# Patient Record
Sex: MALE | Race: WHITE | NOT HISPANIC OR LATINO
[De-identification: names, ages, dates, MRNs, and addresses within clinical notes are randomized per-mention and may not be internally consistent; named-entity substitution may affect disease eponyms.]

---

## 2023-03-07 ENCOUNTER — LABORATORY RESULT (OUTPATIENT)
Age: 63
End: 2023-03-07

## 2023-03-07 ENCOUNTER — NON-APPOINTMENT (OUTPATIENT)
Age: 63
End: 2023-03-07

## 2023-03-07 ENCOUNTER — APPOINTMENT (OUTPATIENT)
Dept: HEART AND VASCULAR | Facility: CLINIC | Age: 63
End: 2023-03-07
Payer: COMMERCIAL

## 2023-03-07 VITALS
BODY MASS INDEX: 30.92 KG/M2 | WEIGHT: 204 LBS | HEIGHT: 68 IN | RESPIRATION RATE: 14 BRPM | HEART RATE: 75 BPM | DIASTOLIC BLOOD PRESSURE: 62 MMHG | SYSTOLIC BLOOD PRESSURE: 100 MMHG

## 2023-03-07 DIAGNOSIS — Z82.49 FAMILY HISTORY OF ISCHEMIC HEART DISEASE AND OTHER DISEASES OF THE CIRCULATORY SYSTEM: ICD-10-CM

## 2023-03-07 DIAGNOSIS — Z87.891 PERSONAL HISTORY OF NICOTINE DEPENDENCE: ICD-10-CM

## 2023-03-07 DIAGNOSIS — Z87.19 PERSONAL HISTORY OF OTHER DISEASES OF THE DIGESTIVE SYSTEM: ICD-10-CM

## 2023-03-07 DIAGNOSIS — Z87.898 PERSONAL HISTORY OF OTHER SPECIFIED CONDITIONS: ICD-10-CM

## 2023-03-07 DIAGNOSIS — Z00.00 ENCOUNTER FOR GENERAL ADULT MEDICAL EXAMINATION W/OUT ABNORMAL FINDINGS: ICD-10-CM

## 2023-03-07 PROCEDURE — 36415 COLL VENOUS BLD VENIPUNCTURE: CPT

## 2023-03-07 PROCEDURE — 93306 TTE W/DOPPLER COMPLETE: CPT

## 2023-03-07 PROCEDURE — 93000 ELECTROCARDIOGRAM COMPLETE: CPT

## 2023-03-07 PROCEDURE — 99204 OFFICE O/P NEW MOD 45 MIN: CPT | Mod: 25

## 2023-03-07 RX ORDER — CARVEDILOL 6.25 MG/1
6.25 TABLET, FILM COATED ORAL TWICE DAILY
Refills: 0 | Status: DISCONTINUED | COMMUNITY
End: 2023-03-07

## 2023-03-07 NOTE — HISTORY OF PRESENT ILLNESS
[FreeTextEntry1] : Pt reports going to [facility] w major UGIB requiring PRBC's. He was experiencing chest pain due to the low H/H and was found to have esophageal varices (which were partially banded) (?status of liver?), and TVD by cardiac cath. Before leaving the hospital. pt. was told he needed bypass surgery. He comes in today for second opinion and management of cardiac disease. Pt also reports not drinking for past 2 weeks since discharge.\par Cardiac Murmur / CAD - Echo ordered to assess LV function/possible valvular heart disease.

## 2023-03-07 NOTE — END OF VISIT
[FreeTextEntry3] : All medical record entries made by the Scribe were at my, Dr. Drew Box, direction and personally dictated by me on 03/07/2023. I have reviewed the chart and agree that the record accurately reflects my personal performance of the history, physical exam, assessment and plan. I have also personally directed, reviewed, and agreed with the chart.  [Time Spent: ___ minutes] : I have spent [unfilled] minutes of time on the encounter.

## 2023-03-07 NOTE — DISCUSSION/SUMMARY
[Coronary Artery Disease] : coronary artery disease [Stable] : stable [None] : There are no changes in medication management [Dietary Modification] : dietary modification [Patient] : discussed with the patient [FreeTextEntry1] : H/o esophageal varices- GI referral given for repeat EGD post banding procedure at hospital.\par CAD- stable at present. Medication adjusted. Will attempt to obtain cardiac cath report. Normal LV fxn by echo done today.\par Cardiac Murmur- Stable; no further intervention at this time (see echo). \par Blood work drawn. Maintain a low caloric, low sodium, low cholesterol diet. Dietary counseling given, diet and exercise discussed in detail.

## 2023-03-07 NOTE — PHYSICAL EXAM

## 2023-03-08 LAB
ALBUMIN SERPL ELPH-MCNC: 3.6 G/DL
ALP BLD-CCNC: 161 U/L
ALT SERPL-CCNC: 37 U/L
ANION GAP SERPL CALC-SCNC: 12 MMOL/L
AST SERPL-CCNC: 81 U/L
BASOPHILS # BLD AUTO: 0.07 K/UL
BASOPHILS NFR BLD AUTO: 0.9 %
BILIRUB SERPL-MCNC: 2.2 MG/DL
BUN SERPL-MCNC: 10 MG/DL
CALCIUM SERPL-MCNC: 9.2 MG/DL
CHLORIDE SERPL-SCNC: 105 MMOL/L
CHOLEST SERPL-MCNC: 128 MG/DL
CO2 SERPL-SCNC: 22 MMOL/L
CREAT SERPL-MCNC: 1.23 MG/DL
EGFR: 66 ML/MIN/1.73M2
EOSINOPHIL # BLD AUTO: 0.21 K/UL
EOSINOPHIL NFR BLD AUTO: 2.6 %
ESTIMATED AVERAGE GLUCOSE: 134 MG/DL
GLUCOSE SERPL-MCNC: 130 MG/DL
HBA1C MFR BLD HPLC: 6.3 %
HCT VFR BLD CALC: 28.5 %
HDLC SERPL-MCNC: 26 MG/DL
HGB BLD-MCNC: 9.8 G/DL
IMM GRANULOCYTES NFR BLD AUTO: 0.2 %
INR PPP: 1.78 RATIO
LDLC SERPL CALC-MCNC: 86 MG/DL
LYMPHOCYTES # BLD AUTO: 1.19 K/UL
LYMPHOCYTES NFR BLD AUTO: 14.7 %
MAN DIFF?: NORMAL
MCHC RBC-ENTMCNC: 30.2 PG
MCHC RBC-ENTMCNC: 34.4 GM/DL
MCV RBC AUTO: 87.7 FL
MONOCYTES # BLD AUTO: 1.01 K/UL
MONOCYTES NFR BLD AUTO: 12.5 %
NEUTROPHILS # BLD AUTO: 5.58 K/UL
NEUTROPHILS NFR BLD AUTO: 69.1 %
NONHDLC SERPL-MCNC: 102 MG/DL
PLATELET # BLD AUTO: 199 K/UL
POTASSIUM SERPL-SCNC: 4.4 MMOL/L
PROT SERPL-MCNC: 8.1 G/DL
PT BLD: 21.1 SEC
RBC # BLD: 3.25 M/UL
RBC # FLD: 17.2 %
SODIUM SERPL-SCNC: 139 MMOL/L
T3 SERPL-MCNC: 99 NG/DL
T3FREE SERPL-MCNC: 2.21 PG/ML
T3RU NFR SERPL: 0.9 TBI
T4 FREE SERPL-MCNC: 1.3 NG/DL
T4 SERPL-MCNC: 9.7 UG/DL
TRIGL SERPL-MCNC: 80 MG/DL
TSH SERPL-ACNC: 4.91 UIU/ML
WBC # FLD AUTO: 8.08 K/UL

## 2023-03-15 ENCOUNTER — APPOINTMENT (OUTPATIENT)
Dept: HEPATOLOGY | Facility: CLINIC | Age: 63
End: 2023-03-15
Payer: COMMERCIAL

## 2023-03-15 VITALS
SYSTOLIC BLOOD PRESSURE: 93 MMHG | BODY MASS INDEX: 31.22 KG/M2 | TEMPERATURE: 98.3 F | HEIGHT: 68 IN | OXYGEN SATURATION: 100 % | HEART RATE: 70 BPM | WEIGHT: 206 LBS | DIASTOLIC BLOOD PRESSURE: 65 MMHG

## 2023-03-15 PROCEDURE — 99205 OFFICE O/P NEW HI 60 MIN: CPT

## 2023-03-15 NOTE — END OF VISIT
[FreeTextEntry3] : Attending note \par I have seen and examined patient at bedside. I agree with hx, ROS, physical examination, imp/suggestions as written by Ms. Kayce Hong NP. Please see my note.\par \par Patient presented to [facility] with chest pain.  He underwent cardiac catheterization which was reportedly showed three-vessel disease.  He was found to be very anemic and he had black his stool.  He underwent upper endoscopy and banding of esophageal varices.  He left the CD which we are going to upload into our system.\par This is a case of alcohol associated liver disease with cirrhosis, portal hypertension and upper GI bleeding related to varices.\par Chest pain could have been due to demand ischemia in the context of significant coronary artery disease.  He was recommended to undergo CABG because of inability to tolerate antiplatelet agents.  He carries a history of aspirin allergy.\par Importance of alcohol cessation was discussed with him in details.  I informed him that at this point, Which carries significant risk of mortality and morbidity for him.  It would be reasonable to stop drinking alcohol which would help improving the liver function.  Subsequently, he might be a better candidate for treatment of coronary artery disease.\par An EGD will be arranged to assess the esophageal varices\par Low-salt diet was discussed with him\par He is on Lopressor or as a beta-blocker.  This is a cardioselective medication and I would like to have him on a noncardioselective beta-blocker such as Coreg which would help both his cardiac disease and portal hypertension.\par Apparently, he carries a history of hepatitis C.  Recent set of labs to assess this.\par Importance of HCC screening was discussed with him in details

## 2023-03-15 NOTE — ASSESSMENT
[FreeTextEntry1] : \par 63 M with hx CAD,  ARLD with portal htn (EV and GIB s/p banding), here for hep f/u.\par \par labs today\par HCC screening, will request imaging done at [facility]\par etoh - encouraged alc cessation\par needs repeat EGD, to eradicate varices, to scd (asap, next week?)\par reassess heart after liver stablized\par CAD, cont f/u with cardiologist dr. Box, clarify beta blockade. \par \par \par RTC 3 weeks\par \par

## 2023-03-15 NOTE — HISTORY OF PRESENT ILLNESS
[de-identified] : 62 yo [place] ([facility]) M with hx CAD, rectal bleeding, LIZ, gastric ulcer, gout, alcoholic liver disease with elev LFTs (TB 2.2, ) and recent admission 2023 [facility] for chest pain (found to have TVD and referred to Card) and UGIB requiring PRBC's, EGD and banding,  Referred by cardiologist  for GI/hep f/u given EVB abd GIB and need for repeat EGD\par \par no drink since hosp discharge late 2023\par Background\par Pt was experiencing chest pain due to the low H/H and was found to have esophageal varices (which were partially banded) (?status of liver?), and tricuspid valve disease (TVD) by cardiac cath. Before leaving the hospital pt was told he needed bypass surgery. Pt reports not drinking for past 2 weeks since discharge.\par \par PMH\par HCV (per report)\par HTN\par per pt - mini stroke ()\par rectal bleeding, started in January (dark stools)\par \par FAM HX\par no liver disease\par mother with ' white blood' cancer ()\par Father, heart disease, open heart surgery, aliver\par \par STUDIES\par \par TTE  3/7/23\par EF 55-60%\par 1. The left ventricular cavity is normal in size. The left ventricular wall thickness is normal. The left ventricular systolic function is normal with an ejection fraction visually estimated at 55 to 60 %. There are no regional wall motion abnormalities seen.\par 2. Mild to moderate mitral regurgitation.\par 3. 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 \par former smoker, smoked 2 -3 pk/day for 40 yrs, quit  after stroke\par Etoh, 'a lot', drank vodka 1-2 day for 4 yrs ago after lost job, prior to that drinking couple pints weekends, stopped 3 weeks ago after hospitalization\par no herbals/no illicit drugs\par Covid vaccination, never rec'd - declined\par Covid infection - 2019, mild case\par \par Surgical Hx\par none\par \par EGD 23\par z line regular\par medium HH\par G3EV, incompletely eradicated, banded\par gstritis\par gastric ulcer with no stigmate of bleeding\par normal duodenal bulb\par \par LABS  3/7/23\par INR 1.78\par Hb 9.8\par PLTs 199\par TB 2.3\par AST 81\par ALT 37\par \par SCr 1.23\par \par \par INTERVAL HX\par feels 'dizzy sometimes'  lost a lot of weight in hospital\par denies abdl pain, NVD, CP, SOB, HA, fevers\par occasional chills in evening\par BM daily, last rectal bleeding 2023\par per wife, sometimes with forgetful\par L shoulder pain (seen by cardiologist)\par \par MEDICATIONS\par  · Allopurinol 100 MG daily\par  · Atorvastatin Calcium 80 MG nightly\par  · Ezetimibe 10 MG bedtime\par  · Folic Acid 1 MG daily\par  · Metoprolol Tartrate 50 MG bid\par  · Olmesartan-amLODIPine-HCTZ 40-10-12.5 MG daily\par  · Pantoprazole Sodium 40 MG Oral Tablet Delayed Release Bid\par  · Vitamin D 36571 UNIT CAPS; TAKE 1 CAPSULE WEEKLY

## 2023-03-15 NOTE — PHYSICAL EXAM
[General Appearance - Alert] : alert [General Appearance - In No Acute Distress] : in no acute distress [PERRL With Normal Accommodation] : pupils were equal in size, round, and reactive to light [Auscultation Breath Sounds / Voice Sounds] : lungs were clear to auscultation bilaterally [Heart Rate And Rhythm] : heart rate was normal and rhythm regular [Edema] : there was no peripheral edema [Abdomen Soft] : soft [Abdomen Tenderness] : non-tender [Abnormal Walk] : normal gait [Nail Clubbing] : no clubbing  or cyanosis of the fingernails [] : no rash [Oriented To Time, Place, And Person] : oriented to person, place, and time [Scleral Icterus] : No Scleral Icterus [Spider Angioma] : No spider angioma(s) were observed [Asterixis] : no asterixis observed [Jaundice] : No jaundice [FreeTextEntry1] : +murmur

## 2023-03-16 ENCOUNTER — LABORATORY RESULT (OUTPATIENT)
Age: 63
End: 2023-03-16

## 2023-03-21 LAB
AFP-TM SERPL-MCNC: 2.2 NG/ML
ALBUMIN SERPL ELPH-MCNC: 3.3 G/DL
ALP BLD-CCNC: 216 U/L
ALT SERPL-CCNC: 29 U/L
ANION GAP SERPL CALC-SCNC: 15 MMOL/L
AST SERPL-CCNC: 69 U/L
BASOPHILS # BLD AUTO: 0.21 K/UL
BASOPHILS NFR BLD AUTO: 2.5 %
BILIRUB SERPL-MCNC: 1.7 MG/DL
BUN SERPL-MCNC: 13 MG/DL
CALCIUM SERPL-MCNC: 9.1 MG/DL
CHLORIDE SERPL-SCNC: 105 MMOL/L
CK BB SERPL ELPH-CCNC: 0 %
CK MB CFR SERPL ELPH: 0 %
CK MM SERPL ELPH-CCNC: 100 %
CO2 SERPL-SCNC: 22 MMOL/L
CREAT SERPL-MCNC: 1.36 MG/DL
CREATINE KINASE,TOTAL,SERUM: 92 U/L
EGFR: 58 ML/MIN/1.73M2
EOSINOPHIL # BLD AUTO: 0.07 K/UL
EOSINOPHIL NFR BLD AUTO: 0.8 %
FERRITIN SERPL-MCNC: 39 NG/ML
GLUCOSE SERPL-MCNC: 170 MG/DL
HBV CORE IGG+IGM SER QL: REACTIVE
HBV SURFACE AB SERPL IA-ACNC: 11.4 MIU/ML
HBV SURFACE AG SER QL: NONREACTIVE
HCT VFR BLD CALC: 29.5 %
HCV AB SER QL: NONREACTIVE
HCV RNA SERPL NAA+PROBE-LOG IU: NOT DETECTED LOGIU/ML
HCV S/CO RATIO: 0.18 S/CO
HEPATITIS A IGG ANTIBODY: REACTIVE
HEPATITIS A IGG ANTIBODY: REACTIVE
HEPC RNA INTERP: NOT DETECTED
HGB BLD-MCNC: 9.5 G/DL
IRON SATN MFR SERPL: 8 %
IRON SERPL-MCNC: 27 UG/DL
LYMPHOCYTES # BLD AUTO: 1.23 K/UL
LYMPHOCYTES NFR BLD AUTO: 14.3 %
MACRO TYPE 1: 0 %
MACRO TYPE 2: 0 %
MAN DIFF?: NORMAL
MCHC RBC-ENTMCNC: 27.8 PG
MCHC RBC-ENTMCNC: 32.2 GM/DL
MCV RBC AUTO: 86.3 FL
MONOCYTES # BLD AUTO: 0.8 K/UL
MONOCYTES NFR BLD AUTO: 9.3 %
NEUTROPHILS # BLD AUTO: 6.27 K/UL
NEUTROPHILS NFR BLD AUTO: 73.1 %
PLATELET # BLD AUTO: 159 K/UL
POTASSIUM SERPL-SCNC: 3.6 MMOL/L
PROT SERPL-MCNC: 7.8 G/DL
RBC # BLD: 3.42 M/UL
RBC # FLD: 17.8 %
SODIUM SERPL-SCNC: 142 MMOL/L
TIBC SERPL-MCNC: 329 UG/DL
UIBC SERPL-MCNC: 303 UG/DL
WBC # FLD AUTO: 8.58 K/UL

## 2023-03-23 LAB — HCV GENTYP BLD NAA+PROBE: NOT DETECTED

## 2023-03-29 ENCOUNTER — APPOINTMENT (OUTPATIENT)
Dept: MRI IMAGING | Facility: CLINIC | Age: 63
End: 2023-03-29
Payer: COMMERCIAL

## 2023-03-29 ENCOUNTER — APPOINTMENT (OUTPATIENT)
Dept: HEPATOLOGY | Facility: CLINIC | Age: 63
End: 2023-03-29
Payer: COMMERCIAL

## 2023-03-29 VITALS
WEIGHT: 208 LBS | OXYGEN SATURATION: 100 % | TEMPERATURE: 98.4 F | DIASTOLIC BLOOD PRESSURE: 80 MMHG | BODY MASS INDEX: 31.52 KG/M2 | HEART RATE: 62 BPM | SYSTOLIC BLOOD PRESSURE: 116 MMHG | HEIGHT: 68 IN

## 2023-03-29 PROCEDURE — 74183 MRI ABD W/O CNTR FLWD CNTR: CPT

## 2023-03-29 PROCEDURE — 99214 OFFICE O/P EST MOD 30 MIN: CPT

## 2023-03-29 PROCEDURE — A9585: CPT | Mod: JW

## 2023-03-29 NOTE — ASSESSMENT
[FreeTextEntry1] : \par 63 M with hx CAD, ARLD with cirrhosis cb portal htn (EV and GIB s/p banding), here for hep f/u.\par \par labs reviewed, trending down LFTs, TB down 1.7 (2.2) with rising AP.216 (161), repeat labs next week\par HCC screening, will request imaging done at [facility], [place] obtained, team to scd\par Portal HTN\par - Ascites - none, rec low Na diet \par - EV - on beta blockade - currently on Lopressor?  taking Coreg (need to confirm with cards), need card clearance prior to EGD\par - HE, mild confusion, start Lactulose, start slow, tsp to tbs, monitor, slowly ttitrate to 2-3bm daily, avoid driving\par low Fe 27 and Iron Sat 8%, fatigue, stable Hg 9.5, start Fe after endoscopy, started MVI\par ETOH - encouraged alc cessation\par repeat EGD, to eradicate varices, to scd (asap, scheduled 4/26\par reassess heart after liver stabilized\par CAD, cont f/u with cardiologist dr. Box, clarify beta blockade. \par \par RTC  1 month\par

## 2023-03-29 NOTE — PHYSICAL EXAM
[General Appearance - Alert] : alert [General Appearance - In No Acute Distress] : in no acute distress [PERRL With Normal Accommodation] : pupils were equal in size, round, and reactive to light [Heart Rate And Rhythm] : heart rate was normal and rhythm regular [Murmurs] : no murmurs [Abdomen Soft] : soft [Abdomen Mass (___ Cm)] : no abdominal mass palpated [Abnormal Walk] : normal gait [] : no rash [Skin Lesions] : no skin lesions [Oriented To Time, Place, And Person] : oriented to person, place, and time [Asterixis] : Asterixis observed [Scleral Icterus] : No Scleral Icterus [Spider Angioma] : No spider angioma(s) were observed [Jaundice] : No jaundice

## 2023-03-29 NOTE — HISTORY OF PRESENT ILLNESS
[de-identified] : \par 64 yo [place] ([facility]) M with hx CAD, rectal bleeding, LIZ, gastric ulcer, gout, alcoholic liver disease with elev LFTs (TB 2.2, ) and recent admission 2023 [facility] for chest pain (found to have TVD and referred to Card) and UGIB requiring PRBC's, EGD and banding, Referred by cardiologist  for GI/hep f/u given EVB abd GIB and need for repeat EGD\par \par Accompanied by wife and daughter\par \par no drink since hosp discharge late 2023\par Background\par Pt was experiencing chest pain due to the low H/H and was found to have esophageal varices (which were partially banded) (?status of liver?), and tricuspid valve disease (TVD) by cardiac cath. Before leaving the hospital pt was told he needed bypass surgery. Pt reports not drinking for past 2 weeks since discharge.\par \par PMH\par HCV (per report)\par HTN\par per pt - mini stroke ()\par rectal bleeding, started in January (dark stools)\par \par FAM HX\par no liver disease\par mother with ' white blood' cancer ()\par Father, heart disease, open heart surgery, aliver\par \par STUDIES\par \par TTE 3/7/23\par EF 55-60%\par 1. The left ventricular cavity is normal in size. The left ventricular wall thickness is normal. The left ventricular systolic function is normal with an ejection fraction visually estimated at 55 to 60 %. There are no regional wall motion abnormalities seen.\par 2. Mild to moderate mitral regurgitation.\par 3. 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 \par former smoker, smoked 2 -3 pk/day for 40 yrs, quit  after stroke\par Etoh, 'a lot', drank vodka 1-2 day for 4 yrs ago after lost job, prior to that drinking couple pints weekends, stopped 3 weeks ago after hospitalization\par no herbals/no illicit drugs\par Covid vaccination, never rec'd - declined\par Covid infection - 2019, mild case\par \par Surgical Hx\par none\par \par EGD 23\par z line regular\par medium HH\par G3EV, incompletely eradicated, banded\par gstritis\par gastric ulcer with no stigmate of bleeding\par normal duodenal bulb\par \par LABS 3/16 as compared to (3/7) \par \par AFP 2.2\par Ferritin 39\par Iron 27\par Iron Sat 8%\par TIBC 329\par \par AST 69 (81)\par ALT 29 (37)\par  (161)\par TB 1.7 (2.2)\par SCr 1.36 (1.23)\par eGFR 58 (66)\par AFP 2.2\par Hbsag NR\par Hbcab REACTIVE\par HBsab 11.4 (nonimmune)\par HAV1Gg ab Reactive\par HAV IgM ab Nonreactive\par HCV NR\par \par INTERVAL HX\par feels tired\par hands are always cold since d/c from hosp\par denies abdl pain, some mild discomfort\par denies CP?SOB/fever/chills\par BM daily, 1-2 formed, no rectal bleeding\par nose bleed a couple of days ago, denies hematemesis\par mild confusion per daugher\par

## 2023-03-29 NOTE — END OF VISIT
[FreeTextEntry3] : Attending note\par I have seen and examined patient at bedside. I agree with hx, ROS, physical examination, imp/suggestions as written by Ms. Kayce Hong NP. Please see my note.\par Patient was seen at bedside.  He is accompanied by wife and daughter.\par Physical exam is unchanged\par He has minimal asterixis\par Had a long discussion with him and family and addressed the following\par Natural history of alcohol associated liver disease and cirrhosis\par Importance of  EGD to assess for esophageal varices\par Risks associated with cirrhosis, fall precautions and the risks associated with driving.\par He was a started on lactulose because of minimal asterixis.  He has no confusion.  He is driving with no issue but I have emphasized on the importance of monitoring his overall mental status and stop driving with any sign of encephalopathy.  He and his family were fully educated.\par He is iron deficient but with a hemoglobin of 9.  I would like to wait and see his labs before starting any iron supplements\par Low MELD score precludes him from being a transplant candidate at this time.  Heart issues also barrier for OLT.

## 2023-03-30 ENCOUNTER — NON-APPOINTMENT (OUTPATIENT)
Age: 63
End: 2023-03-30

## 2023-03-30 LAB
AFP-TM SERPL-MCNC: 2.4 NG/ML
ALBUMIN SERPL ELPH-MCNC: 3.3 G/DL
ALP BLD-CCNC: 190 U/L
ALT SERPL-CCNC: 25 U/L
AMMONIA PLAS-MCNC: 72.9 UMOL/L
ANION GAP SERPL CALC-SCNC: 14 MMOL/L
AST SERPL-CCNC: 87 U/L
BASOPHILS # BLD AUTO: 0.06 K/UL
BASOPHILS NFR BLD AUTO: 0.8 %
BILIRUB SERPL-MCNC: 2.2 MG/DL
BUN SERPL-MCNC: 12 MG/DL
CALCIUM SERPL-MCNC: 9.3 MG/DL
CHLORIDE SERPL-SCNC: 104 MMOL/L
CO2 SERPL-SCNC: 24 MMOL/L
CREAT SERPL-MCNC: 1.26 MG/DL
EGFR: 64 ML/MIN/1.73M2
EOSINOPHIL # BLD AUTO: 0.1 K/UL
EOSINOPHIL NFR BLD AUTO: 1.4 %
GLUCOSE SERPL-MCNC: 172 MG/DL
HCT VFR BLD CALC: 30.6 %
HGB BLD-MCNC: 9.8 G/DL
IMM GRANULOCYTES NFR BLD AUTO: 0.3 %
INR PPP: 1.73 RATIO
LYMPHOCYTES # BLD AUTO: 1.4 K/UL
LYMPHOCYTES NFR BLD AUTO: 19.2 %
MAN DIFF?: NORMAL
MCHC RBC-ENTMCNC: 27.3 PG
MCHC RBC-ENTMCNC: 32 GM/DL
MCV RBC AUTO: 85.2 FL
MONOCYTES # BLD AUTO: 0.89 K/UL
MONOCYTES NFR BLD AUTO: 12.2 %
NEUTROPHILS # BLD AUTO: 4.82 K/UL
NEUTROPHILS NFR BLD AUTO: 66.1 %
PLATELET # BLD AUTO: 116 K/UL
POTASSIUM SERPL-SCNC: 4 MMOL/L
PROT SERPL-MCNC: 7.9 G/DL
PT BLD: 20.4 SEC
RBC # BLD: 3.59 M/UL
RBC # FLD: 19 %
SODIUM SERPL-SCNC: 142 MMOL/L
WBC # FLD AUTO: 7.29 K/UL

## 2023-04-03 ENCOUNTER — NON-APPOINTMENT (OUTPATIENT)
Age: 63
End: 2023-04-03

## 2023-04-03 LAB — PHOSPHATIDYETHANOL (PETH), WHOLE BLOOD: 38 NG/ML

## 2023-04-05 ENCOUNTER — APPOINTMENT (OUTPATIENT)
Dept: HEART AND VASCULAR | Facility: CLINIC | Age: 63
End: 2023-04-05
Payer: COMMERCIAL

## 2023-04-05 ENCOUNTER — NON-APPOINTMENT (OUTPATIENT)
Age: 63
End: 2023-04-05

## 2023-04-05 VITALS
BODY MASS INDEX: 31.98 KG/M2 | DIASTOLIC BLOOD PRESSURE: 72 MMHG | WEIGHT: 211 LBS | SYSTOLIC BLOOD PRESSURE: 98 MMHG | HEIGHT: 68 IN | RESPIRATION RATE: 14 BRPM | HEART RATE: 66 BPM

## 2023-04-05 PROCEDURE — 93000 ELECTROCARDIOGRAM COMPLETE: CPT

## 2023-04-05 PROCEDURE — 99214 OFFICE O/P EST MOD 30 MIN: CPT | Mod: 25

## 2023-04-05 RX ORDER — EZETIMIBE 10 MG/1
10 TABLET ORAL
Refills: 0 | Status: DISCONTINUED | COMMUNITY
End: 2023-04-05

## 2023-04-05 NOTE — DISCUSSION/SUMMARY
[Coronary Artery Disease] : coronary artery disease [Stable] : stable [None] : There are no changes in medication management [Dietary Modification] : dietary modification [Patient] : discussed with the patient [FreeTextEntry1] : CAD- Cath report obtained; includes TVD and LM disease as well. All options discussed with pt. including risk of various revascularization procedures in the setting of Liver disease/coagulopathy and h/o GIB.\par Also, discussed at length with Dr. aLwson, (hepatology). Plan is for consultation with CT surgery for possible minimally invasive/Mid-cap LIMA ->LAD. Other coronary lesions may require coronary stents as situation moves forward. Pt. currently symptom free without cardiac complaints.\par Blood work reviewed with patient. Maintain a low caloric, low sodium, low cholesterol diet. Dietary counseling given, diet and exercise discussed in detail.

## 2023-04-05 NOTE — PHYSICAL EXAM

## 2023-04-05 NOTE — HISTORY OF PRESENT ILLNESS
[FreeTextEntry1] : Denies Chest Pain, SOB, Dizziness, Leg edema, Orthopnea, PND, Palpitations, Syncope, WARD, Diaphoresis.

## 2023-04-05 NOTE — END OF VISIT
[Time Spent: ___ minutes] : I have spent [unfilled] minutes of time on the encounter. [FreeTextEntry3] : All medical record entries made by the Scribe were at my, Dr. Drew Box, direction and personally dictated by me on 04/05/2023. I have reviewed the chart and agree that the record accurately reflects my personal performance of the history, physical exam, assessment and plan. I have also personally directed, reviewed, and agreed with the chart.

## 2023-04-14 RX ORDER — FERROUS SULFATE 325(65) MG
325 TABLET ORAL TWICE DAILY
Refills: 0 | Status: COMPLETED | COMMUNITY
End: 2023-04-14

## 2023-04-18 ENCOUNTER — APPOINTMENT (OUTPATIENT)
Dept: CARDIOTHORACIC SURGERY | Facility: CLINIC | Age: 63
End: 2023-04-18
Payer: COMMERCIAL

## 2023-04-18 VITALS
HEART RATE: 71 BPM | WEIGHT: 197 LBS | SYSTOLIC BLOOD PRESSURE: 102 MMHG | DIASTOLIC BLOOD PRESSURE: 61 MMHG | RESPIRATION RATE: 15 BRPM | HEIGHT: 68 IN | BODY MASS INDEX: 29.86 KG/M2 | TEMPERATURE: 97.1 F | OXYGEN SATURATION: 99 %

## 2023-04-18 PROCEDURE — 99203 OFFICE O/P NEW LOW 30 MIN: CPT

## 2023-04-19 ENCOUNTER — NON-APPOINTMENT (OUTPATIENT)
Age: 63
End: 2023-04-19

## 2023-04-19 NOTE — DATA REVIEWED
[FreeTextEntry1] : 3/7/23 Echo: \par 1. LVEF 55-60%\par 2. mild to moderate MR\par 3. mild TR\par \par 2/27/23 Cardiac Cath @ [facility]: \par -50% distal LM\par -60% mid LAD, 80% distal LAD\par -100%  CFX after OM1\par -95% mid RCA\par -100%  RPDA\par -grade 3 collaterals supplying distal CFX\par -severe inferolateral hypokinesis\par -basal inferolateral hypokinesis\par \par 3/29/23 MRI/MRCP Abdomen: IMPRESSION:\par Motion degraded exam.\par \par Heterogeneous somewhat nodular enhancement of the right hepatic lobe near the dome of uncertain etiology, and potentially perfusional, though neoplasm is not excluded. Given motion on this exam, further evaluation with contrast enhanced multiphase CT is recommended, including arterial, portal venous, and delayed phases.\par \par Cirrhosis.\par \par Findings of portal hypertension including trace ascites, splenomegaly, and small perigastric and paraesophageal varices.

## 2023-04-19 NOTE — CONSULT LETTER
[Dear  ___] : Dear  [unfilled], [Please see my note below.] : Please see my note below. [Sincerely,] : Sincerely, [FreeTextEntry2] : Drew Box MD [FreeTextEntry1] : Please find attached our consultation on your patient, PAVEL DORSEY \par We take a multidisciplinary team approach to patient care and consider you, the referring physician, an extension of our team. We will maintain an open line of communication with you throughout your patient's treatment course.  \par It is our commitment to provide your patient with the highest quality of advanced therapeutic options. We thank you for allowing us to participate in the care of your patient.\par Please do not hesitate to contact our team with any questions or concerns at [phone].\par  [FreeTextEntry3] : Kev Hinton MD, FRCS\par \par [facility] \par Department of Cardiothoracic  Surgery \par Director of Robotic Cardiac Surgery\par Professor of Cardiovascular & Thoracic Surgery\par [employer] \par \par [facility]\par

## 2023-04-19 NOTE — HISTORY OF PRESENT ILLNESS
[FreeTextEntry1] : 62 yo male presents with PMH of HTN, HLD, pre-diabetes, gout,  former tobacco/ETOH use, cirrhosis, esophageal varices, GI bleed and TIA (4 years ago) with severe 3 vessel CAD. Patient sent to [facility] ED from PCP office on 2/18/23 with c/o intermittent chest pain x 2 weeks that was worse on exertion and relieved with rest. CCS 3. In ED his EKG noted to had ST depressions and troponin 0.27. Additionally his history is significant for  black stools and anemia ( hgb 7.7). Work up revealed Grade III esophageal varices.  s/p incomplete banding. Cardiac cath on 2/27/23 revealed distal LM stenosis and 3 vessel CAD. He was discharged home on 3/3. 3/29/23 s/p MRI/MRCP Abdomen that revealed  heterogeneous nodule of the hepatic lobe, cirrhosis, portal hypertension including trace ascites, splenomegaly and small perigastric and paraesophageal varices. \par \par Patient presents today for surgical consult with Dr. Hinton accompanied by his wife. He appears generally well, NAD. He reports prn dizziness.  CCS III.

## 2023-04-19 NOTE — END OF VISIT
[Time Spent: ___ minutes] : I have spent [unfilled] minutes of time on the encounter. [FreeTextEntry3] : I, ROC MUNROE , am scribing for and in the presence of MARK PURI the following sections: History of present illness, past Medical/family/surgical/family/social history, review of systems, vital signs, physical exam and disposition.\par I personally performed the services described in the documentation, reviewed the documentation recorded by the scribe in my presence and it accurately and completely records my words and actions.\par

## 2023-04-19 NOTE — PHYSICAL EXAM
[General Appearance - Alert] : alert [General Appearance - In No Acute Distress] : in no acute distress [Sclera] : the sclera and conjunctiva were normal [PERRL With Normal Accommodation] : pupils were equal in size, round, and reactive to light [Extraocular Movements] : extraocular movements were intact [Outer Ear] : the ears and nose were normal in appearance [Oropharynx] : the oropharynx was normal [Neck Appearance] : the appearance of the neck was normal [Neck Cervical Mass (___cm)] : no neck mass was observed [Jugular Venous Distention Increased] : there was no jugular-venous distention [Thyroid Diffuse Enlargement] : the thyroid was not enlarged [Thyroid Nodule] : there were no palpable thyroid nodules [Auscultation Breath Sounds / Voice Sounds] : lungs were clear to auscultation bilaterally [Heart Rate And Rhythm] : heart rate was normal and rhythm regular [Heart Sounds] : normal S1 and S2 [Heart Sounds Gallop] : no gallops [Murmurs] : no murmurs [Heart Sounds Pericardial Friction Rub] : no pericardial rub [Examination Of The Chest] : the chest was normal in appearance [Chest Visual Inspection Thoracic Asymmetry] : no chest asymmetry [Diminished Respiratory Excursion] : normal chest expansion [2+] : left 2+ [No Abnormalities] : the abdominal aorta was not enlarged and no bruit was heard [Bowel Sounds] : normal bowel sounds [Abdomen Soft] : soft [Abdomen Tenderness] : non-tender [Abdomen Mass (___ Cm)] : no abdominal mass palpated [No CVA Tenderness] : no ~M costovertebral angle tenderness [No Spinal Tenderness] : no spinal tenderness [Abnormal Walk] : normal gait [Nail Clubbing] : no clubbing  or cyanosis of the fingernails [Musculoskeletal - Swelling] : no joint swelling seen [Motor Tone] : muscle strength and tone were normal [Skin Color & Pigmentation] : normal skin color and pigmentation [Skin Turgor] : normal skin turgor [] : no rash [Deep Tendon Reflexes (DTR)] : deep tendon reflexes were 2+ and symmetric [Sensation] : the sensory exam was normal to light touch and pinprick [No Focal Deficits] : no focal deficits [Oriented To Time, Place, And Person] : oriented to person, place, and time [Impaired Insight] : insight and judgment were intact [Affect] : the affect was normal [Right Carotid Bruit] : no bruit heard over the right carotid [Left Carotid Bruit] : no bruit heard over the left carotid [Right Femoral Bruit] : no bruit heard over the right femoral artery [Left Femoral Bruit] : no bruit heard over the left femoral artery [FreeTextEntry1] : no pedal edema

## 2023-04-19 NOTE — ASSESSMENT
[FreeTextEntry1] : 64 yo male presents with PMH of HTN, HLD, pre-diabetes, gout,  former tobacco/ETOH use, cirrhosis(meld score 18), esophageal varices, GI bleed and TIA (4 years ago) with severe 3 vessel CAD. Dr. Hinton reviewed the cardiac cath imaging and reports with the patient and his wife and discussed the case with Dr. Box.  Dr. Hinton deems him high risk for surgery s/t liver dysfunction w/meld score of 18. He discussed that he is not a candidate for PCI/stents s/t inability to tolerate DAPT. Dr. Hinton recommends that he follow up with Dr. Lawson for management of liver dysfunction. Once optimized and cleared we will schedule him for off pump CABG x 3. All questions were addressed.\par

## 2023-04-24 ENCOUNTER — APPOINTMENT (OUTPATIENT)
Age: 63
End: 2023-04-24
Payer: COMMERCIAL

## 2023-04-24 PROCEDURE — 74170 CT ABD WO CNTRST FLWD CNTRST: CPT

## 2023-04-26 ENCOUNTER — OUTPATIENT (OUTPATIENT)
Dept: OUTPATIENT SERVICES | Facility: HOSPITAL | Age: 63
LOS: 1 days | Discharge: HOME | End: 2023-04-26
Payer: COMMERCIAL

## 2023-04-26 ENCOUNTER — APPOINTMENT (OUTPATIENT)
Dept: HEPATOLOGY | Facility: HOSPITAL | Age: 63
End: 2023-04-26

## 2023-04-26 VITALS
WEIGHT: 207.9 LBS | OXYGEN SATURATION: 100 % | TEMPERATURE: 97 F | DIASTOLIC BLOOD PRESSURE: 81 MMHG | HEIGHT: 68 IN | SYSTOLIC BLOOD PRESSURE: 128 MMHG | RESPIRATION RATE: 16 BRPM | HEART RATE: 71 BPM

## 2023-04-26 PROCEDURE — 43244 EGD VARICES LIGATION: CPT

## 2023-04-26 PROCEDURE — C1889: CPT

## 2023-04-26 DEVICE — LIGATOR MULTI BAND/6 KITS: Type: IMPLANTABLE DEVICE | Status: FUNCTIONAL

## 2023-04-26 NOTE — PRE-ANESTHESIA EVALUATION ADULT - NSRADCARDRESULTSFT_GEN_ALL_CORE
TTE 3/7/23  EF 55-60%  1. The left ventricular cavity is normal in size. The left ventricular wall thickness is normal. The left ventricular systolic function is normal with an ejection fraction visually estimated at 55 to 60 %. There are no regional wall motion abnormalities seen.  2. Mild to moderate mitral regurgitation.  3. Mild tricuspid regurgitation.

## 2023-04-26 NOTE — PRE-ANESTHESIA EVALUATION ADULT - NSANTHGENDERRD_ENT_A_CORE
Physical Therapy  Attempted to see pt. Pt requests to be seen later d/t his breakfast just arriving. Will cont as able. Davonte Ferro. Maria Isabel Mackey PTA  Returned at 1000, pt agreeable to therapy    Physical Therapy Treatment Note  Name: Lida Borden MRN: [id number] :   1969   Date:  3/28/2020   Admission Date: 3/16/2020 Room:  [address] [place] [address]   Restrictions/Precautions:        fall risk  Communication with other providers:  Elder Fisher RN states pt is ok to see for therapy, hemoglobin is 7.0  Subjective:  Patient states:  He is ready for ex or getting up in the chair  Pain:   Location, Type, Intensity (0/10 to 10/10): Headache 4/10  Objective:    Observation:  Pt was resting in the bed  Treatment, including education/measures:  Transfers  Supine to sit : Ind  Scooting :SBA  Sit to stand :CGA to min a d/t balance  Stand to sit :min A d/t pt not reaching back for the arms of the chair to control sitting  SPT:min A for balance  Sitting Exercises: Ankle pumps x 20  Glute sets x 20  LAQ's x 20  Marching x 20   Pillow squeezes x 20  Hip Abd x 20  Therapeutic Exercise:  Therapeutic exercises were instructed today. Cues were given for technique, safety, recruitment, and rationale. Cues were verbal and/or tactile. Safety  Patient left safely in the chair, with call light/phone in reach with alarm applied. Gait belt was used for transfers.   Assessment / Impression:     Patient's tolerance of treatment:  Good, very cooperative, sat O2 remained between 93%-96%   Adverse Reaction: none  Significant change in status and impact:  none  Barriers to improvement:  Weakness, neuropathy in both LE's  Plan for Next Session:    Will cont to work towards pt's goals per his tolerance  Time in:  1000  Time out:  1053  Timed treatment minutes:  53  Total treatment time:  48  Previously filed items:  Social/Functional History  Type of Home: Homeless  ADL Assistance: Independent  Ambulation Assistance: Independent  Transfer Assistance: Independent  Active : No  Mode of Transportation: Walk  Type of occupation: Pt is homeless  Additional Comments: Pt reports no recent falls in last 6 months  Short term goals  Time Frame for Short term goals: 1 week   Short term goal 1: Pt will perform sit><supine Christian   Short term goal 2: Pt will transfer sit><stand Christian   Short term goal 3: Pt will transfer between surfaces Christian   Short term goal 4: Pt will ambulate 100ft with [id number]      Electronically signed by:     Beryle Ramsay PTA  3/28/2020, 10:43 AM Yes

## 2023-04-26 NOTE — PRE-ANESTHESIA EVALUATION ADULT - NSATTENDATTESTRD_GEN_ALL_CORE
[General Appearance - Well Developed] : well developed [Normal Appearance] : normal appearance [Well Groomed] : well groomed [General Appearance - Well Nourished] : well nourished [General Appearance - In No Acute Distress] : no acute distress [Normal Conjunctiva] : the conjunctiva exhibited no abnormalities [Eyelids - No Xanthelasma] : the eyelids demonstrated no xanthelasmas [Normal Oral Mucosa] : normal oral mucosa [No Oral Pallor] : no oral pallor The patient has been re-examined and I agree with the above assessment or I updated with my findings. [No Oral Cyanosis] : no oral cyanosis [Respiration, Rhythm And Depth] : normal respiratory rhythm and effort [Exaggerated Use Of Accessory Muscles For Inspiration] : no accessory muscle use [Auscultation Breath Sounds / Voice Sounds] : lungs were clear to auscultation bilaterally [Heart Rate And Rhythm] : heart rate and rhythm were normal [Heart Sounds] : normal S1 and S2 [Murmurs] : no murmurs present [Edema] : no peripheral edema present [Abnormal Walk] : normal gait [Gait - Sufficient For Exercise Testing] : the gait was sufficient for exercise testing [Nail Clubbing] : no clubbing of the fingernails [Cyanosis, Localized] : no localized cyanosis [Skin Color & Pigmentation] : normal skin color and pigmentation [Skin Turgor] : normal skin turgor [] : no rash [Impaired Insight] : insight and judgment were intact [Affect] : the affect was normal [Memory Recent] : recent memory was not impaired [FreeTextEntry1] : No JVD, no carotid artery bruits auscultated bilaterally

## 2023-04-26 NOTE — PRE-ANESTHESIA EVALUATION ADULT - NSANTHINPATMEDSFT_GEN_ALL_CORE
Allopurinol 100 MG Oral Tablet; TAKE 1 TABLET DAILY  Atorvastatin Calcium 80 MG Oral Tablet; TAKE 1 TABLET AT BEDTIME  Ezetimibe 10 MG Oral Tablet; TAKE 1 TABLET AT BEDTIME  Folic Acid 1 MG Oral Tablet; TAKE 1 TABLET DAILY  Metoprolol Tartrate 50 MG Oral Tablet; TAKE 1 TABLET BY MOUTH TWICE A DAY  Olmesartan-amLODIPine-HCTZ 40-10-12.5 MG Oral Tablet; TAKE 1 TABLET BY MOUTH  ONCE DAILY  Pantoprazole Sodium 40 MG Oral Tablet Delayed Release; TAKE 1 TABLET TWICE DAILY  30 MINUTES BEFORE BREAKFAST AND DINNER  Vitamin D 06860 UNIT CAPS; TAKE 1 CAPSULE WEEKLY

## 2023-04-26 NOTE — PRE-ANESTHESIA EVALUATION ADULT - NSANTHPMHFT_GEN_ALL_CORE
HTN, HCV, TIA 2006, Esophageal Varices, Cirrhosis, CAD, Anemia, PUD, Rectal Bleeding, s/p emergency esophageal banding.

## 2023-05-02 ENCOUNTER — NON-APPOINTMENT (OUTPATIENT)
Age: 63
End: 2023-05-02

## 2023-05-11 ENCOUNTER — APPOINTMENT (OUTPATIENT)
Dept: HEPATOLOGY | Facility: CLINIC | Age: 63
End: 2023-05-11
Payer: COMMERCIAL

## 2023-05-11 VITALS
BODY MASS INDEX: 30.41 KG/M2 | DIASTOLIC BLOOD PRESSURE: 73 MMHG | WEIGHT: 200 LBS | OXYGEN SATURATION: 98 % | HEART RATE: 84 BPM | SYSTOLIC BLOOD PRESSURE: 123 MMHG

## 2023-05-11 PROCEDURE — 99214 OFFICE O/P EST MOD 30 MIN: CPT

## 2023-05-12 LAB — PHOSPHATIDYETHANOL (PETH), WHOLE BLOOD: NEGATIVE

## 2023-05-12 NOTE — REVIEW OF SYSTEMS
[Fever] : no fever [Chills] : no chills [Feeling Poorly] : not feeling poorly [Feeling Tired] : not feeling tired [Nosebleeds] : no nosebleeds [Nasal Discharge] : no nasal discharge [Sore Throat] : no sore throat [Hoarseness] : no hoarseness [Chest Pain] : no chest pain [Palpitations] : no palpitations [Leg Claudication] : no intermittent leg claudication [Lower Ext Edema] : no extremity edema [Shortness Of Breath] : no shortness of breath [Wheezing] : no wheezing [Cough] : no cough [SOB on Exertion] : no shortness of breath during exertion [Abdominal Pain] : no abdominal pain [Vomiting] : no vomiting [Constipation] : no constipation [Diarrhea] : no diarrhea [Melena] : no melena [Itching] : no itching

## 2023-05-12 NOTE — PHYSICAL EXAM
[Non-Tender] : non-tender [General Appearance - Alert] : alert [General Appearance - In No Acute Distress] : in no acute distress [General Appearance - Well Nourished] : well nourished [General Appearance - Well Developed] : well developed [Neck Appearance] : the appearance of the neck was normal [] : no respiratory distress [Exaggerated Use Of Accessory Muscles For Inspiration] : no accessory muscle use [Veins - Varicosity Changes] : there were no varicosital changes [Abdomen Soft] : soft [Abdomen Tenderness] : non-tender [Abdomen Mass (___ Cm)] : no abdominal mass palpated [Oriented To Time, Place, And Person] : oriented to person, place, and time [Abdominal  Ascites] : no ascites [Jaundice] : No jaundice [FreeTextEntry1] : +minimal b/l LE edema

## 2023-05-12 NOTE — ASSESSMENT
[FreeTextEntry1] : \par \par 63 M with hx CAD, ARLD with cirrhosis cb portal htn (EV and GIB s/p banding), here for hep f/u. EGD with EV s/p banded. Pt on Coreg. \par \par \par HCC screening: Abd CT 4/24/23: no hcc \par Portal HTN\par - Ascites - none, rec low Na diet \par - EV - on Coreg\par - HE, pt with at least 1 BM per day.\par ETOH - encouraged alc cessation\par reassess heart after liver stabilized\par CAD, cont f/u with cardiologist dr. Box.\par Refer to liver transplant eval. \par \par RTC in 1 week.\par

## 2023-05-12 NOTE — HISTORY OF PRESENT ILLNESS
[FreeTextEntry1] : 62 yo [place] ([facility]) M with hx CAD, rectal bleeding, LIZ, gastric ulcer, gout, alcoholic liver disease with elev LFTs (TB 2.2, ) and admission 2023 [facility] for chest pain (found to have TVD and referred to Card) and UGIB requiring PRBC's, EGD and banding, Referred by cardiologist  for GI/hep f/u given EVB abd GIB and need for repeat EGD.\par \par Repeat EGD 23:  Esophageal Varices. One column of large esophageal varix with one red spot. Three bands were placed. Moderate portal hypertensive gastropathy. Moderate portal hypertensive gastropathy. -No gastric varix. Mild edema of duodenal mucosa. \par \par Accompanied by wife. Pt on Coreg. \par \par No drink since hosp discharge late 2023. Reports daily BM. Reports good appetite. Pt seen cardio and they recommend, once optimized and cleared we will schedule him for off pump CABG x 3\par \par \par Background\par Pt was experiencing chest pain due to the low H/H and was found to have esophageal varices (which were partially banded) (?status of liver?), and tricuspid valve disease (TVD) by cardiac cath. Before leaving the hospital pt was told he needed bypass surgery. Pt reports not drinking for past 2 weeks since discharge.\par \par PMH\par HCV (per report)\par HTN\par per pt - mini stroke ()\par rectal bleeding, started in January (dark stools)\par \par FAM HX\par no liver disease\par mother with ' white blood' cancer ()\par Father, heart disease, open heart surgery, aliver\par \par STUDIES\par \par Abd CT 23: No suspicious liver lesion and no findings to correlate with recent MRI.\par \par EGD 23: Esophageal Varices. One column of large esophageal varix with one red spot. \par Three bands were placed. Moderate portal hypertensive gastropathy. Moderate portal hypertensive gastropathy. No gastric varix. Mild edema of duodenal mucosa. \par \par Abd MRI 3/29/23: Motion degraded exam. Heterogeneous somewhat nodular enhancement of the right hepatic lobe near the dome of uncertain etiology, and potentially perfusional, though neoplasm is not excluded. Given motion on this exam, further evaluation with contrast enhanced multiphase CT is recommended, including arterial, portal venous, and delayed phases. Cirrhosis.\par Findings of portal hypertension including trace ascites, splenomegaly, and small perigastric and paraesophageal varices.\par \par \par TTE 3/7/23\par EF 55-60%\par 1. The left ventricular cavity is normal in size. The left ventricular wall thickness is normal. The left ventricular systolic function is normal with an ejection fraction visually estimated at 55 to 60 %. There are no regional wall motion abnormalities seen.\par 2. Mild to moderate mitral regurgitation.\par 3. 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 \par former smoker, smoked 2 -3 pk/day for 40 yrs, quit  after stroke\par Etoh, 'a lot', drank vodka 1-2 day for 4 yrs ago after lost job, prior to that drinking couple pints weekends, stopped 3 weeks ago after hospitalization\par no herbals/no illicit drugs\par Covid vaccination, never rec'd - declined\par Covid infection - 2019, mild case\par \par Surgical Hx\par none\par \par EGD 23\par z line regular\par medium HH\par G3EV, incompletely eradicated, banded\par gstritis\par gastric ulcer with no stigmate of bleeding\par normal duodenal bulb\par \par LABS 3/16 as compared to (3/7) \par \par AFP 2.2\par Ferritin 39\par Iron 27\par Iron Sat 8%\par TIBC 329\par \par AST 69 (81)\par ALT 29 (37)\par  (161)\par TB 1.7 (2.2)\par SCr 1.36 (1.23)\par eGFR 58 (66)\par AFP 2.2\par Hbsag NR\par Hbcab REACTIVE\par HBsab 11.4 (nonimmune)\par HAV1Gg ab Reactive\par HAV IgM ab Nonreactive\par HCV NR\par \par \par  \par \par  \par

## 2023-05-12 NOTE — END OF VISIT
[FreeTextEntry3] : Attending note\par Patient is seen for follow-up.  No new complaint.  No chest pain, shortness of breath, palpitations, dizziness.  No nausea, vomiting, diarrhea, rectal bleeding or melena.\par Vital signs are stable\par Clear lungs\par Regular heartbeats with no murmur\par Soft abdomen with no tenderness, ascites or organomegaly\par Awake alert and oriented\par He is accompanied by his wife. \par \par I used [facility] and [facility] model for assessment of his post-op mortality if he undergoes CABG. \par If we consider him an ASA 3 class, his 30 to 90 days mortality after CABG is estimated at 6 to 12%.  Obviously this is only an estimation based on the models and it can vary based on the patient's condition.  I have reviewed this in details with him and his wife.\par Our plan is to evaluate him for liver transplantation.  Obviously, he will not be a candidate for liver transplant considering his heart condition, once the work-up is concluded, we will carefully clear him with the above-mentioned risk to undergo CABG.  If he develops decompensation after CABG, and if he goes to the immediate postsurgical.,  And if there is no evidence of ongoing infection, major wound dehiscence, or any other problem which would be a major barrier for transplant, then we can consider him as a transplant candidate.  Obviously, the CABG should be successful from the cardiac point of view and we might consider a post CABG stress test before he will be a candidate for liver transplant.  I have reviewed all of this with him and his wife.  The alternative for him is not to undergo CABG which carries its own risks.  I encouraged him to discuss the risk of not having revascularization of heart vessels with his cardiologist and cardiothoracic surgeon.\par Case was discussed with transplant surgery. \par \par

## 2023-05-15 LAB
AFP-TM SERPL-MCNC: 2.4 NG/ML
ALBUMIN SERPL ELPH-MCNC: 3.4 G/DL
ALP BLD-CCNC: 217 U/L
ALT SERPL-CCNC: 21 U/L
ANION GAP SERPL CALC-SCNC: 15 MMOL/L
AST SERPL-CCNC: 36 U/L
BILIRUB SERPL-MCNC: 2.6 MG/DL
BUN SERPL-MCNC: 8 MG/DL
CALCIUM SERPL-MCNC: 9.3 MG/DL
CHLORIDE SERPL-SCNC: 102 MMOL/L
CO2 SERPL-SCNC: 22 MMOL/L
CREAT SERPL-MCNC: 0.77 MG/DL
EGFR: 100 ML/MIN/1.73M2
GLUCOSE SERPL-MCNC: 346 MG/DL
POTASSIUM SERPL-SCNC: 3.8 MMOL/L
PROT SERPL-MCNC: 7.6 G/DL
SODIUM SERPL-SCNC: 139 MMOL/L

## 2023-05-16 LAB
ABO + RH PNL BLD: NORMAL
BASOPHILS # BLD AUTO: 0.03 K/UL
BASOPHILS NFR BLD AUTO: 0.7 %
EOSINOPHIL # BLD AUTO: 0.1 K/UL
EOSINOPHIL NFR BLD AUTO: 2.4 %
HCT VFR BLD CALC: 32.3 %
HGB BLD-MCNC: 10.1 G/DL
IMM GRANULOCYTES NFR BLD AUTO: 0 %
INR PPP: 1.53 RATIO
LYMPHOCYTES # BLD AUTO: 0.92 K/UL
LYMPHOCYTES NFR BLD AUTO: 21.9 %
MAN DIFF?: NORMAL
MCHC RBC-ENTMCNC: 27.3 PG
MCHC RBC-ENTMCNC: 31.3 GM/DL
MCV RBC AUTO: 87.3 FL
MONOCYTES # BLD AUTO: 0.54 K/UL
MONOCYTES NFR BLD AUTO: 12.8 %
NEUTROPHILS # BLD AUTO: 2.62 K/UL
NEUTROPHILS NFR BLD AUTO: 62.2 %
PLATELET # BLD AUTO: 104 K/UL
PT BLD: 18.1 SEC
RBC # BLD: 3.7 M/UL
RBC # FLD: 21.9 %
WBC # FLD AUTO: 4.21 K/UL

## 2023-05-17 ENCOUNTER — APPOINTMENT (OUTPATIENT)
Dept: TRANSPLANT | Facility: CLINIC | Age: 63
End: 2023-05-17

## 2023-05-17 ENCOUNTER — APPOINTMENT (OUTPATIENT)
Dept: HEPATOLOGY | Facility: CLINIC | Age: 63
End: 2023-05-17

## 2023-05-17 ENCOUNTER — APPOINTMENT (OUTPATIENT)
Dept: HEPATOLOGY | Facility: CLINIC | Age: 63
End: 2023-05-17
Payer: COMMERCIAL

## 2023-05-17 PROCEDURE — 99214 OFFICE O/P EST MOD 30 MIN: CPT

## 2023-05-19 ENCOUNTER — NON-APPOINTMENT (OUTPATIENT)
Age: 63
End: 2023-05-19

## 2023-06-08 ENCOUNTER — APPOINTMENT (OUTPATIENT)
Dept: HEPATOLOGY | Facility: CLINIC | Age: 63
End: 2023-06-08

## 2023-06-30 NOTE — END OF VISIT
[FreeTextEntry3] : Attending note \par I have seen and examined patient at bedside. I agree with hx, ROS, physical examination, imp/suggestions as written by Ms. Kayce Hong NP. Please see my note.\par \par \par Had a long conversation with patient, wife, daughter and brother (as per patient's request)\par The process of liver transplant evaluation, case selection, listing, organ allocation and risks associated with transplant were discussed in details.\par I have explained to them that cardiac surgery carries the risk of hepatic decompensation and liver transplant evaluation would provide a potential option of salvage OLT, pending resolve of cardiac issues after CABG. \par Patient and family understood the conversation but decided not to pursue OLT evaluation.

## 2023-06-30 NOTE — ASSESSMENT
[FreeTextEntry1] : Assessment and Plan: Pt is a 64 yo M hx severe CAD and decompensated ETOH cirr with EV s/p UGIB. Given his severe CAD and liver dysfunction, he is deemed too high risk for card surgery until he is medically optimized per hepatology.  The plan is to proceed with liver transplant evaluation and workup.  Once work up complete, pt can be cleared for liver transplant candidacy with the risk (mortality risks reviewed) of undergoing CABG and developing liver decompensation. Given no medical barriers such as infection, he can undergo liver transplantation. This was discussed with pt at length.  \par \par Pt and family attended Liver Transplant Evaluation class.  However, pt did not consent to liver txp evaluation.   Pt concerns heard. At this time, family wants to think more about and possibly reschedule evaluation.

## 2023-06-30 NOTE — HISTORY OF PRESENT ILLNESS
[History of HCC] : No history of hepatocellular carcinoma [Previous Transplant] : No history of previous transplant [FreeTextEntry1] : 15 [FreeTextEntry2] : O [TextBox_42] : \par 62 yo [place] ([facility]) M former smoker with hx HTN, HLD, severe 3 vessel CAD, pre-diabetes, gout, TIA (14 yrs ago), gastric ulcer, rectal bleed, LIZ and alcoholic liver disease c/b cirrhosis and portal hypertension m/b esophageal varices (and UGIB) and splenomegaly.  Patient sent to  ED from PCP office on 23 with c/o intermittent chest pain x 2 weeks that was worse on exertion and relieved with rest. In ED his EKG noted to have ST depressions and troponin 0.27. Additionally his history is significant for black stools and anemia (Hg7.7). Work up revealed Gr 3 EV s/p incomplete banding and elevated LFTs (TB 2.2, ).  Cardiac cath on 23 revealed distal LM stenosis and 3 vessel CAD. He was discharged home on 3/3. \par Pt was referred by cardiologist Dr. Box for GI/hep f/u given EVB and GIB and need for repeat EGD (performed  cw EV s/p 3 bands placed) and subsequently referred for liver transplant evaluation by Dr. 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 One column of large esophageal varix with one red spot. Three bands were placed. \par Moderate portal hypertensive gastropathy. No gastric varix. \par Mild edema of duodenal mucosa.\par \par MRCP 3/29/23\par Motion degraded exam.\par Heterogeneous somewhat nodular enhancement of the right hepatic lobe near the dome of uncertain etiology, and potentially perfusional, though neoplasm is not excluded. Given motion on this exam, further evaluation with contrast enhanced multiphase CT is recommended, including arterial, portal venous, and delayed phases.\par Cirrhosis.\par Findings of portal htn including trace ascites, splenomegaly, and small perigastric and paraesophageal varices. \par \par TTE 3/7/23\par EF 55-60%\par 1. The left ventricular cavity is normal in size. The left ventricular wall thickness is normal. The left ventricular systolic function is normal with an ejection fraction visually estimated at 55 to 60 %. There are no regional wall motion abnormalities seen.\par 2. Mild to moderate mitral regurgitation.\par 3. Mild tricuspid regurgitation.\par \par COLON 23 poor prep\par Sessile Polyps x2 (transverse, x 2 and desceding) \par  (polypectomy not done due to poor prep\par \par COLON 23 (good prep)\par polyps removed - 1 10mm in transverse, 1 5 mm in descending, 2 rectal\par IH \par multiple nonbleeding colonic angioectasias\par \par EGD 23\par z line regular\par medium HH\par G3EV, incompletely eradicated, banded\par gstritis\par gastric ulcer with no stigmate of bleeding\par normal duodenal bulb\par \par Cardiac Cath 23    : \par -50% distal LM\par -60% mid LAD, 80% distal LAD\par -100%  CFX after OM1\par -95% mid RCA\par -100%  RPDA\par -grade 3 collaterals supplying distal CFX\par -severe inferolateral hypokinesis\par -basal inferolateral hypokinesis\par \par LABS 23  (3/29/23)\par AFP 2.1\par TB 2.6 (2.2)\par AST 36 (87)\par ALT 21 (87)\par  (190)\par SCr 0.77\par Na 139\par K 3.8\par ALB 3.4\par INR 1.53\par \par LABS 3/16 \par Hbsag NR\par Hbcab REACTIVE\par HBsab 11.4 (nonimmune)\par HAV1Gg ab Reactive\par HAV IgM ab Nonreactive\par HCV NR\par Ferritin 39\par Iron 27\par Iron Sat 8%\par TIBC 329\par \par MEDICATIONS\par Allopurinol 100mg daily\par Atorvastin 80mg bedtime\par Carvedilol 6.25 mg bid\par Ezetimibe 10mg bedtime\par Folic Acid 1mg daily\par Pantoprazole 4omg daily\par Vit D 50K weekly\par \par ALLERGIES\par Aspirin \par

## 2023-07-03 ENCOUNTER — NON-APPOINTMENT (OUTPATIENT)
Age: 63
End: 2023-07-03

## 2023-07-03 ENCOUNTER — APPOINTMENT (OUTPATIENT)
Dept: HEPATOLOGY | Facility: CLINIC | Age: 63
End: 2023-07-03
Payer: COMMERCIAL

## 2023-07-03 VITALS
OXYGEN SATURATION: 98 % | HEART RATE: 72 BPM | SYSTOLIC BLOOD PRESSURE: 135 MMHG | HEIGHT: 68 IN | DIASTOLIC BLOOD PRESSURE: 89 MMHG

## 2023-07-03 VITALS — WEIGHT: 190 LBS | BODY MASS INDEX: 28.89 KG/M2

## 2023-07-03 DIAGNOSIS — Z09 ENCOUNTER FOR FOLLOW-UP EXAMINATION AFTER COMPLETED TREATMENT FOR CONDITIONS OTHER THAN MALIGNANT NEOPLASM: ICD-10-CM

## 2023-07-03 LAB — AFP-TM SERPL-MCNC: 2.8 NG/ML

## 2023-07-03 PROCEDURE — 99214 OFFICE O/P EST MOD 30 MIN: CPT

## 2023-07-03 NOTE — ASSESSMENT
[FreeTextEntry1] : Cirrhosis due to alcohol use disorder\par \par No history of recent drinking\par Patient was treated with prednisolone at [facility].  Since then, he has been on metformin and insulin for hyperglycemia.  The prednisone is being decreased by 10 mg every week.  I have educated his wife and him about the blood glucose management in the context of decreasing the dose of prednisolone.  Risk of hypoglycemia was discussed with him in details.\par \par Esophageal varices with history of severe anemia\par Patient had an upper endoscopy while in the hospital.  A small columns of esophageal varix was seen.  He has significant portal hypertensive gastropathy.  He is currently off any antiplatelet agent for his coronary artery disease.  He has allergy to aspirin.  There is no contraindication for clopidogrel, however, he would be at risk of anemia and his CBC should be monitored.\par \par  History of hepatic encephalopathy while in the hospital\par I have advised him and family to gradually lower the dose of gabapentin and methocarbamol.\par  continue Xifaxan and lactulose\par \par  I advised him to follow-up with his primary care physician as well as cardiologist and cardiothoracic surgeon.\par  follow-up in 2 to 3 weeks\par

## 2023-07-03 NOTE — HISTORY OF PRESENT ILLNESS
[de-identified] : \par 64 yo [place] ([facility]) M former smoker with hx HTN, HLD, severe 3 vessel CAD, pre-diabetes, gout, TIA (14 yrs ago), gastric ulcer, rectal bleed, LIZ and alcoholic liver disease c/b cirrhosis and portal hypertension m/b esophageal varices (and UGIB) and splenomegaly. Patient sent to [facility] ED from PCP office on 23 with c/o intermittent chest pain x 2 weeks that was worse on exertion and relieved with rest. In ED his EKG noted to have ST depressions and troponin 0.27. Additionally his history is significant for black stools and anemia (Hg7.7). Work up revealed Gr 3 EV s/p incomplete banding and elevated LFTs (TB 2.2, ). Cardiac cath on 23 revealed distal LM stenosis and 3 vessel CAD. He was discharged home on 3/3. \par Pt was referred by cardiologist Dr. Box for GI/hep f/u given EVB and GIB and need for repeat EGD (performed  cw EV s/p 3 bands placed) and subsequently referred for liver transplant evaluation by Dr. 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 One column of large esophageal varix with one red spot. Three bands were placed. \par Moderate portal hypertensive gastropathy. No gastric varix. \par Mild edema of duodenal mucosa.\par \par MRCP 3/29/23\par Motion degraded exam.\par Heterogeneous somewhat nodular enhancement of the right hepatic lobe near the dome of uncertain etiology, and potentially perfusional, though neoplasm is not excluded. Given motion on this exam, further evaluation with contrast enhanced multiphase CT is recommended, including arterial, portal venous, and delayed phases.\par Cirrhosis.\par Findings of portal htn including trace ascites, splenomegaly, and small perigastric and paraesophageal varices. \par \par TTE 3/7/23\par EF 55-60%\par 1. The left ventricular cavity is normal in size. The left ventricular wall thickness is normal. The left ventricular systolic function is normal with an ejection fraction visually estimated at 55 to 60 %. There are no regional wall motion abnormalities seen.\par 2. Mild to moderate mitral regurgitation.\par 3. Mild tricuspid regurgitation.\par \par COLON 23 poor prep\par Sessile Polyps x2 (transverse, x 2 and desceding) \par  (polypectomy not done due to poor prep\par \par COLON 23 (good prep)\par polyps removed - 1 10mm in transverse, 1 5 mm in descending, 2 rectal\par IH \par multiple nonbleeding colonic angioectasias\par \par EGD 23\par z line regular\par medium HH\par G3EV, incompletely eradicated, banded\par gstritis\par gastric ulcer with no stigmate of bleeding\par normal duodenal bulb\par \par Cardiac Cath 23 [facility]: \par -50% distal LM\par -60% mid LAD, 80% distal LAD\par -100%  CFX after OM1\par -95% mid RCA\par -100%  RPDA\par -grade 3 collaterals supplying distal CFX\par -severe inferolateral hypokinesis\par -basal inferolateral hypokinesis\par \par \par Hbsag NR\par Hbcab REACTIVE\par HBsab 11.4 (nonimmune)\par HAV1Gg ab Reactive\par HAV IgM ab Nonreactive\par HCV NR\par Ferritin 39\par Iron 27\par Iron Sat 8%\par TIBC 329\par \par MEDICATIONS\par Allopurinol 100mg daily\par \par ALLERGIES\par Aspirin \par \par Allopurinol 100 mg\par Norvasc 5 mg\par Atorvastatin 80 mg\par Carvedilolol 3.125 BID\par Ezetimibe 10 mg\par Ferrous sulfate OD \par Folic acid \par Gabapentin 100 one tab BID \par Lactulose 30 ml TID\par Metformin 500 BID\par Methocarbamol 750 mg TID\par Spironolactone 100 mg\par Prednisolone 20 mg with tapering weekly\par \par 7/3/2023\par Patient is here after an admission for fever to [facility].  He was treated with 4 weeks of antibiotics and also prednisone.  I was not sure the exact indication for prednisolone.\par He is in my office today with no significant new complain.  He feels tired.\par No nausea vomiting or diarrhea\par No rectal bleeding\par No fever\par No chest pain or shortness of breath\par No altered mental status\par \par Vital signs are stable\par No scleral icterus\par Clear lungs\par Regular heart rate with no murmur\par Soft abdomen with no ascites\par No edema of the extremities\par Alert awake and oriented\par

## 2023-07-06 LAB
ALBUMIN SERPL ELPH-MCNC: 3.9 G/DL
ALP BLD-CCNC: 274 U/L
ALT SERPL-CCNC: 59 U/L
AMMONIA PLAS-MCNC: 56.4 UMOL/L
ANION GAP SERPL CALC-SCNC: 15 MMOL/L
AST SERPL-CCNC: 60 U/L
BILIRUB SERPL-MCNC: 1.9 MG/DL
BUN SERPL-MCNC: 25 MG/DL
CALCIUM SERPL-MCNC: 9.9 MG/DL
CHLORIDE SERPL-SCNC: 99 MMOL/L
CHOLEST SERPL-MCNC: 225 MG/DL
CO2 SERPL-SCNC: 23 MMOL/L
CREAT SERPL-MCNC: 1.02 MG/DL
EGFR: 82 ML/MIN/1.73M2
ESTIMATED AVERAGE GLUCOSE: 217 MG/DL
FERRITIN SERPL-MCNC: 44 NG/ML
GLUCOSE SERPL-MCNC: 162 MG/DL
HBA1C MFR BLD HPLC: 9.2 %
HDLC SERPL-MCNC: 116 MG/DL
INR PPP: 1.24 RATIO
IRON SATN MFR SERPL: 17 %
IRON SERPL-MCNC: 72 UG/DL
LDLC SERPL CALC-MCNC: 96 MG/DL
MAGNESIUM SERPL-MCNC: 1.8 MG/DL
NONHDLC SERPL-MCNC: 109 MG/DL
POTASSIUM SERPL-SCNC: 5.1 MMOL/L
PROT SERPL-MCNC: 7.5 G/DL
PT BLD: 14.4 SEC
SODIUM SERPL-SCNC: 137 MMOL/L
TIBC SERPL-MCNC: 426 UG/DL
TRIGL SERPL-MCNC: 66 MG/DL
UIBC SERPL-MCNC: 354 UG/DL

## 2023-07-17 ENCOUNTER — NON-APPOINTMENT (OUTPATIENT)
Age: 63
End: 2023-07-17

## 2023-07-17 ENCOUNTER — APPOINTMENT (OUTPATIENT)
Dept: HEART AND VASCULAR | Facility: CLINIC | Age: 63
End: 2023-07-17
Payer: COMMERCIAL

## 2023-07-17 VITALS
HEART RATE: 71 BPM | HEIGHT: 68 IN | WEIGHT: 192 LBS | DIASTOLIC BLOOD PRESSURE: 70 MMHG | RESPIRATION RATE: 14 BRPM | BODY MASS INDEX: 29.1 KG/M2 | SYSTOLIC BLOOD PRESSURE: 120 MMHG

## 2023-07-17 PROCEDURE — 93000 ELECTROCARDIOGRAM COMPLETE: CPT

## 2023-07-17 PROCEDURE — 99214 OFFICE O/P EST MOD 30 MIN: CPT | Mod: 25

## 2023-07-17 NOTE — DISCUSSION/SUMMARY
[Coronary Artery Disease] : coronary artery disease [Stable] : stable [None] : There are no changes in medication management [Dietary Modification] : dietary modification [Patient] : discussed with the patient [FreeTextEntry1] : CAD- pt. appears stable in NAD. Pt. requires CABG when cleared by Hepatology as being "optimized" prior to surgery. Improvement in PT/INR noted. Pt. to discuss this week with Dr. Lawson.\par Cardiac Murmur- Stable; no further intervention at this time (see last echo). \par Blood work reviewed with patient. Elevated HgbA1c due to extensive use of steroids (no longer on regimen). WIll consider increasing diabetes medication next visit if elevated HgbA1c persists. Maintain a low caloric, low sodium, low cholesterol diet. Dietary counseling given, diet and exercise discussed in detail.

## 2023-07-17 NOTE — PHYSICAL EXAM

## 2023-07-17 NOTE — END OF VISIT
[FreeTextEntry3] : All medical record entries made by the Scribe were at my, Dr. Drew Box, direction and personally dictated by me on 07/17/2023. I have reviewed the chart and agree that the record accurately reflects my personal performance of the history, physical exam, assessment and plan. I have also personally directed, reviewed, and agreed with the chart. [Time Spent: ___ minutes] : I have spent [unfilled] minutes of time on the encounter.

## 2023-07-19 ENCOUNTER — APPOINTMENT (OUTPATIENT)
Dept: HEPATOLOGY | Facility: CLINIC | Age: 63
End: 2023-07-19
Payer: COMMERCIAL

## 2023-07-19 VITALS
HEIGHT: 65 IN | OXYGEN SATURATION: 100 % | SYSTOLIC BLOOD PRESSURE: 122 MMHG | WEIGHT: 191 LBS | TEMPERATURE: 97.6 F | HEART RATE: 74 BPM | DIASTOLIC BLOOD PRESSURE: 86 MMHG | BODY MASS INDEX: 31.82 KG/M2

## 2023-07-19 PROCEDURE — 99213 OFFICE O/P EST LOW 20 MIN: CPT

## 2023-07-19 NOTE — HISTORY OF PRESENT ILLNESS
[de-identified] : \par 64 yo [place] ([place]) M former smoker with hx HTN, HLD, severe 3 vessel CAD, pre-diabetes, gout, TIA (14 yrs ago), gastric ulcer, rectal bleed, LIZ and alcoholic liver disease c/b cirrhosis and portal hypertension m/b esophageal varices (and UGIB) and splenomegaly. Patient sent to [facility] ED from PCP office on 23 with c/o intermittent chest pain x 2 weeks that was worse on exertion and relieved with rest. In ED his EKG noted to have ST depressions and troponin 0.27. Additionally his history is significant for black stools and anemia (Hg7.7). Work up revealed Gr 3 EV s/p incomplete banding and elevated LFTs (TB 2.2, ). Cardiac cath on 23 revealed distal LM stenosis and 3 vessel CAD. He was discharged home on 3/3. \par Pt was referred by cardiologist Dr. Box for GI/hep f/u given EVB and GIB and need for repeat EGD (performed  cw EV s/p 3 bands placed) and subsequently referred for liver transplant evaluation by Dr. Lawson, though pt declined undergoing liver transplant evaluation.\par \par Pt had recent [facility] hospitalization in May, 2023 for  staph sepsis\par Patient was treated with prednisolone wean, stopped 2 days ago.. Since then, he has been on metformin and insulin for hyperglycemia. .\par Patient had an upper endoscopy while in the hospital (EV with history of severe anemia. A small columns of esophageal varix was seen. He has significant portal hypertensive gastropathy. He is currently off any antiplatelet agent for his coronary artery disease. He has allergy to aspirin. There is no contraindication for clopidogrel, however, he would be at risk of anemia and his CBC should be monitored\par \par ABO type O\par EV, coreg\par \par No drink since hospital discharge late 2023\par \par Pt is accompanied by wife and granddaughter\par \par PMH\par HCV (per report)\par HTN\par per pt - mini stroke ()\par rectal bleeding, started in January (dark stools)\par Hiatal Hernia\par Gastric Ulcer \par Iron def anemia \par \par FAM HX\par no liver disease\par mother with 'white blood' cancer ()\par Father, CAD s/p CABG\par \par SOCIAL HX\par , 2 daughters, older daughter with breast ca, alive\par drove school bus, 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, 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 \par former smoker, smoked 2 -3 pk/day for 40 yrs, quit 2006 after stroke\par Etoh, 'a lot', drank vodka 1-2 day for 4 yrs ago after lost job, prior to that drinking couple pints weekends, stopped 3 weeks ago after hospitalization\par no herbals/no illicit drugs\par Covid vaccination, never rec'd - declined\par Covid infection - 2019, mild case\par \par SURGICAL HX\par None\par \par STUDIES\par \par CT Abd w/wo IVC 23\par No suspicious liver lesion and no findings to correlate with recent MRI.\par \par EGD 23\par G1 EV\par Gastritis\par NB gastric uslces with no stigmate of bleeding\par normal duodenal bulb and 2nd portion of the duodenum\par No specimens collected.\par \par MRCP 3/29/23\par Motion degraded exam.\par Heterogeneous somewhat nodular enhancement of the right hepatic lobe near the dome of uncertain etiology, and potentially perfusional, though neoplasm is not excluded. Given motion on this exam, further evaluation with contrast enhanced multiphase CT is recommended, including arterial, portal venous, and delayed phases.\par Cirrhosis.\par Findings of portal htn including trace ascites, splenomegaly, and small perigastric and paraesophageal varices. \par \par TTE 3/7/23\par EF 55-60%\par 1. The left ventricular cavity is normal in size. The left ventricular wall thickness is normal. The left ventricular systolic function is normal with an ejection fraction visually estimated at 55 to 60 %. There are no regional wall motion abnormalities seen.\par 2. Mild to moderate mitral regurgitation.\par 3. Mild tricuspid regurgitation.\par \par COLON 23 poor prep\par Sessile Polyps x2 (transverse, x 2 and desceding) \par  (polypectomy not done due to poor prep\par \par COLON 23 (good prep)\par polyps removed - 1 10mm in transverse, 1 5 mm in descending, 2 rectal\par IH \par multiple nonbleeding colonic angioectasias\par \par EGD 23\par z line regular\par medium HH\par G3EV, incompletely eradicated, banded\par gstritis\par gastric ulcer with no stigmate of bleeding\par normal duodenal bulb\par \par Cardiac Cath 23 [facility]: \par -50% distal LM\par -60% mid LAD, 80% distal LAD\par -100%  CFX after OM1\par -95% mid RCA\par -100%  RPDA\par -grade 3 collaterals supplying distal CFX\par -severe inferolateral hypokinesis\par -basal inferolateral hypokinesis\par \par Hbsag NR\par Hbcab REACTIVE\par HBsab 11.4 (nonimmune)\par HAV1Gg ab Reactive\par HAV IgM ab Nonreactive\par HCV NR\par Ferritin 39\par Iron 27\par Iron Sat 8%\par TIBC 329\par \par MEDICATIONS\par Allopurinol 100mg daily\par \par ALLERGIES\par Aspirin \par \par MEDICATIONS\par Allopurinol 100 mg - not taking\par Norvasc 5 mg\par Atorvastatin 80 mg\par Carvedilolol 3.125 BID\par Ezetimibe 10 mg\par Ferrous sulfate OD \par Folic acid 1mg\par Gabapentin 100 one tab BID \par Metformin 500 BID\par Spironolactone 100 mg\par furosemide 40mg\par rifaximin (was for 2 weeks)\par Lactulose 30 ml TID\par \par INTERIM HX\par feels 'ok'\par denies abdl pain, mild pain on R flank\par denies urinary symptoms, f/c/NV\par appetitte 'good'\par BM 1-2x/day, no blood\par exericising no

## 2023-07-19 NOTE — ASSESSMENT
[FreeTextEntry1] : \par 62 yo w hx CAC, preDM, gout, ETOH cirrhosis, recent one month hosp for sepsis, off prednisone x 2 days\par \par HCC screening: Abd CT 4/24/23: no hcc , q 6mos DUE OCT\par Portal HTN\par - Ascites - none, continue Lasix 40/aldactone 100\par - EV 6/22/23, G1 EV- on Coreg\par - HE, 2 BM daily, was on Rifax and lactulose in hosp, now stopped,recommend cont Rifax 550 mg bid\par ETOH - denies drinking, encouraged continue not to drink\par CAD, cont f/u with cardiologist Dr. Box, will wait for pred to wash out (stopped 2 days ago) in the context of card surgery\par \par RTC 3 weeks\par labs 3 weeks before visit

## 2023-08-16 ENCOUNTER — APPOINTMENT (OUTPATIENT)
Dept: HEPATOLOGY | Facility: CLINIC | Age: 63
End: 2023-08-16
Payer: COMMERCIAL

## 2023-08-16 VITALS
TEMPERATURE: 98.2 F | SYSTOLIC BLOOD PRESSURE: 114 MMHG | DIASTOLIC BLOOD PRESSURE: 76 MMHG | HEIGHT: 68 IN | HEART RATE: 83 BPM | BODY MASS INDEX: 28.79 KG/M2 | OXYGEN SATURATION: 100 % | RESPIRATION RATE: 14 BRPM | WEIGHT: 190 LBS

## 2023-08-16 LAB
ALBUMIN SERPL ELPH-MCNC: 4 G/DL
ALP BLD-CCNC: 141 U/L
ALT SERPL-CCNC: 30 U/L
ANION GAP SERPL CALC-SCNC: 17 MMOL/L
AST SERPL-CCNC: 47 U/L
BILIRUB SERPL-MCNC: 2.3 MG/DL
BUN SERPL-MCNC: 17 MG/DL
CALCIUM SERPL-MCNC: 9.8 MG/DL
CHLORIDE SERPL-SCNC: 101 MMOL/L
CO2 SERPL-SCNC: 20 MMOL/L
CREAT SERPL-MCNC: 1.19 MG/DL
EGFR: 68 ML/MIN/1.73M2
GGT SERPL-CCNC: 174 U/L
GLUCOSE SERPL-MCNC: 204 MG/DL
HCT VFR BLD CALC: 35.5 %
HGB BLD-MCNC: 11.9 G/DL
MCHC RBC-ENTMCNC: 30.9 PG
MCHC RBC-ENTMCNC: 33.5 GM/DL
MCV RBC AUTO: 92.2 FL
PLATELET # BLD AUTO: 121 K/UL
POTASSIUM SERPL-SCNC: 4.3 MMOL/L
PROT SERPL-MCNC: 7.6 G/DL
RBC # BLD: 3.85 M/UL
RBC # FLD: 16.8 %
SODIUM SERPL-SCNC: 139 MMOL/L
WBC # FLD AUTO: 6.48 K/UL

## 2023-08-16 PROCEDURE — 99214 OFFICE O/P EST MOD 30 MIN: CPT

## 2023-08-16 NOTE — HISTORY OF PRESENT ILLNESS
[FreeTextEntry1] : 64 yo [place] ([facility]) M former smoker with hx HTN, HLD, severe 3 vessel CAD, pre-diabetes, gout, TIA (14 yrs ago), gastric ulcer, rectal bleed, LIZ and alcoholic liver disease c/b cirrhosis and portal hypertension m/b esophageal varices (and UGIB) and splenomegaly. Patient sent to [facility] ED from PCP office on 23 with c/o intermittent chest pain x 2 weeks that was worse on exertion and relieved with rest. In ED his EKG noted to have ST depressions and troponin 0.27. Additionally his history is significant for black stools and anemia (Hg7.7). Work up revealed Gr 3 EV s/p incomplete banding and elevated LFTs (TB 2.2, ). Cardiac cath on 23 revealed distal LM stenosis and 3 vessel CAD. He was discharged home on 3/3.  Pt was referred by cardiologist Dr. Box for GI/hep f/u given EVB and GIB and need for repeat EGD (performed  cw EV s/p 3 bands placed) and subsequently referred for liver transplant evaluation by Dr. Lawson, though pt declined undergoing liver transplant evaluation.  Pt had recent [facility] hospitalization in May, 2023 for staph sepsis Patient was treated with prednisolone wean, completed. Since then, he has been on metformin and insulin for hyperglycemia.. Patient had an upper endoscopy while in the hospital (EV with history of severe anemia. A small columns of esophageal varix was seen. He has significant portal hypertensive gastropathy. He is currently off any antiplatelet agent for his coronary artery disease. He has allergy to aspirin. There is no contraindication for clopidogrel, however, he would be at risk of anemia and his CBC should be monitored  ABO type O EV, on coreg No drink since hospital discharge late 2023 Pt is accompanied by wife and granddaughter  Labs:  23 AST/ALT 47/30      TB 2.3   GGTP 174 Cr 1.19 Na 139   K  4.3 H/H 11.9/35.5        PMH HCV (per report) HTN per pt - mini stroke () rectal bleeding, started in January (dark stools) Hiatal Hernia Gastric Ulcer Iron def anemia    FAM HX no liver disease mother with 'white blood' cancer () Father, CAD s/p CABG    SOCIAL HX  , 2 daughters, older daughter with breast ca, alive  drove school bus, 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  EGD 23 G1 EV Gastritis NB gastric uslces with no stigmate of bleeding normal duodenal bulb and 2nd portion of the duodenum No specimens collected.   MRCP 3/29/23 Motion degraded exam. Heterogeneous somewhat nodular enhancement of the right hepatic lobe near the dome of uncertain etiology, and potentially perfusional, though neoplasm is not excluded. Given motion on this exam, further evaluation with contrast enhanced multiphase CT is recommended, including arterial, portal venous, and delayed phases. Cirrhosis. Findings of portal htn including trace ascites, splenomegaly, and small perigastric and paraesophageal varices.    TTE 3/7/23 EF 55-60% 1. The left ventricular cavity is normal in size. The left ventricular wall thickness is normal. The left ventricular systolic function is normal with an ejection fraction visually estimated at 55 to 60 %. There are no regional wall motion abnormalities seen. 2. Mild to moderate mitral regurgitation. 3. Mild tricuspid regurgitation.   COLON 23 poor prep Sessile Polyps x2 (transverse, x 2 and desceding)  (polypectomy not done due to poor prep  COLON 23 (good prep) polyps removed - 1 10mm in transverse, 1 5 mm in descending, 2 rectal IH multiple nonbleeding colonic angioectasias   EGD 23 z line regular medium HH G3EV, incompletely eradicated, banded gstritis gastric ulcer with no stigmate of bleeding normal duodenal bulb    Cardiac Cath 23 [facility]:  -50% distal LM  -60% mid LAD, 80% distal LAD  -100%  CFX after OM1  -95% mid RCA  -100%  RPDA  -grade 3 collaterals supplying distal CFX  -severe inferolateral hypokinesis  -basal inferolateral hypokinesis    Hbsag NR  Hbcab REACTIVE  HBsab 11.4 (nonimmune)  HAV1Gg ab Reactive  HAV IgM ab Nonreactive  HCV NR  Ferritin 39  Iron 27  Iron Sat 8%  TIBC 329    INTERIM HX  Reports abd pain last week but resolved after restarting lactulose.  denies abdl pain today. denies urinary symptoms, f/c/NV appetitte 'good' BM 1-2x/day, no blood exericising no

## 2023-08-16 NOTE — ASSESSMENT
[FreeTextEntry1] : 64 yo w hx CAC, preDM, gout, ETOH cirrhosis.    HCC screening: Abd CT 4/24/23: no hcc , q 6mos DUE OCT  Portal HTN - Ascites - none, continue Lasix 40/aldactone 100 - EV 6/22/23, G1 EV- on Coreg - HE, 1 BM daily, cont Rifax and lactulose   ETOH - denies drinking, encouraged continue not to drink  CAD, cont f/u with cardiologist Dr. Box for car surgery.  Labs next week  RTC 6 weeks

## 2023-08-22 ENCOUNTER — APPOINTMENT (OUTPATIENT)
Dept: CARDIOTHORACIC SURGERY | Facility: CLINIC | Age: 63
End: 2023-08-22
Payer: COMMERCIAL

## 2023-08-22 VITALS
TEMPERATURE: 98.3 F | WEIGHT: 190 LBS | RESPIRATION RATE: 15 BRPM | BODY MASS INDEX: 28.79 KG/M2 | HEART RATE: 69 BPM | SYSTOLIC BLOOD PRESSURE: 104 MMHG | OXYGEN SATURATION: 95 % | HEIGHT: 68 IN | DIASTOLIC BLOOD PRESSURE: 59 MMHG

## 2023-08-22 LAB
PETH 16:0/18:1: 30 NG/ML
PETH 16:0/18:2: 34 NG/ML
PETH COMMENTS: NORMAL

## 2023-08-22 PROCEDURE — 99215 OFFICE O/P EST HI 40 MIN: CPT

## 2023-08-22 RX ORDER — PANTOPRAZOLE 40 MG/1
40 TABLET, DELAYED RELEASE ORAL
Qty: 30 | Refills: 5 | Status: DISCONTINUED | COMMUNITY
Start: 1900-01-01 | End: 2023-08-22

## 2023-08-22 RX ORDER — INSULIN LISPRO 100 [IU]/ML
(50-50) 100 INJECTION, SUSPENSION SUBCUTANEOUS
Refills: 0 | Status: DISCONTINUED | COMMUNITY
End: 2023-08-22

## 2023-08-23 NOTE — PHYSICAL EXAM
[General Appearance - Alert] : alert [General Appearance - In No Acute Distress] : in no acute distress [Sclera] : the sclera and conjunctiva were normal [PERRL With Normal Accommodation] : pupils were equal in size, round, and reactive to light [Extraocular Movements] : extraocular movements were intact [Neck Appearance] : the appearance of the neck was normal [Neck Cervical Mass (___cm)] : no neck mass was observed [Jugular Venous Distention Increased] : there was no jugular-venous distention [Thyroid Diffuse Enlargement] : the thyroid was not enlarged [Thyroid Nodule] : there were no palpable thyroid nodules [Auscultation Breath Sounds / Voice Sounds] : lungs were clear to auscultation bilaterally [Heart Rate And Rhythm] : heart rate was normal and rhythm regular [Heart Sounds] : normal S1 and S2 [Heart Sounds Gallop] : no gallops [Murmurs] : no murmurs [Heart Sounds Pericardial Friction Rub] : no pericardial rub [Examination Of The Chest] : the chest was normal in appearance [Chest Visual Inspection Thoracic Asymmetry] : no chest asymmetry [Diminished Respiratory Excursion] : normal chest expansion [2+] : left 2+ [No Abnormalities] : the abdominal aorta was not enlarged and no bruit was heard [Bowel Sounds] : normal bowel sounds [Abdomen Soft] : soft [Abdomen Tenderness] : non-tender [Abdomen Mass (___ Cm)] : no abdominal mass palpated [No CVA Tenderness] : no ~M costovertebral angle tenderness [No Spinal Tenderness] : no spinal tenderness [Abnormal Walk] : normal gait [Nail Clubbing] : no clubbing  or cyanosis of the fingernails [Musculoskeletal - Swelling] : no joint swelling seen [Motor Tone] : muscle strength and tone were normal [Skin Color & Pigmentation] : normal skin color and pigmentation [Skin Turgor] : normal skin turgor [] : no rash [Deep Tendon Reflexes (DTR)] : deep tendon reflexes were 2+ and symmetric [Sensation] : the sensory exam was normal to light touch and pinprick [No Focal Deficits] : no focal deficits [Oriented To Time, Place, And Person] : oriented to person, place, and time [Impaired Insight] : insight and judgment were intact [Affect] : the affect was normal [Outer Ear] : the ears and nose were normal in appearance [Oropharynx] : the oropharynx was normal [Right Carotid Bruit] : no bruit heard over the right carotid [Left Carotid Bruit] : no bruit heard over the left carotid [Right Femoral Bruit] : no bruit heard over the right femoral artery [Left Femoral Bruit] : no bruit heard over the left femoral artery [FreeTextEntry1] : no pedal edema

## 2023-08-23 NOTE — DATA REVIEWED
[FreeTextEntry1] : 3/7/23 Echo: 1. LVEF 55-60% 2. mild to moderate MR 3. mild TR  2/27/23 Cardiac Cath @ [facility]: -50% distal LM -60% mid LAD, 80% distal LAD -100%  CFX after OM1 -95% mid RCA -100%  RPDA -grade 3 collaterals supplying distal CFX -severe inferolateral hypokinesis -basal inferolateral hypokinesis  3/29/23 MRI/MRCP Abdomen: IMPRESSION: Motion degraded exam. Heterogeneous somewhat nodular enhancement of the right hepatic lobe near the dome of uncertain etiology, and potentially perfusional, though neoplasm is not excluded. Given motion on this exam, further evaluation with contrast enhanced multiphase CT is recommended, including arterial, portal venous, and delayed phases. Cirrhosis. Findings of portal hypertension including trace ascites, splenomegaly, and small sotero gastric and paraoesophageal varices.

## 2023-08-23 NOTE — END OF VISIT
[Time Spent: ___ minutes] : I have spent [unfilled] minutes of time on the encounter. [FreeTextEntry3] : I, ROC MUNROE , am scribing for and in the presence of MARK PURI the following sections: History of present illness, past Medical/family/surgical/family/social history, review of systems, vital signs, physical exam and disposition. I personally performed the services described in the documentation, reviewed the documentation recorded by the scribe in my presence and it accurately and completely records my words and actions.

## 2023-08-23 NOTE — HISTORY OF PRESENT ILLNESS
[FreeTextEntry1] : 62 yo male presents with PMH of HTN, HLD, pre-diabetes, gout, former tobacco/ETOH use, cirrhosis, esophageal varices, GI bleed and TIA (4 years ago) with severe 3 vessel CAD. Patient sent to [facility] ED from PCP office on 2/18/23 with c/o intermittent chest pain x 2 weeks that was worse on exertion and relieved with rest. CCS 3. In ED his EKG noted to had ST depressions and troponin 0.27. Additionally, his history is significant for black stools and anemia ( hgb 7.7). Work up revealed Grade III esophageal varices. s/p incomplete banding. Cardiac cath on 2/27/23 revealed distal LM stenosis and 3 vessel CAD. He was discharged home on 3/3. 3/29/23 s/p MRI/MRCP Abdomen that revealed heterogeneous nodule of the hepatic lobe, cirrhosis, portal hypertension including trace ascites, splenomegaly and small sotero gastric and paraoesophageal varices.  Patient seen by Dr. Hinton for surgical consult on 4/18/23. At that time, he was deemed not a candidate for PCI/stents s/t inability to tolerate DAPT and high risk for surgery s/t liver dysfunction w/MELD score of 18. Patient has been by followed by hepatology, Dr. Lawson. Patient hospitalization in May, 2023 for staph sepsis Patient was treated with prednisolone wean, completed. Since then, he has been on metformin and insulin for hyperglycemia.  Patient had an upper endoscopy while in the hospital (EV with history of severe anemia. A small column of esophageal varix was seen. He has significant portal hypertensive gastropathy. He is currently on plavix for his coronary artery disease s/t allergy to aspirin. Meld Score now 14.   Patient presents today for follow up visit with Dr. Hinton to discuss off pump CABG. He is accompanied by his wife. He appears generally well, NAD. He reports compliance with medications and medical follow up.

## 2023-08-23 NOTE — ASSESSMENT
[FreeTextEntry1] : 62 yo male presents with PMH of HTN, HLD, pre-diabetes, gout, former tobacco/ETOH use, cirrhosis(meld score 14), esophageal varices, GI bleed and TIA (4 years ago) with severe 3 vessel CAD. Dr. Hinton reviewed the cardiac cath imaging and reports with the patient and his wife and discussed the case with Dr. Box and Dr. Lawson.  Dr. Hinton performed the STS risk calculator and quoted a 1-2% operative mortality and  3-7% risk for major complication including but not limited to death, heart attack, bleeding, stroke, kidney problems and infection. He also discussed the various approaches, minimally invasive versus sternotomy. Dr. Hinton feels the patient will benefit and is a candidate for off pump CABG. All questions were addressed and patient agrees to proceed with surgery.  Plan:  admit 2 days prior for hepatic work up hold plavix 1 week prior to admission PST->repeat TTE, pfts, carotids, labs, pa/lat xray optimize nutrition

## 2023-08-25 ENCOUNTER — NON-APPOINTMENT (OUTPATIENT)
Age: 63
End: 2023-08-25

## 2023-09-12 ENCOUNTER — INPATIENT (INPATIENT)
Facility: HOSPITAL | Age: 63
LOS: 8 days | Discharge: HOME HEALTH | DRG: 235 | End: 2023-09-21
Attending: THORACIC SURGERY (CARDIOTHORACIC VASCULAR SURGERY) | Admitting: THORACIC SURGERY (CARDIOTHORACIC VASCULAR SURGERY)
Payer: COMMERCIAL

## 2023-09-12 VITALS
TEMPERATURE: 98 F | OXYGEN SATURATION: 100 % | SYSTOLIC BLOOD PRESSURE: 127 MMHG | DIASTOLIC BLOOD PRESSURE: 85 MMHG | HEIGHT: 68 IN | HEART RATE: 85 BPM | RESPIRATION RATE: 16 BRPM | WEIGHT: 192.68 LBS

## 2023-09-12 LAB
ALBUMIN SERPL ELPH-MCNC: 3.8 G/DL — SIGNIFICANT CHANGE UP (ref 3.3–5)
ALP SERPL-CCNC: 175 U/L — HIGH (ref 40–120)
ALT FLD-CCNC: 41 U/L — SIGNIFICANT CHANGE UP (ref 10–45)
ANION GAP SERPL CALC-SCNC: 11 MMOL/L — SIGNIFICANT CHANGE UP (ref 5–17)
APPEARANCE UR: CLEAR — SIGNIFICANT CHANGE UP
APTT BLD: 36.4 SEC — HIGH (ref 24.5–35.6)
AST SERPL-CCNC: 73 U/L — HIGH (ref 10–40)
BILIRUB SERPL-MCNC: 2 MG/DL — HIGH (ref 0.2–1.2)
BILIRUB UR-MCNC: NEGATIVE — SIGNIFICANT CHANGE UP
BLD GP AB SCN SERPL QL: NEGATIVE — SIGNIFICANT CHANGE UP
BLD GP AB SCN SERPL QL: NEGATIVE — SIGNIFICANT CHANGE UP
BUN SERPL-MCNC: 14 MG/DL — SIGNIFICANT CHANGE UP (ref 7–23)
CALCIUM SERPL-MCNC: 9.8 MG/DL — SIGNIFICANT CHANGE UP (ref 8.4–10.5)
CHLORIDE SERPL-SCNC: 104 MMOL/L — SIGNIFICANT CHANGE UP (ref 96–108)
CHOLEST SERPL-MCNC: 125 MG/DL — SIGNIFICANT CHANGE UP
CK MB CFR SERPL CALC: 1.3 NG/ML — SIGNIFICANT CHANGE UP (ref 0–6.7)
CK SERPL-CCNC: 78 U/L — SIGNIFICANT CHANGE UP (ref 30–200)
CO2 SERPL-SCNC: 25 MMOL/L — SIGNIFICANT CHANGE UP (ref 22–31)
COLOR SPEC: YELLOW — SIGNIFICANT CHANGE UP
CREAT SERPL-MCNC: 0.98 MG/DL — SIGNIFICANT CHANGE UP (ref 0.5–1.3)
DIFF PNL FLD: NEGATIVE — SIGNIFICANT CHANGE UP
EGFR: 86 ML/MIN/1.73M2 — SIGNIFICANT CHANGE UP
GLUCOSE BLDC GLUCOMTR-MCNC: 120 MG/DL — HIGH (ref 70–99)
GLUCOSE SERPL-MCNC: 105 MG/DL — HIGH (ref 70–99)
GLUCOSE UR QL: NEGATIVE — SIGNIFICANT CHANGE UP
HCT VFR BLD CALC: 35.1 % — LOW (ref 39–50)
HDLC SERPL-MCNC: 61 MG/DL — SIGNIFICANT CHANGE UP
HGB BLD-MCNC: 11.6 G/DL — LOW (ref 13–17)
INR BLD: 1.3 — HIGH (ref 0.85–1.18)
KETONES UR-MCNC: NEGATIVE — SIGNIFICANT CHANGE UP
LEUKOCYTE ESTERASE UR-ACNC: NEGATIVE — SIGNIFICANT CHANGE UP
LIPID PNL WITH DIRECT LDL SERPL: 49 MG/DL — SIGNIFICANT CHANGE UP
MCHC RBC-ENTMCNC: 29.8 PG — SIGNIFICANT CHANGE UP (ref 27–34)
MCHC RBC-ENTMCNC: 33 GM/DL — SIGNIFICANT CHANGE UP (ref 32–36)
MCV RBC AUTO: 90.2 FL — SIGNIFICANT CHANGE UP (ref 80–100)
NITRITE UR-MCNC: NEGATIVE — SIGNIFICANT CHANGE UP
NON HDL CHOLESTEROL: 64 MG/DL — SIGNIFICANT CHANGE UP
NRBC # BLD: 0 /100 WBCS — SIGNIFICANT CHANGE UP (ref 0–0)
NT-PROBNP SERPL-SCNC: 44 PG/ML — SIGNIFICANT CHANGE UP (ref 0–300)
PH UR: 7 — SIGNIFICANT CHANGE UP (ref 5–8)
PLATELET # BLD AUTO: 107 K/UL — LOW (ref 150–400)
POTASSIUM SERPL-MCNC: 3.9 MMOL/L — SIGNIFICANT CHANGE UP (ref 3.5–5.3)
POTASSIUM SERPL-SCNC: 3.9 MMOL/L — SIGNIFICANT CHANGE UP (ref 3.5–5.3)
PROT SERPL-MCNC: 7.9 G/DL — SIGNIFICANT CHANGE UP (ref 6–8.3)
PROT UR-MCNC: NEGATIVE MG/DL — SIGNIFICANT CHANGE UP
PROTHROM AB SERPL-ACNC: 14.7 SEC — HIGH (ref 9.5–13)
RBC # BLD: 3.89 M/UL — LOW (ref 4.2–5.8)
RBC # FLD: 14.6 % — HIGH (ref 10.3–14.5)
RH IG SCN BLD-IMP: POSITIVE — SIGNIFICANT CHANGE UP
RH IG SCN BLD-IMP: POSITIVE — SIGNIFICANT CHANGE UP
SODIUM SERPL-SCNC: 140 MMOL/L — SIGNIFICANT CHANGE UP (ref 135–145)
SP GR SPEC: 1.01 — SIGNIFICANT CHANGE UP (ref 1–1.03)
TRIGL SERPL-MCNC: 76 MG/DL — SIGNIFICANT CHANGE UP
TROPONIN T, HIGH SENSITIVITY RESULT: 10 NG/L — SIGNIFICANT CHANGE UP (ref 0–51)
TSH SERPL-MCNC: 1.31 UIU/ML — SIGNIFICANT CHANGE UP (ref 0.27–4.2)
UROBILINOGEN FLD QL: 2 E.U./DL
WBC # BLD: 5.79 K/UL — SIGNIFICANT CHANGE UP (ref 3.8–10.5)
WBC # FLD AUTO: 5.79 K/UL — SIGNIFICANT CHANGE UP (ref 3.8–10.5)

## 2023-09-12 PROCEDURE — 93880 EXTRACRANIAL BILAT STUDY: CPT | Mod: 26

## 2023-09-12 PROCEDURE — 71045 X-RAY EXAM CHEST 1 VIEW: CPT | Mod: 26

## 2023-09-12 PROCEDURE — 93306 TTE W/DOPPLER COMPLETE: CPT | Mod: 26

## 2023-09-12 PROCEDURE — 93010 ELECTROCARDIOGRAM REPORT: CPT

## 2023-09-12 RX ORDER — DEXTROSE 50 % IN WATER 50 %
25 SYRINGE (ML) INTRAVENOUS ONCE
Refills: 0 | Status: DISCONTINUED | OUTPATIENT
Start: 2023-09-12 | End: 2023-09-14

## 2023-09-12 RX ORDER — INSULIN LISPRO 100/ML
VIAL (ML) SUBCUTANEOUS
Refills: 0 | Status: DISCONTINUED | OUTPATIENT
Start: 2023-09-12 | End: 2023-09-14

## 2023-09-12 RX ORDER — POTASSIUM CHLORIDE 20 MEQ
20 PACKET (EA) ORAL ONCE
Refills: 0 | Status: COMPLETED | OUTPATIENT
Start: 2023-09-12 | End: 2023-09-12

## 2023-09-12 RX ORDER — ATORVASTATIN CALCIUM 80 MG/1
80 TABLET, FILM COATED ORAL AT BEDTIME
Refills: 0 | Status: DISCONTINUED | OUTPATIENT
Start: 2023-09-12 | End: 2023-09-14

## 2023-09-12 RX ORDER — ALLOPURINOL 300 MG
100 TABLET ORAL DAILY
Refills: 0 | Status: DISCONTINUED | OUTPATIENT
Start: 2023-09-12 | End: 2023-09-12

## 2023-09-12 RX ORDER — INSULIN LISPRO 100/ML
VIAL (ML) SUBCUTANEOUS AT BEDTIME
Refills: 0 | Status: DISCONTINUED | OUTPATIENT
Start: 2023-09-12 | End: 2023-09-14

## 2023-09-12 RX ORDER — CLOPIDOGREL BISULFATE 75 MG/1
1 TABLET, FILM COATED ORAL
Refills: 0 | DISCHARGE

## 2023-09-12 RX ORDER — GLUCAGON INJECTION, SOLUTION 0.5 MG/.1ML
1 INJECTION, SOLUTION SUBCUTANEOUS ONCE
Refills: 0 | Status: DISCONTINUED | OUTPATIENT
Start: 2023-09-12 | End: 2023-09-14

## 2023-09-12 RX ORDER — DEXTROSE 50 % IN WATER 50 %
15 SYRINGE (ML) INTRAVENOUS ONCE
Refills: 0 | Status: DISCONTINUED | OUTPATIENT
Start: 2023-09-12 | End: 2023-09-14

## 2023-09-12 RX ORDER — CARVEDILOL PHOSPHATE 80 MG/1
6.25 CAPSULE, EXTENDED RELEASE ORAL EVERY 12 HOURS
Refills: 0 | Status: DISCONTINUED | OUTPATIENT
Start: 2023-09-12 | End: 2023-09-14

## 2023-09-12 RX ORDER — SODIUM CHLORIDE 9 MG/ML
1000 INJECTION, SOLUTION INTRAVENOUS
Refills: 0 | Status: DISCONTINUED | OUTPATIENT
Start: 2023-09-12 | End: 2023-09-14

## 2023-09-12 RX ORDER — SPIRONOLACTONE 25 MG/1
100 TABLET, FILM COATED ORAL DAILY
Refills: 0 | Status: DISCONTINUED | OUTPATIENT
Start: 2023-09-12 | End: 2023-09-12

## 2023-09-12 RX ORDER — DEXTROSE 50 % IN WATER 50 %
12.5 SYRINGE (ML) INTRAVENOUS ONCE
Refills: 0 | Status: DISCONTINUED | OUTPATIENT
Start: 2023-09-12 | End: 2023-09-14

## 2023-09-12 RX ORDER — PANTOPRAZOLE SODIUM 20 MG/1
40 TABLET, DELAYED RELEASE ORAL
Refills: 0 | Status: DISCONTINUED | OUTPATIENT
Start: 2023-09-12 | End: 2023-09-14

## 2023-09-12 RX ORDER — AMLODIPINE BESYLATE 2.5 MG/1
5 TABLET ORAL DAILY
Refills: 0 | Status: DISCONTINUED | OUTPATIENT
Start: 2023-09-13 | End: 2023-09-14

## 2023-09-12 RX ADMIN — Medication 20 MILLIEQUIVALENT(S): at 18:34

## 2023-09-12 RX ADMIN — ATORVASTATIN CALCIUM 80 MILLIGRAM(S): 80 TABLET, FILM COATED ORAL at 21:30

## 2023-09-12 RX ADMIN — CARVEDILOL PHOSPHATE 6.25 MILLIGRAM(S): 80 CAPSULE, EXTENDED RELEASE ORAL at 18:34

## 2023-09-12 NOTE — H&P ADULT - ASSESSMENT
Neuro: AOx3  - follow up carotid US     CVS: 3CAD pre-op for OPCAB on 9/14   - NO ASA allergy.   - echo pending.     Respiratory: Saturating well on  pack year smoking history   - Wean off O2 sat > 92%  - IS and ambulation  - Chest PT.   - Follow up PFTs     GI: Liver cirrhosis secondary to ETOH abuse with esophageal varices s/p 3 clips in 4/2023. AST/ALT 73/41; INR normal   - consult GI/Hepatologyy. Dr. Lawson aware. Will see   - GI PPX   - Bowel regimen with Senna and Miralax     : BUN/ Creatinine.14/0.98  - Davis   - monitor I/Os.     Endo:A1c pending.  FS well contolled.   - ISS     ID:   - completed periop ABX   - continue to monitor fever curve     Heme: Daniel DVT PPX   - SQH     Dispo plan:     Deepa Woods PA-C       Neuro: AOx3  - follow up carotid US     CVS: 3CAD pre-op for OPCAB on 9/14   - NO ASA allergy.  - Restart Amlodipine 5mg Daily, Coreg 6.25 mg BID and statin.    - echo pending.     Respiratory: Saturating well on  pack year smoking history   - Wean off O2 sat > 92%  - IS and ambulation  - Chest PT.   - Follow up PFTs     GI: Liver cirrhosis secondary to ETOH abuse with esophageal varices s/p 3 clips in 4/2023. AST/ALT 73/41; INR normal   - consult GI/Hepatologyy. Dr. Lawson aware. Will see today.   - GI PPX   - Bowel regimen with Senna and Miralax   - continue rifaximin     : BUN/ Creatinine.14/0.98  - Davis   - monitor I/Os.   - euvolmeic holding Lasix     Endo:A1c pending.  FS well contolled.   - ISS   - on sliding scale for now.     ID: WBC wnl afebrile.   - continue to monitor fever curve     Heme: Platelet 132    - SQH held for low platelets.     Dispo plan: OR on 9/14     Deepa Woods PA-C

## 2023-09-12 NOTE — CONSULT NOTE ADULT - ASSESSMENT
Assessment:   63 [place] ([facility]) M former smoker with PMH of HTN, severe 3 vessel CAD, HLD, pre-diabetes, gout, TIA (14 yrs ago), gastric ulcer, rectal bleed, LIZ and alcoholic liver disease c/b cirrhosis and portal hypertension m/b esophageal varices (and UGIB) and splenomegaly, presenting for OPCAB with CT surgery. Hepatology team consulted for pre-operative optimization.     Recommendations:   Vital signs stable and labs this admission: plt 107, H/H 11.6/35.1, INR 1.30, Na 140, Cr 0.98, bili 2.0 AST/ALT 73/41   - Meld-Na score upon admission: 12   - patient denies any prior adverse reactions to anesthesia   - states that he can walk 5 blocks and climb 1-2 flights of stairs before becoming SOB, so METS >4  - Valverde risk assessment score: 0.7% risk of MI or cardiac arrest, intraoperatively or up to 30 days post-up   - RCRI risk assessment score: Class IV risk   - echo this admission showed: normal LV and RV systolic function, no valvular disease     Case discussed with Dr. Hernandez. Hepatology Team will continue to follow for optimization prior to OPCAB this week.     Arina Murray D.O.   Gastroenterology Fellow  Weekday 7am-5pm Pager: [phone]

## 2023-09-12 NOTE — H&P ADULT - HISTORY OF PRESENT ILLNESS
63 year old malewith history of HTN , HLD, pre-diabetes, gout, former tobacco/ETOH use, cirrhosis, esophageal varices, GI bleed and TIA found to have 3vCAD. Patient originally found to have CAD on cardiac cath in 2/2023. During his hospital stay he also reported history of black stools.  63 year old malewith history of HTN , HLD, diabetes, gout, former tobacco/ETOH use, cirrhosis, esophageal varices, GI bleed and TIA found to have 3vCAD. Patient originally found to have CAD on cardiac cath in 2/2023. During his hospital stay in february he also reported history of black stools.  63 year old malewith history of HTN , HLD, diabetes, gout, former tobacco/ETOH use, cirrhosis, esophageal varices, GI bleed and TIA found to have 3vCAD. Patient originally found to have CAD on cardiac cath in 2/2023. During his hospital stay in february he also reported history of black stools. Work up revealed esophageal varices s/p banding. Patient was seen by Dr. Hinton for surgical consult. At the time, he was demed not a PCI/Stents s/t inability to tolerate DAPT and high risk for surgery secondary to Lover dysfunction with MELD score of 18. Patient has been seeing a hepatologist Dr. Lawson. Today he is admitted for pre-op optimization by Liver medicine prior to his OPCAB later this week. Patient states that he has not taken Plavix for months. Denies chest pain, SOB, n/v/d, abdominal pain, fever and chills.

## 2023-09-12 NOTE — H&P ADULT - NSICDXPASTMEDICALHX_GEN_ALL_CORE_FT
PAST MEDICAL HISTORY:  CAD (coronary artery disease)     Cirrhosis     Diabetes mellitus     Esophageal varices in cirrhosis     Gout     H/O: GI bleed     History of portal hypertension     History of TIAs     HLD (hyperlipidemia)     HTN (hypertension)

## 2023-09-12 NOTE — CONSULT NOTE ADULT - SUBJECTIVE AND OBJECTIVE BOX
Initial Hepatology Consult Note:       OVERNIGHT EVENTS:    SUBJECTIVE / INTERVAL HPI: Patient seen and examined at bedside.     VITAL SIGNS:  Vital Signs Last 24 Hrs  T(C): 36.6 (12 Sep 2023 17:20), Max: 36.6 (12 Sep 2023 14:21)  T(F): 97.9 (12 Sep 2023 17:20), Max: 97.9 (12 Sep 2023 17:20)  HR: 82 (12 Sep 2023 17:48) (82 - 85)  BP: 132/78 (12 Sep 2023 17:48) (127/85 - 132/78)  BP(mean): 97 (12 Sep 2023 17:48) (95 - 97)  RR: 18 (12 Sep 2023 17:48) (16 - 18)  SpO2: 99% (12 Sep 2023 17:48) (99% - 100%)    Parameters below as of 12 Sep 2023 17:48  Patient On (Oxygen Delivery Method): room air        23 @ 07:01  -  23 @ 20:20  --------------------------------------------------------  IN: 720 mL / OUT: 400 mL / NET: 320 mL        PHYSICAL EXAM:    General: WDWN  HEENT: NC/AT; PERRL, anicteric sclera; MMM  Neck: supple  Cardiovascular: +S1/S2; RRR  Respiratory: CTA B/L; no W/R/R  Gastrointestinal: soft, NT/ND; +BSx4  Extremities: WWP; no edema, clubbing or cyanosis  Vascular: 2+ radial, DP/PT pulses B/L  Neurological: AAOx3; no focal deficits    MEDICATIONS:  MEDICATIONS  (STANDING):  atorvastatin 80 milliGRAM(s) Oral at bedtime  carvedilol 6.25 milliGRAM(s) Oral every 12 hours  dextrose 5%. 1000 milliLiter(s) (100 mL/Hr) IV Continuous <Continuous>  dextrose 5%. 1000 milliLiter(s) (50 mL/Hr) IV Continuous <Continuous>  dextrose 50% Injectable 25 Gram(s) IV Push once  dextrose 50% Injectable 12.5 Gram(s) IV Push once  dextrose 50% Injectable 25 Gram(s) IV Push once  glucagon  Injectable 1 milliGRAM(s) IntraMuscular once  insulin lispro (ADMELOG) corrective regimen sliding scale   SubCutaneous at bedtime  insulin lispro (ADMELOG) corrective regimen sliding scale   SubCutaneous three times a day before meals  pantoprazole    Tablet 40 milliGRAM(s) Oral before breakfast  rifAXIMin 550 milliGRAM(s) Oral two times a day    MEDICATIONS  (PRN):  dextrose Oral Gel 15 Gram(s) Oral once PRN Blood Glucose LESS THAN 70 milliGRAM(s)/deciliter      ALLERGIES:  Allergies    aspirin (Pruritus)    Intolerances        LABS:                        11.6   5.79  )-----------( 107      ( 12 Sep 2023 15:42 )             35.1         140  |  104  |  14  ----------------------------<  105<H>  3.9   |  25  |  0.98    Ca    9.8      12 Sep 2023 15:42    TPro  7.9  /  Alb  3.8  /  TBili  2.0<H>  /  DBili  x   /  AST  73<H>  /  ALT  41  /  AlkPhos  175<H>      PT/INR - ( 12 Sep 2023 15:42 )   PT: 14.7 sec;   INR: 1.30          PTT - ( 12 Sep 2023 15:42 )  PTT:36.4 sec  Urinalysis Basic - ( 12 Sep 2023 18:42 )    Color: Yellow / Appearance: Clear / S.010 / pH: x  Gluc: x / Ketone: NEGATIVE  / Bili: Negative / Urobili: 2.0 E.U./dL   Blood: x / Protein: NEGATIVE mg/dL / Nitrite: NEGATIVE   Leuk Esterase: NEGATIVE / RBC: x / WBC x   Sq Epi: x / Non Sq Epi: x / Bacteria: x      CAPILLARY BLOOD GLUCOSE            RADIOLOGY & ADDITIONAL TESTS: Reviewed.    ASSESSMENT:    PLAN:      Initial Hepatology Consult Note:     HPI:   63 [place] ([facility]) M former smoker with PMH of HTN, severe 3 vessel CAD, HLD, pre-diabetes, gout, TIA (14 yrs ago), gastric ulcer, rectal bleed, LIZ and alcoholic liver disease c/b cirrhosis and portal hypertension m/b esophageal varices (and UGIB) and splenomegaly. Additional collateral obtained from out-patient records: Patient initially presented to [facility] ED (23) c/o intermittent chest pain x 2weeks that was worse on exertion and relieved with rest. In ED his EKG noted to have ST depressions and troponin 0.27. This admission was also found to have melena and anemia (Hg7.7). EGD showed: Gr 3 EV s/p incomplete banding and elevated LFTs (TB 2.2, ). Patient underwent a cardiac cath (23), which revealed distal LM stenosis and 3 vessel CAD and was discharged home 3/3/23. Patient was referred by his cardiologist to see Dr. Lawson, and underwent repeat EGD (performed 23 cw EV s/p 3 bands placed) and was referred for liver transplant but declined. Patient recently hospitalized at [place] for sepsis from [facility] (May 2023). EGD there showed small esophageal varix and significant protal hypertensive gastropathy. He was treated with prednisolone wean, which he says he recently completed and has been on Metformin and insulin for hyperglycemia. Of note, patient has an aspirin allergy.     Patient presents this admission to CT surgery for planned OPCAB surgery. Previously not a candidate for PCI/stents due to inability to tolerate DAPT. Patient seen and examined at bedside. Reports that he feels well and is no longer taking steroids. Has not consumed alcohol in any significant quantity since 2023 when he was diagnosed with a liver problem. Baseline labs: Hb 11.9 and AST/ALT 47/30 with TB of 2.3. States that he has no acute complaints at this time aside from mild abdominal tenderness. Denies nausea, vomiting, diarrhea, constipation, fever, or chills.     Prior imaging studies per out-patient records:   ?CT Abd w/wo IVC 23:   No suspicious liver lesion and no findings to correlate with recent MRI.  ?  EGD 23:   G1 EV  Gastritis  NB gastric uslces with no stigmate of bleeding  normal duodenal bulb and 2nd portion of the duodenum  No specimens collected.  ?  MRCP 3/29/23:   Motion degraded exam.  Heterogeneous somewhat nodular enhancement of the right hepatic lobe near the dome of uncertain etiology, and potentially perfusional, though neoplasm is not excluded. Given motion on this exam, further evaluation with contrast enhanced multiphase CT is recommended, including arterial, portal venous, and delayed phases.  Cirrhosis.  Findings of portal htn including trace ascites, splenomegaly, and small perigastric and paraesophageal varices.  ?  TTE 3/7/23:   EF 55-60%  1. The left ventricular cavity is normal in size. The left ventricular wall thickness is normal. The left ventricular systolic function is normal with an ejection fraction visually estimated at 55 to 60 %. There are no regional wall motion abnormalities seen.  2. Mild to moderate mitral regurgitation.  3. Mild tricuspid regurgitation.    COLON 23 poor prep:  Sessile Polyps x2 (transverse, x 2 and desceding)  (polypectomy not done due to poor prep  ?  COLON 23 (good prep):   polyps removed - 1 10mm in transverse, 1 5 mm in descending, 2 rectal  IH  multiple nonbleeding colonic angioectasias  ?  EGD 23:   z line regular  medium HH  G3EV, incompletely eradicated, banded  gstritis  gastric ulcer with no stigmate of bleeding  normal duodenal bulb  ?  Cardiac Cath 23 [facility]:  - 50% distal LM  - 60% mid LAD, 80% distal LAD  - 100%  CFX after OM1  - 95% mid RCA  - 100%  RPDA  - grade 3 collaterals supplying distal CFX  - severe inferolateral hypokinesis  - basal inferolateral hypokinesis  ?    VITAL SIGNS:  Vital Signs Last 24 Hrs  T(C): 36.6 (12 Sep 2023 17:20), Max: 36.6 (12 Sep 2023 14:21)  T(F): 97.9 (12 Sep 2023 17:20), Max: 97.9 (12 Sep 2023 17:20)  HR: 82 (12 Sep 2023 17:48) (82 - 85)  BP: 132/78 (12 Sep 2023 17:48) (127/85 - 132/78)  BP(mean): 97 (12 Sep 2023 17:48) (95 - 97)  RR: 18 (12 Sep 2023 17:48) (16 - 18)  SpO2: 99% (12 Sep 2023 17:48) (99% - 100%)    Parameters below as of 12 Sep 2023 17:48  Patient On (Oxygen Delivery Method): room air    23 @ 07:01  -  23 @ 20:20  --------------------------------------------------------  IN: 720 mL / OUT: 400 mL / NET: 320 mL      PHYSICAL EXAM:  General: No acute distress, conversant   Lungs: Normal respiratory effort and no intercostal retractions  Cardiovascular: RRR  Abdomen: Soft, non-tender, non-distended   Neurological: Alert and oriented x3  Skin: Warm and dry. No obvious rash      MEDICATIONS:  MEDICATIONS  (STANDING):  atorvastatin 80 milliGRAM(s) Oral at bedtime  carvedilol 6.25 milliGRAM(s) Oral every 12 hours  dextrose 5%. 1000 milliLiter(s) (100 mL/Hr) IV Continuous <Continuous>  dextrose 5%. 1000 milliLiter(s) (50 mL/Hr) IV Continuous <Continuous>  dextrose 50% Injectable 25 Gram(s) IV Push once  dextrose 50% Injectable 12.5 Gram(s) IV Push once  dextrose 50% Injectable 25 Gram(s) IV Push once  glucagon  Injectable 1 milliGRAM(s) IntraMuscular once  insulin lispro (ADMELOG) corrective regimen sliding scale   SubCutaneous at bedtime  insulin lispro (ADMELOG) corrective regimen sliding scale   SubCutaneous three times a day before meals  pantoprazole    Tablet 40 milliGRAM(s) Oral before breakfast  rifAXIMin 550 milliGRAM(s) Oral two times a day    MEDICATIONS  (PRN):  dextrose Oral Gel 15 Gram(s) Oral once PRN Blood Glucose LESS THAN 70 milliGRAM(s)/deciliter      ALLERGIES:  Allergies    aspirin (Pruritus)    Intolerances        LABS:                        11.6   5.79  )-----------( 107      ( 12 Sep 2023 15:42 )             35.1     09-12    140  |  104  |  14  ----------------------------<  105<H>  3.9   |  25  |  0.98    Ca    9.8      12 Sep 2023 15:42    TPro  7.9  /  Alb  3.8  /  TBili  2.0<H>  /  DBili  x   /  AST  73<H>  /  ALT  41  /  AlkPhos  175<H>      PT/INR - ( 12 Sep 2023 15:42 )   PT: 14.7 sec;   INR: 1.30          PTT - ( 12 Sep 2023 15:42 )  PTT:36.4 sec  Urinalysis Basic - ( 12 Sep 2023 18:42 )    Color: Yellow / Appearance: Clear / S.010 / pH: x  Gluc: x / Ketone: NEGATIVE  / Bili: Negative / Urobili: 2.0 E.U./dL   Blood: x / Protein: NEGATIVE mg/dL / Nitrite: NEGATIVE   Leuk Esterase: NEGATIVE / RBC: x / WBC x   Sq Epi: x / Non Sq Epi: x / Bacteria: x      CAPILLARY BLOOD GLUCOSE  RADIOLOGY & ADDITIONAL TESTS: Reviewed.

## 2023-09-12 NOTE — H&P ADULT - NSHPPHYSICALEXAM_GEN_ALL_CORE
GEN: NAD, looks comfortable  Psych: Mood appropriate  Neuro: A&Ox3.  No focal deficits.  Moving all extremities.   CV: S1S2, regular, no murmurs appreciated.  No carotid bruits.  No JVD  Lungs: Clear B/L.  No wheezing, rales or rhonchi  ABD: Soft, non-tender, distended.  +Bowel sounds  EXT: Warm and well perfused.  No peripheral edema noted Distal pulses palpable bilaterally.   Musculoskeletal: Moving all extremities with normal ROM, no joint swelling

## 2023-09-12 NOTE — H&P ADULT - NSHPLABSRESULTS_GEN_ALL_CORE
Cardiac Cath 2/27/23:   50% distal LM  60% mid LAD 80% Distal LAD  100%  of CFXafter OM1  95% mid RCA  100%  RPDA

## 2023-09-13 ENCOUNTER — TRANSCRIPTION ENCOUNTER (OUTPATIENT)
Age: 63
End: 2023-09-13

## 2023-09-13 LAB
A1C WITH ESTIMATED AVERAGE GLUCOSE RESULT: 7 % — HIGH (ref 4–5.6)
ALBUMIN SERPL ELPH-MCNC: 3.6 G/DL — SIGNIFICANT CHANGE UP (ref 3.3–5)
ALP SERPL-CCNC: 134 U/L — HIGH (ref 40–120)
ALT FLD-CCNC: 35 U/L — SIGNIFICANT CHANGE UP (ref 10–45)
ANION GAP SERPL CALC-SCNC: 10 MMOL/L — SIGNIFICANT CHANGE UP (ref 5–17)
AST SERPL-CCNC: 58 U/L — HIGH (ref 10–40)
BILIRUB SERPL-MCNC: 2.1 MG/DL — HIGH (ref 0.2–1.2)
BUN SERPL-MCNC: 15 MG/DL — SIGNIFICANT CHANGE UP (ref 7–23)
CALCIUM SERPL-MCNC: 9.8 MG/DL — SIGNIFICANT CHANGE UP (ref 8.4–10.5)
CHLORIDE SERPL-SCNC: 103 MMOL/L — SIGNIFICANT CHANGE UP (ref 96–108)
CO2 SERPL-SCNC: 23 MMOL/L — SIGNIFICANT CHANGE UP (ref 22–31)
CREAT SERPL-MCNC: 0.95 MG/DL — SIGNIFICANT CHANGE UP (ref 0.5–1.3)
EGFR: 89 ML/MIN/1.73M2 — SIGNIFICANT CHANGE UP
ESTIMATED AVERAGE GLUCOSE: 154 MG/DL — HIGH (ref 68–114)
GLUCOSE BLDC GLUCOMTR-MCNC: 128 MG/DL — HIGH (ref 70–99)
GLUCOSE BLDC GLUCOMTR-MCNC: 133 MG/DL — HIGH (ref 70–99)
GLUCOSE BLDC GLUCOMTR-MCNC: 136 MG/DL — HIGH (ref 70–99)
GLUCOSE BLDC GLUCOMTR-MCNC: 141 MG/DL — HIGH (ref 70–99)
GLUCOSE SERPL-MCNC: 195 MG/DL — HIGH (ref 70–99)
HCT VFR BLD CALC: 33.1 % — LOW (ref 39–50)
HCV AB S/CO SERPL IA: 0.04 S/CO — SIGNIFICANT CHANGE UP
HCV AB SERPL-IMP: SIGNIFICANT CHANGE UP
HGB BLD-MCNC: 11.2 G/DL — LOW (ref 13–17)
MAGNESIUM SERPL-MCNC: 1.8 MG/DL — SIGNIFICANT CHANGE UP (ref 1.6–2.6)
MCHC RBC-ENTMCNC: 30.5 PG — SIGNIFICANT CHANGE UP (ref 27–34)
MCHC RBC-ENTMCNC: 33.8 GM/DL — SIGNIFICANT CHANGE UP (ref 32–36)
MCV RBC AUTO: 90.2 FL — SIGNIFICANT CHANGE UP (ref 80–100)
NRBC # BLD: 0 /100 WBCS — SIGNIFICANT CHANGE UP (ref 0–0)
PLATELET # BLD AUTO: 91 K/UL — LOW (ref 150–400)
POTASSIUM SERPL-MCNC: 4 MMOL/L — SIGNIFICANT CHANGE UP (ref 3.5–5.3)
POTASSIUM SERPL-SCNC: 4 MMOL/L — SIGNIFICANT CHANGE UP (ref 3.5–5.3)
PROT SERPL-MCNC: 7.6 G/DL — SIGNIFICANT CHANGE UP (ref 6–8.3)
RBC # BLD: 3.67 M/UL — LOW (ref 4.2–5.8)
RBC # FLD: 14.5 % — SIGNIFICANT CHANGE UP (ref 10.3–14.5)
SODIUM SERPL-SCNC: 136 MMOL/L — SIGNIFICANT CHANGE UP (ref 135–145)
WBC # BLD: 4.64 K/UL — SIGNIFICANT CHANGE UP (ref 3.8–10.5)
WBC # FLD AUTO: 4.64 K/UL — SIGNIFICANT CHANGE UP (ref 3.8–10.5)

## 2023-09-13 PROCEDURE — 99232 SBSQ HOSP IP/OBS MODERATE 35: CPT

## 2023-09-13 PROCEDURE — 99233 SBSQ HOSP IP/OBS HIGH 50: CPT

## 2023-09-13 RX ORDER — CHLORHEXIDINE GLUCONATE 213 G/1000ML
1 SOLUTION TOPICAL ONCE
Refills: 0 | Status: COMPLETED | OUTPATIENT
Start: 2023-09-13 | End: 2023-09-13

## 2023-09-13 RX ORDER — ACETAMINOPHEN 500 MG
1000 TABLET ORAL ONCE
Refills: 0 | Status: COMPLETED | OUTPATIENT
Start: 2023-09-13 | End: 2023-09-14

## 2023-09-13 RX ORDER — MAGNESIUM OXIDE 400 MG ORAL TABLET 241.3 MG
400 TABLET ORAL ONCE
Refills: 0 | Status: COMPLETED | OUTPATIENT
Start: 2023-09-13 | End: 2023-09-13

## 2023-09-13 RX ORDER — CHLORHEXIDINE GLUCONATE 213 G/1000ML
1 SOLUTION TOPICAL ONCE
Refills: 0 | Status: COMPLETED | OUTPATIENT
Start: 2023-09-14 | End: 2023-09-14

## 2023-09-13 RX ORDER — MUPIROCIN 20 MG/G
1 OINTMENT TOPICAL
Refills: 0 | Status: DISCONTINUED | OUTPATIENT
Start: 2023-09-13 | End: 2023-09-14

## 2023-09-13 RX ORDER — ASCORBIC ACID 60 MG
2000 TABLET,CHEWABLE ORAL ONCE
Refills: 0 | Status: COMPLETED | OUTPATIENT
Start: 2023-09-13 | End: 2023-09-13

## 2023-09-13 RX ORDER — CHLORHEXIDINE GLUCONATE 213 G/1000ML
15 SOLUTION TOPICAL ONCE
Refills: 0 | Status: COMPLETED | OUTPATIENT
Start: 2023-09-14 | End: 2023-09-14

## 2023-09-13 RX ORDER — CHLORHEXIDINE GLUCONATE 213 G/1000ML
1 SOLUTION TOPICAL ONCE
Refills: 0 | Status: DISCONTINUED | OUTPATIENT
Start: 2023-09-13 | End: 2023-09-16

## 2023-09-13 RX ADMIN — MAGNESIUM OXIDE 400 MG ORAL TABLET 400 MILLIGRAM(S): 241.3 TABLET ORAL at 13:30

## 2023-09-13 RX ADMIN — CARVEDILOL PHOSPHATE 6.25 MILLIGRAM(S): 80 CAPSULE, EXTENDED RELEASE ORAL at 05:46

## 2023-09-13 RX ADMIN — AMLODIPINE BESYLATE 5 MILLIGRAM(S): 2.5 TABLET ORAL at 05:46

## 2023-09-13 RX ADMIN — ATORVASTATIN CALCIUM 80 MILLIGRAM(S): 80 TABLET, FILM COATED ORAL at 21:29

## 2023-09-13 RX ADMIN — Medication 2000 MILLIGRAM(S): at 23:39

## 2023-09-13 RX ADMIN — CARVEDILOL PHOSPHATE 6.25 MILLIGRAM(S): 80 CAPSULE, EXTENDED RELEASE ORAL at 19:43

## 2023-09-13 RX ADMIN — PANTOPRAZOLE SODIUM 40 MILLIGRAM(S): 20 TABLET, DELAYED RELEASE ORAL at 06:12

## 2023-09-13 RX ADMIN — MUPIROCIN 1 APPLICATION(S): 20 OINTMENT TOPICAL at 21:29

## 2023-09-13 NOTE — PROGRESS NOTE ADULT - ASSESSMENT
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 Patient originally found to have CAD on cardiac cath in 2/2023. During his hospital stay in february he also reported history of black stools. Work up revealed esophageal varices s/p banding. Patient was seen by Dr. Hinton for surgical consult. At the time, he was deemed not a PCI/Stents 2/2 inability to tolerate DAPT and high risk for surgery secondary to liver dysfunction with MELD score of 18. Patient has been seen by hepatologist Dr. Lawosn. Patient presented to [facility] on 9/12/23 for pre-op optimization prior to his OPCAB later this week. Upon admission, hepatology consulted and patient cleared for OPCAB tomorrow.    Plan:    Neurovascular:   -Hx of ETOH use  -Hx of TIA  -Pain well controlled.    Cardiovascular:   -3v CAD  -Hx of HTN  -Hx of HLD  -Hemodynamically stable.   -Monitor: BP, HR, tele    Respiratory:   -Oxygenating well on room air  -Encourage continued use of IS 10x/hr and frequent ambulation  -CXR:    GI:  -GI PPX:  -PO Diet  -Bowel Regimen:     Renal / :  -Continue to monitor renal function: BUN/Cr  -Monitor I/O's daily     Endocrine:    -No hx of DM or thyroid dx  -A1c:  -TSH:    Hematologic:  -CBC: H/H-  -Coagulation Panel.    ID:  -Temperature:   -CBC: WBC-  -Continue to observe for SIRS/Sepsis Syndrome.    Prophylaxis:  -DVT prophylaxis with 5000 SubQ Heparin q8h.  -Continue with SCD's b/l while patient is at rest     Disposition:  -Discharge home once patient is medically ready   64 YO Male w/ PMHx of HTN, HLD, DMII, gout, former tobacco/ETOH use, cirrhosis, esophageal varices, GI bleed and TIA who was found to have 3vCAD. Patient originally found to have CAD on cardiac cath in 2/2023. During his hospital stay in february he also reported history of black stools. Work up revealed esophageal varices s/p banding. Patient was seen by Dr. Hinton for surgical consult. At the time, he was deemed not a PCI/Stents 2/2 inability to tolerate DAPT and high risk for surgery secondary to liver dysfunction with MELD score of 18. Patient has been seen by hepatologist Dr. Lawson. Patient presented to [facility] on 9/12/23 for pre-op optimization prior to his OPCAB later this week. Upon admission, hepatology consulted and patient cleared for OPCAB tomorrow.    Plan:    Neurovascular:   -Hx of ETOH use  -Hx of TIA  -Pain well controlled.    Cardiovascular:   -3v CAD  -No ASA 2/2 allergy  -Cont BB and statin  -OPCAB tomorrow  -Hx of HTN  -Cont Coreg and Norvasc  -Hx of HLD  -Cont statin  -Hemodynamically stable.   -Monitor: BP, HR, tele    Respiratory:   -Oxygenating well on room air  -Encourage continued use of IS 10x/hr and frequent ambulation  -CXR: stable    GI:  -Hx of Cirrhosis  -Cont Rifaximin  -Hepatology following, recs appreciated  -Hx of Esophageal varices  -GI PPX: Protonix  -PO Diet  -Bowel Regimen: None     Renal / :  -Continue to monitor renal function: BUN/Cr: 15/0.95  -Monitor I/O's daily     Endocrine:    -Hx of DMII  -A1c: 7.0  -No hx of thyroid dx  -TSH: 1.31    Hematologic:  -CBC: H/H- 11/33  -Coagulation Panel.    ID:  -Temperature: Afebrile  -CBC: WBC-  -Continue to observe for SIRS/Sepsis Syndrome.    Prophylaxis:  -DVT prophylaxis with 5000 SubQ Heparin q8h.  -Continue with SCD's b/l while patient is at rest     Disposition:  -Discharge home once patient is medically ready   62 YO Male w/ PMHx of HTN, HLD, DMII, gout, former tobacco/ETOH use, cirrhosis, esophageal varices, GI bleed and TIA who was found to have 3vCAD. Patient originally found to have CAD on cardiac cath in 2/2023. During his hospital stay in february he also reported history of black stools. Work up revealed esophageal varices s/p banding. Patient was seen by Dr. Hinton for surgical consult. At the time, he was deemed not a PCI/Stents 2/2 inability to tolerate DAPT and high risk for surgery secondary to liver dysfunction with MELD score of 18. Patient has been seen by hepatologist Dr. Lawson. Patient presented to [facility] on 9/12/23 for pre-op optimization prior to his OPCAB later this week. Upon admission, hepatology consulted and patient cleared for OPCAB tomorrow.    Plan:    Neurovascular:   -Hx of ETOH use  -Hx of TIA  -Pain well controlled.    Cardiovascular:   -3v CAD  -No ASA 2/2 allergy  -Cont BB and statin  -OPCAB tomorrow  -Hx of HTN  -Cont Coreg and Norvasc  -Hx of HLD  -Cont statin  -Hemodynamically stable.   -Monitor: BP, HR, tele    Respiratory:   -Oxygenating well on room air  -Encourage continued use of IS 10x/hr and frequent ambulation  -CXR: stable    GI:  -Hx of Cirrhosis  -Cont Rifaximin  -Hepatology following, recs appreciated  -Hx of Esophageal varices  -GI PPX: Protonix  -PO Diet  -Bowel Regimen: None     Renal / :  -Continue to monitor renal function: BUN/Cr: 15/0.95  -Monitor I/O's daily     Endocrine:    -Hx of DMII  -A1c: 7.0  -Cont ISS  -No hx of thyroid dx  -TSH: 1.31    Hematologic:  -CBC: H/H- 11/33  -Coagulation Panel.    ID:  -Temperature: Afebrile  -CBC: WBC- 4.64  -Continue to observe for SIRS/Sepsis Syndrome.    Prophylaxis:  -DVT prophylaxis with 5000 SubQ Heparin q8h.  -Continue with SCD's b/l while patient is at rest     Disposition:  -OPCAB tomorrow

## 2023-09-13 NOTE — PROGRESS NOTE ADULT - ASSESSMENT
Dr denise ate the bedside- pt removed all his monitors 63 [place] ([facility]) M former smoker with PMH of HTN, severe 3 vessel CAD, HLD, pre-diabetes, gout, TIA (14 yrs ago), gastric ulcer, rectal bleed, LIZ and alcoholic liver disease c/b cirrhosis and portal hypertension m/b esophageal varices (and UGIB) and splenomegaly, presenting for OPCAB with CT surgery. Hepatology team consulted for pre-operative optimization.     *INCOMPLETE*  Recommendations:   Vital signs stable and labs this admission: plt 107, H/H 11.6/35.1, INR 1.30, Na 140, Cr 0.98, bili 2.0 AST/ALT 73/41   - Meld-Na score upon admission: 12   - patient denies any prior adverse reactions to anesthesia   - states that he can walk 5 blocks and climb 1-2 flights of stairs before becoming SOB, so METS >4  - echo this admission showed: normal LV and RV systolic function, no valvular disease      63 [place] ([facility]) M former smoker with PMH of HTN, severe 3 vessel CAD, HLD, pre-diabetes, gout, TIA (14 yrs ago), gastric ulcer, rectal bleed, LIZ and alcoholic liver disease c/b cirrhosis and portal hypertension m/b esophageal varices (and UGIB) and splenomegaly, presenting for OPCAB with CT surgery. Hepatology team consulted for pre-operative optimization.     Recommendations:   - Meld-Na score today: 15  - echo this admission showed: normal LV and RV systolic function, no valvular disease   - based on the Vocal Roberto Score for Cirrhosis Surgical Risk Factors, patient has a 3-7% 30-day mortality risk and a 5-10% 90-day mortality risk, so overall no contraindications to moving forward with the OPCAB surgery planned this week   - agree with continuing home rifaximin for HE   - would recommend colloid vs. crystalloid solutions for IVF post-surgery   - avoid over transfusing patient   - give ceftriaxone 1g for 72 hours starting on day of surgery   - platelets of 91 not a contraindication to DVT ppx  - start pantoprazole 40 mg PO daily   - cautious use of pain medications post-procedurally     Case discussed with Dr. Lawson. Hepatology Team will continue to follow.     Arina Murrya D.O.   Gastroenterology Fellow  Weekday 7am-5pm Pager: [phone]

## 2023-09-13 NOTE — PROGRESS NOTE ADULT - SUBJECTIVE AND OBJECTIVE BOX
Hepatology Consult Progress Note:     OVERNIGHT EVENTS:    SUBJECTIVE / INTERVAL HPI: Patient seen and examined at bedside.     VITAL SIGNS:  Vital Signs Last 24 Hrs  T(C): 36.2 (13 Sep 2023 09:12), Max: 36.6 (12 Sep 2023 14:21)  T(F): 97.1 (13 Sep 2023 09:12), Max: 97.9 (12 Sep 2023 17:20)  HR: 66 (13 Sep 2023 09:36) (64 - 85)  BP: 112/77 (13 Sep 2023 09:36) (110/72 - 132/78)  BP(mean): 89 (13 Sep 2023 09:36) (81 - 97)  RR: 18 (13 Sep 2023 09:36) (15 - 18)  SpO2: 98% (13 Sep 2023 09:36) (96% - 100%)    Parameters below as of 13 Sep 2023 09:36  Patient On (Oxygen Delivery Method): room air        09-12-23 @ 07:01  -  09-13-23 @ 07:00  --------------------------------------------------------  IN: 720 mL / OUT: 625 mL / NET: 95 mL        PHYSICAL EXAM:    General: WDWN  HEENT: NC/AT; PERRL, anicteric sclera; MMM  Neck: supple  Cardiovascular: +S1/S2; RRR  Respiratory: CTA B/L; no W/R/R  Gastrointestinal: soft, NT/ND; +BSx4  Extremities: WWP; no edema, clubbing or cyanosis  Vascular: 2+ radial, DP/PT pulses B/L  Neurological: AAOx3; no focal deficits    MEDICATIONS:  MEDICATIONS  (STANDING):  amLODIPine   Tablet 5 milliGRAM(s) Oral daily  atorvastatin 80 milliGRAM(s) Oral at bedtime  carvedilol 6.25 milliGRAM(s) Oral every 12 hours  dextrose 5%. 1000 milliLiter(s) (50 mL/Hr) IV Continuous <Continuous>  dextrose 5%. 1000 milliLiter(s) (100 mL/Hr) IV Continuous <Continuous>  dextrose 50% Injectable 25 Gram(s) IV Push once  dextrose 50% Injectable 25 Gram(s) IV Push once  dextrose 50% Injectable 12.5 Gram(s) IV Push once  glucagon  Injectable 1 milliGRAM(s) IntraMuscular once  insulin lispro (ADMELOG) corrective regimen sliding scale   SubCutaneous at bedtime  insulin lispro (ADMELOG) corrective regimen sliding scale   SubCutaneous three times a day before meals  magnesium oxide 400 milliGRAM(s) Oral once  pantoprazole    Tablet 40 milliGRAM(s) Oral before breakfast  rifAXIMin 550 milliGRAM(s) Oral two times a day    MEDICATIONS  (PRN):  dextrose Oral Gel 15 Gram(s) Oral once PRN Blood Glucose LESS THAN 70 milliGRAM(s)/deciliter      ALLERGIES:  Allergies    aspirin (Pruritus)    Intolerances        LABS:                        11.2   4.64  )-----------( 91       ( 13 Sep 2023 08:16 )             33.1     09-13    136  |  103  |  15  ----------------------------<  195<H>  4.0   |  23  |  0.95    Ca    9.8      13 Sep 2023 08:16  Mg     1.8     09-13    TPro  7.6  /  Alb  3.6  /  TBili  2.1<H>  /  DBili  x   /  AST  58<H>  /  ALT  35  /  AlkPhos  134<H>  09-13    PT/INR - ( 12 Sep 2023 15:42 )   PT: 14.7 sec;   INR: 1.30          PTT - ( 12 Sep 2023 15:42 )  PTT:36.4 sec  Urinalysis Basic - ( 13 Sep 2023 08:16 )    Color: x / Appearance: x / SG: x / pH: x  Gluc: 195 mg/dL / Ketone: x  / Bili: x / Urobili: x   Blood: x / Protein: x / Nitrite: x   Leuk Esterase: x / RBC: x / WBC x   Sq Epi: x / Non Sq Epi: x / Bacteria: x      CAPILLARY BLOOD GLUCOSE      POCT Blood Glucose.: 141 mg/dL (13 Sep 2023 11:30)        RADIOLOGY & ADDITIONAL TESTS: Reviewed.    ASSESSMENT:    PLAN:      Hepatology Consult Progress Note:     OVERNIGHT EVENTS: RAZIA.     SUBJECTIVE / INTERVAL HPI: Patient seen and examined at bedside.     VITAL SIGNS:  Vital Signs Last 24 Hrs  T(C): 36.2 (13 Sep 2023 09:12), Max: 36.6 (12 Sep 2023 14:21)  T(F): 97.1 (13 Sep 2023 09:12), Max: 97.9 (12 Sep 2023 17:20)  HR: 66 (13 Sep 2023 09:36) (64 - 85)  BP: 112/77 (13 Sep 2023 09:36) (110/72 - 132/78)  BP(mean): 89 (13 Sep 2023 09:36) (81 - 97)  RR: 18 (13 Sep 2023 09:36) (15 - 18)  SpO2: 98% (13 Sep 2023 09:36) (96% - 100%)    Parameters below as of 13 Sep 2023 09:36  Patient On (Oxygen Delivery Method): room air        09-12-23 @ 07:01  -  09-13-23 @ 07:00  --------------------------------------------------------  IN: 720 mL / OUT: 625 mL / NET: 95 mL      PHYSICAL EXAM:  General: No acute distress  Lungs: Normal respiratory effort and no intercostal retractions  Cardiovascular: RRR  Abdomen: Soft, non-tender, non-distended    Neurological: Alert and oriented x3  Skin: Warm and dry. No obvious rash      MEDICATIONS:  MEDICATIONS  (STANDING):  amLODIPine   Tablet 5 milliGRAM(s) Oral daily  atorvastatin 80 milliGRAM(s) Oral at bedtime  carvedilol 6.25 milliGRAM(s) Oral every 12 hours  dextrose 5%. 1000 milliLiter(s) (50 mL/Hr) IV Continuous <Continuous>  dextrose 5%. 1000 milliLiter(s) (100 mL/Hr) IV Continuous <Continuous>  dextrose 50% Injectable 25 Gram(s) IV Push once  dextrose 50% Injectable 25 Gram(s) IV Push once  dextrose 50% Injectable 12.5 Gram(s) IV Push once  glucagon  Injectable 1 milliGRAM(s) IntraMuscular once  insulin lispro (ADMELOG) corrective regimen sliding scale   SubCutaneous at bedtime  insulin lispro (ADMELOG) corrective regimen sliding scale   SubCutaneous three times a day before meals  magnesium oxide 400 milliGRAM(s) Oral once  pantoprazole    Tablet 40 milliGRAM(s) Oral before breakfast  rifAXIMin 550 milliGRAM(s) Oral two times a day    MEDICATIONS  (PRN):  dextrose Oral Gel 15 Gram(s) Oral once PRN Blood Glucose LESS THAN 70 milliGRAM(s)/deciliter      ALLERGIES:  Allergies    aspirin (Pruritus)    Intolerances        LABS:                        11.2   4.64  )-----------( 91       ( 13 Sep 2023 08:16 )             33.1     09-13    136  |  103  |  15  ----------------------------<  195<H>  4.0   |  23  |  0.95    Ca    9.8      13 Sep 2023 08:16  Mg     1.8     09-13    TPro  7.6  /  Alb  3.6  /  TBili  2.1<H>  /  DBili  x   /  AST  58<H>  /  ALT  35  /  AlkPhos  134<H>  09-13    PT/INR - ( 12 Sep 2023 15:42 )   PT: 14.7 sec;   INR: 1.30          PTT - ( 12 Sep 2023 15:42 )  PTT:36.4 sec  Urinalysis Basic - ( 13 Sep 2023 08:16 )    Color: x / Appearance: x / SG: x / pH: x  Gluc: 195 mg/dL / Ketone: x  / Bili: x / Urobili: x   Blood: x / Protein: x / Nitrite: x   Leuk Esterase: x / RBC: x / WBC x   Sq Epi: x / Non Sq Epi: x / Bacteria: x      CAPILLARY BLOOD GLUCOSE      POCT Blood Glucose.: 141 mg/dL (13 Sep 2023 11:30)  RADIOLOGY & ADDITIONAL TESTS: Reviewed.         Hepatology Consult Progress Note:     OVERNIGHT EVENTS: RAZIA.     SUBJECTIVE / INTERVAL HPI: Patient seen and examined at bedside. Currently being optimized for OPCAB this week.       VITAL SIGNS:  Vital Signs Last 24 Hrs  T(C): 36.2 (13 Sep 2023 09:12), Max: 36.6 (12 Sep 2023 14:21)  T(F): 97.1 (13 Sep 2023 09:12), Max: 97.9 (12 Sep 2023 17:20)  HR: 66 (13 Sep 2023 09:36) (64 - 85)  BP: 112/77 (13 Sep 2023 09:36) (110/72 - 132/78)  BP(mean): 89 (13 Sep 2023 09:36) (81 - 97)  RR: 18 (13 Sep 2023 09:36) (15 - 18)  SpO2: 98% (13 Sep 2023 09:36) (96% - 100%)    Parameters below as of 13 Sep 2023 09:36  Patient On (Oxygen Delivery Method): room air        09-12-23 @ 07:01  -  09-13-23 @ 07:00  --------------------------------------------------------  IN: 720 mL / OUT: 625 mL / NET: 95 mL    PHYSICAL EXAM:  General: No acute distress  Lungs: Normal respiratory effort and no intercostal retractions  Cardiovascular: RRR  Abdomen: Soft, non-tender, non-distended    Neurological: Alert and oriented x3  Skin: Warm and dry. No obvious rash      MEDICATIONS:  MEDICATIONS  (STANDING):  amLODIPine   Tablet 5 milliGRAM(s) Oral daily  atorvastatin 80 milliGRAM(s) Oral at bedtime  carvedilol 6.25 milliGRAM(s) Oral every 12 hours  dextrose 5%. 1000 milliLiter(s) (50 mL/Hr) IV Continuous <Continuous>  dextrose 5%. 1000 milliLiter(s) (100 mL/Hr) IV Continuous <Continuous>  dextrose 50% Injectable 25 Gram(s) IV Push once  dextrose 50% Injectable 25 Gram(s) IV Push once  dextrose 50% Injectable 12.5 Gram(s) IV Push once  glucagon  Injectable 1 milliGRAM(s) IntraMuscular once  insulin lispro (ADMELOG) corrective regimen sliding scale   SubCutaneous at bedtime  insulin lispro (ADMELOG) corrective regimen sliding scale   SubCutaneous three times a day before meals  magnesium oxide 400 milliGRAM(s) Oral once  pantoprazole    Tablet 40 milliGRAM(s) Oral before breakfast  rifAXIMin 550 milliGRAM(s) Oral two times a day    MEDICATIONS  (PRN):  dextrose Oral Gel 15 Gram(s) Oral once PRN Blood Glucose LESS THAN 70 milliGRAM(s)/deciliter      ALLERGIES:  Allergies    aspirin (Pruritus)    Intolerances        LABS:                        11.2   4.64  )-----------( 91       ( 13 Sep 2023 08:16 )             33.1     09-13    136  |  103  |  15  ----------------------------<  195<H>  4.0   |  23  |  0.95    Ca    9.8      13 Sep 2023 08:16  Mg     1.8     09-13    TPro  7.6  /  Alb  3.6  /  TBili  2.1<H>  /  DBili  x   /  AST  58<H>  /  ALT  35  /  AlkPhos  134<H>  09-13    PT/INR - ( 12 Sep 2023 15:42 )   PT: 14.7 sec;   INR: 1.30          PTT - ( 12 Sep 2023 15:42 )  PTT:36.4 sec  Urinalysis Basic - ( 13 Sep 2023 08:16 )    Color: x / Appearance: x / SG: x / pH: x  Gluc: 195 mg/dL / Ketone: x  / Bili: x / Urobili: x   Blood: x / Protein: x / Nitrite: x   Leuk Esterase: x / RBC: x / WBC x   Sq Epi: x / Non Sq Epi: x / Bacteria: x      CAPILLARY BLOOD GLUCOSE      POCT Blood Glucose.: 141 mg/dL (13 Sep 2023 11:30)  RADIOLOGY & ADDITIONAL TESTS: Reviewed.

## 2023-09-13 NOTE — PROGRESS NOTE ADULT - SUBJECTIVE AND OBJECTIVE BOX
Planned Date of Surgery: 9/14/23                                                                                                           Surgeon/Attending MD: Dr. Hinton    Procedure: OPCAB    Subjective: Patient states that he feels well today, offers no complaints. Denies chills, chest pain, SOB, palpitations, N/V.     HPI:  64 YO Male w/ PMHx of HTN, HLD, DMII, gout, former tobacco/ETOH use, cirrhosis, esophageal varices, GI bleed and TIA who was found to have 3vCAD. Patient originally found to have CAD on cardiac cath in 2/2023. During his hospital stay in february he also reported history of black stools. Work up revealed esophageal varices s/p banding. Patient was seen by Dr. Hinton for surgical consult. At the time, he was deemed not a PCI/Stents 2/2 inability to tolerate DAPT and high risk for surgery secondary to liver dysfunction with MELD score of 18. Patient has been seen by hepatologist Dr. Lawson. Patient presented to [facility] on 9/12/23 for pre-op optimization prior to his OPCAB later this week. Per patient, he has not taken Plavix for months.     PAST MEDICAL & SURGICAL HISTORY:  CAD (coronary artery disease)    HTN (hypertension)    Cirrhosis    Esophageal varices in cirrhosis    History of TIAs    H/O: GI bleed    Gout    HLD (hyperlipidemia)    History of portal hypertension    Diabetes mellitus    aspirin (Pruritus)    Vitals:  T(C): 36.6 (09-13-23 @ 13:21), Max: 36.6 (09-12-23 @ 17:20)  HR: 66 (09-13-23 @ 09:36) (64 - 82)  BP: 112/77 (09-13-23 @ 09:36) (110/72 - 132/78)  RR: 18 (09-13-23 @ 09:36) (15 - 18)  SpO2: 98% (09-13-23 @ 09:36) (96% - 99%)    Physical Exam  GENERAL: NAD, lying in bed comfortably  HEAD:  Atraumatic, Normocephalic  EYES: EOMI, PERRLA, conjunctiva and sclera clear  ENT: Moist mucous membranes  NECK: Supple, No JVD  CHEST/LUNG: CTAB  HEART: RRR  ABDOMEN: Bowel sounds present; Soft, Nontender, Nondistended. No hepatomegally  EXTREMITIES:  2+ Peripheral Pulses, brisk capillary refill. No clubbing, cyanosis, or edema  NERVOUS SYSTEM:  Alert & Oriented X3, speech clear. No deficits     MEDICATIONS  (STANDING):  amLODIPine   Tablet 5 milliGRAM(s) Oral daily  atorvastatin 80 milliGRAM(s) Oral at bedtime  carvedilol 6.25 milliGRAM(s) Oral every 12 hours  chlorhexidine 4% Liquid 1 Application(s) Topical once  chlorhexidine 4% Liquid 1 Application(s) Topical once  dextrose 5%. 1000 milliLiter(s) (100 mL/Hr) IV Continuous <Continuous>  dextrose 5%. 1000 milliLiter(s) (50 mL/Hr) IV Continuous <Continuous>  dextrose 50% Injectable 25 Gram(s) IV Push once  dextrose 50% Injectable 12.5 Gram(s) IV Push once  dextrose 50% Injectable 25 Gram(s) IV Push once  glucagon  Injectable 1 milliGRAM(s) IntraMuscular once  insulin lispro (ADMELOG) corrective regimen sliding scale   SubCutaneous three times a day before meals  insulin lispro (ADMELOG) corrective regimen sliding scale   SubCutaneous at bedtime  pantoprazole    Tablet 40 milliGRAM(s) Oral before breakfast  rifAXIMin 550 milliGRAM(s) Oral two times a day    MEDICATIONS  (PRN):  dextrose Oral Gel 15 Gram(s) Oral once PRN Blood Glucose LESS THAN 70 milliGRAM(s)/deciliter    On Beta Blocker? YES   On Aspirin 81mg? No CONTRAINDICATED Reason___ASA allergy____________    Labs:                        11.2   4.64  )-----------( 91       ( 13 Sep 2023 08:16 )             33.1     09-13    136  |  103  |  15  ----------------------------<  195<H>  4.0   |  23  |  0.95    Ca    9.8      13 Sep 2023 08:16  Mg     1.8     09-13    TPro  7.6  /  Alb  3.6  /  TBili  2.1<H>  /  DBili  x   /  AST  58<H>  /  ALT  35  /  AlkPhos  134<H>  09-13    PT/INR - ( 12 Sep 2023 15:42 )   PT: 14.7 sec;   INR: 1.30       PTT - ( 12 Sep 2023 15:42 )  PTT:36.4 sec  Urinalysis Basic - ( 13 Sep 2023 08:16 )    Color: x / Appearance: x / SG: x / pH: x  Gluc: 195 mg/dL / Ketone: x  / Bili: x / Urobili: x   Blood: x / Protein: x / Nitrite: x   Leuk Esterase: x / RBC: x / WBC x   Sq Epi: x / Non Sq Epi: x / Bacteria: x    ABO Interpretation: O (09-12-23 @ 16:13)    CARDIAC MARKERS ( 12 Sep 2023 15:42 )  x     / x     / 78 U/L / x     / 1.3 ng/mL    Preoperative Checklist: (x = completed, n/a = not applicable, p = pending)  __X____ECHO  __X____PA/Lateral CXR  __X____Carotid Duplex  __N/A____CT imaging  __P____PFTs  __X____UA  __X____Baseline Labs (CMP, CBC w/ diff, PTT, PT/INR)  __X____A1c  __X____TSH  __X____Lipid panel  __X____Cardiac enzymes  __X____Pro BNP  __X____EKG   __X____T&S 1  __X____T&S 2 (within 72 hours)  __N/A____COVID PCR (within 72 hours)  __N/A____Room air ABG  __N/A____P2Y12  __N/A___bHCG  __P____Consents  __X____Pre-op Orders Placed  __X____Blood Products Ordered  __X____NPO at midnight  ______Conduit tested    Abnormal/Noteworthy Preoperative Testing Results:   TTE:  < from: TTE Echo Complete w/ Contrast w/ Doppler (09.12.23 @ 16:08) >  CONCLUSIONS:     1. Normal left and right ventricular size and systolic function.   2. Aortic sclerosis without significant stenosis.   3. No other significant valvular disease.   4. No pericardial effusion.   5. Normal left ventricular diastolic function.    U/S:  < from: US Duplex Carotid Arteries Complete, Bilateral (09.12.23 @ 17:28) >  FINDINGS:    Moderate amount of calcified plaque in the right carotid bulb. Small   amount of calcified plaque proximal left internal carotid artery.    Peak systolic velocities are as follows:    RIGHT:    PROX CCA = 88 cm/s  DIST CCA = 59 cm/s  PROX ICA = 51 cm/s  DIST ICA = 59 cm/s  ECA = 86 cm/s    LEFT:    PROX CCA = 76 cm/s  DIST CCA = 57 cm/s  PROX ICA = 41 cm/s  DIST ICA = 80 cm/s  ECA = 72 cm/s    Antegrade flow is noted within both vertebral arteries.    IMPRESSION: Plaque bilaterally, right greater than left. No significant   hemodynamic stenosis of either carotid artery.    < end of copied text >

## 2023-09-14 ENCOUNTER — TRANSCRIPTION ENCOUNTER (OUTPATIENT)
Age: 63
End: 2023-09-14

## 2023-09-14 ENCOUNTER — APPOINTMENT (OUTPATIENT)
Dept: CARDIOTHORACIC SURGERY | Facility: HOSPITAL | Age: 63
End: 2023-09-14

## 2023-09-14 LAB
ALBUMIN SERPL ELPH-MCNC: 3.3 G/DL — SIGNIFICANT CHANGE UP (ref 3.3–5)
ALP SERPL-CCNC: 93 U/L — SIGNIFICANT CHANGE UP (ref 40–120)
ALT FLD-CCNC: 32 U/L — SIGNIFICANT CHANGE UP (ref 10–45)
ANION GAP SERPL CALC-SCNC: 11 MMOL/L — SIGNIFICANT CHANGE UP (ref 5–17)
ANION GAP SERPL CALC-SCNC: 8 MMOL/L — SIGNIFICANT CHANGE UP (ref 5–17)
ANION GAP SERPL CALC-SCNC: 9 MMOL/L — SIGNIFICANT CHANGE UP (ref 5–17)
APTT BLD: 36.5 SEC — HIGH (ref 24.5–35.6)
APTT BLD: 37 SEC — HIGH (ref 24.5–35.6)
AST SERPL-CCNC: 61 U/L — HIGH (ref 10–40)
BASE EXCESS BLDA CALC-SCNC: -3.2 MMOL/L — LOW (ref -2–3)
BASE EXCESS BLDA CALC-SCNC: -3.9 MMOL/L — LOW (ref -2–3)
BASE EXCESS BLDA CALC-SCNC: -4.3 MMOL/L — LOW (ref -2–3)
BASE EXCESS BLDA CALC-SCNC: -4.9 MMOL/L — LOW (ref -2–3)
BASE EXCESS BLDA CALC-SCNC: -5.5 MMOL/L — LOW (ref -2–3)
BASE EXCESS BLDA CALC-SCNC: -5.8 MMOL/L — LOW (ref -2–3)
BASOPHILS # BLD AUTO: 0.02 K/UL — SIGNIFICANT CHANGE UP (ref 0–0.2)
BASOPHILS NFR BLD AUTO: 0.2 % — SIGNIFICANT CHANGE UP (ref 0–2)
BILIRUB SERPL-MCNC: 1.6 MG/DL — HIGH (ref 0.2–1.2)
BILIRUB SERPL-MCNC: 3 MG/DL — HIGH (ref 0.2–1.2)
BUN SERPL-MCNC: 13 MG/DL — SIGNIFICANT CHANGE UP (ref 7–23)
BUN SERPL-MCNC: 14 MG/DL — SIGNIFICANT CHANGE UP (ref 7–23)
BUN SERPL-MCNC: 15 MG/DL — SIGNIFICANT CHANGE UP (ref 7–23)
CA-I BLDA-SCNC: 1.17 MMOL/L — SIGNIFICANT CHANGE UP (ref 1.15–1.33)
CA-I BLDA-SCNC: 1.2 MMOL/L — SIGNIFICANT CHANGE UP (ref 1.15–1.33)
CA-I BLDA-SCNC: 1.21 MMOL/L — SIGNIFICANT CHANGE UP (ref 1.15–1.33)
CA-I BLDA-SCNC: 1.24 MMOL/L — SIGNIFICANT CHANGE UP (ref 1.15–1.33)
CA-I BLDA-SCNC: 1.26 MMOL/L — SIGNIFICANT CHANGE UP (ref 1.15–1.33)
CA-I BLDA-SCNC: 1.35 MMOL/L — HIGH (ref 1.15–1.33)
CALCIUM SERPL-MCNC: 10 MG/DL — SIGNIFICANT CHANGE UP (ref 8.4–10.5)
CALCIUM SERPL-MCNC: 10.3 MG/DL — SIGNIFICANT CHANGE UP (ref 8.4–10.5)
CALCIUM SERPL-MCNC: 9.2 MG/DL — SIGNIFICANT CHANGE UP (ref 8.4–10.5)
CHLORIDE SERPL-SCNC: 106 MMOL/L — SIGNIFICANT CHANGE UP (ref 96–108)
CHLORIDE SERPL-SCNC: 106 MMOL/L — SIGNIFICANT CHANGE UP (ref 96–108)
CHLORIDE SERPL-SCNC: 107 MMOL/L — SIGNIFICANT CHANGE UP (ref 96–108)
CO2 BLDA-SCNC: 20 MMOL/L — SIGNIFICANT CHANGE UP (ref 19–24)
CO2 BLDA-SCNC: 21 MMOL/L — SIGNIFICANT CHANGE UP (ref 19–24)
CO2 BLDA-SCNC: 22 MMOL/L — SIGNIFICANT CHANGE UP (ref 19–24)
CO2 BLDA-SCNC: 22 MMOL/L — SIGNIFICANT CHANGE UP (ref 19–24)
CO2 SERPL-SCNC: 21 MMOL/L — LOW (ref 22–31)
CO2 SERPL-SCNC: 21 MMOL/L — LOW (ref 22–31)
CO2 SERPL-SCNC: 22 MMOL/L — SIGNIFICANT CHANGE UP (ref 22–31)
COHGB MFR BLDA: 0.7 % — SIGNIFICANT CHANGE UP
COHGB MFR BLDA: 0.9 % — SIGNIFICANT CHANGE UP
COHGB MFR BLDA: 1.2 % — SIGNIFICANT CHANGE UP
COHGB MFR BLDA: 1.4 % — SIGNIFICANT CHANGE UP
CREAT SERPL-MCNC: 0.71 MG/DL — SIGNIFICANT CHANGE UP (ref 0.5–1.3)
CREAT SERPL-MCNC: 0.71 MG/DL — SIGNIFICANT CHANGE UP (ref 0.5–1.3)
CREAT SERPL-MCNC: 0.99 MG/DL — SIGNIFICANT CHANGE UP (ref 0.5–1.3)
EGFR: 102 ML/MIN/1.73M2 — SIGNIFICANT CHANGE UP
EGFR: 102 ML/MIN/1.73M2 — SIGNIFICANT CHANGE UP
EGFR: 85 ML/MIN/1.73M2 — SIGNIFICANT CHANGE UP
EOSINOPHIL # BLD AUTO: 0.02 K/UL — SIGNIFICANT CHANGE UP (ref 0–0.5)
EOSINOPHIL NFR BLD AUTO: 0.2 % — SIGNIFICANT CHANGE UP (ref 0–6)
GAS PNL BLDA: SIGNIFICANT CHANGE UP
GLUCOSE BLDA-MCNC: 117 MG/DL — HIGH (ref 70–99)
GLUCOSE BLDA-MCNC: 122 MG/DL — HIGH (ref 70–99)
GLUCOSE BLDA-MCNC: 131 MG/DL — HIGH (ref 70–99)
GLUCOSE BLDA-MCNC: 140 MG/DL — HIGH (ref 70–99)
GLUCOSE BLDA-MCNC: 155 MG/DL — HIGH (ref 70–99)
GLUCOSE BLDA-MCNC: 163 MG/DL — HIGH (ref 70–99)
GLUCOSE BLDC GLUCOMTR-MCNC: 129 MG/DL — HIGH (ref 70–99)
GLUCOSE BLDC GLUCOMTR-MCNC: 133 MG/DL — HIGH (ref 70–99)
GLUCOSE BLDC GLUCOMTR-MCNC: 142 MG/DL — HIGH (ref 70–99)
GLUCOSE BLDC GLUCOMTR-MCNC: 143 MG/DL — HIGH (ref 70–99)
GLUCOSE SERPL-MCNC: 128 MG/DL — HIGH (ref 70–99)
GLUCOSE SERPL-MCNC: 133 MG/DL — HIGH (ref 70–99)
GLUCOSE SERPL-MCNC: 163 MG/DL — HIGH (ref 70–99)
HCO3 BLDA-SCNC: 19 MMOL/L — LOW (ref 21–28)
HCO3 BLDA-SCNC: 19 MMOL/L — LOW (ref 21–28)
HCO3 BLDA-SCNC: 20 MMOL/L — LOW (ref 21–28)
HCO3 BLDA-SCNC: 20 MMOL/L — LOW (ref 21–28)
HCO3 BLDA-SCNC: 21 MMOL/L — SIGNIFICANT CHANGE UP (ref 21–28)
HCO3 BLDA-SCNC: 21 MMOL/L — SIGNIFICANT CHANGE UP (ref 21–28)
HCT VFR BLD CALC: 30.1 % — LOW (ref 39–50)
HCT VFR BLD CALC: 31 % — LOW (ref 39–50)
HCT VFR BLD CALC: 32.2 % — LOW (ref 39–50)
HGB BLD-MCNC: 10.2 G/DL — LOW (ref 13–17)
HGB BLD-MCNC: 10.3 G/DL — LOW (ref 13–17)
HGB BLD-MCNC: 10.7 G/DL — LOW (ref 13–17)
HGB BLDA-MCNC: 10 G/DL — LOW (ref 12.6–17.4)
HGB BLDA-MCNC: 10.4 G/DL — LOW (ref 12.6–17.4)
HGB BLDA-MCNC: 10.4 G/DL — LOW (ref 12.6–17.4)
HGB BLDA-MCNC: 10.6 G/DL — LOW (ref 12.6–17.4)
HGB BLDA-MCNC: 9.4 G/DL — LOW (ref 12.6–17.4)
HGB BLDA-MCNC: 9.9 G/DL — LOW (ref 12.6–17.4)
IMM GRANULOCYTES NFR BLD AUTO: 0.6 % — SIGNIFICANT CHANGE UP (ref 0–0.9)
INR BLD: 1.38 — HIGH (ref 0.85–1.18)
INR BLD: 1.41 — HIGH (ref 0.85–1.18)
INR BLD: 1.7 — HIGH (ref 0.85–1.18)
ISTAT ARTERIAL BE: -2 MMOL/L — SIGNIFICANT CHANGE UP (ref -2–3)
ISTAT ARTERIAL GLUCOSE: 120 MG/DL — HIGH (ref 70–99)
ISTAT ARTERIAL HCO3: 22 MMOL/L — SIGNIFICANT CHANGE UP (ref 22–26)
ISTAT ARTERIAL HEMATOCRIT: 28 % — LOW (ref 39–50)
ISTAT ARTERIAL HEMOGLOBIN: 9.5 G/DL — LOW (ref 13–17)
ISTAT ARTERIAL IONIZED CALCIUM: 1.4 MMOL/L — HIGH (ref 1.12–1.3)
ISTAT ARTERIAL PCO2: 37 MMHG — SIGNIFICANT CHANGE UP (ref 35–45)
ISTAT ARTERIAL PH: 7.39 — SIGNIFICANT CHANGE UP (ref 7.35–7.45)
ISTAT ARTERIAL PO2: 160 MMHG — HIGH (ref 80–105)
ISTAT ARTERIAL POTASSIUM: 4.2 MMOL/L — SIGNIFICANT CHANGE UP (ref 3.5–5.3)
ISTAT ARTERIAL SO2: 99 % — HIGH (ref 95–98)
ISTAT ARTERIAL SODIUM: 138 MMOL/L — SIGNIFICANT CHANGE UP (ref 135–145)
ISTAT ARTERIAL TCO2: 24 MMOL/L — SIGNIFICANT CHANGE UP (ref 22–31)
LYMPHOCYTES # BLD AUTO: 0.75 K/UL — LOW (ref 1–3.3)
LYMPHOCYTES # BLD AUTO: 6.5 % — LOW (ref 13–44)
MAGNESIUM SERPL-MCNC: 1.6 MG/DL — SIGNIFICANT CHANGE UP (ref 1.6–2.6)
MAGNESIUM SERPL-MCNC: 1.8 MG/DL — SIGNIFICANT CHANGE UP (ref 1.6–2.6)
MAGNESIUM SERPL-MCNC: 2.6 MG/DL — SIGNIFICANT CHANGE UP (ref 1.6–2.6)
MCHC RBC-ENTMCNC: 29.8 PG — SIGNIFICANT CHANGE UP (ref 27–34)
MCHC RBC-ENTMCNC: 30.4 PG — SIGNIFICANT CHANGE UP (ref 27–34)
MCHC RBC-ENTMCNC: 30.5 PG — SIGNIFICANT CHANGE UP (ref 27–34)
MCHC RBC-ENTMCNC: 33.2 GM/DL — SIGNIFICANT CHANGE UP (ref 32–36)
MCHC RBC-ENTMCNC: 33.2 GM/DL — SIGNIFICANT CHANGE UP (ref 32–36)
MCHC RBC-ENTMCNC: 33.9 GM/DL — SIGNIFICANT CHANGE UP (ref 32–36)
MCV RBC AUTO: 89.7 FL — SIGNIFICANT CHANGE UP (ref 80–100)
MCV RBC AUTO: 90.1 FL — SIGNIFICANT CHANGE UP (ref 80–100)
MCV RBC AUTO: 91.4 FL — SIGNIFICANT CHANGE UP (ref 80–100)
MELD SCORE WITH DIALYSIS: 25 POINTS — SIGNIFICANT CHANGE UP
MELD SCORE WITHOUT DIALYSIS: 12 POINTS — SIGNIFICANT CHANGE UP
METHGB MFR BLDA: 1.1 % — SIGNIFICANT CHANGE UP
METHGB MFR BLDA: 1.1 % — SIGNIFICANT CHANGE UP
METHGB MFR BLDA: 1.2 % — SIGNIFICANT CHANGE UP
METHGB MFR BLDA: 1.3 % — SIGNIFICANT CHANGE UP
METHGB MFR BLDA: 1.4 % — SIGNIFICANT CHANGE UP
METHGB MFR BLDA: 1.6 % — HIGH
MONOCYTES # BLD AUTO: 1.33 K/UL — HIGH (ref 0–0.9)
MONOCYTES NFR BLD AUTO: 11.6 % — SIGNIFICANT CHANGE UP (ref 2–14)
NEUTROPHILS # BLD AUTO: 9.32 K/UL — HIGH (ref 1.8–7.4)
NEUTROPHILS NFR BLD AUTO: 80.9 % — HIGH (ref 43–77)
NRBC # BLD: 0 /100 WBCS — SIGNIFICANT CHANGE UP (ref 0–0)
OB PNL STL: NEGATIVE — SIGNIFICANT CHANGE UP
OXYHGB MFR BLDA: 96.5 % — HIGH (ref 90–95)
OXYHGB MFR BLDA: 97.1 % — HIGH (ref 90–95)
OXYHGB MFR BLDA: 97.1 % — HIGH (ref 90–95)
OXYHGB MFR BLDA: 97.3 % — HIGH (ref 90–95)
OXYHGB MFR BLDA: 97.4 % — HIGH (ref 90–95)
OXYHGB MFR BLDA: 97.5 % — HIGH (ref 90–95)
PCO2 BLDA: 32 MMHG — LOW (ref 35–48)
PCO2 BLDA: 33 MMHG — LOW (ref 35–48)
PCO2 BLDA: 33 MMHG — LOW (ref 35–48)
PCO2 BLDA: 34 MMHG — LOW (ref 35–48)
PCO2 BLDA: 35 MMHG — SIGNIFICANT CHANGE UP (ref 35–48)
PCO2 BLDA: 36 MMHG — SIGNIFICANT CHANGE UP (ref 35–48)
PH BLDA: 7.34 — LOW (ref 7.35–7.45)
PH BLDA: 7.37 — SIGNIFICANT CHANGE UP (ref 7.35–7.45)
PH BLDA: 7.38 — SIGNIFICANT CHANGE UP (ref 7.35–7.45)
PH BLDA: 7.38 — SIGNIFICANT CHANGE UP (ref 7.35–7.45)
PH BLDA: 7.4 — SIGNIFICANT CHANGE UP (ref 7.35–7.45)
PH BLDA: 7.4 — SIGNIFICANT CHANGE UP (ref 7.35–7.45)
PHOSPHATE SERPL-MCNC: 3.8 MG/DL — SIGNIFICANT CHANGE UP (ref 2.5–4.5)
PHOSPHATE SERPL-MCNC: 4.4 MG/DL — SIGNIFICANT CHANGE UP (ref 2.5–4.5)
PLATELET # BLD AUTO: 77 K/UL — LOW (ref 150–400)
PLATELET # BLD AUTO: 81 K/UL — LOW (ref 150–400)
PLATELET # BLD AUTO: 96 K/UL — LOW (ref 150–400)
PO2 BLDA: 225 MMHG — HIGH (ref 83–108)
PO2 BLDA: 234 MMHG — HIGH (ref 83–108)
PO2 BLDA: 263 MMHG — HIGH (ref 83–108)
PO2 BLDA: 285 MMHG — HIGH (ref 83–108)
PO2 BLDA: 293 MMHG — HIGH (ref 83–108)
PO2 BLDA: 446 MMHG — HIGH (ref 83–108)
POTASSIUM BLDA-SCNC: 4.3 MMOL/L — SIGNIFICANT CHANGE UP (ref 3.5–5.1)
POTASSIUM BLDA-SCNC: 4.4 MMOL/L — SIGNIFICANT CHANGE UP (ref 3.5–5.1)
POTASSIUM BLDA-SCNC: 4.5 MMOL/L — SIGNIFICANT CHANGE UP (ref 3.5–5.1)
POTASSIUM BLDA-SCNC: 4.6 MMOL/L — SIGNIFICANT CHANGE UP (ref 3.5–5.1)
POTASSIUM BLDA-SCNC: 4.6 MMOL/L — SIGNIFICANT CHANGE UP (ref 3.5–5.1)
POTASSIUM BLDA-SCNC: 4.9 MMOL/L — SIGNIFICANT CHANGE UP (ref 3.5–5.1)
POTASSIUM SERPL-MCNC: 4.4 MMOL/L — SIGNIFICANT CHANGE UP (ref 3.5–5.3)
POTASSIUM SERPL-MCNC: 4.5 MMOL/L — SIGNIFICANT CHANGE UP (ref 3.5–5.3)
POTASSIUM SERPL-MCNC: 4.5 MMOL/L — SIGNIFICANT CHANGE UP (ref 3.5–5.3)
POTASSIUM SERPL-SCNC: 4.4 MMOL/L — SIGNIFICANT CHANGE UP (ref 3.5–5.3)
POTASSIUM SERPL-SCNC: 4.5 MMOL/L — SIGNIFICANT CHANGE UP (ref 3.5–5.3)
POTASSIUM SERPL-SCNC: 4.5 MMOL/L — SIGNIFICANT CHANGE UP (ref 3.5–5.3)
PROT SERPL-MCNC: 6.4 G/DL — SIGNIFICANT CHANGE UP (ref 6–8.3)
PROTHROM AB SERPL-ACNC: 15.6 SEC — HIGH (ref 9.5–13)
PROTHROM AB SERPL-ACNC: 15.9 SEC — HIGH (ref 9.5–13)
PROTHROM AB SERPL-ACNC: 19.1 SEC — HIGH (ref 9.5–13)
RBC # BLD: 3.34 M/UL — LOW (ref 4.2–5.8)
RBC # BLD: 3.39 M/UL — LOW (ref 4.2–5.8)
RBC # BLD: 3.59 M/UL — LOW (ref 4.2–5.8)
RBC # FLD: 14.3 % — SIGNIFICANT CHANGE UP (ref 10.3–14.5)
RBC # FLD: 14.4 % — SIGNIFICANT CHANGE UP (ref 10.3–14.5)
RBC # FLD: 14.6 % — HIGH (ref 10.3–14.5)
SAO2 % BLDA: 99.1 % — HIGH (ref 94–98)
SAO2 % BLDA: 99.3 % — HIGH (ref 94–98)
SAO2 % BLDA: 99.3 % — HIGH (ref 94–98)
SAO2 % BLDA: 99.6 % — HIGH (ref 94–98)
SAO2 % BLDA: 99.9 % — HIGH (ref 94–98)
SAO2 % BLDA: 99.9 % — HIGH (ref 94–98)
SODIUM BLDA-SCNC: 134 MMOL/L — LOW (ref 136–145)
SODIUM BLDA-SCNC: 135 MMOL/L — LOW (ref 136–145)
SODIUM BLDA-SCNC: 136 MMOL/L — SIGNIFICANT CHANGE UP (ref 136–145)
SODIUM BLDA-SCNC: 137 MMOL/L — SIGNIFICANT CHANGE UP (ref 136–145)
SODIUM SERPL-SCNC: 136 MMOL/L — SIGNIFICANT CHANGE UP (ref 135–145)
SODIUM SERPL-SCNC: 136 MMOL/L — SIGNIFICANT CHANGE UP (ref 135–145)
SODIUM SERPL-SCNC: 139 MMOL/L — SIGNIFICANT CHANGE UP (ref 135–145)
WBC # BLD: 11.51 K/UL — HIGH (ref 3.8–10.5)
WBC # BLD: 4.23 K/UL — SIGNIFICANT CHANGE UP (ref 3.8–10.5)
WBC # BLD: 8.09 K/UL — SIGNIFICANT CHANGE UP (ref 3.8–10.5)
WBC # FLD AUTO: 11.51 K/UL — HIGH (ref 3.8–10.5)
WBC # FLD AUTO: 4.23 K/UL — SIGNIFICANT CHANGE UP (ref 3.8–10.5)
WBC # FLD AUTO: 8.09 K/UL — SIGNIFICANT CHANGE UP (ref 3.8–10.5)

## 2023-09-14 PROCEDURE — 99291 CRITICAL CARE FIRST HOUR: CPT

## 2023-09-14 PROCEDURE — 71045 X-RAY EXAM CHEST 1 VIEW: CPT | Mod: 26

## 2023-09-14 PROCEDURE — 33533 CABG ARTERIAL SINGLE: CPT

## 2023-09-14 PROCEDURE — 93010 ELECTROCARDIOGRAM REPORT: CPT

## 2023-09-14 PROCEDURE — 99292 CRITICAL CARE ADDL 30 MIN: CPT

## 2023-09-14 PROCEDURE — 33519 CABG ARTERY-VEIN THREE: CPT

## 2023-09-14 DEVICE — KIT CVC 3LUM SPECTRUM 9FR: Type: IMPLANTABLE DEVICE | Status: FUNCTIONAL

## 2023-09-14 DEVICE — HEARTSTRING III PROXIMAL SEAL SYSTEM: Type: IMPLANTABLE DEVICE | Status: FUNCTIONAL

## 2023-09-14 DEVICE — PACING WIRE STREAMLINE BIPOLAR MYOCARDIAL: Type: IMPLANTABLE DEVICE | Status: FUNCTIONAL

## 2023-09-14 DEVICE — SURGICEL FIBRILLAR 4 X 4": Type: IMPLANTABLE DEVICE | Status: FUNCTIONAL

## 2023-09-14 DEVICE — SURGIFLO HEMOSTATIC MATRIX KIT: Type: IMPLANTABLE DEVICE | Status: FUNCTIONAL

## 2023-09-14 DEVICE — SHUNT FLO-THRU INTRALUMINAL 2.25MM X 18MM: Type: IMPLANTABLE DEVICE | Status: FUNCTIONAL

## 2023-09-14 DEVICE — CHEST DRAIN THORACIC PVC 28FR RIGHT ANGLE: Type: IMPLANTABLE DEVICE | Status: FUNCTIONAL

## 2023-09-14 DEVICE — LIGATING CLIPS WECK HORIZON SMALL-WIDE (RED) 24: Type: IMPLANTABLE DEVICE | Status: FUNCTIONAL

## 2023-09-14 DEVICE — SHUNT FLO-THRU INTRALUMINAL 2MM X 18MM: Type: IMPLANTABLE DEVICE | Status: FUNCTIONAL

## 2023-09-14 DEVICE — SHUNT FLO-THRU INTRALUMINAL1.5MM X 18MM: Type: IMPLANTABLE DEVICE | Status: FUNCTIONAL

## 2023-09-14 DEVICE — CANNULA VESSEL 3MM BEVELED TIP RADIOPAQUE: Type: IMPLANTABLE DEVICE | Status: FUNCTIONAL

## 2023-09-14 RX ORDER — DEXTROSE 50 % IN WATER 50 %
50 SYRINGE (ML) INTRAVENOUS
Refills: 0 | Status: DISCONTINUED | OUTPATIENT
Start: 2023-09-14 | End: 2023-09-21

## 2023-09-14 RX ORDER — MUPIROCIN 20 MG/G
1 OINTMENT TOPICAL
Refills: 0 | Status: DISCONTINUED | OUTPATIENT
Start: 2023-09-14 | End: 2023-09-18

## 2023-09-14 RX ORDER — FENTANYL CITRATE 50 UG/ML
25 INJECTION INTRAVENOUS
Refills: 0 | Status: DISCONTINUED | OUTPATIENT
Start: 2023-09-14 | End: 2023-09-15

## 2023-09-14 RX ORDER — CHLORHEXIDINE GLUCONATE 213 G/1000ML
1 SOLUTION TOPICAL DAILY
Refills: 0 | Status: DISCONTINUED | OUTPATIENT
Start: 2023-09-14 | End: 2023-09-21

## 2023-09-14 RX ORDER — HEPARIN SODIUM 5000 [USP'U]/ML
5000 INJECTION INTRAVENOUS; SUBCUTANEOUS EVERY 8 HOURS
Refills: 0 | Status: DISCONTINUED | OUTPATIENT
Start: 2023-09-14 | End: 2023-09-18

## 2023-09-14 RX ORDER — PANTOPRAZOLE SODIUM 20 MG/1
40 TABLET, DELAYED RELEASE ORAL DAILY
Refills: 0 | Status: DISCONTINUED | OUTPATIENT
Start: 2023-09-14 | End: 2023-09-21

## 2023-09-14 RX ORDER — DEXTROSE 50 % IN WATER 50 %
25 SYRINGE (ML) INTRAVENOUS
Refills: 0 | Status: DISCONTINUED | OUTPATIENT
Start: 2023-09-14 | End: 2023-09-21

## 2023-09-14 RX ORDER — SODIUM CHLORIDE 9 MG/ML
1000 INJECTION, SOLUTION INTRAVENOUS ONCE
Refills: 0 | Status: COMPLETED | OUTPATIENT
Start: 2023-09-14 | End: 2023-09-14

## 2023-09-14 RX ORDER — ASCORBIC ACID 60 MG
500 TABLET,CHEWABLE ORAL DAILY
Refills: 0 | Status: COMPLETED | OUTPATIENT
Start: 2023-09-14 | End: 2023-09-19

## 2023-09-14 RX ORDER — PROPOFOL 10 MG/ML
25 INJECTION, EMULSION INTRAVENOUS
Qty: 1000 | Refills: 0 | Status: DISCONTINUED | OUTPATIENT
Start: 2023-09-14 | End: 2023-09-15

## 2023-09-14 RX ORDER — POLYETHYLENE GLYCOL 3350 17 G/17G
17 POWDER, FOR SOLUTION ORAL DAILY
Refills: 0 | Status: DISCONTINUED | OUTPATIENT
Start: 2023-09-15 | End: 2023-09-21

## 2023-09-14 RX ORDER — MAGNESIUM SULFATE 500 MG/ML
2 VIAL (ML) INJECTION ONCE
Refills: 0 | Status: COMPLETED | OUTPATIENT
Start: 2023-09-14 | End: 2023-09-14

## 2023-09-14 RX ORDER — ATORVASTATIN CALCIUM 80 MG/1
80 TABLET, FILM COATED ORAL AT BEDTIME
Refills: 0 | Status: DISCONTINUED | OUTPATIENT
Start: 2023-09-14 | End: 2023-09-14

## 2023-09-14 RX ORDER — CLOPIDOGREL BISULFATE 75 MG/1
75 TABLET, FILM COATED ORAL DAILY
Refills: 0 | Status: DISCONTINUED | OUTPATIENT
Start: 2023-09-15 | End: 2023-09-21

## 2023-09-14 RX ORDER — DEXMEDETOMIDINE HYDROCHLORIDE IN 0.9% SODIUM CHLORIDE 4 UG/ML
0.5 INJECTION INTRAVENOUS
Qty: 400 | Refills: 0 | Status: DISCONTINUED | OUTPATIENT
Start: 2023-09-14 | End: 2023-09-15

## 2023-09-14 RX ORDER — INSULIN HUMAN 100 [IU]/ML
1 INJECTION, SOLUTION SUBCUTANEOUS
Qty: 50 | Refills: 0 | Status: DISCONTINUED | OUTPATIENT
Start: 2023-09-14 | End: 2023-09-15

## 2023-09-14 RX ORDER — NICARDIPINE HYDROCHLORIDE 30 MG/1
5 CAPSULE, EXTENDED RELEASE ORAL
Qty: 40 | Refills: 0 | Status: DISCONTINUED | OUTPATIENT
Start: 2023-09-14 | End: 2023-09-15

## 2023-09-14 RX ORDER — SODIUM CHLORIDE 9 MG/ML
1000 INJECTION INTRAMUSCULAR; INTRAVENOUS; SUBCUTANEOUS
Refills: 0 | Status: DISCONTINUED | OUTPATIENT
Start: 2023-09-14 | End: 2023-09-21

## 2023-09-14 RX ORDER — CHLORHEXIDINE GLUCONATE 213 G/1000ML
15 SOLUTION TOPICAL EVERY 12 HOURS
Refills: 0 | Status: DISCONTINUED | OUTPATIENT
Start: 2023-09-14 | End: 2023-09-16

## 2023-09-14 RX ORDER — SENNA PLUS 8.6 MG/1
2 TABLET ORAL AT BEDTIME
Refills: 0 | Status: DISCONTINUED | OUTPATIENT
Start: 2023-09-15 | End: 2023-09-21

## 2023-09-14 RX ORDER — CEFAZOLIN SODIUM 1 G
2000 VIAL (EA) INJECTION EVERY 8 HOURS
Refills: 0 | Status: COMPLETED | OUTPATIENT
Start: 2023-09-14 | End: 2023-09-16

## 2023-09-14 RX ADMIN — DEXMEDETOMIDINE HYDROCHLORIDE IN 0.9% SODIUM CHLORIDE 10.6 MICROGRAM(S)/KG/HR: 4 INJECTION INTRAVENOUS at 21:35

## 2023-09-14 RX ADMIN — FENTANYL CITRATE 25 MICROGRAM(S): 50 INJECTION INTRAVENOUS at 22:15

## 2023-09-14 RX ADMIN — FENTANYL CITRATE 25 MICROGRAM(S): 50 INJECTION INTRAVENOUS at 19:15

## 2023-09-14 RX ADMIN — Medication 1000 MILLIGRAM(S): at 10:39

## 2023-09-14 RX ADMIN — MUPIROCIN 1 APPLICATION(S): 20 OINTMENT TOPICAL at 05:25

## 2023-09-14 RX ADMIN — CARVEDILOL PHOSPHATE 6.25 MILLIGRAM(S): 80 CAPSULE, EXTENDED RELEASE ORAL at 05:25

## 2023-09-14 RX ADMIN — CHLORHEXIDINE GLUCONATE 15 MILLILITER(S): 213 SOLUTION TOPICAL at 05:26

## 2023-09-14 RX ADMIN — SODIUM CHLORIDE 1000 MILLILITER(S): 9 INJECTION, SOLUTION INTRAVENOUS at 21:35

## 2023-09-14 RX ADMIN — FENTANYL CITRATE 25 MICROGRAM(S): 50 INJECTION INTRAVENOUS at 19:00

## 2023-09-14 RX ADMIN — Medication 25 GRAM(S): at 20:09

## 2023-09-14 RX ADMIN — Medication 25 GRAM(S): at 19:00

## 2023-09-14 RX ADMIN — PROPOFOL 12.7 MICROGRAM(S)/KG/MIN: 10 INJECTION, EMULSION INTRAVENOUS at 19:42

## 2023-09-14 RX ADMIN — NICARDIPINE HYDROCHLORIDE 25 MG/HR: 30 CAPSULE, EXTENDED RELEASE ORAL at 19:42

## 2023-09-14 RX ADMIN — CHLORHEXIDINE GLUCONATE 1 APPLICATION(S): 213 SOLUTION TOPICAL at 05:26

## 2023-09-14 RX ADMIN — FENTANYL CITRATE 25 MICROGRAM(S): 50 INJECTION INTRAVENOUS at 22:00

## 2023-09-14 NOTE — PRE-ANESTHESIA EVALUATION ADULT - NSANTHPEFT_GEN_ALL_CORE
General: Appearance is consistent with chronological age. No abnormal facies.  Constitutional: Alert and in no acute distress.  Eyes: The sclera and conjunctiva were normal, pupils were equal in size, round, and reactive to light and extraocular movements were intact.   ENT: The ears and nose were normal in appearance; oropharynx clear, moist mucus membranes.  Neck: The appearance of the neck was normal, no neck mass was observed. There was no jugular-venous distention.   Airway:  See Mallampati score.  Cardiovascular:  Regular rate and rhythm.  Respiratory: Unlabored breathing.  Neurological: No focal deficit, moves all extremities.  Psychiatric: Oriented to person, place, and time, insight and judgment were intact and the affect was normal.  Exercise Tolerance: poor

## 2023-09-14 NOTE — PRE-OP CHECKLIST - SELECT TESTS ORDERED
BMP/CBC/CMP/PT/PTT/INR/Hepatic Function/Spirometry/Type and Cross/Type and Screen/Urinalysis/EKG/CXR/Results in MD note/POCT Blood Glucose/COVID-19

## 2023-09-14 NOTE — BRIEF OPERATIVE NOTE - COMMENTS
Pt was admitted last week for vomiting blood. Dr. Wilcox told to pt to come in today for fever of 102 for the past two days.
I first assisted for the entirety of the case, including but not limited to conduit harvest, distal/proximal anastamoses, and chest closure.

## 2023-09-14 NOTE — PROGRESS NOTE ADULT - SUBJECTIVE AND OBJECTIVE BOX
/CTICU  CRITICAL  CARE  attending  /   Hand off received 					   Pertinent clinical, laboratory, radiographic, hemodynamic, echocardiographic, respiratory data, microbiologic data and chart were reviewed and analyzed frequently throughout the course of the day  Patient seen and examined with CTS/ SH attending at bedside  Pt is a 63yr old male with PMH HTN, HLD, DM, gout, etoh use cb cirrhosis and esophageal varices cb GI bleed, admitted for surgical mgmt of CAD. Pt underwent OpCABG x 4 (LIMA-LAD, OYR-Mitul-WB-PDA, EF 65%) with Dr. Hinton 23. Given 1L IVF with 1200cc urine output. Arrived intubated, cardene started for bp.       FAMILY HISTORY:  PAST MEDICAL & SURGICAL HISTORY:  CAD (coronary artery disease)  HTN (hypertension)  Cirrhosis  Esophageal varices in cirrhosis  History of TIAs  H/O: GI bleed  Gout  HLD (hyperlipidemia)  History of portal hypertension  Diabetes mellitus        Patient is a 63y old  Male who presents with a chief complaint of CAD.      14 system review was unremarkable    Vital signs, hemodynamic and respiratory parameters were reviewed from the bedside nursing flowsheet.  ICU Vital Signs Last 24 Hrs  T(C): 36.1 (14 Sep 2023 18:30), Max: 36.6 (14 Sep 2023 09:31)  T(F): 97 (14 Sep 2023 18:30), Max: 97.9 (14 Sep 2023 09:31)  HR: 63 (14 Sep 2023 20:00) (60 - 73)  BP: 116/76 (14 Sep 2023 11:53) (101/68 - 116/76)  BP(mean): 81 (14 Sep 2023 11:53) (80 - 85)  ABP: 122/64 (14 Sep 2023 20:00) (114/59 - 164/83)  ABP(mean): 83 (14 Sep 2023 20:00) (76 - 110)  RR: 19 (14 Sep 2023 20:00) (14 - 19)  SpO2: 100% (14 Sep 2023 20:00) (97% - 100%)    O2 Parameters below as of 14 Sep 2023 20:00  Patient On (Oxygen Delivery Method): ventilator    O2 Concentration (%): 50      Adult Advanced Hemodynamics Last 24 Hrs  CVP(mm Hg): 14 (14 Sep 2023 20:00) (13 - 22)  CVP(cm H2O): --  CO: --  CI: --  PA: --  PA(mean): --  PCWP: --  SVR: --  SVRI: --  PVR: --  PVRI: --, ABG - ( 14 Sep 2023 18:30 )  pH, Arterial: 7.38  pH, Blood: x     /  pCO2: 36    /  pO2: 177   / HCO3: 21    / Base Excess: -3.3  /  SaO2: 100.0               Intake and output was reviewed and the fluid balance was calculated  Daily Height in cm: 172.72 (14 Sep 2023 11:53)    Daily Weight in k.7 (14 Sep 2023 10:00)  I&O's Summary    14 Sep 2023 07:01  -  14 Sep 2023 20:32  --------------------------------------------------------  IN: 183.1 mL / OUT: 307 mL / NET: -123.9 mL        All lines and drain sites were assessed  Glycemic trend was reviewed      POCT Blood Glucose.: 142 mg/dL (14 Sep 2023 19:56)      Neuro: drowsy but arousable  HEENT: ett  Heart: s1 s2  Lungs: clear bl  Abdomen: soft nt nd   Extremities: wwp    Lines:  RIJ TLC   L radial arterial line     Tubes:  Bl pleurals  Med x 2    labs  CBC Full  -  ( 14 Sep 2023 18:30 )  WBC Count : 8.09 K/uL  RBC Count : 3.34 M/uL  Hemoglobin : 10.2 g/dL  Hematocrit : 30.1 %  Platelet Count - Automated : 77 K/uL  Mean Cell Volume : 90.1 fl  Mean Cell Hemoglobin : 30.5 pg  Mean Cell Hemoglobin Concentration : 33.9 gm/dL  Auto Neutrophil # : x  Auto Lymphocyte # : x  Auto Monocyte # : x  Auto Eosinophil # : x  Auto Basophil # : x  Auto Neutrophil % : x  Auto Lymphocyte % : x  Auto Monocyte % : x  Auto Eosinophil % : x  Auto Basophil % : x        136  |  106  |  13  ----------------------------<  128<H>  4.4   |  22  |  0.71    Ca    10.3      14 Sep 2023 18:30  Phos  3.8     -  Mg     1.6     -    TPro  x   /  Alb  x   /  TBili  1.6<H>  /  DBili  x   /  AST  x   /  ALT  x   /  AlkPhos  x   -    PT/INR - ( 14 Sep 2023 18:30 )   PT: 19.1 sec;   INR: 1.70          PTT - ( 14 Sep 2023 18:30 )  PTT:37.0 sec  The current medications were reviewed   MEDICATIONS  (STANDING):  ascorbic acid 500 milliGRAM(s) Oral daily  ceFAZolin   IVPB 2000 milliGRAM(s) IV Intermittent every 8 hours  chlorhexidine 0.12% Liquid 15 milliLiter(s) Oral Mucosa every 12 hours  chlorhexidine 2% Cloths 1 Application(s) Topical daily  chlorhexidine 4% Liquid 1 Application(s) Topical once  dextrose 50% Injectable 25 milliLiter(s) IV Push every 15 minutes  dextrose 50% Injectable 50 milliLiter(s) IV Push every 15 minutes  heparin   Injectable 5000 Unit(s) SubCutaneous every 8 hours  insulin regular Infusion 1 Unit(s)/Hr (1 mL/Hr) IV Continuous <Continuous>  mupirocin 2% Nasal 1 Application(s) Both Nostrils two times a day  niCARdipine Infusion 5 mG/Hr (25 mL/Hr) IV Continuous <Continuous>  pantoprazole    Tablet 40 milliGRAM(s) Oral daily  propofol Infusion 25 MICROgram(s)/kG/Min (12.7 mL/Hr) IV Continuous <Continuous>  sodium chloride 0.9%. 1000 milliLiter(s) (10 mL/Hr) IV Continuous <Continuous>    MEDICATIONS  (PRN):  fentaNYL    Injectable 25 MICROGram(s) IV Push every 3 hours PRN Severe Pain (7 - 10)      Assessment/Plan:  63yr old male with PMH HTN, HLD, DM, gout, etoh use cb cirrhosis and esophageal varices cb GI bleed, admitted for surgical mgmt of CAD.     Sp OpCABG x 4 (LIMA-LAD, VMK-Xfsrd-UV-PDA, EF 65%, Hinton, 23)  Acute post operative mechanical ventilation requirement-wean to extubate  Post operative hypertensive on cardene-titrate to systolic 120-140  BBlocker when able  Anemia-monitor H/H  Thrombocytopenia-monitor  Monitor CT output  Periop abx  Replete lytes prn  GI/DVT PPX  Bowel Regimen  Pain control  Close hemodynamic, ventilatory and drain monitoring and management per post op routine  Monitor for arrhythmias and monitor parameters for organ perfusion  Monitor neurologic status  Head of the bed should remain elevated to 45 deg   Chest PT and IS will be encouraged  Monitor adequacy of oxygenation and ventilation and attempt to wean oxygen  Antibiotic regimen will be tailored to the clinical, laboratory and microbiologic data  Nutritional goals will be met using po eventually, ensure adequate caloric intake and monitor the same  Stress ulcer and VTE prophylaxis will be achieved    Glycemic control is satisfactory  Electrolytes have been repleted as necessary and wound care has been carried out   Pain control has been achieved.   Aggressive physical therapy and early mobility and ambulation goals will be met   The family was updated about the course and plan.    CRITICAL CARE TIME personally provided by me  in evaluation and management, reassessments, review and interpretation of labs and x-rays, ventilator and hemodynamic management, formulating a plan and coordinating care: ___140____ MIN.  Time does not include procedural time.    CTICU ATTENDING     					  Marii Dill MD

## 2023-09-14 NOTE — CHART NOTE - NSCHARTNOTEFT_GEN_A_CORE
Patient seen and examined at bedside today. Reports that he is feeling well and is planned for OPCAB today at 11:30 AM with CT surgery. Has no acute complaints at this time. Will re-asses patient after OPCAB planned for today.     Arina Murray D.O.   Gastroenterology Fellow  Weekday 7am-5pm Pager: [phone] Patient seen and examined at bedside today. Reports that he is feeling well and is planned for OPCAB today at 11:30 AM with CT surgery. Has no acute complaints at this time. Will re-asses patient after OPCAB planned for today. Meld-Na score today 13.     Arina Murray D.O.   Gastroenterology Fellow  Weekday 7am-5pm Pager: [phone]

## 2023-09-14 NOTE — BRIEF OPERATIVE NOTE - NSICDXBRIEFPROCEDURE_GEN_ALL_CORE_FT
PROCEDURES:  CABG, with CHAVA 14-Sep-2023 18:10:08 OPCABx4 LIMA-LAD, CJY-Bxtqg-RF-PDA EF 65%   Cadence Land

## 2023-09-15 LAB
ALBUMIN SERPL ELPH-MCNC: 3 G/DL — LOW (ref 3.3–5)
ALBUMIN SERPL ELPH-MCNC: 3.8 G/DL — SIGNIFICANT CHANGE UP (ref 3.3–5)
ALBUMIN SERPL ELPH-MCNC: 3.8 G/DL — SIGNIFICANT CHANGE UP (ref 3.3–5)
ALP SERPL-CCNC: 65 U/L — SIGNIFICANT CHANGE UP (ref 40–120)
ALP SERPL-CCNC: 72 U/L — SIGNIFICANT CHANGE UP (ref 40–120)
ALP SERPL-CCNC: 80 U/L — SIGNIFICANT CHANGE UP (ref 40–120)
ALT FLD-CCNC: 23 U/L — SIGNIFICANT CHANGE UP (ref 10–45)
ALT FLD-CCNC: 23 U/L — SIGNIFICANT CHANGE UP (ref 10–45)
ALT FLD-CCNC: 28 U/L — SIGNIFICANT CHANGE UP (ref 10–45)
ANION GAP SERPL CALC-SCNC: 11 MMOL/L — SIGNIFICANT CHANGE UP (ref 5–17)
ANION GAP SERPL CALC-SCNC: 13 MMOL/L — SIGNIFICANT CHANGE UP (ref 5–17)
ANION GAP SERPL CALC-SCNC: 8 MMOL/L — SIGNIFICANT CHANGE UP (ref 5–17)
APTT BLD: 35.2 SEC — SIGNIFICANT CHANGE UP (ref 24.5–35.6)
APTT BLD: 37.2 SEC — HIGH (ref 24.5–35.6)
APTT BLD: 38 SEC — HIGH (ref 24.5–35.6)
AST SERPL-CCNC: 45 U/L — HIGH (ref 10–40)
AST SERPL-CCNC: 46 U/L — HIGH (ref 10–40)
AST SERPL-CCNC: 54 U/L — HIGH (ref 10–40)
BASOPHILS # BLD AUTO: 0.02 K/UL — SIGNIFICANT CHANGE UP (ref 0–0.2)
BASOPHILS # BLD AUTO: 0.03 K/UL — SIGNIFICANT CHANGE UP (ref 0–0.2)
BASOPHILS # BLD AUTO: 0.04 K/UL — SIGNIFICANT CHANGE UP (ref 0–0.2)
BASOPHILS NFR BLD AUTO: 0.3 % — SIGNIFICANT CHANGE UP (ref 0–2)
BASOPHILS NFR BLD AUTO: 0.3 % — SIGNIFICANT CHANGE UP (ref 0–2)
BASOPHILS NFR BLD AUTO: 0.4 % — SIGNIFICANT CHANGE UP (ref 0–2)
BILIRUB SERPL-MCNC: 3.1 MG/DL — HIGH (ref 0.2–1.2)
BILIRUB SERPL-MCNC: 3.5 MG/DL — HIGH (ref 0.2–1.2)
BILIRUB SERPL-MCNC: 4.1 MG/DL — HIGH (ref 0.2–1.2)
BUN SERPL-MCNC: 13 MG/DL — SIGNIFICANT CHANGE UP (ref 7–23)
BUN SERPL-MCNC: 16 MG/DL — SIGNIFICANT CHANGE UP (ref 7–23)
BUN SERPL-MCNC: 16 MG/DL — SIGNIFICANT CHANGE UP (ref 7–23)
CALCIUM SERPL-MCNC: 8.9 MG/DL — SIGNIFICANT CHANGE UP (ref 8.4–10.5)
CALCIUM SERPL-MCNC: 9.2 MG/DL — SIGNIFICANT CHANGE UP (ref 8.4–10.5)
CALCIUM SERPL-MCNC: 9.4 MG/DL — SIGNIFICANT CHANGE UP (ref 8.4–10.5)
CHLORIDE SERPL-SCNC: 103 MMOL/L — SIGNIFICANT CHANGE UP (ref 96–108)
CHLORIDE SERPL-SCNC: 105 MMOL/L — SIGNIFICANT CHANGE UP (ref 96–108)
CHLORIDE SERPL-SCNC: 107 MMOL/L — SIGNIFICANT CHANGE UP (ref 96–108)
CO2 SERPL-SCNC: 20 MMOL/L — LOW (ref 22–31)
CO2 SERPL-SCNC: 20 MMOL/L — LOW (ref 22–31)
CO2 SERPL-SCNC: 22 MMOL/L — SIGNIFICANT CHANGE UP (ref 22–31)
CREAT SERPL-MCNC: 0.66 MG/DL — SIGNIFICANT CHANGE UP (ref 0.5–1.3)
CREAT SERPL-MCNC: 0.82 MG/DL — SIGNIFICANT CHANGE UP (ref 0.5–1.3)
CREAT SERPL-MCNC: 0.88 MG/DL — SIGNIFICANT CHANGE UP (ref 0.5–1.3)
EGFR: 105 ML/MIN/1.73M2 — SIGNIFICANT CHANGE UP
EGFR: 96 ML/MIN/1.73M2 — SIGNIFICANT CHANGE UP
EGFR: 98 ML/MIN/1.73M2 — SIGNIFICANT CHANGE UP
EOSINOPHIL # BLD AUTO: 0 K/UL — SIGNIFICANT CHANGE UP (ref 0–0.5)
EOSINOPHIL # BLD AUTO: 0.03 K/UL — SIGNIFICANT CHANGE UP (ref 0–0.5)
EOSINOPHIL # BLD AUTO: 0.1 K/UL — SIGNIFICANT CHANGE UP (ref 0–0.5)
EOSINOPHIL NFR BLD AUTO: 0 % — SIGNIFICANT CHANGE UP (ref 0–6)
EOSINOPHIL NFR BLD AUTO: 0.3 % — SIGNIFICANT CHANGE UP (ref 0–6)
EOSINOPHIL NFR BLD AUTO: 1 % — SIGNIFICANT CHANGE UP (ref 0–6)
GAS PNL BLDA: SIGNIFICANT CHANGE UP
GLUCOSE BLDC GLUCOMTR-MCNC: 130 MG/DL — HIGH (ref 70–99)
GLUCOSE BLDC GLUCOMTR-MCNC: 133 MG/DL — HIGH (ref 70–99)
GLUCOSE BLDC GLUCOMTR-MCNC: 133 MG/DL — HIGH (ref 70–99)
GLUCOSE BLDC GLUCOMTR-MCNC: 136 MG/DL — HIGH (ref 70–99)
GLUCOSE BLDC GLUCOMTR-MCNC: 146 MG/DL — HIGH (ref 70–99)
GLUCOSE BLDC GLUCOMTR-MCNC: 180 MG/DL — HIGH (ref 70–99)
GLUCOSE SERPL-MCNC: 139 MG/DL — HIGH (ref 70–99)
GLUCOSE SERPL-MCNC: 165 MG/DL — HIGH (ref 70–99)
GLUCOSE SERPL-MCNC: 183 MG/DL — HIGH (ref 70–99)
HCT VFR BLD CALC: 29.1 % — LOW (ref 39–50)
HCT VFR BLD CALC: 29.5 % — LOW (ref 39–50)
HCT VFR BLD CALC: 30.2 % — LOW (ref 39–50)
HGB BLD-MCNC: 10 G/DL — LOW (ref 13–17)
HGB BLD-MCNC: 10.2 G/DL — LOW (ref 13–17)
HGB BLD-MCNC: 9.8 G/DL — LOW (ref 13–17)
IMM GRANULOCYTES NFR BLD AUTO: 0.2 % — SIGNIFICANT CHANGE UP (ref 0–0.9)
IMM GRANULOCYTES NFR BLD AUTO: 0.3 % — SIGNIFICANT CHANGE UP (ref 0–0.9)
IMM GRANULOCYTES NFR BLD AUTO: 0.3 % — SIGNIFICANT CHANGE UP (ref 0–0.9)
INR BLD: 1.49 — HIGH (ref 0.85–1.18)
INR BLD: 1.56 — HIGH (ref 0.85–1.18)
INR BLD: 1.63 — HIGH (ref 0.85–1.18)
LYMPHOCYTES # BLD AUTO: 0.56 K/UL — LOW (ref 1–3.3)
LYMPHOCYTES # BLD AUTO: 0.98 K/UL — LOW (ref 1–3.3)
LYMPHOCYTES # BLD AUTO: 1.06 K/UL — SIGNIFICANT CHANGE UP (ref 1–3.3)
LYMPHOCYTES # BLD AUTO: 11.5 % — LOW (ref 13–44)
LYMPHOCYTES # BLD AUTO: 7.7 % — LOW (ref 13–44)
LYMPHOCYTES # BLD AUTO: 9.8 % — LOW (ref 13–44)
MAGNESIUM SERPL-MCNC: 2 MG/DL — SIGNIFICANT CHANGE UP (ref 1.6–2.6)
MCHC RBC-ENTMCNC: 30.2 PG — SIGNIFICANT CHANGE UP (ref 27–34)
MCHC RBC-ENTMCNC: 30.4 PG — SIGNIFICANT CHANGE UP (ref 27–34)
MCHC RBC-ENTMCNC: 30.4 PG — SIGNIFICANT CHANGE UP (ref 27–34)
MCHC RBC-ENTMCNC: 33.7 GM/DL — SIGNIFICANT CHANGE UP (ref 32–36)
MCHC RBC-ENTMCNC: 33.8 GM/DL — SIGNIFICANT CHANGE UP (ref 32–36)
MCHC RBC-ENTMCNC: 33.9 GM/DL — SIGNIFICANT CHANGE UP (ref 32–36)
MCV RBC AUTO: 89.5 FL — SIGNIFICANT CHANGE UP (ref 80–100)
MCV RBC AUTO: 89.7 FL — SIGNIFICANT CHANGE UP (ref 80–100)
MCV RBC AUTO: 89.9 FL — SIGNIFICANT CHANGE UP (ref 80–100)
MONOCYTES # BLD AUTO: 0.77 K/UL — SIGNIFICANT CHANGE UP (ref 0–0.9)
MONOCYTES # BLD AUTO: 1.15 K/UL — HIGH (ref 0–0.9)
MONOCYTES # BLD AUTO: 1.31 K/UL — HIGH (ref 0–0.9)
MONOCYTES NFR BLD AUTO: 10.6 % — SIGNIFICANT CHANGE UP (ref 2–14)
MONOCYTES NFR BLD AUTO: 11.5 % — SIGNIFICANT CHANGE UP (ref 2–14)
MONOCYTES NFR BLD AUTO: 14.2 % — HIGH (ref 2–14)
NEUTROPHILS # BLD AUTO: 5.91 K/UL — SIGNIFICANT CHANGE UP (ref 1.8–7.4)
NEUTROPHILS # BLD AUTO: 6.75 K/UL — SIGNIFICANT CHANGE UP (ref 1.8–7.4)
NEUTROPHILS # BLD AUTO: 7.72 K/UL — HIGH (ref 1.8–7.4)
NEUTROPHILS NFR BLD AUTO: 73.4 % — SIGNIFICANT CHANGE UP (ref 43–77)
NEUTROPHILS NFR BLD AUTO: 77.1 % — HIGH (ref 43–77)
NEUTROPHILS NFR BLD AUTO: 81.1 % — HIGH (ref 43–77)
NRBC # BLD: 0 /100 WBCS — SIGNIFICANT CHANGE UP (ref 0–0)
PHOSPHATE SERPL-MCNC: 4.6 MG/DL — HIGH (ref 2.5–4.5)
PLATELET # BLD AUTO: 106 K/UL — LOW (ref 150–400)
PLATELET # BLD AUTO: 69 K/UL — LOW (ref 150–400)
PLATELET # BLD AUTO: 96 K/UL — LOW (ref 150–400)
POTASSIUM SERPL-MCNC: 4 MMOL/L — SIGNIFICANT CHANGE UP (ref 3.5–5.3)
POTASSIUM SERPL-MCNC: 4.4 MMOL/L — SIGNIFICANT CHANGE UP (ref 3.5–5.3)
POTASSIUM SERPL-MCNC: 4.6 MMOL/L — SIGNIFICANT CHANGE UP (ref 3.5–5.3)
POTASSIUM SERPL-SCNC: 4 MMOL/L — SIGNIFICANT CHANGE UP (ref 3.5–5.3)
POTASSIUM SERPL-SCNC: 4.4 MMOL/L — SIGNIFICANT CHANGE UP (ref 3.5–5.3)
POTASSIUM SERPL-SCNC: 4.6 MMOL/L — SIGNIFICANT CHANGE UP (ref 3.5–5.3)
PROT SERPL-MCNC: 5.8 G/DL — LOW (ref 6–8.3)
PROT SERPL-MCNC: 6.4 G/DL — SIGNIFICANT CHANGE UP (ref 6–8.3)
PROT SERPL-MCNC: 6.5 G/DL — SIGNIFICANT CHANGE UP (ref 6–8.3)
PROTHROM AB SERPL-ACNC: 16.8 SEC — HIGH (ref 9.5–13)
PROTHROM AB SERPL-ACNC: 17.6 SEC — HIGH (ref 9.5–13)
PROTHROM AB SERPL-ACNC: 18.3 SEC — HIGH (ref 9.5–13)
RBC # BLD: 3.25 M/UL — LOW (ref 4.2–5.8)
RBC # BLD: 3.29 M/UL — LOW (ref 4.2–5.8)
RBC # BLD: 3.36 M/UL — LOW (ref 4.2–5.8)
RBC # FLD: 14.5 % — SIGNIFICANT CHANGE UP (ref 10.3–14.5)
RBC # FLD: 14.5 % — SIGNIFICANT CHANGE UP (ref 10.3–14.5)
RBC # FLD: 14.7 % — HIGH (ref 10.3–14.5)
SODIUM SERPL-SCNC: 136 MMOL/L — SIGNIFICANT CHANGE UP (ref 135–145)
SODIUM SERPL-SCNC: 136 MMOL/L — SIGNIFICANT CHANGE UP (ref 135–145)
SODIUM SERPL-SCNC: 137 MMOL/L — SIGNIFICANT CHANGE UP (ref 135–145)
WBC # BLD: 10.01 K/UL — SIGNIFICANT CHANGE UP (ref 3.8–10.5)
WBC # BLD: 7.28 K/UL — SIGNIFICANT CHANGE UP (ref 3.8–10.5)
WBC # BLD: 9.21 K/UL — SIGNIFICANT CHANGE UP (ref 3.8–10.5)
WBC # FLD AUTO: 10.01 K/UL — SIGNIFICANT CHANGE UP (ref 3.8–10.5)
WBC # FLD AUTO: 7.28 K/UL — SIGNIFICANT CHANGE UP (ref 3.8–10.5)
WBC # FLD AUTO: 9.21 K/UL — SIGNIFICANT CHANGE UP (ref 3.8–10.5)

## 2023-09-15 PROCEDURE — 99292 CRITICAL CARE ADDL 30 MIN: CPT

## 2023-09-15 PROCEDURE — 71045 X-RAY EXAM CHEST 1 VIEW: CPT | Mod: 26

## 2023-09-15 PROCEDURE — 71045 X-RAY EXAM CHEST 1 VIEW: CPT | Mod: 26,77

## 2023-09-15 PROCEDURE — 99291 CRITICAL CARE FIRST HOUR: CPT

## 2023-09-15 RX ORDER — FUROSEMIDE 40 MG
10 TABLET ORAL ONCE
Refills: 0 | Status: COMPLETED | OUTPATIENT
Start: 2023-09-15 | End: 2023-09-15

## 2023-09-15 RX ORDER — SODIUM CHLORIDE 9 MG/ML
1000 INJECTION, SOLUTION INTRAVENOUS
Refills: 0 | Status: DISCONTINUED | OUTPATIENT
Start: 2023-09-15 | End: 2023-09-21

## 2023-09-15 RX ORDER — ALBUMIN HUMAN 25 %
250 VIAL (ML) INTRAVENOUS ONCE
Refills: 0 | Status: COMPLETED | OUTPATIENT
Start: 2023-09-15 | End: 2023-09-15

## 2023-09-15 RX ORDER — INSULIN LISPRO 100/ML
VIAL (ML) SUBCUTANEOUS
Refills: 0 | Status: DISCONTINUED | OUTPATIENT
Start: 2023-09-15 | End: 2023-09-21

## 2023-09-15 RX ORDER — FUROSEMIDE 40 MG
20 TABLET ORAL ONCE
Refills: 0 | Status: COMPLETED | OUTPATIENT
Start: 2023-09-15 | End: 2023-09-15

## 2023-09-15 RX ORDER — FENTANYL CITRATE 50 UG/ML
50 INJECTION INTRAVENOUS ONCE
Refills: 0 | Status: DISCONTINUED | OUTPATIENT
Start: 2023-09-15 | End: 2023-09-15

## 2023-09-15 RX ORDER — NOREPINEPHRINE BITARTRATE/D5W 8 MG/250ML
0.05 PLASTIC BAG, INJECTION (ML) INTRAVENOUS
Qty: 8 | Refills: 0 | Status: DISCONTINUED | OUTPATIENT
Start: 2023-09-15 | End: 2023-09-15

## 2023-09-15 RX ORDER — GLUCAGON INJECTION, SOLUTION 0.5 MG/.1ML
1 INJECTION, SOLUTION SUBCUTANEOUS ONCE
Refills: 0 | Status: DISCONTINUED | OUTPATIENT
Start: 2023-09-15 | End: 2023-09-21

## 2023-09-15 RX ORDER — OXYCODONE HYDROCHLORIDE 5 MG/1
5 TABLET ORAL EVERY 6 HOURS
Refills: 0 | Status: DISCONTINUED | OUTPATIENT
Start: 2023-09-15 | End: 2023-09-16

## 2023-09-15 RX ORDER — ALBUMIN HUMAN 25 %
250 VIAL (ML) INTRAVENOUS
Refills: 0 | Status: COMPLETED | OUTPATIENT
Start: 2023-09-15 | End: 2023-09-15

## 2023-09-15 RX ORDER — ACETAMINOPHEN 500 MG
1000 TABLET ORAL ONCE
Refills: 0 | Status: COMPLETED | OUTPATIENT
Start: 2023-09-15 | End: 2023-09-15

## 2023-09-15 RX ORDER — HYDROMORPHONE HYDROCHLORIDE 2 MG/ML
0.5 INJECTION INTRAMUSCULAR; INTRAVENOUS; SUBCUTANEOUS
Refills: 0 | Status: DISCONTINUED | OUTPATIENT
Start: 2023-09-15 | End: 2023-09-16

## 2023-09-15 RX ORDER — POTASSIUM CHLORIDE 20 MEQ
20 PACKET (EA) ORAL ONCE
Refills: 0 | Status: COMPLETED | OUTPATIENT
Start: 2023-09-15 | End: 2023-09-15

## 2023-09-15 RX ORDER — DEXTROSE 50 % IN WATER 50 %
15 SYRINGE (ML) INTRAVENOUS ONCE
Refills: 0 | Status: DISCONTINUED | OUTPATIENT
Start: 2023-09-15 | End: 2023-09-21

## 2023-09-15 RX ORDER — OXYCODONE HYDROCHLORIDE 5 MG/1
10 TABLET ORAL EVERY 6 HOURS
Refills: 0 | Status: DISCONTINUED | OUTPATIENT
Start: 2023-09-15 | End: 2023-09-16

## 2023-09-15 RX ORDER — AMIODARONE HYDROCHLORIDE 400 MG/1
150 TABLET ORAL ONCE
Refills: 0 | Status: COMPLETED | OUTPATIENT
Start: 2023-09-15 | End: 2023-09-15

## 2023-09-15 RX ORDER — VASOPRESSIN 20 [USP'U]/ML
0.03 INJECTION INTRAVENOUS
Qty: 40 | Refills: 0 | Status: DISCONTINUED | OUTPATIENT
Start: 2023-09-15 | End: 2023-09-16

## 2023-09-15 RX ADMIN — Medication 10 MILLIGRAM(S): at 11:51

## 2023-09-15 RX ADMIN — Medication 7.94 MICROGRAM(S)/KG/MIN: at 10:20

## 2023-09-15 RX ADMIN — Medication 20 MILLIEQUIVALENT(S): at 11:52

## 2023-09-15 RX ADMIN — Medication 100 MILLIGRAM(S): at 17:39

## 2023-09-15 RX ADMIN — FENTANYL CITRATE 25 MICROGRAM(S): 50 INJECTION INTRAVENOUS at 01:41

## 2023-09-15 RX ADMIN — FENTANYL CITRATE 25 MICROGRAM(S): 50 INJECTION INTRAVENOUS at 05:22

## 2023-09-15 RX ADMIN — Medication 125 MILLILITER(S): at 05:07

## 2023-09-15 RX ADMIN — HYDROMORPHONE HYDROCHLORIDE 0.5 MILLIGRAM(S): 2 INJECTION INTRAMUSCULAR; INTRAVENOUS; SUBCUTANEOUS at 17:59

## 2023-09-15 RX ADMIN — Medication 100 MILLIGRAM(S): at 01:56

## 2023-09-15 RX ADMIN — Medication 400 MILLIGRAM(S): at 03:49

## 2023-09-15 RX ADMIN — Medication 500 MILLILITER(S): at 10:21

## 2023-09-15 RX ADMIN — Medication 125 MILLILITER(S): at 23:08

## 2023-09-15 RX ADMIN — Medication 20 MILLIGRAM(S): at 15:20

## 2023-09-15 RX ADMIN — SENNA PLUS 2 TABLET(S): 8.6 TABLET ORAL at 22:01

## 2023-09-15 RX ADMIN — HEPARIN SODIUM 5000 UNIT(S): 5000 INJECTION INTRAVENOUS; SUBCUTANEOUS at 22:00

## 2023-09-15 RX ADMIN — OXYCODONE HYDROCHLORIDE 10 MILLIGRAM(S): 5 TABLET ORAL at 23:00

## 2023-09-15 RX ADMIN — OXYCODONE HYDROCHLORIDE 10 MILLIGRAM(S): 5 TABLET ORAL at 22:00

## 2023-09-15 RX ADMIN — Medication 1000 MILLIGRAM(S): at 04:00

## 2023-09-15 RX ADMIN — OXYCODONE HYDROCHLORIDE 5 MILLIGRAM(S): 5 TABLET ORAL at 14:00

## 2023-09-15 RX ADMIN — MUPIROCIN 1 APPLICATION(S): 20 OINTMENT TOPICAL at 17:39

## 2023-09-15 RX ADMIN — HYDROMORPHONE HYDROCHLORIDE 0.5 MILLIGRAM(S): 2 INJECTION INTRAMUSCULAR; INTRAVENOUS; SUBCUTANEOUS at 09:07

## 2023-09-15 RX ADMIN — VASOPRESSIN 4.5 UNIT(S)/MIN: 20 INJECTION INTRAVENOUS at 23:39

## 2023-09-15 RX ADMIN — OXYCODONE HYDROCHLORIDE 10 MILLIGRAM(S): 5 TABLET ORAL at 07:25

## 2023-09-15 RX ADMIN — FENTANYL CITRATE 25 MICROGRAM(S): 50 INJECTION INTRAVENOUS at 05:35

## 2023-09-15 RX ADMIN — HEPARIN SODIUM 5000 UNIT(S): 5000 INJECTION INTRAVENOUS; SUBCUTANEOUS at 13:02

## 2023-09-15 RX ADMIN — FENTANYL CITRATE 25 MICROGRAM(S): 50 INJECTION INTRAVENOUS at 02:00

## 2023-09-15 RX ADMIN — Medication 100 MILLIGRAM(S): at 11:16

## 2023-09-15 RX ADMIN — Medication 2: at 16:18

## 2023-09-15 RX ADMIN — POLYETHYLENE GLYCOL 3350 17 GRAM(S): 17 POWDER, FOR SOLUTION ORAL at 11:15

## 2023-09-15 RX ADMIN — Medication 500 MILLILITER(S): at 10:45

## 2023-09-15 RX ADMIN — CLOPIDOGREL BISULFATE 75 MILLIGRAM(S): 75 TABLET, FILM COATED ORAL at 11:17

## 2023-09-15 RX ADMIN — Medication 125 MILLILITER(S): at 04:16

## 2023-09-15 RX ADMIN — Medication 500 MILLIGRAM(S): at 11:17

## 2023-09-15 RX ADMIN — CHLORHEXIDINE GLUCONATE 15 MILLILITER(S): 213 SOLUTION TOPICAL at 17:39

## 2023-09-15 RX ADMIN — PANTOPRAZOLE SODIUM 40 MILLIGRAM(S): 20 TABLET, DELAYED RELEASE ORAL at 11:17

## 2023-09-15 RX ADMIN — HYDROMORPHONE HYDROCHLORIDE 0.5 MILLIGRAM(S): 2 INJECTION INTRAMUSCULAR; INTRAVENOUS; SUBCUTANEOUS at 17:39

## 2023-09-15 RX ADMIN — AMIODARONE HYDROCHLORIDE 600 MILLIGRAM(S): 400 TABLET ORAL at 14:29

## 2023-09-15 RX ADMIN — Medication 20 MILLIEQUIVALENT(S): at 15:20

## 2023-09-15 RX ADMIN — SODIUM CHLORIDE 1000 MILLILITER(S): 9 INJECTION, SOLUTION INTRAVENOUS at 00:18

## 2023-09-15 RX ADMIN — HYDROMORPHONE HYDROCHLORIDE 0.5 MILLIGRAM(S): 2 INJECTION INTRAMUSCULAR; INTRAVENOUS; SUBCUTANEOUS at 09:20

## 2023-09-15 RX ADMIN — Medication 500 MILLILITER(S): at 07:52

## 2023-09-15 RX ADMIN — OXYCODONE HYDROCHLORIDE 5 MILLIGRAM(S): 5 TABLET ORAL at 14:31

## 2023-09-15 RX ADMIN — Medication 125 MILLILITER(S): at 22:01

## 2023-09-15 RX ADMIN — MUPIROCIN 1 APPLICATION(S): 20 OINTMENT TOPICAL at 06:44

## 2023-09-15 NOTE — PROGRESS NOTE ADULT - SUBJECTIVE AND OBJECTIVE BOX
Hepatology Consult Progress Note:     OVERNIGHT EVENTS:    SUBJECTIVE / INTERVAL HPI: Patient seen and examined at bedside.     VITAL SIGNS:  Vital Signs Last 24 Hrs  T(C): 36.4 (15 Sep 2023 09:28), Max: 36.6 (14 Sep 2023 11:08)  T(F): 97.6 (15 Sep 2023 09:28), Max: 97.9 (14 Sep 2023 11:08)  HR: 56 (15 Sep 2023 09:00) (53 - 73)  BP: 116/76 (14 Sep 2023 11:53) (116/76 - 116/76)  BP(mean): 81 (14 Sep 2023 11:53) (81 - 81)  RR: 16 (15 Sep 2023 09:00) (13 - 19)  SpO2: 99% (15 Sep 2023 09:00) (96% - 100%)    Parameters below as of 15 Sep 2023 09:00  Patient On (Oxygen Delivery Method): nasal cannula w/ humidification  O2 Flow (L/min): 2      09-14-23 @ 07:01  -  09-15-23 @ 07:00  --------------------------------------------------------  IN: 3118.2 mL / OUT: 1522 mL / NET: 1596.2 mL    09-15-23 @ 07:01  -  09-15-23 @ 10:57  --------------------------------------------------------  IN: 539.6 mL / OUT: 150 mL / NET: 389.6 mL        PHYSICAL EXAM:    General: WDWN  HEENT: NC/AT; PERRL, anicteric sclera; MMM  Neck: supple  Cardiovascular: +S1/S2; RRR  Respiratory: CTA B/L; no W/R/R  Gastrointestinal: soft, NT/ND; +BSx4  Extremities: WWP; no edema, clubbing or cyanosis  Vascular: 2+ radial, DP/PT pulses B/L  Neurological: AAOx3; no focal deficits    MEDICATIONS:  MEDICATIONS  (STANDING):  albumin human  5% IVPB 250 milliLiter(s) IV Intermittent every 10 minutes  ascorbic acid 500 milliGRAM(s) Oral daily  ceFAZolin   IVPB 2000 milliGRAM(s) IV Intermittent every 8 hours  chlorhexidine 0.12% Liquid 15 milliLiter(s) Oral Mucosa every 12 hours  chlorhexidine 2% Cloths 1 Application(s) Topical daily  chlorhexidine 4% Liquid 1 Application(s) Topical once  clopidogrel Tablet 75 milliGRAM(s) Oral daily  dextrose 5%. 1000 milliLiter(s) (100 mL/Hr) IV Continuous <Continuous>  dextrose 5%. 1000 milliLiter(s) (50 mL/Hr) IV Continuous <Continuous>  dextrose 50% Injectable 50 milliLiter(s) IV Push every 15 minutes  dextrose 50% Injectable 25 milliLiter(s) IV Push every 15 minutes  glucagon  Injectable 1 milliGRAM(s) IntraMuscular once  heparin   Injectable 5000 Unit(s) SubCutaneous every 8 hours  insulin lispro (ADMELOG) corrective regimen sliding scale   SubCutaneous Before meals and at bedtime  mupirocin 2% Nasal 1 Application(s) Both Nostrils two times a day  norepinephrine Infusion 0.05 MICROgram(s)/kG/Min (7.94 mL/Hr) IV Continuous <Continuous>  pantoprazole    Tablet 40 milliGRAM(s) Oral daily  polyethylene glycol 3350 17 Gram(s) Oral daily  senna 2 Tablet(s) Oral at bedtime  sodium chloride 0.9%. 1000 milliLiter(s) (10 mL/Hr) IV Continuous <Continuous>    MEDICATIONS  (PRN):  dextrose Oral Gel 15 Gram(s) Oral once PRN Blood Glucose LESS THAN 70 milliGRAM(s)/deciliter  HYDROmorphone  Injectable 0.5 milliGRAM(s) IV Push every 3 hours PRN Breakthrough  oxyCODONE    IR 10 milliGRAM(s) Oral every 6 hours PRN Severe Pain (7 - 10)  oxyCODONE    IR 5 milliGRAM(s) Oral every 6 hours PRN Moderate Pain (4 - 6)      ALLERGIES:  Allergies    aspirin (Pruritus)    Intolerances        LABS:                        10.2   7.28  )-----------( 69       ( 15 Sep 2023 01:47 )             30.2     09-15    137  |  107  |  13  ----------------------------<  165<H>  4.6   |  22  |  0.66    Ca    9.4      15 Sep 2023 01:47  Phos  4.6     09-15  Mg     2.0     09-15    TPro  5.8<L>  /  Alb  3.0<L>  /  TBili  3.1<H>  /  DBili  x   /  AST  54<H>  /  ALT  28  /  AlkPhos  80  09-15    PT/INR - ( 15 Sep 2023 01:47 )   PT: 16.8 sec;   INR: 1.49          PTT - ( 15 Sep 2023 01:47 )  PTT:38.0 sec  Urinalysis Basic - ( 15 Sep 2023 01:47 )    Color: x / Appearance: x / SG: x / pH: x  Gluc: 165 mg/dL / Ketone: x  / Bili: x / Urobili: x   Blood: x / Protein: x / Nitrite: x   Leuk Esterase: x / RBC: x / WBC x   Sq Epi: x / Non Sq Epi: x / Bacteria: x      CAPILLARY BLOOD GLUCOSE      POCT Blood Glucose.: 130 mg/dL (15 Sep 2023 10:48)        RADIOLOGY & ADDITIONAL TESTS: Reviewed.    ASSESSMENT:    PLAN:    Hepatology Consult Progress Note:     OVERNIGHT EVENTS:    SUBJECTIVE / INTERVAL HPI: Patient seen and examined at bedside. Doing OK after surgery.    VITAL SIGNS:  Vital Signs Last 24 Hrs  T(C): 36.4 (15 Sep 2023 09:28), Max: 36.6 (14 Sep 2023 11:08)  T(F): 97.6 (15 Sep 2023 09:28), Max: 97.9 (14 Sep 2023 11:08)  HR: 56 (15 Sep 2023 09:00) (53 - 73)  BP: 116/76 (14 Sep 2023 11:53) (116/76 - 116/76)  BP(mean): 81 (14 Sep 2023 11:53) (81 - 81)  RR: 16 (15 Sep 2023 09:00) (13 - 19)  SpO2: 99% (15 Sep 2023 09:00) (96% - 100%)    Parameters below as of 15 Sep 2023 09:00  Patient On (Oxygen Delivery Method): nasal cannula w/ humidification  O2 Flow (L/min): 2      09-14-23 @ 07:01  -  09-15-23 @ 07:00  --------------------------------------------------------  IN: 3118.2 mL / OUT: 1522 mL / NET: 1596.2 mL    09-15-23 @ 07:01  -  09-15-23 @ 10:57  --------------------------------------------------------  IN: 539.6 mL / OUT: 150 mL / NET: 389.6 mL    PHYSICAL EXAM:  General: No acute distress  Lungs: Normal respiratory effort  Abdomen: Nondistended   Neurological: moving all extremities    MEDICATIONS:  MEDICATIONS  (STANDING):  albumin human  5% IVPB 250 milliLiter(s) IV Intermittent every 10 minutes  ascorbic acid 500 milliGRAM(s) Oral daily  ceFAZolin   IVPB 2000 milliGRAM(s) IV Intermittent every 8 hours  chlorhexidine 0.12% Liquid 15 milliLiter(s) Oral Mucosa every 12 hours  chlorhexidine 2% Cloths 1 Application(s) Topical daily  chlorhexidine 4% Liquid 1 Application(s) Topical once  clopidogrel Tablet 75 milliGRAM(s) Oral daily  dextrose 5%. 1000 milliLiter(s) (100 mL/Hr) IV Continuous <Continuous>  dextrose 5%. 1000 milliLiter(s) (50 mL/Hr) IV Continuous <Continuous>  dextrose 50% Injectable 50 milliLiter(s) IV Push every 15 minutes  dextrose 50% Injectable 25 milliLiter(s) IV Push every 15 minutes  glucagon  Injectable 1 milliGRAM(s) IntraMuscular once  heparin   Injectable 5000 Unit(s) SubCutaneous every 8 hours  insulin lispro (ADMELOG) corrective regimen sliding scale   SubCutaneous Before meals and at bedtime  mupirocin 2% Nasal 1 Application(s) Both Nostrils two times a day  norepinephrine Infusion 0.05 MICROgram(s)/kG/Min (7.94 mL/Hr) IV Continuous <Continuous>  pantoprazole    Tablet 40 milliGRAM(s) Oral daily  polyethylene glycol 3350 17 Gram(s) Oral daily  senna 2 Tablet(s) Oral at bedtime  sodium chloride 0.9%. 1000 milliLiter(s) (10 mL/Hr) IV Continuous <Continuous>    MEDICATIONS  (PRN):  dextrose Oral Gel 15 Gram(s) Oral once PRN Blood Glucose LESS THAN 70 milliGRAM(s)/deciliter  HYDROmorphone  Injectable 0.5 milliGRAM(s) IV Push every 3 hours PRN Breakthrough  oxyCODONE    IR 10 milliGRAM(s) Oral every 6 hours PRN Severe Pain (7 - 10)  oxyCODONE    IR 5 milliGRAM(s) Oral every 6 hours PRN Moderate Pain (4 - 6)      ALLERGIES:  Allergies    aspirin (Pruritus)    Intolerances        LABS:                        10.2   7.28  )-----------( 69       ( 15 Sep 2023 01:47 )             30.2     09-15    137  |  107  |  13  ----------------------------<  165<H>  4.6   |  22  |  0.66    Ca    9.4      15 Sep 2023 01:47  Phos  4.6     09-15  Mg     2.0     09-15    TPro  5.8<L>  /  Alb  3.0<L>  /  TBili  3.1<H>  /  DBili  x   /  AST  54<H>  /  ALT  28  /  AlkPhos  80  09-15    PT/INR - ( 15 Sep 2023 01:47 )   PT: 16.8 sec;   INR: 1.49          PTT - ( 15 Sep 2023 01:47 )  PTT:38.0 sec  Urinalysis Basic - ( 15 Sep 2023 01:47 )    Color: x / Appearance: x / SG: x / pH: x  Gluc: 165 mg/dL / Ketone: x  / Bili: x / Urobili: x   Blood: x / Protein: x / Nitrite: x   Leuk Esterase: x / RBC: x / WBC x   Sq Epi: x / Non Sq Epi: x / Bacteria: x      CAPILLARY BLOOD GLUCOSE      POCT Blood Glucose.: 130 mg/dL (15 Sep 2023 10:48)      RADIOLOGY & ADDITIONAL TESTS: Reviewed.

## 2023-09-15 NOTE — DIETITIAN INITIAL EVALUATION ADULT - OTHER INFO
63 year old male, with history of HTN, HLD, diabetes, gout, former tobacco/ETOH use, cirrhosis, esophageal varices, GI bleed and TIA found to have 3vCAD- originally found to have CAD on cardiac cath in 2/2023. During his hospital stay in february he also reported history of black stools. Work up revealed esophageal varices s/p banding. Pt was seen by Dr. Hinton for surgical consult. At the time, he was demed not a PCI/Stents s/t inability to tolerate DAPT and high risk for surgery secondary to Lover dysfunction with MELD score of 18. Pt has been seeing a hepatologist Dr. Lawson. Now admitted for pre-op optimization by Liver medicine prior to his OPCAB later this week. Now is s/p  OPCABx4 LIMA-LAD, QVA-Bvgme-AX-PDA EF 65% 9/14. Extubated 9/15.     Pt seen this AM on 9[facility]. Pt sitting in chair, soundly sleeping. Spoke with RN. Reports bedside dys screen just passed and pt ordered since for consCHO/DASH TLC-RN reports plan to place meal order shortly.   Labs: POCT 146 133 136 (suggest goal s/p OR/ICU: 110-140), A1c 7%, BUN/Cr WDL, Na K Mg WDL, Phos 4.6, , AST SGOT 45.  GI: Ordered for DULCOLAX MIRALAX Senna and Protonix. BM+ 9/13 per flow sheets.  Pain: Pain meds are ordered.  Skin: Vit C ordered. Mian 15. No Edema or pressure ulcer, SX site noted.   Weight: admit wt 186pounds (BMI 28.4) - seems accurate.   Please see below for nutritions recommendations.

## 2023-09-15 NOTE — DIETITIAN INITIAL EVALUATION ADULT - PERTINENT LABORATORY DATA
09-15    136  |  105  |  16  ----------------------------<  139<H>  4.4   |  20<L>  |  0.82    Ca    9.2      15 Sep 2023 10:53  Phos  4.6     09-15  Mg     2.0     09-15    TPro  6.5  /  Alb  3.8  /  TBili  3.5<H>  /  DBili  x   /  AST  45<H>  /  ALT  23  /  AlkPhos  65  09-15  POCT Blood Glucose.: 180 mg/dL (09-15-23 @ 16:04)  A1C with Estimated Average Glucose Result: 7.0 % (09-13-23 @ 05:30)

## 2023-09-15 NOTE — DIETITIAN INITIAL EVALUATION ADULT - NS FNS DIET ORDER
Diet, Consistent Carbohydrate/No Snacks:   DASH/TLC {Sodium & Cholesterol Restricted} (DASH) (09-15-23 @ 07:19)

## 2023-09-15 NOTE — PHYSICAL THERAPY INITIAL EVALUATION ADULT - GAIT DEVIATIONS NOTED, PT EVAL
Pt with initial difficulty weight shifting to initiate gait for first ~10 ft requires max verbal cueing and mod A, once in hallway pt with improved gait and able to initiate reciprocal gait with max encouragement/decreased hemanth/decreased velocity of limb motion/decreased step length/decreased weight-shifting ability

## 2023-09-15 NOTE — PHYSICAL THERAPY INITIAL EVALUATION ADULT - NSPTDISCHREC_GEN_A_CORE
anticipate HPT, pt limited by pain; HARSHAD Zabala informed/Home PT anticipate HPT with functional progress; HARSHAD Zabala informed/Home PT

## 2023-09-15 NOTE — PROGRESS NOTE ADULT - ATTENDING COMMENTS
Please see GI fellow's note
Patient is known to me from multiple prior visits.   Cirrhosis due to AUD with hx of variceal bleeding and hepatic encephalopathy   EGD and banding of varices   Hx of staph sepsis at outside hospital treated with long course of IV antibiotics   CAD     [facility]  Feels OK  No new symptoms   No NVD  No rectal bleeding   Stable vital signs  No scleral icterus   Clear lungs   Regular heart sounds  No ascites or edema   No encephalopathy     Imp/Suggestions:  Cirrhosis   CAD in need of surgical revascularization   According to [facility] pre-op risk assessment model and VoCal SUZIE model, he has no contraindication for surgery from hepatology point of view. He and family are aware of risks associated with surgery in th context of cirrhosis and portal HTN.   Please  -Avoid high dose narcotics   -Bowel regimen post op to prevent hepatic encephalopathy   -Avoid NSAIDs as much as possible   -Prophylactic antibiotics for at least 3 days post op   -We prefer colloids (Albumin) over crystalloids  -Avoid over transfusion. Our target Hb from liver point of view would be around 9-10.   -No contraindication for DVT prophylaxis   -PPI for mucosal protection   -will follow up

## 2023-09-15 NOTE — DIETITIAN INITIAL EVALUATION ADULT - PERTINENT MEDS FT
MEDICATIONS  (STANDING):  ascorbic acid 500 milliGRAM(s) Oral daily  ceFAZolin   IVPB 2000 milliGRAM(s) IV Intermittent every 8 hours  chlorhexidine 0.12% Liquid 15 milliLiter(s) Oral Mucosa every 12 hours  chlorhexidine 2% Cloths 1 Application(s) Topical daily  chlorhexidine 4% Liquid 1 Application(s) Topical once  clopidogrel Tablet 75 milliGRAM(s) Oral daily  dextrose 5%. 1000 milliLiter(s) (100 mL/Hr) IV Continuous <Continuous>  dextrose 5%. 1000 milliLiter(s) (50 mL/Hr) IV Continuous <Continuous>  dextrose 50% Injectable 25 milliLiter(s) IV Push every 15 minutes  dextrose 50% Injectable 50 milliLiter(s) IV Push every 15 minutes  glucagon  Injectable 1 milliGRAM(s) IntraMuscular once  heparin   Injectable 5000 Unit(s) SubCutaneous every 8 hours  insulin lispro (ADMELOG) corrective regimen sliding scale   SubCutaneous Before meals and at bedtime  mupirocin 2% Nasal 1 Application(s) Both Nostrils two times a day  norepinephrine Infusion 0.05 MICROgram(s)/kG/Min (7.94 mL/Hr) IV Continuous <Continuous>  pantoprazole    Tablet 40 milliGRAM(s) Oral daily  polyethylene glycol 3350 17 Gram(s) Oral daily  senna 2 Tablet(s) Oral at bedtime  sodium chloride 0.9%. 1000 milliLiter(s) (10 mL/Hr) IV Continuous <Continuous>    MEDICATIONS  (PRN):  dextrose Oral Gel 15 Gram(s) Oral once PRN Blood Glucose LESS THAN 70 milliGRAM(s)/deciliter  HYDROmorphone  Injectable 0.5 milliGRAM(s) IV Push every 3 hours PRN Breakthrough  oxyCODONE    IR 5 milliGRAM(s) Oral every 6 hours PRN Moderate Pain (4 - 6)  oxyCODONE    IR 10 milliGRAM(s) Oral every 6 hours PRN Severe Pain (7 - 10)

## 2023-09-15 NOTE — PROGRESS NOTE ADULT - SUBJECTIVE AND OBJECTIVE BOX
CTICU  CRITICAL  CARE  attending     Hand off received 					   Pertinent clinical, laboratory, radiographic, hemodynamic, echocardiographic, respiratory data, microbiologic data and chart were reviewed and analyzed frequently throughout the course of the day and night    63 year old male with DM, HTN , HLD, H/O TIA, gout, former tobacco/ETOH use, cirrhosis, esophageal varices, GI bleed.  He was admitted in February 2023 with GI bleed due to esophageal varices. Esophageal banding was performed.  Cardiac Cath in 2/2023 showed triple vessel CAD.  Due to GI bleed he was not a suitable candidate for dual antiplatelet therapy.  Due to hepatic dysfunction Liver dysfunction with MELD score of 18 surgery was deferred.   He is followed by his hepatologist Dr. Lawson.   He was admitted for pre-op optimization by Liver team before revascularization.     S/P Off pump CABG x 4.       FAMILY HISTORY:  PAST MEDICAL & SURGICAL HISTORY:  CAD (coronary artery disease)  HTN (hypertension)  Cirrhosis  Esophageal varices in cirrhosis  History of TIAs  H/O: GI bleed  Gout  HLD (hyperlipidemia)  History of portal hypertension  Diabetes mellitus        14 system review was unremarkable    Vital signs, hemodynamic and respiratory parameters were reviewed from the bedside nursing flow sheet.  ICU Vital Signs Last 24 Hrs  T(C): 36.7 (15 Sep 2023 20:33), Max: 37.3 (15 Sep 2023 17:33)  T(F): 98 (15 Sep 2023 20:33), Max: 99.2 (15 Sep 2023 17:33)  HR: 75 (15 Sep 2023 23:00) (53 - 85)  BP: --  BP(mean): --  ABP: 129/64 (15 Sep 2023 23:00) (99/50 - 134/63)  ABP(mean): 85 (15 Sep 2023 23:00) (65 - 86)  RR: 16 (15 Sep 2023 23:00) (13 - 19)  SpO2: 95% (15 Sep 2023 23:00) (94% - 100%)    O2 Parameters below as of 15 Sep 2023 23:00  Patient On (Oxygen Delivery Method): nasal cannula w/ humidification  O2 Flow (L/min): 2        Adult Advanced Hemodynamics Last 24 Hrs  CVP(mm Hg): 9 (15 Sep 2023 23:00) (2 - 14)  CVP(cm H2O): --  CO: --  CI: --  PA: --  PA(mean): --  PCWP: --  SVR: --  SVRI: --  PVR: --  PVRI: --, ABG - ( 15 Sep 2023 16:07 )  pH, Arterial: 7.41  pH, Blood: x     /  pCO2: 32    /  pO2: 84    / HCO3: 20    / Base Excess: -3.4  /  SaO2: 97.2              Mode: CPAP with PS  FiO2: 40  PEEP: 5  MAP: 8  PIP: 15    Intake and output was reviewed and the fluid balance was calculated  Daily     Daily   I&O's Summary    14 Sep 2023 07:01  -  15 Sep 2023 07:00  --------------------------------------------------------  IN: 3118.2 mL / OUT: 1522 mL / NET: 1596.2 mL    15 Sep 2023 07:01  -  15 Sep 2023 23:33  --------------------------------------------------------  IN: 1658 mL / OUT: 1080 mL / NET: 578 mL            Neuro: Slight lethargy but oriented x 3.   Neck: No JVD.  CVS: S1, S2, No S3.  Lungs: Good air entry bilaterally.  Abd: Soft. No tenderness. + Bowel sounds.  Vascular: + DP/PT.  Extremities: No edema.  Lymphatic: Normal.  Skin: No abnormalities.      labs  CBC Full  -  ( 15 Sep 2023 16:18 )  WBC Count : 10.01 K/uL  RBC Count : 3.25 M/uL  Hemoglobin : 9.8 g/dL  Hematocrit : 29.1 %  Platelet Count - Automated : 106 K/uL  Mean Cell Volume : 89.5 fl  Mean Cell Hemoglobin : 30.2 pg  Mean Cell Hemoglobin Concentration : 33.7 gm/dL  Auto Neutrophil # : 7.72 K/uL  Auto Lymphocyte # : 0.98 K/uL  Auto Monocyte # : 1.15 K/uL  Auto Eosinophil # : 0.10 K/uL  Auto Basophil # : 0.04 K/uL  Auto Neutrophil % : 77.1 %  Auto Lymphocyte % : 9.8 %  Auto Monocyte % : 11.5 %  Auto Eosinophil % : 1.0 %  Auto Basophil % : 0.4 %    09-15    136  |  103  |  16  ----------------------------<  183<H>  4.0   |  20<L>  |  0.88    Ca    8.9      15 Sep 2023 16:18  Phos  4.6     09-15  Mg     2.0     09-15    TPro  6.4  /  Alb  3.8  /  TBili  4.1<H>  /  DBili  x   /  AST  46<H>  /  ALT  23  /  AlkPhos  72  09-15    PT/INR - ( 15 Sep 2023 16:18 )   PT: 17.6 sec;   INR: 1.56          PTT - ( 15 Sep 2023 16:18 )  PTT:37.2 sec  The current medications were reviewed   MEDICATIONS  (STANDING):  ascorbic acid 500 milliGRAM(s) Oral daily  ceFAZolin   IVPB 2000 milliGRAM(s) IV Intermittent every 8 hours  chlorhexidine 0.12% Liquid 15 milliLiter(s) Oral Mucosa every 12 hours  chlorhexidine 2% Cloths 1 Application(s) Topical daily  chlorhexidine 4% Liquid 1 Application(s) Topical once  clopidogrel Tablet 75 milliGRAM(s) Oral daily  dextrose 5%. 1000 milliLiter(s) (100 mL/Hr) IV Continuous <Continuous>  dextrose 5%. 1000 milliLiter(s) (50 mL/Hr) IV Continuous <Continuous>  dextrose 50% Injectable 25 milliLiter(s) IV Push every 15 minutes  dextrose 50% Injectable 50 milliLiter(s) IV Push every 15 minutes  glucagon  Injectable 1 milliGRAM(s) IntraMuscular once  heparin   Injectable 5000 Unit(s) SubCutaneous every 8 hours  insulin lispro (ADMELOG) corrective regimen sliding scale   SubCutaneous Before meals and at bedtime  mupirocin 2% Nasal 1 Application(s) Both Nostrils two times a day  pantoprazole    Tablet 40 milliGRAM(s) Oral daily  polyethylene glycol 3350 17 Gram(s) Oral daily  senna 2 Tablet(s) Oral at bedtime  sodium chloride 0.9%. 1000 milliLiter(s) (10 mL/Hr) IV Continuous <Continuous>  vasopressin Infusion 0.03 Unit(s)/Min (4.5 mL/Hr) IV Continuous <Continuous>    MEDICATIONS  (PRN):  dextrose Oral Gel 15 Gram(s) Oral once PRN Blood Glucose LESS THAN 70 milliGRAM(s)/deciliter  HYDROmorphone  Injectable 0.5 milliGRAM(s) IV Push every 3 hours PRN Breakthrough  oxyCODONE    IR 10 milliGRAM(s) Oral every 6 hours PRN Severe Pain (7 - 10)  oxyCODONE    IR 5 milliGRAM(s) Oral every 6 hours PRN Moderate Pain (4 - 6)           63 year old male with triple vessel CAD.   S/P Off pump CABG x 4. (LIMA to LAD, SVG to RI & OM, SVG to PDA).   Mild metabolic acidosis.  Hyperglycemia.  Hemodynamically stable.  Good oxygenation.  Low urine out put.        My plan includes :  Low dose vasopressin for optimal renal perfusion.  Statin Rx.   No ASA.   PO clopidogrel.    Gentle diuresis.  Beta blocker Rx when off vasopressin.   OPTIMIZE Glycemic Control.  Close hemodynamic, ventilatory and drain monitoring and management  Monitor for arrhythmias and monitor parameters for organ perfusion  Monitor neurologic status  Monitor renal function.  Head of the bed should remain elevated to 45 deg .   Chest PT and IS will be encouraged  Monitor adequacy of oxygenation and ventilation and attempt to wean oxygen  Nutritional goals will be met using po eventually , ensure adequate caloric intake and monitor the same  Stress ulcer and VTE prophylaxis will be achieved.   Electrolytes have been repleted as necessary and wound care has been carried out. Pain control has been achieved.   Aggressive physical therapy and early mobility and ambulation goals will be met   The family was updated about the course and plan  CRITICAL CARE TIME SPENT in evaluation and management, reassessments, review and interpretation of labs and x-rays, ventilator and hemodynamic management, formulating a plan and coordinating care: ___30____ MIN.  Time does not include procedural time.  CTICU ATTENDING     					    Jose Jalloh MD

## 2023-09-15 NOTE — DIETITIAN INITIAL EVALUATION ADULT - OTHER CALCULATIONS
IBW used to calculate energy needs due to pt's current body weight exceeding 120% of IBW  Adjusted for age, S/p OR

## 2023-09-15 NOTE — DIETITIAN INITIAL EVALUATION ADULT - NSFNSGIIOFT_GEN_A_CORE
09-14-23 @ 07:01  -  09-15-23 @ 07:00  --------------------------------------------------------  OUT:    Chest Tube (mL): 410 mL  Total OUT: 410 mL    Total NET: -410 mL      09-15-23 @ 07:01  -  09-15-23 @ 16:13  --------------------------------------------------------  OUT:    Chest Tube (mL): 70 mL  Total OUT: 70 mL    Total NET: -70 mL

## 2023-09-15 NOTE — PHYSICAL THERAPY INITIAL EVALUATION ADULT - ADDITIONAL COMMENTS
As per pt, PTA he was completely independent with all functional mobility, ADLs, and IADLs with no AD. Has a shower tub.

## 2023-09-15 NOTE — PROGRESS NOTE ADULT - ASSESSMENT
63 [place] ([facility]) M former smoker with PMH of HTN, severe 3 vessel CAD, HLD, pre-diabetes, gout, TIA (14 yrs ago), gastric ulcer, rectal bleed, LIZ and alcoholic liver disease c/b cirrhosis and portal hypertension m/b esophageal varices (and UGIB) and splenomegaly, presenting for OPCAB with CT surgery. Hepatology team consulted for pre-operative optimization.     Recommendations:   - Start bowel reg and uptitrate to aim for daily BMs  - Avoid high dose narcotics  - Agree with post-operative prophylactic antibiotics  - Continue PPI for mucosal protection    We will continue to follow. Please see attending addendum for final recommendations.     Ryan (Luis Emarleen) MD [place]  Gastroenterology Fellow  Pager (M-F 7a-5p): [phone]  Pager (after hours): Please call  for on-call fellow

## 2023-09-15 NOTE — DIETITIAN INITIAL EVALUATION ADULT - ADD RECOMMEND
1. Monitor PO intake/appetite, GI distress, diet tolerance, labs, weights.  2. Honor pt food preferences as able.  3. Should pt be noted with s/s of issues swallowing, recommend NPO/SLP.   4. RD to remain available for additional nutrition interventions as needed.  ** High Nutrition Risk.

## 2023-09-15 NOTE — PROGRESS NOTE ADULT - SUBJECTIVE AND OBJECTIVE BOX
CTICU  CRITICAL  CARE  attending     Hand off received 					   Pertinent clinical, laboratory, radiographic, hemodynamic, echocardiographic, respiratory data, microbiologic data and chart were reviewed and analyzed frequently throughout the course of the day and night  Patient seen and examined with CTS/ SH attending at bedside    Pt is a 63y , Male, pod # 1 s/p CABG x 4V; off pump; EF nl    post op:    extubated; on supplemental O2  hypotensive  low dose pressor support  acute post Hemorrhagic anemia      63 year old malewith history of HTN , HLD, diabetes, gout, former tobacco/ETOH use, cirrhosis, esophageal varices, GI bleed and TIA found to have 3vCAD. Patient originally found to have CAD on cardiac cath in 2/2023. During his hospital stay in february he also reported history of black stools. Work up revealed esophageal varices s/p banding. Patient was seen by Dr. Hinton for surgical consult. At the time, he was demed not a PCI/Stents s/t inability to tolerate DAPT and high risk for surgery secondary to Lover dysfunction with MELD score of 18. Patient has been seeing a hepatologist Dr. Lawson.     , FAMILY HISTORY:  PAST MEDICAL & SURGICAL HISTORY:  CAD (coronary artery disease)      HTN (hypertension)      Cirrhosis      Esophageal varices in cirrhosis      History of TIAs      H/O: GI bleed      Gout      HLD (hyperlipidemia)      History of portal hypertension      Diabetes mellitus        Patient is a 63y old  Male who presents with a chief complaint of CAD (15 Sep 2023 10:57)      14 system review limited 2/2 post op morbidity    Vital signs, hemodynamic and respiratory parameters were reviewed from the bedside nursing flowsheet.  ICU Vital Signs Last 24 Hrs  T(C): 36.4 (15 Sep 2023 09:28), Max: 36.4 (15 Sep 2023 05:04)  T(F): 97.6 (15 Sep 2023 09:28), Max: 97.6 (15 Sep 2023 05:04)  HR: 73 (15 Sep 2023 12:00) (53 - 74)  BP: --  BP(mean): --  ABP: 119/55 (15 Sep 2023 12:00) (99/50 - 164/83)  ABP(mean): 75 (15 Sep 2023 12:00) (65 - 110)  RR: 16 (15 Sep 2023 12:00) (13 - 19)  SpO2: 96% (15 Sep 2023 12:00) (95% - 100%)    O2 Parameters below as of 15 Sep 2023 12:00  Patient On (Oxygen Delivery Method): room air          Adult Advanced Hemodynamics Last 24 Hrs  CVP(mm Hg): 5 (15 Sep 2023 12:00) (5 - 22)  CVP(cm H2O): --  CO: --  CI: --  PA: --  PA(mean): --  PCWP: --  SVR: --  SVRI: --  PVR: --  PVRI: --, ABG - ( 15 Sep 2023 10:53 )  pH, Arterial: 7.40  pH, Blood: x     /  pCO2: 32    /  pO2: 92    / HCO3: 19    / Base Excess: -4.3  /  SaO2: 99.0              Mode: CPAP with PS  FiO2: 40  PEEP: 5  MAP: 8  PIP: 15    Intake and output was reviewed and the fluid balance was calculated  Daily     Daily   I&O's Summary    14 Sep 2023 07:01  -  15 Sep 2023 07:00  --------------------------------------------------------  IN: 3118.2 mL / OUT: 1522 mL / NET: 1596.2 mL    15 Sep 2023 07:01  -  15 Sep 2023 13:01  --------------------------------------------------------  IN: 614.4 mL / OUT: 280 mL / NET: 334.4 mL        All lines and drain sites were assessed  Glycemic trend was reviewedCAPILLARY BLOOD GLUCOSE      POCT Blood Glucose.: 130 mg/dL (15 Sep 2023 10:48)    No acute change in mental status  Auscultation of the chest reveals equal bs  Abdomen is soft  Extremities are warm and well perfused  Wounds appear clean and unremarkable  Antibiotics are periop    labs  CBC Full  -  ( 15 Sep 2023 10:53 )  WBC Count : 9.21 K/uL  RBC Count : 3.29 M/uL  Hemoglobin : 10.0 g/dL  Hematocrit : 29.5 %  Platelet Count - Automated : 96 K/uL  Mean Cell Volume : 89.7 fl  Mean Cell Hemoglobin : 30.4 pg  Mean Cell Hemoglobin Concentration : 33.9 gm/dL  Auto Neutrophil # : 6.75 K/uL  Auto Lymphocyte # : 1.06 K/uL  Auto Monocyte # : 1.31 K/uL  Auto Eosinophil # : 0.03 K/uL  Auto Basophil # : 0.03 K/uL  Auto Neutrophil % : 73.4 %  Auto Lymphocyte % : 11.5 %  Auto Monocyte % : 14.2 %  Auto Eosinophil % : 0.3 %  Auto Basophil % : 0.3 %    09-15    136  |  105  |  16  ----------------------------<  139<H>  4.4   |  20<L>  |  0.82    Ca    9.2      15 Sep 2023 10:53  Phos  4.6     09-15  Mg     2.0     09-15    TPro  6.5  /  Alb  3.8  /  TBili  3.5<H>  /  DBili  x   /  AST  45<H>  /  ALT  23  /  AlkPhos  65  09-15    PT/INR - ( 15 Sep 2023 10:53 )   PT: 18.3 sec;   INR: 1.63          PTT - ( 15 Sep 2023 10:53 )  PTT:35.2 sec  The current medications were reviewed   MEDICATIONS  (STANDING):  ascorbic acid 500 milliGRAM(s) Oral daily  ceFAZolin   IVPB 2000 milliGRAM(s) IV Intermittent every 8 hours  chlorhexidine 0.12% Liquid 15 milliLiter(s) Oral Mucosa every 12 hours  chlorhexidine 2% Cloths 1 Application(s) Topical daily  chlorhexidine 4% Liquid 1 Application(s) Topical once  clopidogrel Tablet 75 milliGRAM(s) Oral daily  dextrose 5%. 1000 milliLiter(s) (100 mL/Hr) IV Continuous <Continuous>  dextrose 5%. 1000 milliLiter(s) (50 mL/Hr) IV Continuous <Continuous>  dextrose 50% Injectable 50 milliLiter(s) IV Push every 15 minutes  dextrose 50% Injectable 25 milliLiter(s) IV Push every 15 minutes  glucagon  Injectable 1 milliGRAM(s) IntraMuscular once  heparin   Injectable 5000 Unit(s) SubCutaneous every 8 hours  insulin lispro (ADMELOG) corrective regimen sliding scale   SubCutaneous Before meals and at bedtime  mupirocin 2% Nasal 1 Application(s) Both Nostrils two times a day  norepinephrine Infusion 0.05 MICROgram(s)/kG/Min (7.94 mL/Hr) IV Continuous <Continuous>  pantoprazole    Tablet 40 milliGRAM(s) Oral daily  polyethylene glycol 3350 17 Gram(s) Oral daily  senna 2 Tablet(s) Oral at bedtime  sodium chloride 0.9%. 1000 milliLiter(s) (10 mL/Hr) IV Continuous <Continuous>    MEDICATIONS  (PRN):  dextrose Oral Gel 15 Gram(s) Oral once PRN Blood Glucose LESS THAN 70 milliGRAM(s)/deciliter  HYDROmorphone  Injectable 0.5 milliGRAM(s) IV Push every 3 hours PRN Breakthrough  oxyCODONE    IR 10 milliGRAM(s) Oral every 6 hours PRN Severe Pain (7 - 10)  oxyCODONE    IR 5 milliGRAM(s) Oral every 6 hours PRN Moderate Pain (4 - 6)       PROBLEM LIST/ ASSESSMENT:  HEALTH ISSUES - PROBLEM Dx:      ,   Patient is a 63y old  Male who presents with a chief complaint of CAD (15 Sep 2023 10:57)     s/p OPCAB x 4V      My plan includes :    Titrate pressor support to maintain MAP >70  Supplemental O2  Titrate Fio2 to maintain SAo2 >95%  Serial ABGs  Strict blood sugar control  Avoid tylenol/toradol ( hx of cirrhosis/Varices)    close hemodynamic, ventilatory and drain monitoring and management per post op routine    Monitor for arrhythmias and monitor parameters for organ perfusion  monitor neurologic status  Head of the bed should remain elevated to 45 deg .   chest PT and IS will be encouraged  monitor adequacy of oxygenation and ventilation and attempt to wean oxygen  Nutritional goals will be met using po eventually , ensure adequate caloric intake and montior the same  Stress ulcer and VTE prophylaxis will be achieved    Glycemic control is satisfactory  Electrolytes have been repleted as necessary and wound care has been carried out. Pain control has been achieved.   agressive physical therapy and early mobility and ambulation goals will be met   The family was updated about the course and plan  CRITICAL CARE TIME SPENT in evaluation and management, reassessments, review and interpretation of labs and x-rays, ventilator and hemodynamic management, formulating a plan and coordinating care: ___90____ MIN.  Time does not include procedural time.  CTICU ATTENDING     					    Lars De Jesus MD

## 2023-09-15 NOTE — PHYSICAL THERAPY INITIAL EVALUATION ADULT - PERTINENT HX OF CURRENT PROBLEM, REHAB EVAL
63 year old male with history of HTN , HLD, diabetes, gout, former tobacco/ETOH use, cirrhosis, esophageal varices, GI bleed and TIA found to have 3vCAD. Patient originally found to have CAD on cardiac cath in 2/2023. During his hospital stay in february he also reported history of black stools. Work up revealed esophageal varices s/p banding. Patient was seen by Dr. Hinton for surgical consult. Today he is admitted for pre-op optimization by Liver medicine prior to his OPCAB later this week

## 2023-09-15 NOTE — PHYSICAL THERAPY INITIAL EVALUATION ADULT - GENERAL OBSERVATIONS, REHAB EVAL
Pt received in bedside chair in no acute distress +IV +EKG +NC 2L +ken +central line +2 blakes to wall suction +1 chest tube to wall suction +keith +pacing wires +SCDs +sternal incision dressing C/D/I +LLE ace wrap

## 2023-09-16 LAB
ALBUMIN SERPL ELPH-MCNC: 3.4 G/DL — SIGNIFICANT CHANGE UP (ref 3.3–5)
ALBUMIN SERPL ELPH-MCNC: 4 G/DL — SIGNIFICANT CHANGE UP (ref 3.3–5)
ALBUMIN SERPL ELPH-MCNC: 4 G/DL — SIGNIFICANT CHANGE UP (ref 3.3–5)
ALP SERPL-CCNC: 52 U/L — SIGNIFICANT CHANGE UP (ref 40–120)
ALP SERPL-CCNC: 54 U/L — SIGNIFICANT CHANGE UP (ref 40–120)
ALP SERPL-CCNC: 54 U/L — SIGNIFICANT CHANGE UP (ref 40–120)
ALT FLD-CCNC: 15 U/L — SIGNIFICANT CHANGE UP (ref 10–45)
ALT FLD-CCNC: 16 U/L — SIGNIFICANT CHANGE UP (ref 10–45)
ALT FLD-CCNC: 16 U/L — SIGNIFICANT CHANGE UP (ref 10–45)
AMMONIA BLD-MCNC: 55 UMOL/L — SIGNIFICANT CHANGE UP (ref 11–55)
ANION GAP SERPL CALC-SCNC: 10 MMOL/L — SIGNIFICANT CHANGE UP (ref 5–17)
ANION GAP SERPL CALC-SCNC: 10 MMOL/L — SIGNIFICANT CHANGE UP (ref 5–17)
ANION GAP SERPL CALC-SCNC: 11 MMOL/L — SIGNIFICANT CHANGE UP (ref 5–17)
ANION GAP SERPL CALC-SCNC: 9 MMOL/L — SIGNIFICANT CHANGE UP (ref 5–17)
ANISOCYTOSIS BLD QL: SLIGHT — SIGNIFICANT CHANGE UP
ANISOCYTOSIS BLD QL: SLIGHT — SIGNIFICANT CHANGE UP
APTT BLD: 32.8 SEC — SIGNIFICANT CHANGE UP (ref 24.5–35.6)
APTT BLD: 35.2 SEC — SIGNIFICANT CHANGE UP (ref 24.5–35.6)
APTT BLD: 38 SEC — HIGH (ref 24.5–35.6)
AST SERPL-CCNC: 36 U/L — SIGNIFICANT CHANGE UP (ref 10–40)
AST SERPL-CCNC: 37 U/L — SIGNIFICANT CHANGE UP (ref 10–40)
AST SERPL-CCNC: 37 U/L — SIGNIFICANT CHANGE UP (ref 10–40)
BASE EXCESS BLDMV CALC-SCNC: -2.2 MMOL/L — SIGNIFICANT CHANGE UP
BASE EXCESS BLDV CALC-SCNC: -0.7 MMOL/L — SIGNIFICANT CHANGE UP (ref -2–3)
BASOPHILS # BLD AUTO: 0.02 K/UL — SIGNIFICANT CHANGE UP (ref 0–0.2)
BASOPHILS # BLD AUTO: 0.07 K/UL — SIGNIFICANT CHANGE UP (ref 0–0.2)
BASOPHILS # BLD AUTO: 0.16 K/UL — SIGNIFICANT CHANGE UP (ref 0–0.2)
BASOPHILS NFR BLD AUTO: 0.3 % — SIGNIFICANT CHANGE UP (ref 0–2)
BASOPHILS NFR BLD AUTO: 0.9 % — SIGNIFICANT CHANGE UP (ref 0–2)
BASOPHILS NFR BLD AUTO: 1.8 % — SIGNIFICANT CHANGE UP (ref 0–2)
BILIRUB SERPL-MCNC: 5.1 MG/DL — HIGH (ref 0.2–1.2)
BILIRUB SERPL-MCNC: 5.4 MG/DL — HIGH (ref 0.2–1.2)
BILIRUB SERPL-MCNC: 7 MG/DL — HIGH (ref 0.2–1.2)
BLD GP AB SCN SERPL QL: NEGATIVE — SIGNIFICANT CHANGE UP
BUN SERPL-MCNC: 11 MG/DL — SIGNIFICANT CHANGE UP (ref 7–23)
BUN SERPL-MCNC: 12 MG/DL — SIGNIFICANT CHANGE UP (ref 7–23)
BUN SERPL-MCNC: 13 MG/DL — SIGNIFICANT CHANGE UP (ref 7–23)
BUN SERPL-MCNC: 14 MG/DL — SIGNIFICANT CHANGE UP (ref 7–23)
BURR CELLS BLD QL SMEAR: PRESENT — SIGNIFICANT CHANGE UP
CALCIUM SERPL-MCNC: 8 MG/DL — LOW (ref 8.4–10.5)
CALCIUM SERPL-MCNC: 8.3 MG/DL — LOW (ref 8.4–10.5)
CALCIUM SERPL-MCNC: 8.6 MG/DL — SIGNIFICANT CHANGE UP (ref 8.4–10.5)
CALCIUM SERPL-MCNC: 9.2 MG/DL — SIGNIFICANT CHANGE UP (ref 8.4–10.5)
CHLORIDE SERPL-SCNC: 101 MMOL/L — SIGNIFICANT CHANGE UP (ref 96–108)
CHLORIDE SERPL-SCNC: 102 MMOL/L — SIGNIFICANT CHANGE UP (ref 96–108)
CHLORIDE SERPL-SCNC: 102 MMOL/L — SIGNIFICANT CHANGE UP (ref 96–108)
CHLORIDE SERPL-SCNC: 104 MMOL/L — SIGNIFICANT CHANGE UP (ref 96–108)
CO2 BLDMV-SCNC: 23.5 MMOL/L — SIGNIFICANT CHANGE UP
CO2 BLDV-SCNC: 25.4 MMOL/L — SIGNIFICANT CHANGE UP (ref 22–26)
CO2 SERPL-SCNC: 21 MMOL/L — LOW (ref 22–31)
CO2 SERPL-SCNC: 21 MMOL/L — LOW (ref 22–31)
CO2 SERPL-SCNC: 22 MMOL/L — SIGNIFICANT CHANGE UP (ref 22–31)
CO2 SERPL-SCNC: 24 MMOL/L — SIGNIFICANT CHANGE UP (ref 22–31)
CORTIS F PM SERPL-MCNC: 9.37 UG/DL — SIGNIFICANT CHANGE UP (ref 2.68–10.5)
CREAT SERPL-MCNC: 0.66 MG/DL — SIGNIFICANT CHANGE UP (ref 0.5–1.3)
CREAT SERPL-MCNC: 0.73 MG/DL — SIGNIFICANT CHANGE UP (ref 0.5–1.3)
CREAT SERPL-MCNC: 0.75 MG/DL — SIGNIFICANT CHANGE UP (ref 0.5–1.3)
CREAT SERPL-MCNC: 0.76 MG/DL — SIGNIFICANT CHANGE UP (ref 0.5–1.3)
EGFR: 100 ML/MIN/1.73M2 — SIGNIFICANT CHANGE UP
EGFR: 101 ML/MIN/1.73M2 — SIGNIFICANT CHANGE UP
EGFR: 102 ML/MIN/1.73M2 — SIGNIFICANT CHANGE UP
EGFR: 105 ML/MIN/1.73M2 — SIGNIFICANT CHANGE UP
ELLIPTOCYTES BLD QL SMEAR: SLIGHT — SIGNIFICANT CHANGE UP
EOSINOPHIL # BLD AUTO: 0 K/UL — SIGNIFICANT CHANGE UP (ref 0–0.5)
EOSINOPHIL # BLD AUTO: 0 K/UL — SIGNIFICANT CHANGE UP (ref 0–0.5)
EOSINOPHIL # BLD AUTO: 0.09 K/UL — SIGNIFICANT CHANGE UP (ref 0–0.5)
EOSINOPHIL NFR BLD AUTO: 0 % — SIGNIFICANT CHANGE UP (ref 0–6)
EOSINOPHIL NFR BLD AUTO: 0 % — SIGNIFICANT CHANGE UP (ref 0–6)
EOSINOPHIL NFR BLD AUTO: 1.2 % — SIGNIFICANT CHANGE UP (ref 0–6)
GAS PNL BLDA: SIGNIFICANT CHANGE UP
GAS PNL BLDMV: SIGNIFICANT CHANGE UP
GIANT PLATELETS BLD QL SMEAR: PRESENT — SIGNIFICANT CHANGE UP
GLUCOSE BLDC GLUCOMTR-MCNC: 128 MG/DL — HIGH (ref 70–99)
GLUCOSE BLDC GLUCOMTR-MCNC: 137 MG/DL — HIGH (ref 70–99)
GLUCOSE BLDC GLUCOMTR-MCNC: 163 MG/DL — HIGH (ref 70–99)
GLUCOSE SERPL-MCNC: 126 MG/DL — HIGH (ref 70–99)
GLUCOSE SERPL-MCNC: 142 MG/DL — HIGH (ref 70–99)
GLUCOSE SERPL-MCNC: 148 MG/DL — HIGH (ref 70–99)
GLUCOSE SERPL-MCNC: 152 MG/DL — HIGH (ref 70–99)
HCO3 BLDMV-SCNC: 22 MMOL/L — SIGNIFICANT CHANGE UP
HCO3 BLDV-SCNC: 24 MMOL/L — SIGNIFICANT CHANGE UP (ref 22–29)
HCT VFR BLD CALC: 25.5 % — LOW (ref 39–50)
HCT VFR BLD CALC: 25.7 % — LOW (ref 39–50)
HCT VFR BLD CALC: 26 % — LOW (ref 39–50)
HCT VFR BLD CALC: 27.3 % — LOW (ref 39–50)
HGB BLD-MCNC: 8.7 G/DL — LOW (ref 13–17)
HGB BLD-MCNC: 8.8 G/DL — LOW (ref 13–17)
HGB BLD-MCNC: 8.9 G/DL — LOW (ref 13–17)
HGB BLD-MCNC: 9.5 G/DL — LOW (ref 13–17)
HYPOCHROMIA BLD QL: SLIGHT — SIGNIFICANT CHANGE UP
HYPOCHROMIA BLD QL: SLIGHT — SIGNIFICANT CHANGE UP
IMM GRANULOCYTES NFR BLD AUTO: 0.4 % — SIGNIFICANT CHANGE UP (ref 0–0.9)
INR BLD: 1.42 — HIGH (ref 0.85–1.18)
INR BLD: 1.48 — HIGH (ref 0.85–1.18)
INR BLD: 1.49 — HIGH (ref 0.85–1.18)
INR BLD: 1.58 — HIGH (ref 0.85–1.18)
LACTATE SERPL-SCNC: 1.7 MMOL/L — SIGNIFICANT CHANGE UP (ref 0.5–2)
LACTATE SERPL-SCNC: 2 MMOL/L — SIGNIFICANT CHANGE UP (ref 0.5–2)
LYMPHOCYTES # BLD AUTO: 0.62 K/UL — LOW (ref 1–3.3)
LYMPHOCYTES # BLD AUTO: 0.72 K/UL — LOW (ref 1–3.3)
LYMPHOCYTES # BLD AUTO: 0.76 K/UL — LOW (ref 1–3.3)
LYMPHOCYTES # BLD AUTO: 10.4 % — LOW (ref 13–44)
LYMPHOCYTES # BLD AUTO: 7.1 % — LOW (ref 13–44)
LYMPHOCYTES # BLD AUTO: 8.9 % — LOW (ref 13–44)
MACROCYTES BLD QL: SLIGHT — SIGNIFICANT CHANGE UP
MAGNESIUM SERPL-MCNC: 1.5 MG/DL — LOW (ref 1.6–2.6)
MAGNESIUM SERPL-MCNC: 1.6 MG/DL — SIGNIFICANT CHANGE UP (ref 1.6–2.6)
MAGNESIUM SERPL-MCNC: 1.9 MG/DL — SIGNIFICANT CHANGE UP (ref 1.6–2.6)
MAGNESIUM SERPL-MCNC: 2.1 MG/DL — SIGNIFICANT CHANGE UP (ref 1.6–2.6)
MANUAL SMEAR VERIFICATION: SIGNIFICANT CHANGE UP
MANUAL SMEAR VERIFICATION: SIGNIFICANT CHANGE UP
MCHC RBC-ENTMCNC: 30.1 PG — SIGNIFICANT CHANGE UP (ref 27–34)
MCHC RBC-ENTMCNC: 31 PG — SIGNIFICANT CHANGE UP (ref 27–34)
MCHC RBC-ENTMCNC: 31.1 PG — SIGNIFICANT CHANGE UP (ref 27–34)
MCHC RBC-ENTMCNC: 31.1 PG — SIGNIFICANT CHANGE UP (ref 27–34)
MCHC RBC-ENTMCNC: 33.5 GM/DL — SIGNIFICANT CHANGE UP (ref 32–36)
MCHC RBC-ENTMCNC: 34.5 GM/DL — SIGNIFICANT CHANGE UP (ref 32–36)
MCHC RBC-ENTMCNC: 34.6 GM/DL — SIGNIFICANT CHANGE UP (ref 32–36)
MCHC RBC-ENTMCNC: 34.8 GM/DL — SIGNIFICANT CHANGE UP (ref 32–36)
MCV RBC AUTO: 89.2 FL — SIGNIFICANT CHANGE UP (ref 80–100)
MCV RBC AUTO: 89.9 FL — SIGNIFICANT CHANGE UP (ref 80–100)
MCV RBC AUTO: 90 FL — SIGNIFICANT CHANGE UP (ref 80–100)
MCV RBC AUTO: 90.1 FL — SIGNIFICANT CHANGE UP (ref 80–100)
MICROCYTES BLD QL: SLIGHT — SIGNIFICANT CHANGE UP
MICROCYTES BLD QL: SLIGHT — SIGNIFICANT CHANGE UP
MONOCYTES # BLD AUTO: 0.46 K/UL — SIGNIFICANT CHANGE UP (ref 0–0.9)
MONOCYTES # BLD AUTO: 0.78 K/UL — SIGNIFICANT CHANGE UP (ref 0–0.9)
MONOCYTES # BLD AUTO: 1.25 K/UL — HIGH (ref 0–0.9)
MONOCYTES NFR BLD AUTO: 17.1 % — HIGH (ref 2–14)
MONOCYTES NFR BLD AUTO: 5.3 % — SIGNIFICANT CHANGE UP (ref 2–14)
MONOCYTES NFR BLD AUTO: 9.7 % — SIGNIFICANT CHANGE UP (ref 2–14)
NEUTROPHILS # BLD AUTO: 5.16 K/UL — SIGNIFICANT CHANGE UP (ref 1.8–7.4)
NEUTROPHILS # BLD AUTO: 6.49 K/UL — SIGNIFICANT CHANGE UP (ref 1.8–7.4)
NEUTROPHILS # BLD AUTO: 7.5 K/UL — HIGH (ref 1.8–7.4)
NEUTROPHILS NFR BLD AUTO: 70.6 % — SIGNIFICANT CHANGE UP (ref 43–77)
NEUTROPHILS NFR BLD AUTO: 80.5 % — HIGH (ref 43–77)
NEUTROPHILS NFR BLD AUTO: 85.8 % — HIGH (ref 43–77)
NRBC # BLD: 0 /100 WBCS — SIGNIFICANT CHANGE UP (ref 0–0)
NRBC # BLD: 0 /100 WBCS — SIGNIFICANT CHANGE UP (ref 0–0)
O2 CT VFR BLD CALC: 45 MMHG — SIGNIFICANT CHANGE UP
OVALOCYTES BLD QL SMEAR: SLIGHT — SIGNIFICANT CHANGE UP
OVALOCYTES BLD QL SMEAR: SLIGHT — SIGNIFICANT CHANGE UP
PCO2 BLDMV: 37 MMHG — SIGNIFICANT CHANGE UP
PCO2 BLDV: 40 MMHG — LOW (ref 42–55)
PH BLDMV: 7.39 — SIGNIFICANT CHANGE UP
PH BLDV: 7.39 — SIGNIFICANT CHANGE UP (ref 7.32–7.43)
PHOSPHATE SERPL-MCNC: 2.5 MG/DL — SIGNIFICANT CHANGE UP (ref 2.5–4.5)
PHOSPHATE SERPL-MCNC: 2.5 MG/DL — SIGNIFICANT CHANGE UP (ref 2.5–4.5)
PHOSPHATE SERPL-MCNC: 2.7 MG/DL — SIGNIFICANT CHANGE UP (ref 2.5–4.5)
PLAT MORPH BLD: ABNORMAL
PLAT MORPH BLD: NORMAL — SIGNIFICANT CHANGE UP
PLATELET # BLD AUTO: 67 K/UL — LOW (ref 150–400)
PLATELET # BLD AUTO: 68 K/UL — LOW (ref 150–400)
PLATELET # BLD AUTO: 70 K/UL — LOW (ref 150–400)
PLATELET # BLD AUTO: 73 K/UL — LOW (ref 150–400)
PO2 BLDV: 42 MMHG — SIGNIFICANT CHANGE UP (ref 25–45)
POIKILOCYTOSIS BLD QL AUTO: SLIGHT — SIGNIFICANT CHANGE UP
POIKILOCYTOSIS BLD QL AUTO: SLIGHT — SIGNIFICANT CHANGE UP
POLYCHROMASIA BLD QL SMEAR: SLIGHT — SIGNIFICANT CHANGE UP
POTASSIUM SERPL-MCNC: 3.4 MMOL/L — LOW (ref 3.5–5.3)
POTASSIUM SERPL-MCNC: 3.9 MMOL/L — SIGNIFICANT CHANGE UP (ref 3.5–5.3)
POTASSIUM SERPL-MCNC: 4.1 MMOL/L — SIGNIFICANT CHANGE UP (ref 3.5–5.3)
POTASSIUM SERPL-MCNC: 4.5 MMOL/L — SIGNIFICANT CHANGE UP (ref 3.5–5.3)
POTASSIUM SERPL-SCNC: 3.4 MMOL/L — LOW (ref 3.5–5.3)
POTASSIUM SERPL-SCNC: 3.9 MMOL/L — SIGNIFICANT CHANGE UP (ref 3.5–5.3)
POTASSIUM SERPL-SCNC: 4.1 MMOL/L — SIGNIFICANT CHANGE UP (ref 3.5–5.3)
POTASSIUM SERPL-SCNC: 4.5 MMOL/L — SIGNIFICANT CHANGE UP (ref 3.5–5.3)
PROT SERPL-MCNC: 5.7 G/DL — LOW (ref 6–8.3)
PROT SERPL-MCNC: 6.2 G/DL — SIGNIFICANT CHANGE UP (ref 6–8.3)
PROT SERPL-MCNC: 6.5 G/DL — SIGNIFICANT CHANGE UP (ref 6–8.3)
PROTHROM AB SERPL-ACNC: 16 SEC — HIGH (ref 9.5–13)
PROTHROM AB SERPL-ACNC: 16.7 SEC — HIGH (ref 9.5–13)
PROTHROM AB SERPL-ACNC: 16.8 SEC — HIGH (ref 9.5–13)
PROTHROM AB SERPL-ACNC: 17.8 SEC — HIGH (ref 9.5–13)
RBC # BLD: 2.83 M/UL — LOW (ref 4.2–5.8)
RBC # BLD: 2.86 M/UL — LOW (ref 4.2–5.8)
RBC # BLD: 2.89 M/UL — LOW (ref 4.2–5.8)
RBC # BLD: 3.06 M/UL — LOW (ref 4.2–5.8)
RBC # FLD: 14.7 % — HIGH (ref 10.3–14.5)
RBC # FLD: 14.8 % — HIGH (ref 10.3–14.5)
RBC BLD AUTO: ABNORMAL
RBC BLD AUTO: ABNORMAL
RH IG SCN BLD-IMP: POSITIVE — SIGNIFICANT CHANGE UP
SAO2 % BLDMV: 70 % — SIGNIFICANT CHANGE UP
SAO2 % BLDV: 66.9 % — LOW (ref 67–88)
SODIUM SERPL-SCNC: 134 MMOL/L — LOW (ref 135–145)
SODIUM SERPL-SCNC: 135 MMOL/L — SIGNIFICANT CHANGE UP (ref 135–145)
WBC # BLD: 7.31 K/UL — SIGNIFICANT CHANGE UP (ref 3.8–10.5)
WBC # BLD: 8.06 K/UL — SIGNIFICANT CHANGE UP (ref 3.8–10.5)
WBC # BLD: 8.07 K/UL — SIGNIFICANT CHANGE UP (ref 3.8–10.5)
WBC # BLD: 8.74 K/UL — SIGNIFICANT CHANGE UP (ref 3.8–10.5)
WBC # FLD AUTO: 7.31 K/UL — SIGNIFICANT CHANGE UP (ref 3.8–10.5)
WBC # FLD AUTO: 8.06 K/UL — SIGNIFICANT CHANGE UP (ref 3.8–10.5)
WBC # FLD AUTO: 8.07 K/UL — SIGNIFICANT CHANGE UP (ref 3.8–10.5)
WBC # FLD AUTO: 8.74 K/UL — SIGNIFICANT CHANGE UP (ref 3.8–10.5)

## 2023-09-16 PROCEDURE — 99292 CRITICAL CARE ADDL 30 MIN: CPT

## 2023-09-16 PROCEDURE — 99291 CRITICAL CARE FIRST HOUR: CPT

## 2023-09-16 PROCEDURE — 93308 TTE F-UP OR LMTD: CPT | Mod: 26

## 2023-09-16 PROCEDURE — 71045 X-RAY EXAM CHEST 1 VIEW: CPT | Mod: 26

## 2023-09-16 PROCEDURE — 74018 RADEX ABDOMEN 1 VIEW: CPT | Mod: 26

## 2023-09-16 PROCEDURE — 93010 ELECTROCARDIOGRAM REPORT: CPT

## 2023-09-16 RX ORDER — LACTULOSE 10 G/15ML
20 SOLUTION ORAL EVERY 4 HOURS
Refills: 0 | Status: DISCONTINUED | OUTPATIENT
Start: 2023-09-16 | End: 2023-09-16

## 2023-09-16 RX ORDER — POTASSIUM CHLORIDE 20 MEQ
20 PACKET (EA) ORAL
Refills: 0 | Status: COMPLETED | OUTPATIENT
Start: 2023-09-16 | End: 2023-09-17

## 2023-09-16 RX ORDER — LACTULOSE 10 G/15ML
20 SOLUTION ORAL EVERY 6 HOURS
Refills: 0 | Status: COMPLETED | OUTPATIENT
Start: 2023-09-16 | End: 2023-09-17

## 2023-09-16 RX ORDER — VASOPRESSIN 20 [USP'U]/ML
0.02 INJECTION INTRAVENOUS
Qty: 40 | Refills: 0 | Status: DISCONTINUED | OUTPATIENT
Start: 2023-09-16 | End: 2023-09-17

## 2023-09-16 RX ORDER — MAGNESIUM SULFATE 500 MG/ML
2 VIAL (ML) INJECTION ONCE
Refills: 0 | Status: COMPLETED | OUTPATIENT
Start: 2023-09-16 | End: 2023-09-17

## 2023-09-16 RX ORDER — ALBUMIN HUMAN 25 %
250 VIAL (ML) INTRAVENOUS ONCE
Refills: 0 | Status: COMPLETED | OUTPATIENT
Start: 2023-09-16 | End: 2023-09-16

## 2023-09-16 RX ORDER — ALBUMIN HUMAN 25 %
50 VIAL (ML) INTRAVENOUS EVERY 6 HOURS
Refills: 0 | Status: COMPLETED | OUTPATIENT
Start: 2023-09-16 | End: 2023-09-17

## 2023-09-16 RX ORDER — MIDODRINE HYDROCHLORIDE 2.5 MG/1
10 TABLET ORAL EVERY 8 HOURS
Refills: 0 | Status: DISCONTINUED | OUTPATIENT
Start: 2023-09-16 | End: 2023-09-18

## 2023-09-16 RX ORDER — METOCLOPRAMIDE HCL 10 MG
10 TABLET ORAL ONCE
Refills: 0 | Status: COMPLETED | OUTPATIENT
Start: 2023-09-16 | End: 2023-09-16

## 2023-09-16 RX ORDER — FUROSEMIDE 40 MG
10 TABLET ORAL ONCE
Refills: 0 | Status: COMPLETED | OUTPATIENT
Start: 2023-09-16 | End: 2023-09-16

## 2023-09-16 RX ORDER — HYDROCORTISONE 20 MG
75 TABLET ORAL EVERY 8 HOURS
Refills: 0 | Status: DISCONTINUED | OUTPATIENT
Start: 2023-09-16 | End: 2023-09-17

## 2023-09-16 RX ORDER — GABAPENTIN 400 MG/1
100 CAPSULE ORAL EVERY 8 HOURS
Refills: 0 | Status: DISCONTINUED | OUTPATIENT
Start: 2023-09-16 | End: 2023-09-21

## 2023-09-16 RX ORDER — CALCIUM GLUCONATE 100 MG/ML
2 VIAL (ML) INTRAVENOUS ONCE
Refills: 0 | Status: COMPLETED | OUTPATIENT
Start: 2023-09-16 | End: 2023-09-16

## 2023-09-16 RX ORDER — LIDOCAINE HCL 20 MG/ML
10 VIAL (ML) INJECTION ONCE
Refills: 0 | Status: COMPLETED | OUTPATIENT
Start: 2023-09-16 | End: 2023-09-16

## 2023-09-16 RX ORDER — POTASSIUM CHLORIDE 20 MEQ
20 PACKET (EA) ORAL ONCE
Refills: 0 | Status: COMPLETED | OUTPATIENT
Start: 2023-09-16 | End: 2023-09-16

## 2023-09-16 RX ORDER — TRAMADOL HYDROCHLORIDE 50 MG/1
25 TABLET ORAL EVERY 6 HOURS
Refills: 0 | Status: DISCONTINUED | OUTPATIENT
Start: 2023-09-16 | End: 2023-09-21

## 2023-09-16 RX ORDER — MAGNESIUM SULFATE 500 MG/ML
2 VIAL (ML) INJECTION ONCE
Refills: 0 | Status: COMPLETED | OUTPATIENT
Start: 2023-09-16 | End: 2023-09-16

## 2023-09-16 RX ORDER — FUROSEMIDE 40 MG
20 TABLET ORAL ONCE
Refills: 0 | Status: COMPLETED | OUTPATIENT
Start: 2023-09-16 | End: 2023-09-16

## 2023-09-16 RX ADMIN — Medication 20 MILLIGRAM(S): at 17:27

## 2023-09-16 RX ADMIN — Medication 25 GRAM(S): at 05:45

## 2023-09-16 RX ADMIN — Medication 2: at 07:50

## 2023-09-16 RX ADMIN — Medication 100 MILLIGRAM(S): at 10:23

## 2023-09-16 RX ADMIN — Medication 75 MILLIGRAM(S): at 22:32

## 2023-09-16 RX ADMIN — Medication 50 MILLILITER(S): at 19:56

## 2023-09-16 RX ADMIN — MIDODRINE HYDROCHLORIDE 10 MILLIGRAM(S): 2.5 TABLET ORAL at 09:19

## 2023-09-16 RX ADMIN — Medication 125 MILLILITER(S): at 09:20

## 2023-09-16 RX ADMIN — HYDROMORPHONE HYDROCHLORIDE 0.5 MILLIGRAM(S): 2 INJECTION INTRAMUSCULAR; INTRAVENOUS; SUBCUTANEOUS at 00:24

## 2023-09-16 RX ADMIN — Medication 10 MILLILITER(S): at 20:43

## 2023-09-16 RX ADMIN — HYDROMORPHONE HYDROCHLORIDE 0.5 MILLIGRAM(S): 2 INJECTION INTRAMUSCULAR; INTRAVENOUS; SUBCUTANEOUS at 18:06

## 2023-09-16 RX ADMIN — HYDROMORPHONE HYDROCHLORIDE 0.5 MILLIGRAM(S): 2 INJECTION INTRAMUSCULAR; INTRAVENOUS; SUBCUTANEOUS at 00:07

## 2023-09-16 RX ADMIN — MUPIROCIN 1 APPLICATION(S): 20 OINTMENT TOPICAL at 07:36

## 2023-09-16 RX ADMIN — PANTOPRAZOLE SODIUM 40 MILLIGRAM(S): 20 TABLET, DELAYED RELEASE ORAL at 11:29

## 2023-09-16 RX ADMIN — Medication 125 MILLILITER(S): at 10:23

## 2023-09-16 RX ADMIN — SENNA PLUS 2 TABLET(S): 8.6 TABLET ORAL at 22:32

## 2023-09-16 RX ADMIN — HYDROMORPHONE HYDROCHLORIDE 0.5 MILLIGRAM(S): 2 INJECTION INTRAMUSCULAR; INTRAVENOUS; SUBCUTANEOUS at 06:00

## 2023-09-16 RX ADMIN — OXYCODONE HYDROCHLORIDE 5 MILLIGRAM(S): 5 TABLET ORAL at 11:11

## 2023-09-16 RX ADMIN — Medication 10 MILLIGRAM(S): at 18:46

## 2023-09-16 RX ADMIN — HYDROMORPHONE HYDROCHLORIDE 0.5 MILLIGRAM(S): 2 INJECTION INTRAMUSCULAR; INTRAVENOUS; SUBCUTANEOUS at 05:43

## 2023-09-16 RX ADMIN — CHLORHEXIDINE GLUCONATE 1 APPLICATION(S): 213 SOLUTION TOPICAL at 11:11

## 2023-09-16 RX ADMIN — Medication 100 MILLIEQUIVALENT(S): at 08:46

## 2023-09-16 RX ADMIN — LACTULOSE 20 GRAM(S): 10 SOLUTION ORAL at 18:46

## 2023-09-16 RX ADMIN — Medication 20 MILLIGRAM(S): at 03:00

## 2023-09-16 RX ADMIN — MIDODRINE HYDROCHLORIDE 10 MILLIGRAM(S): 2.5 TABLET ORAL at 17:41

## 2023-09-16 RX ADMIN — OXYCODONE HYDROCHLORIDE 10 MILLIGRAM(S): 5 TABLET ORAL at 17:35

## 2023-09-16 RX ADMIN — Medication 100 MILLIGRAM(S): at 03:48

## 2023-09-16 RX ADMIN — OXYCODONE HYDROCHLORIDE 10 MILLIGRAM(S): 5 TABLET ORAL at 16:28

## 2023-09-16 RX ADMIN — Medication 10 MILLIGRAM(S): at 13:22

## 2023-09-16 RX ADMIN — CLOPIDOGREL BISULFATE 75 MILLIGRAM(S): 75 TABLET, FILM COATED ORAL at 11:29

## 2023-09-16 RX ADMIN — OXYCODONE HYDROCHLORIDE 5 MILLIGRAM(S): 5 TABLET ORAL at 10:23

## 2023-09-16 RX ADMIN — HYDROMORPHONE HYDROCHLORIDE 0.5 MILLIGRAM(S): 2 INJECTION INTRAMUSCULAR; INTRAVENOUS; SUBCUTANEOUS at 17:27

## 2023-09-16 RX ADMIN — GABAPENTIN 100 MILLIGRAM(S): 400 CAPSULE ORAL at 22:31

## 2023-09-16 RX ADMIN — Medication 500 MILLIGRAM(S): at 11:29

## 2023-09-16 RX ADMIN — HEPARIN SODIUM 5000 UNIT(S): 5000 INJECTION INTRAVENOUS; SUBCUTANEOUS at 22:32

## 2023-09-16 RX ADMIN — Medication 200 GRAM(S): at 13:22

## 2023-09-16 RX ADMIN — MUPIROCIN 1 APPLICATION(S): 20 OINTMENT TOPICAL at 17:41

## 2023-09-16 RX ADMIN — POLYETHYLENE GLYCOL 3350 17 GRAM(S): 17 POWDER, FOR SOLUTION ORAL at 11:29

## 2023-09-16 NOTE — PROGRESS NOTE ADULT - ASSESSMENT
63 [place] ([facility]) M former smoker with PMH of HTN, severe 3 vessel CAD, HLD, pre-diabetes, gout, TIA (14 yrs ago), gastric ulcer, rectal bleed, LIZ and alcoholic liver disease c/b cirrhosis and portal hypertension m/b esophageal varices (and UGIB) and splenomegaly, presenting for OPCAB with CT surgery. Hepatology team consulted for sotero-operative optimization.     Doing OK from cardiac perspective however picture concerning for HE as well as possible ileus.      Recommendations:   - Obtain KUB  - If no ileus would treat with PO lactulose Q4H + rifaximin as you are (do NOT hold for # of BMs), and if ileus could consider [place] lactulose  - Pt can receive Tylenol max 2g/day for pain, and this would be preferable to opioids  - Agree with post-operative prophylactic antibiotics  - Continue PPI for mucosal protection    We will continue to follow. Please see attending addendum for final recommendations.     Ryan (Saige) MD [place]  Gastroenterology Fellow  Pager (M-F 7a-5p): [phone]  Pager (after hours): Please call  for on-call fellow 63 [place] ([facility]) M former smoker with PMH of HTN, severe 3 vessel CAD, HLD, pre-diabetes, gout, TIA (14 yrs ago), gastric ulcer, rectal bleed, LIZ and alcoholic liver disease c/b cirrhosis and portal hypertension m/b esophageal varices (and UGIB) and splenomegaly, presenting for OPCAB with CT surgery. Hepatology team consulted for sotero-operative optimization.     Doing OK from cardiac perspective however picture concerning for HE as well as possible ileus.      Recommendations:   - Obtain KUB  - If no ileus would treat with PO lactulose Q4H + rifaximin as you are (do NOT hold for # of BMs), and if ileus could consider [place] lactulose  - Pt can receive Tylenol max 2g/day for pain, and this would be preferable to opioids  - Agree with post-operative prophylactic antibiotics  - Continue PPI for mucosal protection    We will continue to follow.    Ryan ([facility]) MD [place]  Gastroenterology Fellow  Pager (M-F 7a-5p): [phone]  Pager (after hours): Please call  for on-call fellow

## 2023-09-16 NOTE — PROGRESS NOTE ADULT - SUBJECTIVE AND OBJECTIVE BOX
CTICU  CRITICAL  CARE  attending     Hand off received 					   Pertinent clinical, laboratory, radiographic, hemodynamic, echocardiographic, respiratory data, microbiologic data and chart were reviewed and analyzed frequently throughout the course of the day and night  Patient seen and examined with CTS/ SH attending at bedside  Pt is a 63y , Male, HEALTH ISSUES - PROBLEM Dx:      , FAMILY HISTORY:  PAST MEDICAL & SURGICAL HISTORY:  CAD (coronary artery disease)      HTN (hypertension)      Cirrhosis      Esophageal varices in cirrhosis      History of TIAs      H/O: GI bleed      Gout      HLD (hyperlipidemia)      History of portal hypertension      Diabetes mellitus        Patient is a 63y old  Male who presents with a chief complaint of CAD (15 Sep 2023 23:33)      14 system review was unremarkable    Vital signs, hemodynamic and respiratory parameters were reviewed from the bedside nursing flowsheet.  ICU Vital Signs Last 24 Hrs  T(C): 36.9 (16 Sep 2023 17:29), Max: 36.9 (16 Sep 2023 17:29)  T(F): 98.5 (16 Sep 2023 17:29), Max: 98.5 (16 Sep 2023 17:29)  HR: 101 (16 Sep 2023 16:00) (68 - 101)  BP: --  BP(mean): --  ABP: 137/72 (16 Sep 2023 16:00) (105/52 - 139/76)  ABP(mean): 94 (16 Sep 2023 16:00) (69 - 99)  RR: 11 (16 Sep 2023 16:00) (11 - 19)  SpO2: 91% (16 Sep 2023 16:00) (90% - 100%)    O2 Parameters below as of 16 Sep 2023 17:00  Patient On (Oxygen Delivery Method): nasal cannula w/ humidification  O2 Flow (L/min): 40  O2 Concentration (%): 50      Adult Advanced Hemodynamics Last 24 Hrs  CVP(mm Hg): 10 (16 Sep 2023 16:00) (5 - 17)  CVP(cm H2O): --  CO: --  CI: --  PA: --  PA(mean): --  PCWP: --  SVR: --  SVRI: --  PVR: --  PVRI: --, ABG - ( 16 Sep 2023 17:26 )  pH, Arterial: 7.44  pH, Blood: x     /  pCO2: 33    /  pO2: 111   / HCO3: 22    / Base Excess: -1.1  /  SaO2: 99.6                Intake and output was reviewed and the fluid balance was calculated  Daily     Daily   I&O's Summary    15 Sep 2023 07:01  -  16 Sep 2023 07:00  --------------------------------------------------------  IN: 1920.5 mL / OUT: 2015 mL / NET: -94.5 mL    16 Sep 2023 07:01  -  16 Sep 2023 17:35  --------------------------------------------------------  IN: 1799 mL / OUT: 705 mL / NET: 1094 mL        All lines and drain sites were assessed  [facility]      POCT Blood Glucose.: 137 mg/dL (16 Sep 2023 11:38)    No acute change in mental status  Auscultation of the chest reveals equal bs  Abdomen is soft  Extremities are warm and well perfused  Wounds appear clean and unremarkable  Antibiotics are periop    labs  CBC Full  -  ( 16 Sep 2023 09:49 )  WBC Count : 8.74 K/uL  RBC Count : 2.83 M/uL  Hemoglobin : 8.8 g/dL  Hematocrit : 25.5 %  Platelet Count - Automated : 73 K/uL  Mean Cell Volume : 90.1 fl  Mean Cell Hemoglobin : 31.1 pg  Mean Cell Hemoglobin Concentration : 34.5 gm/dL  Auto Neutrophil # : 7.50 K/uL  Auto Lymphocyte # : 0.62 K/uL  Auto Monocyte # : 0.46 K/uL  Auto Eosinophil # : 0.00 K/uL  Auto Basophil # : 0.16 K/uL  Auto Neutrophil % : 85.8 %  Auto Lymphocyte % : 7.1 %  Auto Monocyte % : 5.3 %  Auto Eosinophil % : 0.0 %  Auto Basophil % : 1.8 %    09-16    134<L>  |  102  |  13  ----------------------------<  148<H>  4.5   |  21<L>  |  0.75    Ca    8.6      16 Sep 2023 09:49  Phos  2.7     09-16  Mg     2.1     09-16    TPro  6.2  /  Alb  4.0  /  TBili  5.4<H>  /  DBili  x   /  AST  37  /  ALT  16  /  AlkPhos  54  09-16    PT/INR - ( 16 Sep 2023 09:49 )   PT: 16.8 sec;   INR: 1.49          PTT - ( 16 Sep 2023 03:18 )  PTT:38.0 sec  The current medications were reviewed   MEDICATIONS  (STANDING):  ascorbic acid 500 milliGRAM(s) Oral daily  bisacodyl Suppository 10 milliGRAM(s) Rectal once  chlorhexidine 0.12% Liquid 15 milliLiter(s) Oral Mucosa every 12 hours  chlorhexidine 2% Cloths 1 Application(s) Topical daily  chlorhexidine 4% Liquid 1 Application(s) Topical once  clopidogrel Tablet 75 milliGRAM(s) Oral daily  dextrose 5%. 1000 milliLiter(s) (100 mL/Hr) IV Continuous <Continuous>  dextrose 5%. 1000 milliLiter(s) (50 mL/Hr) IV Continuous <Continuous>  dextrose 50% Injectable 50 milliLiter(s) IV Push every 15 minutes  dextrose 50% Injectable 25 milliLiter(s) IV Push every 15 minutes  glucagon  Injectable 1 milliGRAM(s) IntraMuscular once  heparin   Injectable 5000 Unit(s) SubCutaneous every 8 hours  insulin lispro (ADMELOG) corrective regimen sliding scale   SubCutaneous Before meals and at bedtime  lactulose Syrup 20 Gram(s) Oral every 6 hours  lactulose Syrup 20 Gram(s) Oral every 4 hours  midodrine 10 milliGRAM(s) Oral every 8 hours  mupirocin 2% Nasal 1 Application(s) Both Nostrils two times a day  pantoprazole    Tablet 40 milliGRAM(s) Oral daily  polyethylene glycol 3350 17 Gram(s) Oral daily  rifAXIMin 200 milliGRAM(s) Oral three times a day  senna 2 Tablet(s) Oral at bedtime  sodium chloride 0.9%. 1000 milliLiter(s) (10 mL/Hr) IV Continuous <Continuous>  vasopressin Infusion 0.02 Unit(s)/Min (3 mL/Hr) IV Continuous <Continuous>    MEDICATIONS  (PRN):  dextrose Oral Gel 15 Gram(s) Oral once PRN Blood Glucose LESS THAN 70 milliGRAM(s)/deciliter  HYDROmorphone  Injectable 0.5 milliGRAM(s) IV Push every 3 hours PRN Breakthrough  oxyCODONE    IR 10 milliGRAM(s) Oral every 6 hours PRN Severe Pain (7 - 10)  oxyCODONE    IR 5 milliGRAM(s) Oral every 6 hours PRN Moderate Pain (4 - 6)       PROBLEM LIST/ ASSESSMENT:  HEALTH ISSUES - PROBLEM Dx:      ,   Patient is a 63y old  Male who presents with a chief complaint of CAD (15 Sep 2023 23:33)     s/p cardiac surgery    Vasogenic shock due to hypotension in the cticu , will keep on pressors    expected post - op Hypovolemic shock - > 20% intravascular depletion will replete volume    Toxic metabolic hepc encephalopathy ; sundowning due to anesthesia pain medications        My plan includes :    lactulose limit narcs    check nh4    rifax prn   close hemodynamic, ventilatory and drain monitoring and management per post op routine    Monitor for arrhythmias and monitor parameters for organ perfusion  beta blockade not administered due to hemodynamic instability and bradycardia  monitor neurologic status  Head of the bed should remain elevated to 45 deg .   chest PT and IS will be encouraged  monitor adequacy of oxygenation and ventilation and attempt to wean oxygen  antibiotic regimen will be tailored to the clinical, laboratory and microbiologic data  Nutritional goals will be met using po eventually , ensure adequate caloric intake and montior the same  Stress ulcer and VTE prophylaxis will be achieved    Glycemic control is satisfactory  Electrolytes have been repleted as necessary and wound care has been carried out. Pain control has been achieved.   agressive physical therapy and early mobility and ambulation goals will be met   The family was updated about the course and plan  CRITICAL CARE TIME Upon my evaluation, this patient had a high probability of imminent or life-threatening deterioration due to the above problems which required my direct attention, intervention, and personal management.  I have personally provided 110 minutes of critical care time exclusive of time spent on separately billable procedures. Time included review of laboratory data, radiology results, discussion with consultants, and monitoring for potential decompensation. Interventions were performed as documented abovepersonally provided by me  in evaluation and management, reassessments, review and interpretation of labs and x-rays, ventilator and hemodynamic management, formulating a plan and coordinating care: ___110___ MIN.  Time does not include procedural time.    CTICU ATTENDING     					    Corona Marie MD

## 2023-09-16 NOTE — PROGRESS NOTE ADULT - SUBJECTIVE AND OBJECTIVE BOX
CTICU  CRITICAL  CARE  attending     Hand off received 					   Pertinent clinical, laboratory, radiographic, hemodynamic, echocardiographic, respiratory data, microbiologic data and chart were reviewed and analyzed frequently throughout the course of the day and night  Patient seen and examined with CTS/ SH attending at bedside    Pt is a 63y , Male, post op day # 2 s/p CABG x 4V; OFF PUMP    POST OP:    remains extubated  hypotensive on pressor support    today:    features of hepatic encephalopathy  (+) asterisks  started on Xifaxan and lactulose  ammonia level: borderline high  Midodrine          , FAMILY HISTORY:  PAST MEDICAL & SURGICAL HISTORY:  CAD (coronary artery disease)      HTN (hypertension)      Cirrhosis      Esophageal varices in cirrhosis      History of TIAs      H/O: GI bleed      Gout      HLD (hyperlipidemia)      History of portal hypertension      Diabetes mellitus        Patient is a 63y old  Male who presents with a chief complaint of CAD (16 Sep 2023 18:53)      14 system review limited 2/2 post op morbidity    Vital signs, hemodynamic and respiratory parameters were reviewed from the bedside nursing flowsheet.  ICU Vital Signs Last 24 Hrs  T(C): 36.7 (16 Sep 2023 21:16), Max: 36.9 (16 Sep 2023 17:29)  T(F): 98.1 (16 Sep 2023 21:16), Max: 98.5 (16 Sep 2023 17:29)  HR: 93 (16 Sep 2023 23:10) (80 - 103)  BP: --  BP(mean): --  ABP: 97/51 (16 Sep 2023 23:10) (94/52 - 139/76)  ABP(mean): 67 (16 Sep 2023 23:10) (67 - 99)  RR: 16 (16 Sep 2023 23:10) (11 - 16)  SpO2: 98% (16 Sep 2023 23:10) (90% - 100%)    O2 Parameters below as of 16 Sep 2023 23:10  Patient On (Oxygen Delivery Method): nasal cannula, high flow  O2 Flow (L/min): 40  O2 Concentration (%): 50      Adult Advanced Hemodynamics Last 24 Hrs  CVP(mm Hg): 6 (16 Sep 2023 23:00) (2 - 218)  CVP(cm H2O): --  CO: --  CI: --  PA: --  PA(mean): --  PCWP: --  SVR: --  SVRI: --  PVR: --  PVRI: --, ABG - ( 16 Sep 2023 23:03 )  pH, Arterial: 7.42  pH, Blood: x     /  pCO2: 37    /  pO2: 133   / HCO3: 24    / Base Excess: -0.2  /  SaO2: 99.4                Intake and output was reviewed and the fluid balance was calculated  Daily     Daily   I&O's Summary    15 Sep 2023 07:01  -  16 Sep 2023 07:00  --------------------------------------------------------  IN: 1920.5 mL / OUT: 2015 mL / NET: -94.5 mL    16 Sep 2023 07:01  -  17 Sep 2023 00:02  --------------------------------------------------------  IN: 1890.5 mL / OUT: 2255 mL / NET: -364.5 mL        All lines and drain sites were assessed  Glycemic trend was reviewedCAPILLARY BLOOD GLUCOSE      POCT Blood Glucose.: 128 mg/dL (16 Sep 2023 22:56)    No acute change in mental status  Auscultation of the chest reveals equal bs  Abdomen is soft  Extremities are warm and well perfused  Wounds appear clean and unremarkable  Antibiotics are periop    labs  CBC Full  -  ( 16 Sep 2023 22:53 )  WBC Count : 8.06 K/uL  RBC Count : 2.86 M/uL  Hemoglobin : 8.9 g/dL  Hematocrit : 25.7 %  Platelet Count - Automated : 68 K/uL  Mean Cell Volume : 89.9 fl  Mean Cell Hemoglobin : 31.1 pg  Mean Cell Hemoglobin Concentration : 34.6 gm/dL  Auto Neutrophil # : 6.49 K/uL  Auto Lymphocyte # : 0.72 K/uL  Auto Monocyte # : 0.78 K/uL  Auto Eosinophil # : 0.00 K/uL  Auto Basophil # : 0.07 K/uL  Auto Neutrophil % : 80.5 %  Auto Lymphocyte % : 8.9 %  Auto Monocyte % : 9.7 %  Auto Eosinophil % : 0.0 %  Auto Basophil % : 0.9 %    09-16    134<L>  |  102  |  11  ----------------------------<  126<H>  3.4<L>   |  22  |  0.66    Ca    8.0<L>      16 Sep 2023 22:20  Phos  2.5     09-16  Mg     1.6     09-16    TPro  6.5  /  Alb  4.0  /  TBili  7.0<H>  /  DBili  x   /  AST  37  /  ALT  15  /  AlkPhos  54  09-16    PT/INR - ( 16 Sep 2023 22:53 )   PT: 16.7 sec;   INR: 1.48          PTT - ( 16 Sep 2023 22:53 )  PTT:35.2 sec  The current medications were reviewed   MEDICATIONS  (STANDING):  albumin human 25% IVPB 50 milliLiter(s) IV Intermittent every 6 hours  ascorbic acid 500 milliGRAM(s) Oral daily  bisacodyl Suppository 10 milliGRAM(s) Rectal once  chlorhexidine 2% Cloths 1 Application(s) Topical daily  clopidogrel Tablet 75 milliGRAM(s) Oral daily  dextrose 5%. 1000 milliLiter(s) (50 mL/Hr) IV Continuous <Continuous>  dextrose 5%. 1000 milliLiter(s) (100 mL/Hr) IV Continuous <Continuous>  dextrose 50% Injectable 50 milliLiter(s) IV Push every 15 minutes  dextrose 50% Injectable 25 milliLiter(s) IV Push every 15 minutes  gabapentin 100 milliGRAM(s) Oral every 8 hours  glucagon  Injectable 1 milliGRAM(s) IntraMuscular once  heparin   Injectable 5000 Unit(s) SubCutaneous every 8 hours  hydrocortisone sodium succinate Injectable 75 milliGRAM(s) IV Push every 8 hours  insulin lispro (ADMELOG) corrective regimen sliding scale   SubCutaneous Before meals and at bedtime  lactulose Syrup 20 Gram(s) Oral every 6 hours  magnesium sulfate  IVPB 2 Gram(s) IV Intermittent once  midodrine 10 milliGRAM(s) Oral every 8 hours  mupirocin 2% Nasal 1 Application(s) Both Nostrils two times a day  pantoprazole    Tablet 40 milliGRAM(s) Oral daily  polyethylene glycol 3350 17 Gram(s) Oral daily  potassium chloride  20 mEq/100 mL IVPB 20 milliEquivalent(s) IV Intermittent every 2 hours  rifAXIMin 550 milliGRAM(s) Oral two times a day  senna 2 Tablet(s) Oral at bedtime  sodium chloride 0.9%. 1000 milliLiter(s) (10 mL/Hr) IV Continuous <Continuous>  vasopressin Infusion 0.02 Unit(s)/Min (3 mL/Hr) IV Continuous <Continuous>    MEDICATIONS  (PRN):  dextrose Oral Gel 15 Gram(s) Oral once PRN Blood Glucose LESS THAN 70 milliGRAM(s)/deciliter  traMADol 25 milliGRAM(s) Oral every 6 hours PRN Moderate Pain (4 - 6)       PROBLEM LIST/ ASSESSMENT:  HEALTH ISSUES - PROBLEM Dx:      ,   Patient is a 63y old  Male who presents with a chief complaint of CAD (16 Sep 2023 18:53)     s/p OPCAB x 4V      My plan includes :    Titrate pressor support to maintain MAP >70  Lactulose 20 cc Q6; per Hepatology fellow  start Rifaximin  supplemental O2 as neded  Titrate Fio2 to maintain Sao2 >95%  Serial ABGs      close hemodynamic, ventilatory and drain monitoring and management per post op routine    Monitor for arrhythmias and monitor parameters for organ perfusion  monitor neurologic status  Head of the bed should remain elevated to 45 deg .   chest PT and IS will be encouraged  monitor adequacy of oxygenation and ventilation and attempt to wean oxygen  Nutritional goals will be met using po eventually , ensure adequate caloric intake and montior the same  Stress ulcer and VTE prophylaxis will be achieved    Glycemic control is satisfactory  Electrolytes have been repleted as necessary and wound care has been carried out. Pain control has been achieved.   agressive physical therapy and early mobility and ambulation goals will be met   The family was updated about the course and plan  CRITICAL CARE TIME SPENT in evaluation and management, reassessments, review and interpretation of labs and x-rays, ventilator and hemodynamic management, formulating a plan and coordinating care: ___30_ MIN.  Time does not include procedural time.  CTICU ATTENDING     					    Lars De Jesus M.D.

## 2023-09-16 NOTE — PROGRESS NOTE ADULT - SUBJECTIVE AND OBJECTIVE BOX
HEPATOLOGY PROGRESS NOTE    INTERVAL/SUBJECTIVE:  Mediastinal drain removed w/ some leakage. Denies abd pain currently. Denies BMs and no flatus. Pt had difficulty with days of week backwards.    Allergies  aspirin (Pruritus)    Intolerances    MEDICATIONS:  MEDICATIONS  (STANDING):  albumin human 25% IVPB 50 milliLiter(s) IV Intermittent every 6 hours  ascorbic acid 500 milliGRAM(s) Oral daily  bisacodyl Suppository 10 milliGRAM(s) Rectal once  chlorhexidine 0.12% Liquid 15 milliLiter(s) Oral Mucosa every 12 hours  chlorhexidine 2% Cloths 1 Application(s) Topical daily  chlorhexidine 4% Liquid 1 Application(s) Topical once  clopidogrel Tablet 75 milliGRAM(s) Oral daily  dextrose 5%. 1000 milliLiter(s) (100 mL/Hr) IV Continuous <Continuous>  dextrose 5%. 1000 milliLiter(s) (50 mL/Hr) IV Continuous <Continuous>  dextrose 50% Injectable 50 milliLiter(s) IV Push every 15 minutes  dextrose 50% Injectable 25 milliLiter(s) IV Push every 15 minutes  glucagon  Injectable 1 milliGRAM(s) IntraMuscular once  heparin   Injectable 5000 Unit(s) SubCutaneous every 8 hours  hydrocortisone sodium succinate Injectable 75 milliGRAM(s) IV Push every 8 hours  insulin lispro (ADMELOG) corrective regimen sliding scale   SubCutaneous Before meals and at bedtime  lactulose Syrup 20 Gram(s) Oral every 4 hours  lactulose Syrup 20 Gram(s) Oral every 6 hours  metoclopramide Injectable 10 milliGRAM(s) IV Push once  midodrine 10 milliGRAM(s) Oral every 8 hours  mupirocin 2% Nasal 1 Application(s) Both Nostrils two times a day  pantoprazole    Tablet 40 milliGRAM(s) Oral daily  polyethylene glycol 3350 17 Gram(s) Oral daily  rifAXIMin 550 milliGRAM(s) Oral two times a day  senna 2 Tablet(s) Oral at bedtime  sodium chloride 0.9%. 1000 milliLiter(s) (10 mL/Hr) IV Continuous <Continuous>  vasopressin Infusion 0.02 Unit(s)/Min (3 mL/Hr) IV Continuous <Continuous>    MEDICATIONS  (PRN):  dextrose Oral Gel 15 Gram(s) Oral once PRN Blood Glucose LESS THAN 70 milliGRAM(s)/deciliter  traMADol 25 milliGRAM(s) Oral every 6 hours PRN Moderate Pain (4 - 6)    Vital Signs Last 24 Hrs  T(C): 36.9 (16 Sep 2023 17:29), Max: 36.9 (16 Sep 2023 17:29)  T(F): 98.5 (16 Sep 2023 17:29), Max: 98.5 (16 Sep 2023 17:29)  HR: 94 (16 Sep 2023 17:00) (70 - 101)  BP: --  BP(mean): --  RR: 11 (16 Sep 2023 16:00) (11 - 18)  SpO2: 97% (16 Sep 2023 17:00) (90% - 100%)    Parameters below as of 16 Sep 2023 18:00  Patient On (Oxygen Delivery Method): nasal cannula w/ humidification  O2 Flow (L/min): 40  O2 Concentration (%): 50    09-15 @ 07:01  -  09-16 @ 07:00  --------------------------------------------------------  IN: 1920.5 mL / OUT: 2015 mL / NET: -94.5 mL    09-16 @ 07:01  -  09-16 @ 18:53  --------------------------------------------------------  IN: 1820.5 mL / OUT: 1455 mL / NET: 365.5 mL      PHYSICAL EXAM:  General: Alert, no acute distress  Lungs: Normal respiratory effort  Abdomen: Midly distended, soft, nontender  Extremities: No edema  Neurological: Moving all extremities spontaneously, +asterixis    LABS:                        9.5    8.07  )-----------( 70       ( 16 Sep 2023 16:59 )             27.3     09-16    134<L>  |  101  |  12  ----------------------------<  142<H>  4.1   |  24  |  0.76    Ca    9.2      16 Sep 2023 16:59  Phos  2.7     09-16  Mg     1.9     09-16    TPro  6.5  /  Alb  4.0  /  TBili  7.0<H>  /  DBili  x   /  AST  37  /  ALT  15  /  AlkPhos  54  09-16    PT/INR - ( 16 Sep 2023 17:24 )   PT: 16.0 sec;   INR: 1.42     PTT - ( 16 Sep 2023 17:24 )  PTT:32.8 sec      RADIOLOGY & ADDITIONAL STUDIES:  Reviewed

## 2023-09-17 LAB
ALBUMIN SERPL ELPH-MCNC: 3.7 G/DL — SIGNIFICANT CHANGE UP (ref 3.3–5)
ALBUMIN SERPL ELPH-MCNC: 3.9 G/DL — SIGNIFICANT CHANGE UP (ref 3.3–5)
ALBUMIN SERPL ELPH-MCNC: 4.4 G/DL — SIGNIFICANT CHANGE UP (ref 3.3–5)
ALP SERPL-CCNC: 50 U/L — SIGNIFICANT CHANGE UP (ref 40–120)
ALP SERPL-CCNC: 58 U/L — SIGNIFICANT CHANGE UP (ref 40–120)
ALP SERPL-CCNC: 75 U/L — SIGNIFICANT CHANGE UP (ref 40–120)
ALT FLD-CCNC: 12 U/L — SIGNIFICANT CHANGE UP (ref 10–45)
ALT FLD-CCNC: 16 U/L — SIGNIFICANT CHANGE UP (ref 10–45)
ALT FLD-CCNC: 27 U/L — SIGNIFICANT CHANGE UP (ref 10–45)
ANION GAP SERPL CALC-SCNC: 11 MMOL/L — SIGNIFICANT CHANGE UP (ref 5–17)
ANION GAP SERPL CALC-SCNC: 13 MMOL/L — SIGNIFICANT CHANGE UP (ref 5–17)
ANION GAP SERPL CALC-SCNC: 15 MMOL/L — SIGNIFICANT CHANGE UP (ref 5–17)
ANISOCYTOSIS BLD QL: SLIGHT — SIGNIFICANT CHANGE UP
ANISOCYTOSIS BLD QL: SLIGHT — SIGNIFICANT CHANGE UP
APTT BLD: 34.4 SEC — SIGNIFICANT CHANGE UP (ref 24.5–35.6)
APTT BLD: 35.6 SEC — SIGNIFICANT CHANGE UP (ref 24.5–35.6)
APTT BLD: 37.1 SEC — HIGH (ref 24.5–35.6)
AST SERPL-CCNC: 31 U/L — SIGNIFICANT CHANGE UP (ref 10–40)
AST SERPL-CCNC: 44 U/L — HIGH (ref 10–40)
AST SERPL-CCNC: 61 U/L — HIGH (ref 10–40)
BASOPHILS # BLD AUTO: 0 K/UL — SIGNIFICANT CHANGE UP (ref 0–0.2)
BASOPHILS NFR BLD AUTO: 0 % — SIGNIFICANT CHANGE UP (ref 0–2)
BILIRUB DIRECT SERPL-MCNC: 3.6 MG/DL — HIGH (ref 0–0.3)
BILIRUB INDIRECT FLD-MCNC: 4.6 MG/DL — HIGH (ref 0.2–1)
BILIRUB SERPL-MCNC: 10.8 MG/DL — HIGH (ref 0.2–1.2)
BILIRUB SERPL-MCNC: 8.2 MG/DL — HIGH (ref 0.2–1.2)
BILIRUB SERPL-MCNC: 9.6 MG/DL — HIGH (ref 0.2–1.2)
BUN SERPL-MCNC: 12 MG/DL — SIGNIFICANT CHANGE UP (ref 7–23)
BUN SERPL-MCNC: 12 MG/DL — SIGNIFICANT CHANGE UP (ref 7–23)
BUN SERPL-MCNC: 13 MG/DL — SIGNIFICANT CHANGE UP (ref 7–23)
BURR CELLS BLD QL SMEAR: PRESENT — SIGNIFICANT CHANGE UP
CALCIUM SERPL-MCNC: 8.6 MG/DL — SIGNIFICANT CHANGE UP (ref 8.4–10.5)
CALCIUM SERPL-MCNC: 8.8 MG/DL — SIGNIFICANT CHANGE UP (ref 8.4–10.5)
CALCIUM SERPL-MCNC: 9.1 MG/DL — SIGNIFICANT CHANGE UP (ref 8.4–10.5)
CHLORIDE SERPL-SCNC: 100 MMOL/L — SIGNIFICANT CHANGE UP (ref 96–108)
CHLORIDE SERPL-SCNC: 101 MMOL/L — SIGNIFICANT CHANGE UP (ref 96–108)
CHLORIDE SERPL-SCNC: 102 MMOL/L — SIGNIFICANT CHANGE UP (ref 96–108)
CO2 SERPL-SCNC: 20 MMOL/L — LOW (ref 22–31)
CO2 SERPL-SCNC: 21 MMOL/L — LOW (ref 22–31)
CO2 SERPL-SCNC: 22 MMOL/L — SIGNIFICANT CHANGE UP (ref 22–31)
CREAT SERPL-MCNC: 0.64 MG/DL — SIGNIFICANT CHANGE UP (ref 0.5–1.3)
CREAT SERPL-MCNC: 0.67 MG/DL — SIGNIFICANT CHANGE UP (ref 0.5–1.3)
CREAT SERPL-MCNC: 0.75 MG/DL — SIGNIFICANT CHANGE UP (ref 0.5–1.3)
DACRYOCYTES BLD QL SMEAR: SLIGHT — SIGNIFICANT CHANGE UP
EGFR: 101 ML/MIN/1.73M2 — SIGNIFICANT CHANGE UP
EGFR: 104 ML/MIN/1.73M2 — SIGNIFICANT CHANGE UP
EGFR: 106 ML/MIN/1.73M2 — SIGNIFICANT CHANGE UP
EOSINOPHIL # BLD AUTO: 0 K/UL — SIGNIFICANT CHANGE UP (ref 0–0.5)
EOSINOPHIL # BLD AUTO: 0 K/UL — SIGNIFICANT CHANGE UP (ref 0–0.5)
EOSINOPHIL # BLD AUTO: 0.07 K/UL — SIGNIFICANT CHANGE UP (ref 0–0.5)
EOSINOPHIL NFR BLD AUTO: 0 % — SIGNIFICANT CHANGE UP (ref 0–6)
EOSINOPHIL NFR BLD AUTO: 0 % — SIGNIFICANT CHANGE UP (ref 0–6)
EOSINOPHIL NFR BLD AUTO: 0.9 % — SIGNIFICANT CHANGE UP (ref 0–6)
GAS PNL BLDA: SIGNIFICANT CHANGE UP
GIANT PLATELETS BLD QL SMEAR: PRESENT — SIGNIFICANT CHANGE UP
GIANT PLATELETS BLD QL SMEAR: PRESENT — SIGNIFICANT CHANGE UP
GLUCOSE BLDC GLUCOMTR-MCNC: 153 MG/DL — HIGH (ref 70–99)
GLUCOSE BLDC GLUCOMTR-MCNC: 165 MG/DL — HIGH (ref 70–99)
GLUCOSE BLDC GLUCOMTR-MCNC: 173 MG/DL — HIGH (ref 70–99)
GLUCOSE BLDC GLUCOMTR-MCNC: 201 MG/DL — HIGH (ref 70–99)
GLUCOSE SERPL-MCNC: 150 MG/DL — HIGH (ref 70–99)
GLUCOSE SERPL-MCNC: 182 MG/DL — HIGH (ref 70–99)
GLUCOSE SERPL-MCNC: 203 MG/DL — HIGH (ref 70–99)
HCT VFR BLD CALC: 22.8 % — LOW (ref 39–50)
HCT VFR BLD CALC: 27.3 % — LOW (ref 39–50)
HCT VFR BLD CALC: 28.8 % — LOW (ref 39–50)
HGB BLD-MCNC: 10 G/DL — LOW (ref 13–17)
HGB BLD-MCNC: 7.8 G/DL — LOW (ref 13–17)
HGB BLD-MCNC: 9.2 G/DL — LOW (ref 13–17)
HYPOCHROMIA BLD QL: SLIGHT — SIGNIFICANT CHANGE UP
INR BLD: 1.41 — HIGH (ref 0.85–1.18)
INR BLD: 1.49 — HIGH (ref 0.85–1.18)
INR BLD: 1.66 — HIGH (ref 0.85–1.18)
LACTATE SERPL-SCNC: 2.1 MMOL/L — HIGH (ref 0.5–2)
LACTATE SERPL-SCNC: 2.9 MMOL/L — HIGH (ref 0.5–2)
LACTATE SERPL-SCNC: 3.8 MMOL/L — HIGH (ref 0.5–2)
LYMPHOCYTES # BLD AUTO: 0.27 K/UL — LOW (ref 1–3.3)
LYMPHOCYTES # BLD AUTO: 0.34 K/UL — LOW (ref 1–3.3)
LYMPHOCYTES # BLD AUTO: 0.41 K/UL — LOW (ref 1–3.3)
LYMPHOCYTES # BLD AUTO: 3.4 % — LOW (ref 13–44)
LYMPHOCYTES # BLD AUTO: 4.4 % — LOW (ref 13–44)
LYMPHOCYTES # BLD AUTO: 5.3 % — LOW (ref 13–44)
MACROCYTES BLD QL: SLIGHT — SIGNIFICANT CHANGE UP
MAGNESIUM SERPL-MCNC: 2 MG/DL — SIGNIFICANT CHANGE UP (ref 1.6–2.6)
MAGNESIUM SERPL-MCNC: 2.1 MG/DL — SIGNIFICANT CHANGE UP (ref 1.6–2.6)
MANUAL SMEAR VERIFICATION: SIGNIFICANT CHANGE UP
MCHC RBC-ENTMCNC: 30.3 PG — SIGNIFICANT CHANGE UP (ref 27–34)
MCHC RBC-ENTMCNC: 30.6 PG — SIGNIFICANT CHANGE UP (ref 27–34)
MCHC RBC-ENTMCNC: 31 PG — SIGNIFICANT CHANGE UP (ref 27–34)
MCHC RBC-ENTMCNC: 33.7 GM/DL — SIGNIFICANT CHANGE UP (ref 32–36)
MCHC RBC-ENTMCNC: 34.2 GM/DL — SIGNIFICANT CHANGE UP (ref 32–36)
MCHC RBC-ENTMCNC: 34.7 GM/DL — SIGNIFICANT CHANGE UP (ref 32–36)
MCV RBC AUTO: 89.2 FL — SIGNIFICANT CHANGE UP (ref 80–100)
MCV RBC AUTO: 89.4 FL — SIGNIFICANT CHANGE UP (ref 80–100)
MCV RBC AUTO: 89.8 FL — SIGNIFICANT CHANGE UP (ref 80–100)
MICROCYTES BLD QL: SLIGHT — SIGNIFICANT CHANGE UP
MICROCYTES BLD QL: SLIGHT — SIGNIFICANT CHANGE UP
MONOCYTES # BLD AUTO: 0.39 K/UL — SIGNIFICANT CHANGE UP (ref 0–0.9)
MONOCYTES # BLD AUTO: 0.68 K/UL — SIGNIFICANT CHANGE UP (ref 0–0.9)
MONOCYTES # BLD AUTO: 0.81 K/UL — SIGNIFICANT CHANGE UP (ref 0–0.9)
MONOCYTES NFR BLD AUTO: 6.1 % — SIGNIFICANT CHANGE UP (ref 2–14)
MONOCYTES NFR BLD AUTO: 8.6 % — SIGNIFICANT CHANGE UP (ref 2–14)
MONOCYTES NFR BLD AUTO: 8.8 % — SIGNIFICANT CHANGE UP (ref 2–14)
NEUTROPHILS # BLD AUTO: 5.65 K/UL — SIGNIFICANT CHANGE UP (ref 1.8–7.4)
NEUTROPHILS # BLD AUTO: 6.88 K/UL — SIGNIFICANT CHANGE UP (ref 1.8–7.4)
NEUTROPHILS # BLD AUTO: 8.01 K/UL — HIGH (ref 1.8–7.4)
NEUTROPHILS NFR BLD AUTO: 86.8 % — HIGH (ref 43–77)
NEUTROPHILS NFR BLD AUTO: 87.1 % — HIGH (ref 43–77)
NEUTROPHILS NFR BLD AUTO: 88.6 % — HIGH (ref 43–77)
OVALOCYTES BLD QL SMEAR: SLIGHT — SIGNIFICANT CHANGE UP
PHOSPHATE SERPL-MCNC: 1.9 MG/DL — LOW (ref 2.5–4.5)
PHOSPHATE SERPL-MCNC: 2.8 MG/DL — SIGNIFICANT CHANGE UP (ref 2.5–4.5)
PLAT MORPH BLD: ABNORMAL
PLAT MORPH BLD: NORMAL — SIGNIFICANT CHANGE UP
PLAT MORPH BLD: NORMAL — SIGNIFICANT CHANGE UP
PLATELET # BLD AUTO: 59 K/UL — LOW (ref 150–400)
PLATELET # BLD AUTO: 79 K/UL — LOW (ref 150–400)
PLATELET # BLD AUTO: 86 K/UL — LOW (ref 150–400)
POIKILOCYTOSIS BLD QL AUTO: SIGNIFICANT CHANGE UP
POIKILOCYTOSIS BLD QL AUTO: SIGNIFICANT CHANGE UP
POIKILOCYTOSIS BLD QL AUTO: SLIGHT — SIGNIFICANT CHANGE UP
POLYCHROMASIA BLD QL SMEAR: SLIGHT — SIGNIFICANT CHANGE UP
POLYCHROMASIA BLD QL SMEAR: SLIGHT — SIGNIFICANT CHANGE UP
POTASSIUM SERPL-MCNC: 3.4 MMOL/L — LOW (ref 3.5–5.3)
POTASSIUM SERPL-MCNC: 3.8 MMOL/L — SIGNIFICANT CHANGE UP (ref 3.5–5.3)
POTASSIUM SERPL-MCNC: 4.8 MMOL/L — SIGNIFICANT CHANGE UP (ref 3.5–5.3)
POTASSIUM SERPL-SCNC: 3.4 MMOL/L — LOW (ref 3.5–5.3)
POTASSIUM SERPL-SCNC: 3.8 MMOL/L — SIGNIFICANT CHANGE UP (ref 3.5–5.3)
POTASSIUM SERPL-SCNC: 4.8 MMOL/L — SIGNIFICANT CHANGE UP (ref 3.5–5.3)
PROT SERPL-MCNC: 6 G/DL — SIGNIFICANT CHANGE UP (ref 6–8.3)
PROT SERPL-MCNC: 6.1 G/DL — SIGNIFICANT CHANGE UP (ref 6–8.3)
PROT SERPL-MCNC: 6.7 G/DL — SIGNIFICANT CHANGE UP (ref 6–8.3)
PROTHROM AB SERPL-ACNC: 15.9 SEC — HIGH (ref 9.5–13)
PROTHROM AB SERPL-ACNC: 16.8 SEC — HIGH (ref 9.5–13)
PROTHROM AB SERPL-ACNC: 18.7 SEC — HIGH (ref 9.5–13)
RBC # BLD: 2.55 M/UL — LOW (ref 4.2–5.8)
RBC # BLD: 3.04 M/UL — LOW (ref 4.2–5.8)
RBC # BLD: 3.23 M/UL — LOW (ref 4.2–5.8)
RBC # FLD: 14.6 % — HIGH (ref 10.3–14.5)
RBC BLD AUTO: ABNORMAL
SODIUM SERPL-SCNC: 134 MMOL/L — LOW (ref 135–145)
SODIUM SERPL-SCNC: 135 MMOL/L — SIGNIFICANT CHANGE UP (ref 135–145)
SODIUM SERPL-SCNC: 136 MMOL/L — SIGNIFICANT CHANGE UP (ref 135–145)
WBC # BLD: 6.38 K/UL — SIGNIFICANT CHANGE UP (ref 3.8–10.5)
WBC # BLD: 7.9 K/UL — SIGNIFICANT CHANGE UP (ref 3.8–10.5)
WBC # BLD: 9.23 K/UL — SIGNIFICANT CHANGE UP (ref 3.8–10.5)
WBC # FLD AUTO: 6.38 K/UL — SIGNIFICANT CHANGE UP (ref 3.8–10.5)
WBC # FLD AUTO: 7.9 K/UL — SIGNIFICANT CHANGE UP (ref 3.8–10.5)
WBC # FLD AUTO: 9.23 K/UL — SIGNIFICANT CHANGE UP (ref 3.8–10.5)

## 2023-09-17 PROCEDURE — 99291 CRITICAL CARE FIRST HOUR: CPT

## 2023-09-17 PROCEDURE — 71045 X-RAY EXAM CHEST 1 VIEW: CPT | Mod: 26

## 2023-09-17 RX ORDER — ALBUMIN HUMAN 25 %
250 VIAL (ML) INTRAVENOUS ONCE
Refills: 0 | Status: COMPLETED | OUTPATIENT
Start: 2023-09-17 | End: 2023-09-17

## 2023-09-17 RX ORDER — POTASSIUM CHLORIDE 20 MEQ
20 PACKET (EA) ORAL
Refills: 0 | Status: COMPLETED | OUTPATIENT
Start: 2023-09-17 | End: 2023-09-18

## 2023-09-17 RX ORDER — PHENYLEPHRINE HYDROCHLORIDE 10 MG/ML
0.5 INJECTION INTRAVENOUS
Qty: 40 | Refills: 0 | Status: DISCONTINUED | OUTPATIENT
Start: 2023-09-17 | End: 2023-09-17

## 2023-09-17 RX ORDER — ALBUMIN HUMAN 25 %
50 VIAL (ML) INTRAVENOUS
Refills: 0 | Status: COMPLETED | OUTPATIENT
Start: 2023-09-17 | End: 2023-09-17

## 2023-09-17 RX ORDER — LACTULOSE 10 G/15ML
20 SOLUTION ORAL EVERY 4 HOURS
Refills: 0 | Status: DISCONTINUED | OUTPATIENT
Start: 2023-09-17 | End: 2023-09-18

## 2023-09-17 RX ORDER — HYDROCORTISONE 20 MG
50 TABLET ORAL EVERY 8 HOURS
Refills: 0 | Status: DISCONTINUED | OUTPATIENT
Start: 2023-09-17 | End: 2023-09-19

## 2023-09-17 RX ORDER — ACETAMINOPHEN 500 MG
650 TABLET ORAL ONCE
Refills: 0 | Status: COMPLETED | OUTPATIENT
Start: 2023-09-17 | End: 2023-09-17

## 2023-09-17 RX ORDER — POTASSIUM PHOSPHATE, MONOBASIC POTASSIUM PHOSPHATE, DIBASIC 236; 224 MG/ML; MG/ML
15 INJECTION, SOLUTION INTRAVENOUS ONCE
Refills: 0 | Status: COMPLETED | OUTPATIENT
Start: 2023-09-17 | End: 2023-09-17

## 2023-09-17 RX ORDER — BUMETANIDE 0.25 MG/ML
1 INJECTION INTRAMUSCULAR; INTRAVENOUS ONCE
Refills: 0 | Status: COMPLETED | OUTPATIENT
Start: 2023-09-17 | End: 2023-09-17

## 2023-09-17 RX ADMIN — TRAMADOL HYDROCHLORIDE 25 MILLIGRAM(S): 50 TABLET ORAL at 19:59

## 2023-09-17 RX ADMIN — Medication 50 MILLIGRAM(S): at 14:27

## 2023-09-17 RX ADMIN — Medication 2: at 07:34

## 2023-09-17 RX ADMIN — MIDODRINE HYDROCHLORIDE 10 MILLIGRAM(S): 2.5 TABLET ORAL at 09:43

## 2023-09-17 RX ADMIN — POLYETHYLENE GLYCOL 3350 17 GRAM(S): 17 POWDER, FOR SOLUTION ORAL at 09:44

## 2023-09-17 RX ADMIN — Medication 50 MILLILITER(S): at 06:18

## 2023-09-17 RX ADMIN — BUMETANIDE 1 MILLIGRAM(S): 0.25 INJECTION INTRAMUSCULAR; INTRAVENOUS at 06:17

## 2023-09-17 RX ADMIN — LACTULOSE 20 GRAM(S): 10 SOLUTION ORAL at 11:02

## 2023-09-17 RX ADMIN — MUPIROCIN 1 APPLICATION(S): 20 OINTMENT TOPICAL at 18:18

## 2023-09-17 RX ADMIN — Medication 150 MILLILITER(S): at 22:24

## 2023-09-17 RX ADMIN — TRAMADOL HYDROCHLORIDE 25 MILLIGRAM(S): 50 TABLET ORAL at 07:17

## 2023-09-17 RX ADMIN — Medication 50 MILLILITER(S): at 11:02

## 2023-09-17 RX ADMIN — GABAPENTIN 100 MILLIGRAM(S): 400 CAPSULE ORAL at 14:27

## 2023-09-17 RX ADMIN — Medication 50 MILLIEQUIVALENT(S): at 23:05

## 2023-09-17 RX ADMIN — TRAMADOL HYDROCHLORIDE 25 MILLIGRAM(S): 50 TABLET ORAL at 06:17

## 2023-09-17 RX ADMIN — GABAPENTIN 100 MILLIGRAM(S): 400 CAPSULE ORAL at 06:17

## 2023-09-17 RX ADMIN — LACTULOSE 20 GRAM(S): 10 SOLUTION ORAL at 06:17

## 2023-09-17 RX ADMIN — MUPIROCIN 1 APPLICATION(S): 20 OINTMENT TOPICAL at 06:18

## 2023-09-17 RX ADMIN — MIDODRINE HYDROCHLORIDE 10 MILLIGRAM(S): 2.5 TABLET ORAL at 00:54

## 2023-09-17 RX ADMIN — Medication 2: at 16:26

## 2023-09-17 RX ADMIN — CHLORHEXIDINE GLUCONATE 1 APPLICATION(S): 213 SOLUTION TOPICAL at 13:15

## 2023-09-17 RX ADMIN — Medication 150 MILLILITER(S): at 22:32

## 2023-09-17 RX ADMIN — Medication 25 GRAM(S): at 01:14

## 2023-09-17 RX ADMIN — GABAPENTIN 100 MILLIGRAM(S): 400 CAPSULE ORAL at 22:09

## 2023-09-17 RX ADMIN — Medication 650 MILLIGRAM(S): at 11:02

## 2023-09-17 RX ADMIN — MIDODRINE HYDROCHLORIDE 10 MILLIGRAM(S): 2.5 TABLET ORAL at 18:18

## 2023-09-17 RX ADMIN — Medication 50 MILLIEQUIVALENT(S): at 04:29

## 2023-09-17 RX ADMIN — HEPARIN SODIUM 5000 UNIT(S): 5000 INJECTION INTRAVENOUS; SUBCUTANEOUS at 14:27

## 2023-09-17 RX ADMIN — Medication 2: at 11:18

## 2023-09-17 RX ADMIN — Medication 50 MILLIGRAM(S): at 21:18

## 2023-09-17 RX ADMIN — Medication 75 MILLIGRAM(S): at 06:19

## 2023-09-17 RX ADMIN — Medication 50 MILLIEQUIVALENT(S): at 02:27

## 2023-09-17 RX ADMIN — LACTULOSE 20 GRAM(S): 10 SOLUTION ORAL at 18:18

## 2023-09-17 RX ADMIN — Medication 4: at 21:16

## 2023-09-17 RX ADMIN — Medication 650 MILLIGRAM(S): at 14:26

## 2023-09-17 RX ADMIN — Medication 50 MILLILITER(S): at 00:54

## 2023-09-17 RX ADMIN — LACTULOSE 20 GRAM(S): 10 SOLUTION ORAL at 14:27

## 2023-09-17 RX ADMIN — HEPARIN SODIUM 5000 UNIT(S): 5000 INJECTION INTRAVENOUS; SUBCUTANEOUS at 06:17

## 2023-09-17 RX ADMIN — HEPARIN SODIUM 5000 UNIT(S): 5000 INJECTION INTRAVENOUS; SUBCUTANEOUS at 21:17

## 2023-09-17 RX ADMIN — Medication 125 MILLILITER(S): at 19:18

## 2023-09-17 RX ADMIN — Medication 50 MILLIEQUIVALENT(S): at 01:00

## 2023-09-17 RX ADMIN — TRAMADOL HYDROCHLORIDE 25 MILLIGRAM(S): 50 TABLET ORAL at 21:06

## 2023-09-17 RX ADMIN — LACTULOSE 20 GRAM(S): 10 SOLUTION ORAL at 00:53

## 2023-09-17 RX ADMIN — PANTOPRAZOLE SODIUM 40 MILLIGRAM(S): 20 TABLET, DELAYED RELEASE ORAL at 09:44

## 2023-09-17 RX ADMIN — Medication 500 MILLIGRAM(S): at 09:44

## 2023-09-17 RX ADMIN — CLOPIDOGREL BISULFATE 75 MILLIGRAM(S): 75 TABLET, FILM COATED ORAL at 09:43

## 2023-09-17 NOTE — CHART NOTE - NSCHARTNOTEFT_GEN_A_CORE
Patient seen at bedside. Reports his appetite is starting to come back after surgery. Patient reports at home, he does add salt to foods because he doesn't like how foods such as eggs taste without salt. Discussed Mrs DASH seasoning, however patient reports he doesn't like it. Provided extensive nutrition education discussing limiting salt and saturated fat, food sources of saturated fat, consistent carbohydrate diet, carbohydrate portion control, food sources of complex carbohydrates. Patient verbalized understanding. Reports he monitors his blood sugar every day at home and has a BP monitor, but does not check it every day. Endorsing some lower abdominal pain that "comes and goes." Reports he has not had a bowel movement since surgery, on bowel regimen. RD to f/u prn.

## 2023-09-17 NOTE — PROGRESS NOTE ADULT - SUBJECTIVE AND OBJECTIVE BOX
INTERVAL HPI/OVERNIGHT EVENTS:    9/14: OPCAB x 4  EF 65%    62yo Male Hx HTN, HLD, DM, gout, former tobacco/ETOH use - Cirrhosis (MELD 18) known esophageal varices/banding, TIA, CAD     Patient unable to tolerate DAPT - not a candidate for PCI/stent   Hepatologist Dr. Lawson following     Pt states that he has not taken Plavix for months.     to OR 9/14:   intraop: 1 L Crystalloid     Extubated 9/16; volume given for low UO  midodrine started and low dose Vaso stopped ; higher MAP for UO required by report    some confusion/agitation reported - ammonia level checked (55)- rifaximin and lactulose started     patient splinting - poor respiratory effort - placed on BIPAP overnight   1 U pRBC infusing at this time for Hg 7.8 this am     PMHx includes but is not limited to:   CAD (coronary artery disease)  HTN (hypertension)  Cirrhosis  Esophageal varices in cirrhosis  History of TIAs  H/O: GI bleed  Gout  HLD (hyperlipidemia)  History of portal hypertension  Diabetes mellitus    ICU Vital Signs Last 24 Hrs  T(C): 36.2 (17 Sep 2023 08:56), Max: 36.9 (16 Sep 2023 17:29)  T(F): 97.1 (17 Sep 2023 08:56), Max: 98.5 (16 Sep 2023 17:29)  HR: 80 (17 Sep 2023 08:20) (71 - 103) sinus   ABP: 143/74 (17 Sep 2023 08:20) (94/52 - 143/74)  ABP(mean): 99 (17 Sep 2023 08:20) (67 - 99)  RR: 17 (17 Sep 2023 08:20) (11 - 19)  SpO2: 100% (17 Sep 2023 08:20) (90% - 100%) 6L NC     Qtts: None     I&O's Summary    16 Sep 2023 07:01  -  17 Sep 2023 07:00  --------------------------------------------------------  IN: 2350.5 mL / OUT: 3505 mL / NET: -1154.5 mL    17 Sep 2023 07:01  -  17 Sep 2023 09:25  --------------------------------------------------------  IN: 10 mL / OUT: 300 mL / NET: -290 mL    Physical Exam    Heart - regular (-)rub/gallop  Lungs - CTA anterior - no rib/gallop  Abd - (+)BS soft NTND (-)r/r/g  Ext - warm to touch no cyanosis/clubbing  Chest - incision site clean and dry  Neuro - alert/oriented and nonfocal   Skin - no rash    LABS:                        7.8    6.38  )-----------( 59       ( 17 Sep 2023 06:10 )             22.8     09-17    134<L>  |  101  |  12  ----------------------------<  150<H>  4.8   |  22  |  0.67    Ca    8.6      17 Sep 2023 06:10  Phos  2.8     09-17  Mg     2.0     09-17    TPro  6.5  /  Alb  4.0  /  TBili  7.0<H>  /  DBili  x   /  AST  37  /  ALT  15  /  AlkPhos  54  09-16    PT/INR - ( 17 Sep 2023 06:10 )   PT: 15.9 sec;   INR: 1.41     PTT - ( 17 Sep 2023 06:10 )  PTT:34.4 sec    ABG - ( 17 Sep 2023 06:01 )  pH, Arterial: 7.43  pH, Blood: x     /  pCO2: 34    /  pO2: 128   / HCO3: 23    / Base Excess: -1.1  /  SaO2: 100.0     RADIOLOGY & ADDITIONAL STUDIES: reviewed     Patient with complicated medical history including cirrhosis and CAD now POD#3 OPCAB x 3    1. CV  Plavix ; no ASA/statin to be given per CTS  sinus rhythm   start on stress dose steroids 9/16 - begin taper  Midodrine 10 q8h    2. Pulm   BIPAP support overnight for resp insufficiency  transition to Nasal cannula this am - maintain Sa02 93% or greater  incentive spirometry and ambulation with staff assist     3. GI  cirrhosis; noted MELD 18 - hepatology following  cont rifaximin and lactulose  monitor neuro status; avoid narcotics or sedatives     Maintain glycemic control  DVT and GI prophylaxis     d/w patient/staff and CTS     I have spent/provided stated minutes of critical care time to this patient: 60

## 2023-09-17 NOTE — PROGRESS NOTE ADULT - SUBJECTIVE AND OBJECTIVE BOX
HEPATOLOGY PROGRESS NOTE    INTERVAL/SUBJECTIVE:  - KUB with no ileus    Still no BMs as of this AM. Able to say days of week backwards today which he was not able to do yesterday.    Allergies  aspirin (Pruritus)    Intolerances    MEDICATIONS:  MEDICATIONS  (STANDING):  albumin human 25% IVPB 50 milliLiter(s) IV Intermittent every 6 hours  ascorbic acid 500 milliGRAM(s) Oral daily  bisacodyl Suppository 10 milliGRAM(s) Rectal once  chlorhexidine 2% Cloths 1 Application(s) Topical daily  clopidogrel Tablet 75 milliGRAM(s) Oral daily  dextrose 5%. 1000 milliLiter(s) (100 mL/Hr) IV Continuous <Continuous>  dextrose 5%. 1000 milliLiter(s) (50 mL/Hr) IV Continuous <Continuous>  dextrose 50% Injectable 50 milliLiter(s) IV Push every 15 minutes  dextrose 50% Injectable 25 milliLiter(s) IV Push every 15 minutes  gabapentin 100 milliGRAM(s) Oral every 8 hours  glucagon  Injectable 1 milliGRAM(s) IntraMuscular once  heparin   Injectable 5000 Unit(s) SubCutaneous every 8 hours  hydrocortisone sodium succinate Injectable 50 milliGRAM(s) IV Push every 8 hours  insulin lispro (ADMELOG) corrective regimen sliding scale   SubCutaneous Before meals and at bedtime  lactulose Syrup 20 Gram(s) Oral every 4 hours  midodrine 10 milliGRAM(s) Oral every 8 hours  mupirocin 2% Nasal 1 Application(s) Both Nostrils two times a day  pantoprazole    Tablet 40 milliGRAM(s) Oral daily  phenylephrine    Infusion 0.5 MICROgram(s)/kG/Min (15.9 mL/Hr) IV Continuous <Continuous>  polyethylene glycol 3350 17 Gram(s) Oral daily  rifAXIMin 550 milliGRAM(s) Oral two times a day  senna 2 Tablet(s) Oral at bedtime  sodium chloride 0.9%. 1000 milliLiter(s) (10 mL/Hr) IV Continuous <Continuous>    MEDICATIONS  (PRN):  dextrose Oral Gel 15 Gram(s) Oral once PRN Blood Glucose LESS THAN 70 milliGRAM(s)/deciliter  traMADol 25 milliGRAM(s) Oral every 6 hours PRN Moderate Pain (4 - 6)    Vital Signs Last 24 Hrs  T(C): 36.2 (17 Sep 2023 08:56), Max: 36.9 (16 Sep 2023 17:29)  T(F): 97.1 (17 Sep 2023 08:56), Max: 98.5 (16 Sep 2023 17:29)  HR: 78 (17 Sep 2023 10:00) (71 - 103)  BP: --  BP(mean): --  RR: 16 (17 Sep 2023 09:00) (11 - 19)  SpO2: 98% (17 Sep 2023 10:00) (90% - 100%)    Parameters below as of 17 Sep 2023 11:00  Patient On (Oxygen Delivery Method): nasal cannula  O2 Flow (L/min): 4      09-16 @ 07:01  -  09-17 @ 07:00  --------------------------------------------------------  IN: 2350.5 mL / OUT: 3505 mL / NET: -1154.5 mL    09-17 @ 07:01  -  09-17 @ 10:43  --------------------------------------------------------  IN: 440 mL / OUT: 925 mL / NET: -485 mL      PHYSICAL EXAM:  General: Alert, no acute distress  Lungs: Normal respiratory effort  Abdomen: Midly distended, soft, nontender  Extremities: No edema  Neurological: Moving all extremities spontaneously, +low-frequency tremors     LABS:                        7.8    6.38  )-----------( 59       ( 17 Sep 2023 06:10 )             22.8     09-17    134<L>  |  101  |  12  ----------------------------<  150<H>  4.8   |  22  |  0.67    Ca    8.6      17 Sep 2023 06:10  Phos  2.8     09-17  Mg     2.0     09-17    TPro  6.5  /  Alb  4.0  /  TBili  7.0<H>  /  DBili  x   /  AST  37  /  ALT  15  /  AlkPhos  54  09-16    PT/INR - ( 17 Sep 2023 06:10 )   PT: 15.9 sec;   INR: 1.41     PTT - ( 17 Sep 2023 06:10 )  PTT:34.4 sec    RADIOLOGY & ADDITIONAL STUDIES:  Reviewed

## 2023-09-17 NOTE — PROGRESS NOTE ADULT - ASSESSMENT
63 [place] ([facility]) M former smoker with PMH of HTN, severe 3 vessel CAD, HLD, pre-diabetes, gout, TIA (14 yrs ago), gastric ulcer, rectal bleed, LIZ and alcoholic liver disease c/b cirrhosis and portal hypertension m/b esophageal varices (and UGIB) and splenomegaly, presenting for OPCAB with CT surgery. Hepatology team consulted for sotero-operative optimization.     Mental status appears improved compared to yesterday afternoon though without BMs, thus confusion may be due to delirium rather than HE. Does have tremor suggestive of asterixis though possible that this is more of a baseline tremor.     Recommendations:   - Continue lactulose + rifaximin, and consider mobilization and other bowel reg  - Pt can receive Tylenol max 2g/day for pain, and this would be preferable to opioids  - Agree with post-operative prophylactic antibiotics  - Continue PPI for mucosal protection    We will continue to follow.    Ryan (Saige) MD [place]  Gastroenterology Fellow  Pager (M-F 7a-5p): [phone]  Pager (after hours): Please call  for on-call fellow

## 2023-09-18 LAB
ALBUMIN SERPL ELPH-MCNC: 4 G/DL — SIGNIFICANT CHANGE UP (ref 3.3–5)
ALBUMIN SERPL ELPH-MCNC: 4.4 G/DL — SIGNIFICANT CHANGE UP (ref 3.3–5)
ALP SERPL-CCNC: 80 U/L — SIGNIFICANT CHANGE UP (ref 40–120)
ALP SERPL-CCNC: 91 U/L — SIGNIFICANT CHANGE UP (ref 40–120)
ALT FLD-CCNC: 29 U/L — SIGNIFICANT CHANGE UP (ref 10–45)
ALT FLD-CCNC: 33 U/L — SIGNIFICANT CHANGE UP (ref 10–45)
AMMONIA BLD-MCNC: 61 UMOL/L — HIGH (ref 11–55)
AMMONIA BLD-MCNC: 69 UMOL/L — HIGH (ref 11–55)
ANION GAP SERPL CALC-SCNC: 10 MMOL/L — SIGNIFICANT CHANGE UP (ref 5–17)
ANION GAP SERPL CALC-SCNC: 9 MMOL/L — SIGNIFICANT CHANGE UP (ref 5–17)
APTT BLD: 35.1 SEC — SIGNIFICANT CHANGE UP (ref 24.5–35.6)
APTT BLD: 35.2 SEC — SIGNIFICANT CHANGE UP (ref 24.5–35.6)
AST SERPL-CCNC: 62 U/L — HIGH (ref 10–40)
AST SERPL-CCNC: 63 U/L — HIGH (ref 10–40)
BASOPHILS # BLD AUTO: 0.01 K/UL — SIGNIFICANT CHANGE UP (ref 0–0.2)
BASOPHILS NFR BLD AUTO: 0.1 % — SIGNIFICANT CHANGE UP (ref 0–2)
BILIRUB SERPL-MCNC: 8 MG/DL — HIGH (ref 0.2–1.2)
BILIRUB SERPL-MCNC: 8.6 MG/DL — HIGH (ref 0.2–1.2)
BUN SERPL-MCNC: 10 MG/DL — SIGNIFICANT CHANGE UP (ref 7–23)
BUN SERPL-MCNC: 11 MG/DL — SIGNIFICANT CHANGE UP (ref 7–23)
CALCIUM SERPL-MCNC: 8.9 MG/DL — SIGNIFICANT CHANGE UP (ref 8.4–10.5)
CALCIUM SERPL-MCNC: 9.3 MG/DL — SIGNIFICANT CHANGE UP (ref 8.4–10.5)
CHLORIDE SERPL-SCNC: 105 MMOL/L — SIGNIFICANT CHANGE UP (ref 96–108)
CHLORIDE SERPL-SCNC: 105 MMOL/L — SIGNIFICANT CHANGE UP (ref 96–108)
CO2 SERPL-SCNC: 22 MMOL/L — SIGNIFICANT CHANGE UP (ref 22–31)
CO2 SERPL-SCNC: 22 MMOL/L — SIGNIFICANT CHANGE UP (ref 22–31)
CREAT SERPL-MCNC: 0.58 MG/DL — SIGNIFICANT CHANGE UP (ref 0.5–1.3)
CREAT SERPL-MCNC: 0.63 MG/DL — SIGNIFICANT CHANGE UP (ref 0.5–1.3)
EGFR: 106 ML/MIN/1.73M2 — SIGNIFICANT CHANGE UP
EGFR: 109 ML/MIN/1.73M2 — SIGNIFICANT CHANGE UP
EOSINOPHIL # BLD AUTO: 0.01 K/UL — SIGNIFICANT CHANGE UP (ref 0–0.5)
EOSINOPHIL NFR BLD AUTO: 0.1 % — SIGNIFICANT CHANGE UP (ref 0–6)
GAS PNL BLDA: SIGNIFICANT CHANGE UP
GAS PNL BLDA: SIGNIFICANT CHANGE UP
GLUCOSE BLDC GLUCOMTR-MCNC: 185 MG/DL — HIGH (ref 70–99)
GLUCOSE BLDC GLUCOMTR-MCNC: 188 MG/DL — HIGH (ref 70–99)
GLUCOSE BLDC GLUCOMTR-MCNC: 191 MG/DL — HIGH (ref 70–99)
GLUCOSE BLDC GLUCOMTR-MCNC: 205 MG/DL — HIGH (ref 70–99)
GLUCOSE SERPL-MCNC: 212 MG/DL — HIGH (ref 70–99)
GLUCOSE SERPL-MCNC: 216 MG/DL — HIGH (ref 70–99)
HCT VFR BLD CALC: 26.3 % — LOW (ref 39–50)
HCT VFR BLD CALC: 26.6 % — LOW (ref 39–50)
HGB BLD-MCNC: 9 G/DL — LOW (ref 13–17)
HGB BLD-MCNC: 9 G/DL — LOW (ref 13–17)
IMM GRANULOCYTES NFR BLD AUTO: 0.4 % — SIGNIFICANT CHANGE UP (ref 0–0.9)
INR BLD: 1.58 — HIGH (ref 0.85–1.18)
INR BLD: 1.77 — HIGH (ref 0.85–1.18)
LACTATE SERPL-SCNC: 2.3 MMOL/L — HIGH (ref 0.5–2)
LYMPHOCYTES # BLD AUTO: 0.54 K/UL — LOW (ref 1–3.3)
LYMPHOCYTES # BLD AUTO: 7.2 % — LOW (ref 13–44)
MAGNESIUM SERPL-MCNC: 2 MG/DL — SIGNIFICANT CHANGE UP (ref 1.6–2.6)
MAGNESIUM SERPL-MCNC: 2.1 MG/DL — SIGNIFICANT CHANGE UP (ref 1.6–2.6)
MCHC RBC-ENTMCNC: 30.2 PG — SIGNIFICANT CHANGE UP (ref 27–34)
MCHC RBC-ENTMCNC: 30.7 PG — SIGNIFICANT CHANGE UP (ref 27–34)
MCHC RBC-ENTMCNC: 33.8 GM/DL — SIGNIFICANT CHANGE UP (ref 32–36)
MCHC RBC-ENTMCNC: 34.2 GM/DL — SIGNIFICANT CHANGE UP (ref 32–36)
MCV RBC AUTO: 89.3 FL — SIGNIFICANT CHANGE UP (ref 80–100)
MCV RBC AUTO: 89.8 FL — SIGNIFICANT CHANGE UP (ref 80–100)
MONOCYTES # BLD AUTO: 1.05 K/UL — HIGH (ref 0–0.9)
MONOCYTES NFR BLD AUTO: 14.1 % — HIGH (ref 2–14)
NEUTROPHILS # BLD AUTO: 5.82 K/UL — SIGNIFICANT CHANGE UP (ref 1.8–7.4)
NEUTROPHILS NFR BLD AUTO: 78.1 % — HIGH (ref 43–77)
NRBC # BLD: 0 /100 WBCS — SIGNIFICANT CHANGE UP (ref 0–0)
NRBC # BLD: 0 /100 WBCS — SIGNIFICANT CHANGE UP (ref 0–0)
PHOSPHATE SERPL-MCNC: 1.4 MG/DL — LOW (ref 2.5–4.5)
PHOSPHATE SERPL-MCNC: 2 MG/DL — LOW (ref 2.5–4.5)
PLATELET # BLD AUTO: 74 K/UL — LOW (ref 150–400)
PLATELET # BLD AUTO: 76 K/UL — LOW (ref 150–400)
POTASSIUM SERPL-MCNC: 4.1 MMOL/L — SIGNIFICANT CHANGE UP (ref 3.5–5.3)
POTASSIUM SERPL-MCNC: 4.5 MMOL/L — SIGNIFICANT CHANGE UP (ref 3.5–5.3)
POTASSIUM SERPL-SCNC: 4.1 MMOL/L — SIGNIFICANT CHANGE UP (ref 3.5–5.3)
POTASSIUM SERPL-SCNC: 4.5 MMOL/L — SIGNIFICANT CHANGE UP (ref 3.5–5.3)
PROT SERPL-MCNC: 6.2 G/DL — SIGNIFICANT CHANGE UP (ref 6–8.3)
PROT SERPL-MCNC: 6.6 G/DL — SIGNIFICANT CHANGE UP (ref 6–8.3)
PROTHROM AB SERPL-ACNC: 17.8 SEC — HIGH (ref 9.5–13)
PROTHROM AB SERPL-ACNC: 19.8 SEC — HIGH (ref 9.5–13)
RBC # BLD: 2.93 M/UL — LOW (ref 4.2–5.8)
RBC # BLD: 2.98 M/UL — LOW (ref 4.2–5.8)
RBC # FLD: 14.6 % — HIGH (ref 10.3–14.5)
RBC # FLD: 14.7 % — HIGH (ref 10.3–14.5)
SODIUM SERPL-SCNC: 136 MMOL/L — SIGNIFICANT CHANGE UP (ref 135–145)
SODIUM SERPL-SCNC: 137 MMOL/L — SIGNIFICANT CHANGE UP (ref 135–145)
WBC # BLD: 6.97 K/UL — SIGNIFICANT CHANGE UP (ref 3.8–10.5)
WBC # BLD: 7.46 K/UL — SIGNIFICANT CHANGE UP (ref 3.8–10.5)
WBC # FLD AUTO: 6.97 K/UL — SIGNIFICANT CHANGE UP (ref 3.8–10.5)
WBC # FLD AUTO: 7.46 K/UL — SIGNIFICANT CHANGE UP (ref 3.8–10.5)

## 2023-09-18 PROCEDURE — 71045 X-RAY EXAM CHEST 1 VIEW: CPT | Mod: 26

## 2023-09-18 RX ORDER — SODIUM CHLORIDE 9 MG/ML
3 INJECTION INTRAMUSCULAR; INTRAVENOUS; SUBCUTANEOUS EVERY 8 HOURS
Refills: 0 | Status: DISCONTINUED | OUTPATIENT
Start: 2023-09-18 | End: 2023-09-21

## 2023-09-18 RX ORDER — LACTULOSE 10 G/15ML
10 SOLUTION ORAL
Refills: 0 | Status: DISCONTINUED | OUTPATIENT
Start: 2023-09-18 | End: 2023-09-19

## 2023-09-18 RX ORDER — ALBUMIN HUMAN 25 %
50 VIAL (ML) INTRAVENOUS
Refills: 0 | Status: COMPLETED | OUTPATIENT
Start: 2023-09-18 | End: 2023-09-18

## 2023-09-18 RX ADMIN — SODIUM CHLORIDE 3 MILLILITER(S): 9 INJECTION INTRAMUSCULAR; INTRAVENOUS; SUBCUTANEOUS at 13:21

## 2023-09-18 RX ADMIN — Medication 150 MILLILITER(S): at 04:42

## 2023-09-18 RX ADMIN — Medication 4: at 21:33

## 2023-09-18 RX ADMIN — GABAPENTIN 100 MILLIGRAM(S): 400 CAPSULE ORAL at 13:25

## 2023-09-18 RX ADMIN — Medication 50 MILLIGRAM(S): at 21:34

## 2023-09-18 RX ADMIN — SENNA PLUS 2 TABLET(S): 8.6 TABLET ORAL at 21:34

## 2023-09-18 RX ADMIN — Medication 50 MILLIGRAM(S): at 13:24

## 2023-09-18 RX ADMIN — Medication 50 MILLIGRAM(S): at 05:37

## 2023-09-18 RX ADMIN — Medication 2: at 07:33

## 2023-09-18 RX ADMIN — LACTULOSE 20 GRAM(S): 10 SOLUTION ORAL at 05:36

## 2023-09-18 RX ADMIN — Medication 150 MILLILITER(S): at 03:41

## 2023-09-18 RX ADMIN — PANTOPRAZOLE SODIUM 40 MILLIGRAM(S): 20 TABLET, DELAYED RELEASE ORAL at 11:10

## 2023-09-18 RX ADMIN — POLYETHYLENE GLYCOL 3350 17 GRAM(S): 17 POWDER, FOR SOLUTION ORAL at 11:10

## 2023-09-18 RX ADMIN — SODIUM CHLORIDE 3 MILLILITER(S): 9 INJECTION INTRAMUSCULAR; INTRAVENOUS; SUBCUTANEOUS at 21:35

## 2023-09-18 RX ADMIN — Medication 50 MILLIEQUIVALENT(S): at 00:30

## 2023-09-18 RX ADMIN — GABAPENTIN 100 MILLIGRAM(S): 400 CAPSULE ORAL at 21:55

## 2023-09-18 RX ADMIN — Medication 2: at 11:20

## 2023-09-18 RX ADMIN — HEPARIN SODIUM 5000 UNIT(S): 5000 INJECTION INTRAVENOUS; SUBCUTANEOUS at 05:37

## 2023-09-18 RX ADMIN — GABAPENTIN 100 MILLIGRAM(S): 400 CAPSULE ORAL at 05:37

## 2023-09-18 RX ADMIN — CLOPIDOGREL BISULFATE 75 MILLIGRAM(S): 75 TABLET, FILM COATED ORAL at 11:10

## 2023-09-18 RX ADMIN — LACTULOSE 10 GRAM(S): 10 SOLUTION ORAL at 11:10

## 2023-09-18 RX ADMIN — Medication 500 MILLIGRAM(S): at 11:10

## 2023-09-18 RX ADMIN — MUPIROCIN 1 APPLICATION(S): 20 OINTMENT TOPICAL at 05:38

## 2023-09-18 RX ADMIN — Medication 2: at 16:50

## 2023-09-18 RX ADMIN — LACTULOSE 10 GRAM(S): 10 SOLUTION ORAL at 16:49

## 2023-09-18 RX ADMIN — Medication 50 MILLIEQUIVALENT(S): at 01:50

## 2023-09-18 RX ADMIN — POTASSIUM PHOSPHATE, MONOBASIC POTASSIUM PHOSPHATE, DIBASIC 62.5 MILLIMOLE(S): 236; 224 INJECTION, SOLUTION INTRAVENOUS at 01:50

## 2023-09-18 NOTE — PROGRESS NOTE ADULT - SUBJECTIVE AND OBJECTIVE BOX
Patient discussed on morning rounds with       Operation / Date:     SUBJECTIVE ASSESSMENT:  63y Male       Vital Signs Last 24 Hrs  T(C): 36.3 (18 Sep 2023 08:51), Max: 36.6 (17 Sep 2023 21:02)  T(F): 97.4 (18 Sep 2023 08:51), Max: 97.8 (17 Sep 2023 21:02)  HR: 73 (18 Sep 2023 10:00) (66 - 103)  BP: --  BP(mean): --  RR: 16 (18 Sep 2023 10:00) (11 - 20)  SpO2: 99% (18 Sep 2023 10:00) (93% - 99%)    Parameters below as of 18 Sep 2023 10:00  Patient On (Oxygen Delivery Method): nasal cannula w/ humidification  O2 Flow (L/min): 4    I&O's Detail    17 Sep 2023 07:01  -  18 Sep 2023 07:00  --------------------------------------------------------  IN:    Albumin 25%  -  50 mL: 200 mL    Albumin 5%  - 250 mL: 300 mL    IV PiggyBack: 300 mL    IV PiggyBack: 250 mL    Oral Fluid: 760 mL    PRBCs (Packed Red Blood Cells): 450 mL    sodium chloride 0.9%: 240 mL  Total IN: 2500 mL    OUT:    Indwelling Catheter - Urethral (mL): 3015 mL  Total OUT: 3015 mL    Total NET: -515 mL      18 Sep 2023 07:01  -  18 Sep 2023 11:26  --------------------------------------------------------  IN:    sodium chloride 0.9%: 20 mL  Total IN: 20 mL    OUT:    Indwelling Catheter - Urethral (mL): 50 mL  Total OUT: 50 mL    Total NET: -30 mL          CHEST TUBE:    SUNITHA DRAIN:    EPICARDIAL WIRES:   TIE BELLE:   MELANY:     PHYSICAL EXAM:  *****    LABS:                        9.0    7.46  )-----------( 76       ( 18 Sep 2023 10:35 )             26.6       PT/INR - ( 18 Sep 2023 10:35 )   PT: 19.8 sec;   INR: 1.77          PTT - ( 18 Sep 2023 10:35 )  PTT:35.2 sec    09-18    136  |  105  |  x   ----------------------------<  x   4.1   |  x   |  x     Ca    8.9      18 Sep 2023 03:35  Phos  2.0     09-18  Mg     2.1     09-18    TPro  6.2  /  Alb  4.0  /  TBili  8.6<H>  /  DBili  x   /  AST  62<H>  /  ALT  29  /  AlkPhos  80  09-18      Urinalysis Basic - ( 18 Sep 2023 03:35 )    Color: x / Appearance: x / SG: x / pH: x  Gluc: 216 mg/dL / Ketone: x  / Bili: x / Urobili: x   Blood: x / Protein: x / Nitrite: x   Leuk Esterase: x / RBC: x / WBC x   Sq Epi: x / Non Sq Epi: x / Bacteria: x    MEDICATIONS  (STANDING):  ascorbic acid 500 milliGRAM(s) Oral daily  bisacodyl Suppository 10 milliGRAM(s) Rectal once  chlorhexidine 2% Cloths 1 Application(s) Topical daily  clopidogrel Tablet 75 milliGRAM(s) Oral daily  dextrose 5%. 1000 milliLiter(s) (100 mL/Hr) IV Continuous <Continuous>  dextrose 5%. 1000 milliLiter(s) (50 mL/Hr) IV Continuous <Continuous>  dextrose 50% Injectable 50 milliLiter(s) IV Push every 15 minutes  dextrose 50% Injectable 25 milliLiter(s) IV Push every 15 minutes  gabapentin 100 milliGRAM(s) Oral every 8 hours  glucagon  Injectable 1 milliGRAM(s) IntraMuscular once  hydrocortisone sodium succinate Injectable 50 milliGRAM(s) IV Push every 8 hours  insulin lispro (ADMELOG) corrective regimen sliding scale   SubCutaneous Before meals and at bedtime  lactulose Syrup 10 Gram(s) Oral four times a day  pantoprazole    Tablet 40 milliGRAM(s) Oral daily  polyethylene glycol 3350 17 Gram(s) Oral daily  rifAXIMin 550 milliGRAM(s) Oral two times a day  senna 2 Tablet(s) Oral at bedtime  sodium chloride 0.9% lock flush 3 milliLiter(s) IV Push every 8 hours  sodium chloride 0.9%. 1000 milliLiter(s) (10 mL/Hr) IV Continuous <Continuous>    MEDICATIONS  (PRN):  dextrose Oral Gel 15 Gram(s) Oral once PRN Blood Glucose LESS THAN 70 milliGRAM(s)/deciliter  traMADol 25 milliGRAM(s) Oral every 6 hours PRN Moderate Pain (4 - 6)        RADIOLOGY & ADDITIONAL TESTS:     Operation / Date: OPCAB x 4 (LIMA-LAD, IGH-[id number]-KN-PDA) on 9/14/23    SUBJECTIVE ASSESSMENT: Patient seen and examined at bedside. Patient states that he feels okay, ambulated well and admits to some pain at his previous drain sites. Denies chills, chest pain, SOB, palpitations.     Vital Signs Last 24 Hrs  T(C): 36.3 (18 Sep 2023 08:51), Max: 36.6 (17 Sep 2023 21:02)  T(F): 97.4 (18 Sep 2023 08:51), Max: 97.8 (17 Sep 2023 21:02)  HR: 73 (18 Sep 2023 10:00) (66 - 103)  BP: --  BP(mean): --  RR: 16 (18 Sep 2023 10:00) (11 - 20)  SpO2: 99% (18 Sep 2023 10:00) (93% - 99%)    Parameters below as of 18 Sep 2023 10:00  Patient On (Oxygen Delivery Method): nasal cannula w/ humidification  O2 Flow (L/min): 4    I&O's Detail    17 Sep 2023 07:01  -  18 Sep 2023 07:00  --------------------------------------------------------  IN:    Albumin 25%  -  50 mL: 200 mL    Albumin 5%  - 250 mL: 300 mL    IV PiggyBack: 300 mL    IV PiggyBack: 250 mL    Oral Fluid: 760 mL    PRBCs (Packed Red Blood Cells): 450 mL    sodium chloride 0.9%: 240 mL  Total IN: 2500 mL    OUT:    Indwelling Catheter - Urethral (mL): 3015 mL  Total OUT: 3015 mL    Total NET: -515 mL    18 Sep 2023 07:01  -  18 Sep 2023 11:26  --------------------------------------------------------  IN:    sodium chloride 0.9%: 20 mL  Total IN: 20 mL    OUT:    Indwelling Catheter - Urethral (mL): 50 mL  Total OUT: 50 mL    Total NET: -30 mL    CHEST TUBE:  None  SUNITHA DRAIN:  None  EPICARDIAL WIRES: Yes  TIE DOWNS: None  MOSLEY: None    PHYSICAL EXAM:  GENERAL: NAD, sitting upright in chair  HEAD:  Atraumatic, Normocephalic  EYES: EOMI, PERRLA, conjunctiva and sclera clear  ENT: Moist mucous membranes  NECK: Supple, No JVD  CHEST/LUNG: CTAB; MSI well-approximated; Wires in place, dressing over previous drain sites c/d/i  HEART: RRR  ABDOMEN: Bowel sounds present; Soft, Nontender, Nondistended. No hepatomegally  EXTREMITIES:  2+ Peripheral Pulses, brisk capillary refill. No clubbing, cyanosis, or edema  NERVOUS SYSTEM:  Alert & Oriented X3, speech clear. No deficits     LABS:                        9.0    7.46  )-----------( 76       ( 18 Sep 2023 10:35 )             26.6       PT/INR - ( 18 Sep 2023 10:35 )   PT: 19.8 sec;   INR: 1.77          PTT - ( 18 Sep 2023 10:35 )  PTT:35.2 sec    09-18    136  |  105  |  x   ----------------------------<  x   4.1   |  x   |  x     Ca    8.9      18 Sep 2023 03:35  Phos  2.0     09-18  Mg     2.1     09-18    TPro  6.2  /  Alb  4.0  /  TBili  8.6<H>  /  DBili  x   /  AST  62<H>  /  ALT  29  /  AlkPhos  80  09-18    Urinalysis Basic - ( 18 Sep 2023 03:35 )    Color: x / Appearance: x / SG: x / pH: x  Gluc: 216 mg/dL / Ketone: x  / Bili: x / Urobili: x   Blood: x / Protein: x / Nitrite: x   Leuk Esterase: x / RBC: x / WBC x   Sq Epi: x / Non Sq Epi: x / Bacteria: x    MEDICATIONS  (STANDING):  ascorbic acid 500 milliGRAM(s) Oral daily  bisacodyl Suppository 10 milliGRAM(s) Rectal once  chlorhexidine 2% Cloths 1 Application(s) Topical daily  clopidogrel Tablet 75 milliGRAM(s) Oral daily  dextrose 5%. 1000 milliLiter(s) (100 mL/Hr) IV Continuous <Continuous>  dextrose 5%. 1000 milliLiter(s) (50 mL/Hr) IV Continuous <Continuous>  dextrose 50% Injectable 50 milliLiter(s) IV Push every 15 minutes  dextrose 50% Injectable 25 milliLiter(s) IV Push every 15 minutes  gabapentin 100 milliGRAM(s) Oral every 8 hours  glucagon  Injectable 1 milliGRAM(s) IntraMuscular once  hydrocortisone sodium succinate Injectable 50 milliGRAM(s) IV Push every 8 hours  insulin lispro (ADMELOG) corrective regimen sliding scale   SubCutaneous Before meals and at bedtime  lactulose Syrup 10 Gram(s) Oral four times a day  pantoprazole    Tablet 40 milliGRAM(s) Oral daily  polyethylene glycol 3350 17 Gram(s) Oral daily  rifAXIMin 550 milliGRAM(s) Oral two times a day  senna 2 Tablet(s) Oral at bedtime  sodium chloride 0.9% lock flush 3 milliLiter(s) IV Push every 8 hours  sodium chloride 0.9%. 1000 milliLiter(s) (10 mL/Hr) IV Continuous <Continuous>    MEDICATIONS  (PRN):  dextrose Oral Gel 15 Gram(s) Oral once PRN Blood Glucose LESS THAN 70 milliGRAM(s)/deciliter  traMADol 25 milliGRAM(s) Oral every 6 hours PRN Moderate Pain (4 - 6)    RADIOLOGY & ADDITIONAL TESTS:  < from: Xray Chest 1 View- PORTABLE-Routine (Xray Chest 1 View- PORTABLE-Routine in AM.) (09.18.23 @ 05:12) >  Findings/  impression: Bilateral opacities/pleural effusions, left focal atelectasis   Satisfactory positioning of support devices. Stable cardiomegaly,   thoracic aortic calcification, status post median sternotomy, CABG.   Stable bony structures.

## 2023-09-18 NOTE — PROGRESS NOTE ADULT - SUBJECTIVE AND OBJECTIVE BOX
Patient seen at bedside.  He complains of sense of bloating and multiple bowel movements.  No nausea or vomiting.  No rectal bleeding.  He had some bouts of confusion over the weekend which have resolved.  No fever.  His bilirubin has increased to 10.  No other major liver or GI related symptoms.  Vital signs are stable  Scleral icterus  Mild muscle wasting  Clear lungs  Regular heart beats with no murmur  Abdomen is mildly distended and is gaseous.  No edema of extremities  Awake alert and oriented with minimal asterixis    Imp/Suggestions:    Patient with cirrhosis related to alcohol use disorder with history of decompensation and upper GI bleeding related to varices.  He stopped drinking and all of these improved.  Now he is a status post CABG for multivessel coronary artery disease.  As expected his bilirubin increased from the baseline of 2 to-10.  However, the bilirubin has plateaued and improving gradually.  He had encephalopathy which responded to combination of rifaximin and lactulose.  His abdomen is mildly distended with gaseous distention.  I doubt he has any ascites.  I suggest to slow down the lactulose and use MiraLAX for bowel movements.  No obvious evidence of infection at this time  He is on low-dose gabapentin and tramadol.  It appears that is helping him with the pain without significant worsening of his mental status.  Monitor for any signs of sepsis  Daily labs including CBC, CMP and PT/INR  Avoid high-dose narcotics  If patient is going to be on beta-blockers, we prefer and none cardioselective beta-blocker such as carvedilol.    I recommend to wait for liver function to improve before starting statins unless if there is an urgent indication.

## 2023-09-18 NOTE — PROGRESS NOTE ADULT - ASSESSMENT
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 Patient originally found to have CAD on cardiac cath in 2/2023. During his hospital stay in february he also reported history of black stools. Work up revealed esophageal varices s/p banding. Patient was seen by Dr. Hinton for surgical consult. At the time, he was deemed not a PCI/Stents 2/2 inability to tolerate DAPT and high risk for surgery secondary to liver dysfunction with MELD score of 18. Patient has been seen by hepatologist Dr. Lawson. Patient presented to [facility] on 9/12/23 for pre-op optimization and on 9/14/23 patient underwent uncomplicated OPCAB x 4 (LIMA-LAD, [id number]-PDA). Arrived to the ICU intubated and on Cardene. POD1 levo was started for increased renal perfusion. Diuresis started. POD2 extubated to NC. Echo revealed grossly normal function and no pericardial effusion. Patient was confused. Ammonia level elevated and started on Rifampin and Lactulose. POD3 Given 1u PRBC for hgb of 7. Briefly on BIPAP but weaned to NC by the evening. POD4 Midodrine stopped but still holding BB for renal perfusion. Mental status improved. Ammonia and HIT panel pending. Transferred to the floor.     Plan:    Neurovascular:   -Hx of ETOH use  -Hx of TIA  -Pain well controlled. PRN Tramadol  -Cont Gabapentin    Cardiovascular:   -3v CAD s/p OPCAB x 4 on 9/14/23  -No ASA 2/2 allergy. Cont Plavix  -BB held for renal perfusion  -No statin for liver disease  -Hx of HTN  -BB held for renal perfusion  -Hx of HLD  -Hemodynamically stable.   -Monitor: BP, HR, tele    Respiratory:   -Oxygenating well on room air  -Encourage continued use of IS 10x/hr and frequent ambulation  -CXR: stable    GI:  -Hx of Cirrhosis  -Cont Rifaximin  -Hepatology following, recs appreciated  -Hx of Esophageal varices  -GI PPX: Protonix  -PO Diet  -Bowel Regimen: Miralax, dulcolax and lactulose    Renal / :  -Continue to monitor renal function: BUN/Cr: 10/0.58  -Monitor I/O's daily     Endocrine:    -Hx of DMII  -A1c: 7.0  -Cont ISS  -No hx of thyroid dx  -TSH: 1.31    Hematologic:  -CBC: H/H- 9/26.6  -Coagulation Panel.    ID:  -Temperature: Afebrile  -CBC: WBC- 7.46  -Continue to observe for SIRS/Sepsis Syndrome.    Prophylaxis:  -DVT prophylaxis with 5000 SubQ Heparin q8h.  -Continue with SCD's b/l while patient is at rest     Disposition:  -Discharge once medically stable

## 2023-09-19 ENCOUNTER — TRANSCRIPTION ENCOUNTER (OUTPATIENT)
Age: 63
End: 2023-09-19

## 2023-09-19 LAB
ALBUMIN SERPL ELPH-MCNC: 4 G/DL — SIGNIFICANT CHANGE UP (ref 3.3–5)
ALP SERPL-CCNC: 113 U/L — SIGNIFICANT CHANGE UP (ref 40–120)
ALT FLD-CCNC: 33 U/L — SIGNIFICANT CHANGE UP (ref 10–45)
AMMONIA BLD-MCNC: 59 UMOL/L — HIGH (ref 11–55)
AMYLASE P1 CFR SERPL: 65 U/L — SIGNIFICANT CHANGE UP (ref 25–125)
ANION GAP SERPL CALC-SCNC: 11 MMOL/L — SIGNIFICANT CHANGE UP (ref 5–17)
ANISOCYTOSIS BLD QL: SLIGHT — SIGNIFICANT CHANGE UP
APTT BLD: 29.4 SEC — SIGNIFICANT CHANGE UP (ref 24.5–35.6)
AST SERPL-CCNC: 57 U/L — HIGH (ref 10–40)
BASOPHILS # BLD AUTO: 0 K/UL — SIGNIFICANT CHANGE UP (ref 0–0.2)
BASOPHILS NFR BLD AUTO: 0 % — SIGNIFICANT CHANGE UP (ref 0–2)
BILIRUB SERPL-MCNC: 6.2 MG/DL — HIGH (ref 0.2–1.2)
BUN SERPL-MCNC: 13 MG/DL — SIGNIFICANT CHANGE UP (ref 7–23)
BURR CELLS BLD QL SMEAR: PRESENT — SIGNIFICANT CHANGE UP
CALCIUM SERPL-MCNC: 9.2 MG/DL — SIGNIFICANT CHANGE UP (ref 8.4–10.5)
CHLORIDE SERPL-SCNC: 105 MMOL/L — SIGNIFICANT CHANGE UP (ref 96–108)
CO2 SERPL-SCNC: 20 MMOL/L — LOW (ref 22–31)
CREAT SERPL-MCNC: 0.65 MG/DL — SIGNIFICANT CHANGE UP (ref 0.5–1.3)
EGFR: 105 ML/MIN/1.73M2 — SIGNIFICANT CHANGE UP
EOSINOPHIL # BLD AUTO: 0.07 K/UL — SIGNIFICANT CHANGE UP (ref 0–0.5)
EOSINOPHIL NFR BLD AUTO: 0.9 % — SIGNIFICANT CHANGE UP (ref 0–6)
GLUCOSE BLDC GLUCOMTR-MCNC: 186 MG/DL — HIGH (ref 70–99)
GLUCOSE BLDC GLUCOMTR-MCNC: 199 MG/DL — HIGH (ref 70–99)
GLUCOSE BLDC GLUCOMTR-MCNC: 201 MG/DL — HIGH (ref 70–99)
GLUCOSE BLDC GLUCOMTR-MCNC: 220 MG/DL — HIGH (ref 70–99)
GLUCOSE BLDC GLUCOMTR-MCNC: 230 MG/DL — HIGH (ref 70–99)
GLUCOSE SERPL-MCNC: 205 MG/DL — HIGH (ref 70–99)
HCT VFR BLD CALC: 28.6 % — LOW (ref 39–50)
HGB BLD-MCNC: 9.5 G/DL — LOW (ref 13–17)
INR BLD: 1.6 — HIGH (ref 0.85–1.18)
LYMPHOCYTES # BLD AUTO: 0.46 K/UL — LOW (ref 1–3.3)
LYMPHOCYTES # BLD AUTO: 6.2 % — LOW (ref 13–44)
MAGNESIUM SERPL-MCNC: 2 MG/DL — SIGNIFICANT CHANGE UP (ref 1.6–2.6)
MANUAL SMEAR VERIFICATION: SIGNIFICANT CHANGE UP
MCHC RBC-ENTMCNC: 30.5 PG — SIGNIFICANT CHANGE UP (ref 27–34)
MCHC RBC-ENTMCNC: 33.2 GM/DL — SIGNIFICANT CHANGE UP (ref 32–36)
MCV RBC AUTO: 92 FL — SIGNIFICANT CHANGE UP (ref 80–100)
MONOCYTES # BLD AUTO: 0.78 K/UL — SIGNIFICANT CHANGE UP (ref 0–0.9)
MONOCYTES NFR BLD AUTO: 10.6 % — SIGNIFICANT CHANGE UP (ref 2–14)
NEUTROPHILS # BLD AUTO: 6.06 K/UL — SIGNIFICANT CHANGE UP (ref 1.8–7.4)
NEUTROPHILS NFR BLD AUTO: 82.3 % — HIGH (ref 43–77)
OVALOCYTES BLD QL SMEAR: SLIGHT — SIGNIFICANT CHANGE UP
PHOSPHATE SERPL-MCNC: 2.3 MG/DL — LOW (ref 2.5–4.5)
PLAT MORPH BLD: NORMAL — SIGNIFICANT CHANGE UP
PLATELET # BLD AUTO: 105 K/UL — LOW (ref 150–400)
POIKILOCYTOSIS BLD QL AUTO: SIGNIFICANT CHANGE UP
POLYCHROMASIA BLD QL SMEAR: SLIGHT — SIGNIFICANT CHANGE UP
POTASSIUM SERPL-MCNC: 4.5 MMOL/L — SIGNIFICANT CHANGE UP (ref 3.5–5.3)
POTASSIUM SERPL-SCNC: 4.5 MMOL/L — SIGNIFICANT CHANGE UP (ref 3.5–5.3)
PROT SERPL-MCNC: 6.1 G/DL — SIGNIFICANT CHANGE UP (ref 6–8.3)
PROTHROM AB SERPL-ACNC: 18 SEC — HIGH (ref 9.5–13)
RBC # BLD: 3.11 M/UL — LOW (ref 4.2–5.8)
RBC # FLD: 15 % — HIGH (ref 10.3–14.5)
RBC BLD AUTO: ABNORMAL
SODIUM SERPL-SCNC: 136 MMOL/L — SIGNIFICANT CHANGE UP (ref 135–145)
SPHEROCYTES BLD QL SMEAR: SLIGHT — SIGNIFICANT CHANGE UP
WBC # BLD: 7.36 K/UL — SIGNIFICANT CHANGE UP (ref 3.8–10.5)
WBC # FLD AUTO: 7.36 K/UL — SIGNIFICANT CHANGE UP (ref 3.8–10.5)

## 2023-09-19 PROCEDURE — 71045 X-RAY EXAM CHEST 1 VIEW: CPT | Mod: 26

## 2023-09-19 RX ORDER — CARVEDILOL PHOSPHATE 80 MG/1
3.12 CAPSULE, EXTENDED RELEASE ORAL EVERY 12 HOURS
Refills: 0 | Status: DISCONTINUED | OUTPATIENT
Start: 2023-09-19 | End: 2023-09-19

## 2023-09-19 RX ORDER — HYDROCORTISONE 20 MG
25 TABLET ORAL EVERY 8 HOURS
Refills: 0 | Status: DISCONTINUED | OUTPATIENT
Start: 2023-09-19 | End: 2023-09-21

## 2023-09-19 RX ORDER — CARVEDILOL PHOSPHATE 80 MG/1
6.25 CAPSULE, EXTENDED RELEASE ORAL EVERY 12 HOURS
Refills: 0 | Status: DISCONTINUED | OUTPATIENT
Start: 2023-09-19 | End: 2023-09-21

## 2023-09-19 RX ADMIN — SENNA PLUS 2 TABLET(S): 8.6 TABLET ORAL at 21:11

## 2023-09-19 RX ADMIN — CHLORHEXIDINE GLUCONATE 1 APPLICATION(S): 213 SOLUTION TOPICAL at 06:12

## 2023-09-19 RX ADMIN — GABAPENTIN 100 MILLIGRAM(S): 400 CAPSULE ORAL at 13:19

## 2023-09-19 RX ADMIN — CARVEDILOL PHOSPHATE 6.25 MILLIGRAM(S): 80 CAPSULE, EXTENDED RELEASE ORAL at 19:35

## 2023-09-19 RX ADMIN — Medication 500 MILLIGRAM(S): at 12:56

## 2023-09-19 RX ADMIN — SODIUM CHLORIDE 3 MILLILITER(S): 9 INJECTION INTRAMUSCULAR; INTRAVENOUS; SUBCUTANEOUS at 13:26

## 2023-09-19 RX ADMIN — TRAMADOL HYDROCHLORIDE 25 MILLIGRAM(S): 50 TABLET ORAL at 22:52

## 2023-09-19 RX ADMIN — Medication 4: at 12:19

## 2023-09-19 RX ADMIN — LACTULOSE 10 GRAM(S): 10 SOLUTION ORAL at 06:13

## 2023-09-19 RX ADMIN — Medication 25 MILLIGRAM(S): at 13:18

## 2023-09-19 RX ADMIN — CLOPIDOGREL BISULFATE 75 MILLIGRAM(S): 75 TABLET, FILM COATED ORAL at 12:56

## 2023-09-19 RX ADMIN — CARVEDILOL PHOSPHATE 3.12 MILLIGRAM(S): 80 CAPSULE, EXTENDED RELEASE ORAL at 09:14

## 2023-09-19 RX ADMIN — GABAPENTIN 100 MILLIGRAM(S): 400 CAPSULE ORAL at 21:43

## 2023-09-19 RX ADMIN — PANTOPRAZOLE SODIUM 40 MILLIGRAM(S): 20 TABLET, DELAYED RELEASE ORAL at 12:57

## 2023-09-19 RX ADMIN — Medication 25 MILLIGRAM(S): at 21:11

## 2023-09-19 RX ADMIN — POLYETHYLENE GLYCOL 3350 17 GRAM(S): 17 POWDER, FOR SOLUTION ORAL at 12:56

## 2023-09-19 RX ADMIN — Medication 4: at 21:45

## 2023-09-19 RX ADMIN — Medication 2: at 07:55

## 2023-09-19 RX ADMIN — SODIUM CHLORIDE 3 MILLILITER(S): 9 INJECTION INTRAMUSCULAR; INTRAVENOUS; SUBCUTANEOUS at 21:16

## 2023-09-19 RX ADMIN — Medication 50 MILLIGRAM(S): at 06:12

## 2023-09-19 RX ADMIN — GABAPENTIN 100 MILLIGRAM(S): 400 CAPSULE ORAL at 06:12

## 2023-09-19 RX ADMIN — Medication 4: at 17:10

## 2023-09-19 RX ADMIN — SODIUM CHLORIDE 3 MILLILITER(S): 9 INJECTION INTRAMUSCULAR; INTRAVENOUS; SUBCUTANEOUS at 06:00

## 2023-09-19 NOTE — PROGRESS NOTE ADULT - SUBJECTIVE AND OBJECTIVE BOX
Hepatology Consult Progress Note:     OVERNIGHT EVENTS: RAZIA.    SUBJECTIVE / INTERVAL HPI:   Patient seen and examined at bedside. Complaining of dry eyes and fatigue but otherwise has no acute complaints at this time. Mental status improved from the weekend as patient is easily about to recall the days of the week backwards. Has some pain at incision site but overall doing well he reports.       VITAL SIGNS:  Vital Signs Last 24 Hrs  T(C): 36.3 (19 Sep 2023 16:58), Max: 36.9 (18 Sep 2023 22:44)  T(F): 97.3 (19 Sep 2023 16:58), Max: 98.5 (18 Sep 2023 22:44)  HR: 86 (19 Sep 2023 17:33) (77 - 95)  BP: 143/90 (19 Sep 2023 17:33) (112/70 - 143/90)  BP(mean): 111 (19 Sep 2023 17:33) (85 - 112)  RR: 18 (19 Sep 2023 17:33) (16 - 18)  SpO2: 94% (19 Sep 2023 17:33) (93% - 95%)    Parameters below as of 19 Sep 2023 17:33  Patient On (Oxygen Delivery Method): room air        09-18-23 @ 07:01  -  09-19-23 @ 07:00  --------------------------------------------------------  IN: 20 mL / OUT: 680 mL / NET: -660 mL    09-19-23 @ 07:01  - 09-19-23 @ 18:45  --------------------------------------------------------  IN: 750 mL / OUT: 0 mL / NET: 750 mL      PHYSICAL EXAM:  General: No acute distress, scleral icterus, mild muscle wasting   Lungs: Normal respiratory effort and no intercostal retractions  Cardiovascular: RRR  Abdomen: Soft, non-tender, mildly distended   Neurological: Alert and oriented x3  Skin: Warm and dry. No obvious rash       MEDICATIONS:  MEDICATIONS  (STANDING):  bisacodyl Suppository 10 milliGRAM(s) Rectal once  carvedilol 6.25 milliGRAM(s) Oral every 12 hours  chlorhexidine 2% Cloths 1 Application(s) Topical daily  clopidogrel Tablet 75 milliGRAM(s) Oral daily  dextrose 5%. 1000 milliLiter(s) (50 mL/Hr) IV Continuous <Continuous>  dextrose 5%. 1000 milliLiter(s) (100 mL/Hr) IV Continuous <Continuous>  dextrose 50% Injectable 25 milliLiter(s) IV Push every 15 minutes  dextrose 50% Injectable 50 milliLiter(s) IV Push every 15 minutes  gabapentin 100 milliGRAM(s) Oral every 8 hours  glucagon  Injectable 1 milliGRAM(s) IntraMuscular once  hydrocortisone sodium succinate Injectable 25 milliGRAM(s) IV Push every 8 hours  insulin lispro (ADMELOG) corrective regimen sliding scale   SubCutaneous Before meals and at bedtime  pantoprazole    Tablet 40 milliGRAM(s) Oral daily  polyethylene glycol 3350 17 Gram(s) Oral daily  rifAXIMin 550 milliGRAM(s) Oral two times a day  senna 2 Tablet(s) Oral at bedtime  sodium chloride 0.9% lock flush 3 milliLiter(s) IV Push every 8 hours  sodium chloride 0.9%. 1000 milliLiter(s) (10 mL/Hr) IV Continuous <Continuous>    MEDICATIONS  (PRN):  artificial tears (preservative free) Ophthalmic Solution 1 Drop(s) Both EYES every 12 hours PRN Dry Eyes  dextrose Oral Gel 15 Gram(s) Oral once PRN Blood Glucose LESS THAN 70 milliGRAM(s)/deciliter  traMADol 25 milliGRAM(s) Oral every 6 hours PRN Moderate Pain (4 - 6)      ALLERGIES:  Allergies    aspirin (Pruritus)    Intolerances        LABS:                        9.5    7.36  )-----------( 105      ( 19 Sep 2023 06:46 )             28.6     09-19    136  |  105  |  13  ----------------------------<  205<H>  4.5   |  20<L>  |  0.65    Ca    9.2      19 Sep 2023 06:46  Phos  2.3     09-19  Mg     2.0     09-19    TPro  6.1  /  Alb  4.0  /  TBili  6.2<H>  /  DBili  x   /  AST  57<H>  /  ALT  33  /  AlkPhos  113  09-19    PT/INR - ( 19 Sep 2023 06:46 )   PT: 18.0 sec;   INR: 1.60          PTT - ( 19 Sep 2023 06:46 )  PTT:29.4 sec  Urinalysis Basic - ( 19 Sep 2023 06:46 )    Color: x / Appearance: x / SG: x / pH: x  Gluc: 205 mg/dL / Ketone: x  / Bili: x / Urobili: x   Blood: x / Protein: x / Nitrite: x   Leuk Esterase: x / RBC: x / WBC x   Sq Epi: x / Non Sq Epi: x / Bacteria: x      CAPILLARY BLOOD GLUCOSE      POCT Blood Glucose.: 230 mg/dL (19 Sep 2023 16:40)        RADIOLOGY & ADDITIONAL TESTS: Reviewed.    ASSESSMENT:    PLAN:

## 2023-09-19 NOTE — PROGRESS NOTE ADULT - SUBJECTIVE AND OBJECTIVE BOX
Patient discussed on morning rounds with Dr. Roberts    Operation / Date: OPCAB x 4 (LIMA-LAD, UGE-[id number]-PDA) on 9/14/23    SUBJECTIVE ASSESSMENT: Patient seen and examined at bedside. Patient admits to come incisional pain after ambulating, otherwise feels okay. Denies chills, chest pain, SOB, palpitations.     Vital Signs Last 24 Hrs  T(C): 36.1 (19 Sep 2023 09:18), Max: 36.9 (18 Sep 2023 22:44)  T(F): 97 (19 Sep 2023 09:18), Max: 98.5 (18 Sep 2023 22:44)  HR: 95 (19 Sep 2023 09:15) (77 - 95)  BP: 142/91 (19 Sep 2023 09:15) (127/76 - 142/91)  BP(mean): 112 (19 Sep 2023 09:15) (97 - 112)  RR: 16 (19 Sep 2023 09:15) (16 - 18)  SpO2: 94% (19 Sep 2023 09:15) (93% - 99%)    Parameters below as of 19 Sep 2023 09:15  Patient On (Oxygen Delivery Method): room air    I&O's Detail    18 Sep 2023 07:01  -  19 Sep 2023 07:00  --------------------------------------------------------  IN:    sodium chloride 0.9%: 20 mL  Total IN: 20 mL    OUT:    Indwelling Catheter - Urethral (mL): 80 mL    Voided (mL): 600 mL  Total OUT: 680 mL    Total NET: -660 mL    19 Sep 2023 07:01  -  19 Sep 2023 11:59  --------------------------------------------------------  IN:    Oral Fluid: 250 mL  Total IN: 250 mL    OUT:  Total OUT: 0 mL    Total NET: 250 mL    CHEST TUBE:  None  SUNITHA DRAIN:  None  EPICARDIAL WIRES: Yes  TIE DOWNS: None  MOSLEY: None    PHYSICAL EXAM:  GENERAL: NAD, sitting upright in chair  HEAD:  Atraumatic, Normocephalic  EYES: EOMI, PERRLA, conjunctiva and sclera clear  ENT: Moist mucous membranes  NECK: Supple, No JVD  CHEST/LUNG: CTAB; MSI well-approximated; Wires in place, dressing over previous drain sites c/d/i  HEART: RRR  ABDOMEN: Bowel sounds present; Soft, Nontender, Nondistended. No hepatomegally  EXTREMITIES:  2+ Peripheral Pulses, brisk capillary refill. No clubbing, cyanosis, or edema  NERVOUS SYSTEM:  Alert & Oriented X3, speech clear. No deficits     LABS:                        9.5    7.36  )-----------( 105      ( 19 Sep 2023 06:46 )             28.6     PT/INR - ( 19 Sep 2023 06:46 )   PT: 18.0 sec;   INR: 1.60       PTT - ( 19 Sep 2023 06:46 )  PTT:29.4 sec    09-19    136  |  105  |  13  ----------------------------<  205<H>  4.5   |  20<L>  |  0.65    Ca    9.2      19 Sep 2023 06:46  Phos  2.3     09-19  Mg     2.0     09-19    TPro  6.1  /  Alb  4.0  /  TBili  6.2<H>  /  DBili  x   /  AST  57<H>  /  ALT  33  /  AlkPhos  113  09-19    Urinalysis Basic - ( 19 Sep 2023 06:46 )    Color: x / Appearance: x / SG: x / pH: x  Gluc: 205 mg/dL / Ketone: x  / Bili: x / Urobili: x   Blood: x / Protein: x / Nitrite: x   Leuk Esterase: x / RBC: x / WBC x   Sq Epi: x / Non Sq Epi: x / Bacteria: x    MEDICATIONS  (STANDING):  ascorbic acid 500 milliGRAM(s) Oral daily  bisacodyl Suppository 10 milliGRAM(s) Rectal once  carvedilol 3.125 milliGRAM(s) Oral every 12 hours  chlorhexidine 2% Cloths 1 Application(s) Topical daily  clopidogrel Tablet 75 milliGRAM(s) Oral daily  dextrose 5%. 1000 milliLiter(s) (50 mL/Hr) IV Continuous <Continuous>  dextrose 5%. 1000 milliLiter(s) (100 mL/Hr) IV Continuous <Continuous>  dextrose 50% Injectable 25 milliLiter(s) IV Push every 15 minutes  dextrose 50% Injectable 50 milliLiter(s) IV Push every 15 minutes  gabapentin 100 milliGRAM(s) Oral every 8 hours  glucagon  Injectable 1 milliGRAM(s) IntraMuscular once  hydrocortisone sodium succinate Injectable 25 milliGRAM(s) IV Push every 8 hours  insulin lispro (ADMELOG) corrective regimen sliding scale   SubCutaneous Before meals and at bedtime  pantoprazole    Tablet 40 milliGRAM(s) Oral daily  polyethylene glycol 3350 17 Gram(s) Oral daily  rifAXIMin 550 milliGRAM(s) Oral two times a day  senna 2 Tablet(s) Oral at bedtime  sodium chloride 0.9% lock flush 3 milliLiter(s) IV Push every 8 hours  sodium chloride 0.9%. 1000 milliLiter(s) (10 mL/Hr) IV Continuous <Continuous>    MEDICATIONS  (PRN):  dextrose Oral Gel 15 Gram(s) Oral once PRN Blood Glucose LESS THAN 70 milliGRAM(s)/deciliter  traMADol 25 milliGRAM(s) Oral every 6 hours PRN Moderate Pain (4 - 6)    RADIOLOGY & ADDITIONAL TESTS:  < from: Xray Chest 1 View- PORTABLE-Routine (Xray Chest 1 View- PORTABLE-Routine in AM.) (09.19.23 @ 05:13) >  IMPRESSION:    The right venous catheter has been removed since prior exam 9/18/2023. No   other definite change. No pneumothorax. No significant pleural effusion.   Minimal lung base focal atelectasis.

## 2023-09-19 NOTE — PROGRESS NOTE ADULT - ASSESSMENT
63 [place] ([facility]) M former smoker with PMH of HTN, severe 3 vessel CAD, HLD, pre-diabetes, gout, TIA (14 yrs ago), gastric ulcer, rectal bleed, LIZ and alcoholic liver disease c/b cirrhosis and portal hypertension m/b esophageal varices (and UGIB) and splenomegaly, presenting for OPCAB with CT surgery. Hepatology team consulted for sotero-operative optimization.     Underwent OPCAB on 09/14 and initially encephalopathic but mental status improved with rifaximin and lactulose.     Recommendations:   - continue rifaximin 550 mg PO BID and Miralax 17g PO daily for HE   - continue low-dose gabapentin and tramadol for pain control  - would avoid high-dose narcotics   - agree with starting Coreg 6.25 mg PO BID   - continue pantoprazole 40 mg PO daily for mucosal protection   - daily CBC, CMP, and INR to calculate Meld 3.0 score  - bilirubin steadily down-trending and Meld 3.0 score today: 19  - continue to monitor for signs of infection      Hepatology Team will continue to follow.     Arina Murray D.O.   Gastroenterology Fellow  Weekday 7am-5pm Pager: [phone]

## 2023-09-19 NOTE — PROGRESS NOTE ADULT - ASSESSMENT
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 Patient originally found to have CAD on cardiac cath in 2/2023. During his hospital stay in february he also reported history of black stools. Work up revealed esophageal varices s/p banding. Patient was seen by Dr. Hinton for surgical consult. At the time, he was deemed not a PCI/Stents 2/2 inability to tolerate DAPT and high risk for surgery secondary to liver dysfunction with MELD score of 18. Patient has been seen by hepatologist Dr. Lawson. Patient presented to [facility] on 9/12/23 for pre-op optimization and on 9/14/23 patient underwent uncomplicated OPCAB x 4 (LIMA-LAD, AFQ-Cvkem-DQ-PDA). Arrived to the ICU intubated and on Cardene. POD1 levo was started for increased renal perfusion. Diuresis started. POD2 extubated to NC. Echo revealed grossly normal function and no pericardial effusion. Patient was confused. Ammonia level elevated and started on Rifampin and Lactulose. POD3 Given 1u PRBC for hgb of 7. Briefly on BIPAP but weaned to NC by the evening. POD4 Midodrine stopped but still holding BB for renal perfusion. Mental status improved. Ammonia and HIT panel pending. Transferred to the floor. POD5 Coreg started, Solu-cortef weaned down.     Plan:    Neurovascular:   -Hx of ETOH use  -Hx of TIA  -Pain well controlled. PRN Tramadol  -Cont Gabapentin    Cardiovascular:   -3v CAD s/p OPCAB x 4 on 9/14/23  -No ASA 2/2 allergy. Cont Plavix  -Coreg started  -No statin for liver disease  -Hx of HTN  -Cont Coreg  -Hx of HLD  -statin held  -Hemodynamically stable.   -Monitor: BP, HR, tele    Respiratory:   -Oxygenating well on room air  -Encourage continued use of IS 10x/hr and frequent ambulation  -CXR: stable    GI:  -Hx of Cirrhosis  -Cont Rifaximin  -Hepatology following, recs appreciated  -Ammonia level elevated  -Hx of Esophageal varices  -GI PPX: Protonix  -PO Diet  -Bowel Regimen: Miralax, dulcolax     Renal / :  -Continue to monitor renal function: BUN/Cr: 13/0.65  -Monitor I/O's daily     Endocrine:    -Hx of DMII  -A1c: 7.0  -Cont ISS  -No hx of thyroid dx  -TSH: 1.31    Hematologic:  -CBC: H/H- 9.5/28.6  -Coagulation Panel.    ID:  -Temperature: Afebrile  -CBC: WBC- 7.36  -Continue to observe for SIRS/Sepsis Syndrome.    Prophylaxis:  -DVT prophylaxis with 5000 SubQ Heparin q8h.  -Continue with SCD's b/l while patient is at rest     Disposition:  -Discharge once medically stable

## 2023-09-19 NOTE — DISCHARGE NOTE PROVIDER - CARE PROVIDERS DIRECT ADDRESSES
,[email or web address]

## 2023-09-19 NOTE — DISCHARGE NOTE PROVIDER - NSDCCPTREATMENT_GEN_ALL_CORE_FT
PRINCIPAL PROCEDURE  Procedure: CABG, with CHAVA  Findings and Treatment: OPCABx4 LIMA-LAD, IIZ-Jotar-EF-PDA EF 65%

## 2023-09-19 NOTE — DISCHARGE NOTE PROVIDER - PROVIDER TOKENS
PROVIDER:[TOKEN:[[id number]]],PROVIDER:[TOKEN:[[id number]]] PROVIDER:[TOKEN:[[id number]],SCHEDULEDAPPT:[09/26/2023],SCHEDULEDAPPTTIME:[10:45 AM]],PROVIDER:[TOKEN:[[id number]],FOLLOWUP:[2 weeks]],PROVIDER:[TOKEN:[[id number]],SCHEDULEDAPPT:[10/02/2023],SCHEDULEDAPPTTIME:[02:00 PM]]

## 2023-09-19 NOTE — DISCHARGE NOTE PROVIDER - NSDCFUSCHEDAPPT_GEN_ALL_CORE_FT
John Lawson  [facility]  HEPATOLOGY [address]  Scheduled Appointment: 10/02/2023     Kev Hinton  [facility]  CTSURG [address]  Scheduled Appointment: 09/26/2023    John Lawson  [facility]  HEPATOLOGY [address]  Scheduled Appointment: 10/02/2023

## 2023-09-19 NOTE — DISCHARGE NOTE PROVIDER - HOSPITAL COURSE
Patient discussed on morning rounds with  _____    Operation / Date: OPCAB x 4 (LIMA-LAD, ODZ-Umvcp-SE-PDA) on 9/14/23    Primary Surgeon/Attending MD: Dr. Hinton    Referring Physician: Dr. Drew Box   _ _ _ _ _ _ _ _ _ _ _ _  HOSPITAL COURSE:  64 YO Male w/ PMHx of HTN, HLD, DMII, gout, former tobacco/ETOH use, cirrhosis, esophageal varices, GI bleed and TIA who was found to have 3vCAD. Patient originally found to have CAD on cardiac cath in 2/2023. During his hospital stay in february he also reported history of black stools. Work up revealed esophageal varices s/p banding. Patient was seen by Dr. Hinton for surgical consult. At the time, he was deemed not a PCI/Stents 2/2 inability to tolerate DAPT and high risk for surgery secondary to liver dysfunction with MELD score of 18. Patient has been seen by hepatologist Dr. Lawson. Patient presented to [facility] on 9/12/23 for pre-op optimization and on 9/14/23 patient underwent uncomplicated OPCAB x 4 (LIMA-LAD, KHL-Vsgsy-BS-PDA). Arrived to the ICU intubated and on Cardene. POD1 levo was started for increased renal perfusion. Diuresis started. POD2 extubated to NC. Echo revealed grossly normal function and no pericardial effusion. Patient was confused. Ammonia level elevated and started on Rifampin and Lactulose. POD3 Given 1u PRBC for hgb of 7. Briefly on BIPAP but weaned to NC by the evening. POD4 Midodrine stopped but still holding BB for renal perfusion. Mental status improved. Ammonia and HIT panel pending. Transferred to the floor. POD5 Coreg started, Solu-cortef weaned down. POD6 ______.   _ _ _ _ _ _ _ _ _ _ _ _  DISCHARGE PHYSICAL EXAM:  General:  Neuro:  Cardio:  Pulm:  GI/:  Vascular:  Extremities:  Incisions:  _ _ _ _ _ _ _ _ _ _ _ _  REMOVAL CHECKLIST:        [ ] Epicardial wires          [ ] Stitches/tie downs,   If no, why? ______          [ ] PICC/Midline,   If no, why? ________  _ _ _ _ _ _ _ _ _ _ _ _   MEDICATION DISCHARGE CHECKLIST    CABG        [ ] Aspirin, [  ] Contraindicated, Reason_______________________________        [ ] Plavix, [  ] Contraindicated, Reason_______________________________        [ ] Statin, [  ] Contraindicated, Reason_______________________________        [ ] Lasix , [  ] Contraindicated, Reason_______________________________              Duration: _____        [ ] Beta-Blocker, [  ] Contraindicated, Reason_______________________________        Anticoagulation        [ ] NOAC, [ ] Reason _______________________________              Cost/Insurance barriers addressed: YES/NO         [ ] Coumadin, [ ] Reason _______________________________              Dose:___________              INR Goal: ___________              Follow up established: YES/NO  _ _ _ _ _ _ _ _ _ _ _ _  RELEVANT LABS/IMAGING:    _ _ _ _ _ _ _ _ _ _ _ _  CLINICAL FOLLOW UP NEEDS:     [ ] Labwork           Labs needed:           When/Timing:           Outpatient team aware: YES/NO         [ ] Imaging            Type:           When/Timing:           Outpatient team aware: YES/NO       [ ] Home equipment           Type: (i.e. wound vac, pneumostat, prevena, wet/dry dressings, picc/midlines, MCOT, keith etc)           Specific needs:           Outpatient team aware: YES/NO  _ _ _ _ _ _ _ _ _ _ _ _  Over 35 minutes was spent with the patient reviewing the discharge material including medications, follow up appointments, recovery, concerning symptoms, and how to contact their health care providers if they have questions   Patient discussed on morning rounds with Dr. Hinton    Operation / Date: OPCAB x 4 (LIMA-LAD, VKC-Yjopk-NI-PDA) on 9/14/23    Primary Surgeon/Attending MD: Dr. Hinton    Referring Physician: Dr. 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 Patient originally found to have CAD on cardiac cath in 2/2023. During his hospital stay in february he also reported history of black stools. Work up revealed esophageal varices s/p banding. Patient was seen by Dr. Hinton for surgical consult. At the time, he was deemed not a PCI/Stents 2/2 inability to tolerate DAPT and high risk for surgery secondary to liver dysfunction with MELD score of 18. Patient has been seen by hepatologist Dr. Lawson. Patient presented to [facility] on 9/12/23 for pre-op optimization and on 9/14/23 patient underwent uncomplicated OPCAB x 4 (LIMA-LAD, CCQ-Xxmco-WM-PDA). Arrived to the ICU intubated and on Cardene. POD1 levo was started for increased renal perfusion. Diuresis started. POD2 extubated to NC. Echo revealed grossly normal function and no pericardial effusion. Patient was confused. Ammonia level elevated and started on Rifampin and Lactulose. POD3 Given 1u PRBC for hgb of 7. Briefly on BIPAP but weaned to NC by the evening. POD4 Midodrine stopped but still holding BB for renal perfusion. Mental status improved. Ammonia and HIT panel pending. Transferred to the floor. POD5 Coreg started, Solu-cortef weaned down. POD6 pt feeling well, still with some post op pain. CXR reveals bilateral pleural effusions, pet Dr. Hinton, no pigtail placement today given low platelet count. Pt with normal O2 sat on RA, denies SOB. Will DC home with lasix. Cleared for DC by Dr. Hinton.    _ _ _ _ _ _ _ _ _ _ _ _  DISCHARGE PHYSICAL EXAM:    General: resting comfortably in bed in NAD  Neurological: AOx3. Motor skills grossly intact  Cardiovascular: Normal S1/S2. Regular rate/rhythm. No murmurs  Respiratory: Lungs CTA bilaterally. No wheezing or rales  Gastrointestinal: +BS in all 4 quadrants. Non-distended. Soft. Non-tender  Extremities: Strength 5/5 b/l upper/lower extremities. Sensation grossly intact upper/lower extremities. No edema. No calf tenderness.  Vascular: Radial 2+bilaterally, DP 2+ b/l  Incision Sites: MSI clean, dry, intact.   _ _ _ _   _ _ _ _ _ _ _ _  REMOVAL CHECKLIST:        [ x] Epicardial wires          [ x] Stitches/tie downs,   If no, why? ______          [ x] PICC/Midline,   If no, why? ________  _ _ _ _ _ _ _ _ _ _ _ _   MEDICATION DISCHARGE CHECKLIST    CABG        [ ] Aspirin, [  ] Contraindicated, Reason_______________________________        [ ] Plavix, [  ] Contraindicated, Reason_______________________________        [ ] Statin, [  ] Contraindicated, Reason_______________________________        [ ] Lasix , [  ] Contraindicated, Reason_______________________________              Duration: _____        [ ] Beta-Blocker, [  ] Contraindicated, Reason_______________________________        Anticoagulation        [ ] NOAC, [ ] Reason _______________________________              Cost/Insurance barriers addressed: YES/NO         [ ] Coumadin, [ ] Reason _______________________________              Dose:___________              INR Goal: ___________              Follow up established: YES/NO  _ _ _ _ _ _ _ _ _ _ _ _  RELEVANT LABS/IMAGING:    _ _ _ _ _ _ _ _ _ _ _ _  CLINICAL FOLLOW UP NEEDS:     [ ] Labwork           Labs needed:           When/Timing:           Outpatient team aware: YES/NO         [ ] Imaging            Type:           When/Timing:           Outpatient team aware: YES/NO       [ ] Home equipment           Type: (i.e. wound vac, pneumostat, prevena, wet/dry dressings, picc/midlines, MCOT, keith etc)           Specific needs:           Outpatient team aware: YES/NO  _ _ _ _ _ _ _ _ _ _ _ _  Over 35 minutes was spent with the patient reviewing the discharge material including medications, follow up appointments, recovery, concerning symptoms, and how to contact their health care providers if they have questions   Patient discussed on morning rounds with Dr. Hinton    Operation / Date: OPCAB x 4 (LIMA-LAD, HYC-Nziej-KZ-PDA) on 9/14/23    Primary Surgeon/Attending MD: Dr. Hinton    Referring Physician: Dr. 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 Patient originally found to have CAD on cardiac cath in 2/2023. During his hospital stay in february he also reported history of black stools. Work up revealed esophageal varices s/p banding. Patient was seen by Dr. Hinton for surgical consult. At the time, he was deemed not a PCI/Stents 2/2 inability to tolerate DAPT and high risk for surgery secondary to liver dysfunction with MELD score of 18. Patient has been seen by hepatologist Dr. Lawson. Patient presented to [facility] on 9/12/23 for pre-op optimization and on 9/14/23 patient underwent uncomplicated OPCAB x 4 (LIMA-LAD, UYW-Jcysb-LK-PDA). Arrived to the ICU intubated and on Cardene. POD1 levo was started for increased renal perfusion. Diuresis started. POD2 extubated to NC. Echo revealed grossly normal function and no pericardial effusion. Patient was confused. Ammonia level elevated and started on Rifampin and Lactulose. POD3 Given 1u PRBC for hgb of 7. Briefly on BIPAP but weaned to NC by the evening. POD4 Midodrine stopped but still holding BB for renal perfusion. Mental status improved. Ammonia and HIT panel pending. Transferred to the floor. POD5 Coreg started, Solu-cortef weaned down. POD6 pt feeling well, still with some post op pain. CXR reveals bilateral pleural effusions, pet Dr. Hinton, no pigtail placement today given low platelet count. Pt with normal O2 sat on RA, denies SOB. Will DC home with lasix. Cleared for DC by Dr. Hinton.    _ _ _ _ _ _ _ _ _ _ _ _  DISCHARGE PHYSICAL EXAM:    General: resting comfortably in bed in NAD  Neurological: AOx3. Motor skills grossly intact  Cardiovascular: Normal S1/S2. Regular rate/rhythm. No murmurs  Respiratory: Lungs CTA bilaterally. No wheezing or rales  Gastrointestinal: +BS in all 4 quadrants. Non-distended. Soft. Non-tender  Extremities: Strength 5/5 b/l upper/lower extremities. Sensation grossly intact upper/lower extremities. No edema. No calf tenderness.  Vascular: Radial 2+bilaterally, DP 2+ b/l  Incision Sites: MSI clean, dry, intact.   _ _ _ _   _ _ _ _ _ _ _ _  REMOVAL CHECKLIST:        [ x] Epicardial wires          [ x] Stitches/tie downs,   If no, why? ______          [ x] PICC/Midline,   If no, why? ________  _ _ _ _ _ _ _ _ _ _ _ _   MEDICATION DISCHARGE CHECKLIST    CABG        [ ] Aspirin, [  x] Contraindicated, Reason__liver dz, thrombocytopenia______        [ x] Plavix, [  ] Contraindicated, Reason_______________________________        [ ] Statin, [  x] Contraindicated, Reason____liver dz_________        [ x] Lasix , [  ] Contraindicated, Reason_______________________________              Duration: _____        [ x] Beta-Blocker, [  ] Contraindicated, Reason_______________________________      _ _ _ _ _ _ _ _ _ _ _ _  Over 35 minutes was spent with the patient reviewing the discharge material including medications, follow up appointments, recovery, concerning symptoms, and how to contact their health care providers if they have questions

## 2023-09-19 NOTE — DISCHARGE NOTE PROVIDER - CARE PROVIDER_API CALL
Kev Hinton  Thoracic and Cardiac Surgery  [address]  [place] [address]  Phone: [phone]  Fax: [phone]  Follow Up Time:     Drew Box  Cardiology  [address] [place] [address]  Phone: [phone]  Fax: [phone]  Follow Up Time:    Kev Hinton  Thoracic and Cardiac Surgery  [address], [address]  [place] [address]  Phone: [phone]  Fax: [phone]  Scheduled Appointment: 09/26/2023 10:45 AM    Drew Box  Cardiology  [address] [place] [address]  Phone: [phone]  Fax: [phone]  Follow Up Time: 2 weeks    John Lawson  Internal Medicine  [address], [address]  [place] [address]  Phone: [phone]  Fax: [phone]  Scheduled Appointment: 10/02/2023 02:00 PM

## 2023-09-19 NOTE — DISCHARGE NOTE PROVIDER - NSDCFUADDAPPT_GEN_ALL_CORE_FT
Please attend all follow-up appointments as scheduled. Please attend all follow-up appointments as scheduled. If you have any questions are concerns, please call [phone].

## 2023-09-19 NOTE — DISCHARGE NOTE PROVIDER - NSDCMRMEDTOKEN_GEN_ALL_CORE_FT
allopurinol 100 mg oral tablet: 1 tab(s) orally once a day  amLODIPine 5 mg oral tablet: 1 tab(s) orally once a day  atorvastatin 80 mg oral tablet: 1 tab(s) orally once a day  folic acid 1 mg oral tablet: 1 tab(s) orally once a day  furosemide 40 mg oral tablet: 1 tab(s) orally once a day  HumaLOG Mix 50/50 subcutaneous suspension: 100 unit(s) subcutaneous once a day  metFORMIN 500 mg oral tablet: 1 tab(s) orally 2 times a day  Protonix 40 mg oral delayed release tablet: 1 tab(s) orally once a day  rifAXIMin 550 mg oral tablet: 1 tab(s) orally 2 times a day  spironolactone 100 mg oral tablet: 1 orally once a day  Super-Strength D-5000 oral tablet: 1 tab(s) orally once a day  Zetia 10 mg oral tablet: 1 tab(s) orally once a day   allopurinol 100 mg oral tablet: 1 tab(s) orally once a day  carvedilol 6.25 mg oral tablet: 1 tab(s) orally every 12 hours  clopidogrel 75 mg oral tablet: 1 tab(s) orally once a day  furosemide 20 mg oral tablet: 1 tab(s) orally once a day  gabapentin 100 mg oral capsule: 1 cap(s) orally every 8 hours  HumaLOG Mix 50/50 subcutaneous suspension: 100 unit(s) subcutaneous once a day  Lidoderm 5% topical film: Apply topically to affected area once a day  Medrol Dosepak 4 mg oral tablet: 1 tab(s) orally once a day Take as directed by package instructions  metFORMIN 500 mg oral tablet: 1 tab(s) orally 2 times a day  oxyCODONE 5 mg oral tablet: 1 tab(s) orally every 6 hours as needed for  severe pain MDD: 4  pantoprazole 40 mg oral delayed release tablet: 1 tab(s) orally once a day  polyethylene glycol 3350 oral powder for reconstitution: 17 gram(s) orally once a day  potassium chloride 10 mEq oral capsule, extended release: 1 cap(s) orally once a day  rifAXIMin 550 mg oral tablet: 1 tab(s) orally 2 times a day

## 2023-09-20 LAB
ALBUMIN SERPL ELPH-MCNC: 3.4 G/DL — SIGNIFICANT CHANGE UP (ref 3.3–5)
ALP SERPL-CCNC: 132 U/L — HIGH (ref 40–120)
ALT FLD-CCNC: 39 U/L — SIGNIFICANT CHANGE UP (ref 10–45)
AMMONIA BLD-MCNC: 48 UMOL/L — SIGNIFICANT CHANGE UP (ref 11–55)
AMYLASE P1 CFR SERPL: 65 U/L — SIGNIFICANT CHANGE UP (ref 25–125)
ANION GAP SERPL CALC-SCNC: 10 MMOL/L — SIGNIFICANT CHANGE UP (ref 5–17)
AST SERPL-CCNC: 49 U/L — HIGH (ref 10–40)
BILIRUB SERPL-MCNC: 4.5 MG/DL — HIGH (ref 0.2–1.2)
BUN SERPL-MCNC: 13 MG/DL — SIGNIFICANT CHANGE UP (ref 7–23)
CALCIUM SERPL-MCNC: 8.8 MG/DL — SIGNIFICANT CHANGE UP (ref 8.4–10.5)
CHLORIDE SERPL-SCNC: 107 MMOL/L — SIGNIFICANT CHANGE UP (ref 96–108)
CO2 SERPL-SCNC: 23 MMOL/L — SIGNIFICANT CHANGE UP (ref 22–31)
CREAT SERPL-MCNC: 0.62 MG/DL — SIGNIFICANT CHANGE UP (ref 0.5–1.3)
EGFR: 107 ML/MIN/1.73M2 — SIGNIFICANT CHANGE UP
GLUCOSE BLDC GLUCOMTR-MCNC: 156 MG/DL — HIGH (ref 70–99)
GLUCOSE BLDC GLUCOMTR-MCNC: 212 MG/DL — HIGH (ref 70–99)
GLUCOSE BLDC GLUCOMTR-MCNC: 222 MG/DL — HIGH (ref 70–99)
GLUCOSE BLDC GLUCOMTR-MCNC: 244 MG/DL — HIGH (ref 70–99)
GLUCOSE SERPL-MCNC: 182 MG/DL — HIGH (ref 70–99)
HCT VFR BLD CALC: 28.8 % — LOW (ref 39–50)
HGB BLD-MCNC: 9.4 G/DL — LOW (ref 13–17)
INR BLD: 1.7 — HIGH (ref 0.85–1.18)
MCHC RBC-ENTMCNC: 30.3 PG — SIGNIFICANT CHANGE UP (ref 27–34)
MCHC RBC-ENTMCNC: 32.6 GM/DL — SIGNIFICANT CHANGE UP (ref 32–36)
MCV RBC AUTO: 92.9 FL — SIGNIFICANT CHANGE UP (ref 80–100)
NRBC # BLD: 0 /100 WBCS — SIGNIFICANT CHANGE UP (ref 0–0)
PLATELET # BLD AUTO: 93 K/UL — LOW (ref 150–400)
POTASSIUM SERPL-MCNC: 3.9 MMOL/L — SIGNIFICANT CHANGE UP (ref 3.5–5.3)
POTASSIUM SERPL-SCNC: 3.9 MMOL/L — SIGNIFICANT CHANGE UP (ref 3.5–5.3)
PROT SERPL-MCNC: 5.8 G/DL — LOW (ref 6–8.3)
PROTHROM AB SERPL-ACNC: 19.1 SEC — HIGH (ref 9.5–13)
RBC # BLD: 3.1 M/UL — LOW (ref 4.2–5.8)
RBC # FLD: 15.3 % — HIGH (ref 10.3–14.5)
SODIUM SERPL-SCNC: 140 MMOL/L — SIGNIFICANT CHANGE UP (ref 135–145)
WBC # BLD: 5.53 K/UL — SIGNIFICANT CHANGE UP (ref 3.8–10.5)
WBC # FLD AUTO: 5.53 K/UL — SIGNIFICANT CHANGE UP (ref 3.8–10.5)

## 2023-09-20 PROCEDURE — 71046 X-RAY EXAM CHEST 2 VIEWS: CPT | Mod: 26

## 2023-09-20 RX ORDER — POTASSIUM CHLORIDE 20 MEQ
40 PACKET (EA) ORAL ONCE
Refills: 0 | Status: COMPLETED | OUTPATIENT
Start: 2023-09-20 | End: 2023-09-20

## 2023-09-20 RX ADMIN — Medication 40 MILLIEQUIVALENT(S): at 09:41

## 2023-09-20 RX ADMIN — TRAMADOL HYDROCHLORIDE 25 MILLIGRAM(S): 50 TABLET ORAL at 06:22

## 2023-09-20 RX ADMIN — CHLORHEXIDINE GLUCONATE 1 APPLICATION(S): 213 SOLUTION TOPICAL at 07:23

## 2023-09-20 RX ADMIN — SODIUM CHLORIDE 3 MILLILITER(S): 9 INJECTION INTRAMUSCULAR; INTRAVENOUS; SUBCUTANEOUS at 14:12

## 2023-09-20 RX ADMIN — CARVEDILOL PHOSPHATE 6.25 MILLIGRAM(S): 80 CAPSULE, EXTENDED RELEASE ORAL at 22:59

## 2023-09-20 RX ADMIN — GABAPENTIN 100 MILLIGRAM(S): 400 CAPSULE ORAL at 06:22

## 2023-09-20 RX ADMIN — TRAMADOL HYDROCHLORIDE 25 MILLIGRAM(S): 50 TABLET ORAL at 07:02

## 2023-09-20 RX ADMIN — GABAPENTIN 100 MILLIGRAM(S): 400 CAPSULE ORAL at 14:03

## 2023-09-20 RX ADMIN — Medication 4: at 22:59

## 2023-09-20 RX ADMIN — GABAPENTIN 100 MILLIGRAM(S): 400 CAPSULE ORAL at 22:59

## 2023-09-20 RX ADMIN — Medication 25 MILLIGRAM(S): at 22:58

## 2023-09-20 RX ADMIN — Medication 4: at 12:17

## 2023-09-20 RX ADMIN — SENNA PLUS 2 TABLET(S): 8.6 TABLET ORAL at 22:58

## 2023-09-20 RX ADMIN — SODIUM CHLORIDE 3 MILLILITER(S): 9 INJECTION INTRAMUSCULAR; INTRAVENOUS; SUBCUTANEOUS at 22:47

## 2023-09-20 RX ADMIN — POLYETHYLENE GLYCOL 3350 17 GRAM(S): 17 POWDER, FOR SOLUTION ORAL at 12:04

## 2023-09-20 RX ADMIN — CLOPIDOGREL BISULFATE 75 MILLIGRAM(S): 75 TABLET, FILM COATED ORAL at 12:04

## 2023-09-20 RX ADMIN — Medication 25 MILLIGRAM(S): at 14:03

## 2023-09-20 RX ADMIN — SODIUM CHLORIDE 3 MILLILITER(S): 9 INJECTION INTRAMUSCULAR; INTRAVENOUS; SUBCUTANEOUS at 06:25

## 2023-09-20 RX ADMIN — CARVEDILOL PHOSPHATE 6.25 MILLIGRAM(S): 80 CAPSULE, EXTENDED RELEASE ORAL at 07:23

## 2023-09-20 RX ADMIN — Medication 4: at 18:26

## 2023-09-20 RX ADMIN — PANTOPRAZOLE SODIUM 40 MILLIGRAM(S): 20 TABLET, DELAYED RELEASE ORAL at 12:12

## 2023-09-20 RX ADMIN — Medication 25 MILLIGRAM(S): at 06:22

## 2023-09-20 RX ADMIN — Medication 2: at 07:22

## 2023-09-20 NOTE — PROGRESS NOTE ADULT - ASSESSMENT
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 Patient originally found to have CAD on cardiac cath in 2/2023. During his hospital stay in february he also reported history of black stools. Work up revealed esophageal varices s/p banding. Patient was seen by Dr. Hinton for surgical consult. At the time, he was deemed not a PCI/Stents 2/2 inability to tolerate DAPT and high risk for surgery secondary to liver dysfunction with MELD score of 18. Patient has been seen by hepatologist Dr. Lawson. Patient presented to [facility] on 9/12/23 for pre-op optimization and on 9/14/23 patient underwent uncomplicated OPCAB x 4 (LIMA-LAD, VEL-Ubrdp-TN-PDA). Arrived to the ICU intubated and on Cardene. POD1 levo was started for increased renal perfusion. Diuresis started. POD2 extubated to NC. Echo revealed grossly normal function and no pericardial effusion. Patient was confused. Ammonia level elevated and started on Rifampin and Lactulose. POD3 Given 1u PRBC for hgb of 7. Briefly on BIPAP but weaned to NC by the evening. POD4 Midodrine stopped but still holding BB for renal perfusion. Mental status improved. Ammonia and HIT panel pending. Transferred to the floor. POD5 Coreg started, Solu-cortef weaned down. POD 5 post op pushing, no changes in hepatology reccs today, likely DC in AM, LFTs stable.     Plan:    Neurovascular:   -Pain well controlled with current regimen. PRN's: gabapentin, tramadol, tylenol held 2/2 to cirrhosis    Cardiovascular:   -Hemodynamically stable.   -Monitor: BP, HR, tele  -CAD s/p CABG   -c/w plavix  -HTN    -c/w coreg 6.25 BID    Respiratory:   -Oxygenating well on room air  -Encourage continued use of IS 10x/hr and frequent ambulation  -CXR: WNL    GI:  -GI PPX: protonix  -PO Diet  -Bowel Regimen: dulcolax, miralax, senna  -liver cirrhosis    -LFTs stable    -c/w rifaxamin 550 BID    Renal / :  -Continue to monitor renal function: BUN/Cr 13/.62  -Monitor I/O's daily     Endocrine:    -DM  -A1c: 7    -c/w ISS  -TSH: 1.3    Hematologic:  -CBC: H/H- 9.4/28.8  -Coagulation Panel.    ID:  -Temperature: 36.8  -CBC: WBC- 5.5  -Continue to observe for SIRS/Sepsis Syndrome.    Prophylaxis:  -DVT prophylaxis held given hx GIB  -Continue with SCD's b/l while patient is at rest     Disposition:  -Discharge home once patient is medically ready

## 2023-09-20 NOTE — PROGRESS NOTE ADULT - SUBJECTIVE AND OBJECTIVE BOX
Hepatology Consult Progress Note:     OVERNIGHT EVENTS: RAZIA.     SUBJECTIVE / INTERVAL HPI:   Patient seen and examined at bedside. States that he does not feel ready to go home at this time. Complaining of difficulty having bowel movements and general fatigue. Also states that he feels he has diminished ability to taste certain foods. Reports that his pain is for the most part controlled and thinks that he might be feeling okay to go home later this week.         VITAL SIGNS:  Vital Signs Last 24 Hrs  T(C): 36.3 (20 Sep 2023 05:19), Max: 36.8 (19 Sep 2023 14:26)  T(F): 97.4 (20 Sep 2023 05:19), Max: 98.2 (19 Sep 2023 14:26)  HR: 74 (20 Sep 2023 05:24) (74 - 95)  BP: 135/92 (20 Sep 2023 05:24) (112/70 - 153/86)  BP(mean): 110 (20 Sep 2023 05:24) (85 - 115)  RR: 18 (20 Sep 2023 05:24) (16 - 20)  SpO2: 92% (20 Sep 2023 05:24) (92% - 95%)    Parameters below as of 20 Sep 2023 05:24  Patient On (Oxygen Delivery Method): room air        09-19-23 @ 07:01  -  09-20-23 @ 07:00  --------------------------------------------------------  IN: 1070 mL / OUT: 0 mL / NET: 1070 mL      PHYSICAL EXAM:  General: No acute distress, scleral icterus   Lungs: Normal respiratory effort and no intercostal retractions  Cardiovascular: RRR, midline CABG incision healing   Abdomen: Soft, mild RUQ tenderness, mild distension   Neurological: Alert and oriented x3  Skin: Warm and dry. No obvious rash       MEDICATIONS:  MEDICATIONS  (STANDING):  bisacodyl Suppository 10 milliGRAM(s) Rectal once  carvedilol 6.25 milliGRAM(s) Oral every 12 hours  chlorhexidine 2% Cloths 1 Application(s) Topical daily  clopidogrel Tablet 75 milliGRAM(s) Oral daily  dextrose 5%. 1000 milliLiter(s) (50 mL/Hr) IV Continuous <Continuous>  dextrose 5%. 1000 milliLiter(s) (100 mL/Hr) IV Continuous <Continuous>  dextrose 50% Injectable 25 milliLiter(s) IV Push every 15 minutes  dextrose 50% Injectable 50 milliLiter(s) IV Push every 15 minutes  gabapentin 100 milliGRAM(s) Oral every 8 hours  glucagon  Injectable 1 milliGRAM(s) IntraMuscular once  hydrocortisone sodium succinate Injectable 25 milliGRAM(s) IV Push every 8 hours  insulin lispro (ADMELOG) corrective regimen sliding scale   SubCutaneous Before meals and at bedtime  pantoprazole    Tablet 40 milliGRAM(s) Oral daily  polyethylene glycol 3350 17 Gram(s) Oral daily  potassium chloride    Tablet ER 40 milliEquivalent(s) Oral once  rifAXIMin 550 milliGRAM(s) Oral two times a day  senna 2 Tablet(s) Oral at bedtime  sodium chloride 0.9% lock flush 3 milliLiter(s) IV Push every 8 hours  sodium chloride 0.9%. 1000 milliLiter(s) (10 mL/Hr) IV Continuous <Continuous>    MEDICATIONS  (PRN):  artificial tears (preservative free) Ophthalmic Solution 1 Drop(s) Both EYES every 12 hours PRN Dry Eyes  dextrose Oral Gel 15 Gram(s) Oral once PRN Blood Glucose LESS THAN 70 milliGRAM(s)/deciliter  traMADol 25 milliGRAM(s) Oral every 6 hours PRN Moderate Pain (4 - 6)      ALLERGIES:  Allergies    aspirin (Pruritus)    Intolerances        LABS:                        9.4    5.53  )-----------( 93       ( 20 Sep 2023 05:31 )             28.8     09-20    140  |  107  |  13  ----------------------------<  182<H>  3.9   |  23  |  0.62    Ca    8.8      20 Sep 2023 05:31  Phos  2.3     09-19  Mg     2.0     09-19    TPro  5.8<L>  /  Alb  3.4  /  TBili  4.5<H>  /  DBili  x   /  AST  49<H>  /  ALT  39  /  AlkPhos  132<H>  09-20    PT/INR - ( 20 Sep 2023 05:31 )   PT: 19.1 sec;   INR: 1.70          PTT - ( 19 Sep 2023 06:46 )  PTT:29.4 sec  Urinalysis Basic - ( 20 Sep 2023 05:31 )    Color: x / Appearance: x / SG: x / pH: x  Gluc: 182 mg/dL / Ketone: x  / Bili: x / Urobili: x   Blood: x / Protein: x / Nitrite: x   Leuk Esterase: x / RBC: x / WBC x   Sq Epi: x / Non Sq Epi: x / Bacteria: x      CAPILLARY BLOOD GLUCOSE      POCT Blood Glucose.: 156 mg/dL (20 Sep 2023 06:48)  RADIOLOGY & ADDITIONAL TESTS: Reviewed.

## 2023-09-20 NOTE — PROGRESS NOTE ADULT - SUBJECTIVE AND OBJECTIVE BOX
Patient discussed on morning rounds with Dr. Hinton    OPERATION & DATE: 9/14 OPCABx4    SUBJECTIVE ASSESSMENT:    VITAL SIGNS:  Vital Signs Last 24 Hrs  T(C): 36.3 (20 Sep 2023 09:40), Max: 36.8 (19 Sep 2023 14:26)  T(F): 97.4 (20 Sep 2023 09:40), Max: 98.2 (19 Sep 2023 14:26)  HR: 89 (20 Sep 2023 09:52) (74 - 89)  BP: 108/88 (20 Sep 2023 09:52) (108/88 - 153/86)  BP(mean): 93 (20 Sep 2023 09:52) (85 - 115)  RR: 16 (20 Sep 2023 09:39) (16 - 20)  SpO2: 97% (20 Sep 2023 09:52) (92% - 97%)    Parameters below as of 20 Sep 2023 09:52  Patient On (Oxygen Delivery Method): room air      I&O's Detail    19 Sep 2023 07:01  -  20 Sep 2023 07:00  --------------------------------------------------------  IN:    Oral Fluid: 1070 mL  Total IN: 1070 mL    OUT:  Total OUT: 0 mL    Total NET: 1070 mL        CHEST TUBE:    SUNITHA DRAIN:    EPICARDIAL WIRES:   STITCHES:  STAPLES:  MOSLEY:   CENTRAL LINE:  MIDLINE/PICC:  WOUND VAC:    PHYSICAL EXAM:  General:  HEENT:  Cardio:  Pulm:  GI:  Extremities:  Vascular:  Incisions:    LABS:                        9.4    5.53  )-----------( 93       ( 20 Sep 2023 05:31 )             28.8     PT/INR - ( 20 Sep 2023 05:31 )   PT: 19.1 sec;   INR: 1.70          PTT - ( 19 Sep 2023 06:46 )  PTT:29.4 sec  09-20    140  |  107  |  13  ----------------------------<  182<H>  3.9   |  23  |  0.62    Ca    8.8      20 Sep 2023 05:31  Phos  2.3     09-19  Mg     2.0     09-19    TPro  5.8<L>  /  Alb  3.4  /  TBili  4.5<H>  /  DBili  x   /  AST  49<H>  /  ALT  39  /  AlkPhos  132<H>  09-20    Urinalysis Basic - ( 20 Sep 2023 05:31 )    Color: x / Appearance: x / SG: x / pH: x  Gluc: 182 mg/dL / Ketone: x  / Bili: x / Urobili: x   Blood: x / Protein: x / Nitrite: x   Leuk Esterase: x / RBC: x / WBC x   Sq Epi: x / Non Sq Epi: x / Bacteria: x      MEDICATIONS  (STANDING):  bisacodyl Suppository 10 milliGRAM(s) Rectal once  carvedilol 6.25 milliGRAM(s) Oral every 12 hours  chlorhexidine 2% Cloths 1 Application(s) Topical daily  clopidogrel Tablet 75 milliGRAM(s) Oral daily  dextrose 5%. 1000 milliLiter(s) (100 mL/Hr) IV Continuous <Continuous>  dextrose 5%. 1000 milliLiter(s) (50 mL/Hr) IV Continuous <Continuous>  dextrose 50% Injectable 50 milliLiter(s) IV Push every 15 minutes  dextrose 50% Injectable 25 milliLiter(s) IV Push every 15 minutes  gabapentin 100 milliGRAM(s) Oral every 8 hours  glucagon  Injectable 1 milliGRAM(s) IntraMuscular once  hydrocortisone sodium succinate Injectable 25 milliGRAM(s) IV Push every 8 hours  insulin lispro (ADMELOG) corrective regimen sliding scale   SubCutaneous Before meals and at bedtime  pantoprazole    Tablet 40 milliGRAM(s) Oral daily  polyethylene glycol 3350 17 Gram(s) Oral daily  rifAXIMin 550 milliGRAM(s) Oral two times a day  senna 2 Tablet(s) Oral at bedtime  sodium chloride 0.9% lock flush 3 milliLiter(s) IV Push every 8 hours  sodium chloride 0.9%. 1000 milliLiter(s) (10 mL/Hr) IV Continuous <Continuous>    MEDICATIONS  (PRN):  artificial tears (preservative free) Ophthalmic Solution 1 Drop(s) Both EYES every 12 hours PRN Dry Eyes  dextrose Oral Gel 15 Gram(s) Oral once PRN Blood Glucose LESS THAN 70 milliGRAM(s)/deciliter  traMADol 25 milliGRAM(s) Oral every 6 hours PRN Moderate Pain (4 - 6)    RADIOLOGY & ADDITIONAL TESTS:       Patient discussed on morning rounds with Dr. Hinton    OPERATION & DATE: 9/14 OPCABx4    SUBJECTIVE ASSESSMENT: Pt feeling well this morning, still with some substernal discomfort. Ambulating without difficulty.     VITAL SIGNS:  Vital Signs Last 24 Hrs  T(C): 36.3 (20 Sep 2023 09:40), Max: 36.8 (19 Sep 2023 14:26)  T(F): 97.4 (20 Sep 2023 09:40), Max: 98.2 (19 Sep 2023 14:26)  HR: 89 (20 Sep 2023 09:52) (74 - 89)  BP: 108/88 (20 Sep 2023 09:52) (108/88 - 153/86)  BP(mean): 93 (20 Sep 2023 09:52) (85 - 115)  RR: 16 (20 Sep 2023 09:39) (16 - 20)  SpO2: 97% (20 Sep 2023 09:52) (92% - 97%)    Parameters below as of 20 Sep 2023 09:52  Patient On (Oxygen Delivery Method): room air      I&O's Detail    19 Sep 2023 07:01  -  20 Sep 2023 07:00  --------------------------------------------------------  IN:    Oral Fluid: 1070 mL  Total IN: 1070 mL    OUT:  Total OUT: 0 mL    Total NET: 1070 mL        CHEST TUBE:  none  SUNITHA DRAIN:  none  EPICARDIAL WIRES: in place  STITCHES: none  STAPLES: none  MOSLEY: none   CENTRAL LINE: none  MIDLINE/PICC: none  WOUND VAC: none    PHYSICAL EXAM:  General: resting comfortably in chair in NAD  Neurological: AOx3. Motor skills grossly intact  Cardiovascular: Normal S1/S2. Regular rate/rhythm. No murmurs  Respiratory: Lungs CTA bilaterally. No wheezing or rales  Gastrointestinal: +BS in all 4 quadrants. Non-distended. Soft. Non-tender  Extremities: Strength 5/5 b/l upper/lower extremities. Sensation grossly intact upper/lower extremities. No edema. No calf tenderness.  Vascular: Radial 2+bilaterally, DP 2+ b/l  Incision Sites: MSI clean, dry, intact. RLE graft site intact.       LABS:                        9.4    5.53  )-----------( 93       ( 20 Sep 2023 05:31 )             28.8     PT/INR - ( 20 Sep 2023 05:31 )   PT: 19.1 sec;   INR: 1.70          PTT - ( 19 Sep 2023 06:46 )  PTT:29.4 sec  09-20    140  |  107  |  13  ----------------------------<  182<H>  3.9   |  23  |  0.62    Ca    8.8      20 Sep 2023 05:31  Phos  2.3     09-19  Mg     2.0     09-19    TPro  5.8<L>  /  Alb  3.4  /  TBili  4.5<H>  /  DBili  x   /  AST  49<H>  /  ALT  39  /  AlkPhos  132<H>  09-20    Urinalysis Basic - ( 20 Sep 2023 05:31 )    Color: x / Appearance: x / SG: x / pH: x  Gluc: 182 mg/dL / Ketone: x  / Bili: x / Urobili: x   Blood: x / Protein: x / Nitrite: x   Leuk Esterase: x / RBC: x / WBC x   Sq Epi: x / Non Sq Epi: x / Bacteria: x      MEDICATIONS  (STANDING):  bisacodyl Suppository 10 milliGRAM(s) Rectal once  carvedilol 6.25 milliGRAM(s) Oral every 12 hours  chlorhexidine 2% Cloths 1 Application(s) Topical daily  clopidogrel Tablet 75 milliGRAM(s) Oral daily  dextrose 5%. 1000 milliLiter(s) (100 mL/Hr) IV Continuous <Continuous>  dextrose 5%. 1000 milliLiter(s) (50 mL/Hr) IV Continuous <Continuous>  dextrose 50% Injectable 50 milliLiter(s) IV Push every 15 minutes  dextrose 50% Injectable 25 milliLiter(s) IV Push every 15 minutes  gabapentin 100 milliGRAM(s) Oral every 8 hours  glucagon  Injectable 1 milliGRAM(s) IntraMuscular once  hydrocortisone sodium succinate Injectable 25 milliGRAM(s) IV Push every 8 hours  insulin lispro (ADMELOG) corrective regimen sliding scale   SubCutaneous Before meals and at bedtime  pantoprazole    Tablet 40 milliGRAM(s) Oral daily  polyethylene glycol 3350 17 Gram(s) Oral daily  rifAXIMin 550 milliGRAM(s) Oral two times a day  senna 2 Tablet(s) Oral at bedtime  sodium chloride 0.9% lock flush 3 milliLiter(s) IV Push every 8 hours  sodium chloride 0.9%. 1000 milliLiter(s) (10 mL/Hr) IV Continuous <Continuous>    MEDICATIONS  (PRN):  artificial tears (preservative free) Ophthalmic Solution 1 Drop(s) Both EYES every 12 hours PRN Dry Eyes  dextrose Oral Gel 15 Gram(s) Oral once PRN Blood Glucose LESS THAN 70 milliGRAM(s)/deciliter  traMADol 25 milliGRAM(s) Oral every 6 hours PRN Moderate Pain (4 - 6)    RADIOLOGY & ADDITIONAL TESTS:

## 2023-09-20 NOTE — PROGRESS NOTE ADULT - ASSESSMENT
63 [place] ([facility]) M former smoker with PMH of HTN, severe 3 vessel CAD, HLD, pre-diabetes, gout, TIA (14 yrs ago), gastric ulcer, rectal bleed, LIZ and alcoholic liver disease c/b cirrhosis and portal hypertension m/b esophageal varices (and UGIB) and splenomegaly, presenting for OPCAB with CT surgery. Hepatology team consulted for sotero-operative optimization.     Underwent OPCAB on 09/14 and initially encephalopathic but mental status improved with rifaximin and lactulose.     Recommendations:   - continue rifaximin 550 mg PO BID and Miralax 17g for HE - can increase to BID given patient reports constipation   - continue low-dose gabapentin and tramadol for pain control  - would avoid high-dose narcotics   - agree with starting Coreg 6.25 mg PO BID   - continue pantoprazole 40 mg PO daily for mucosal protection   - daily CBC, CMP, and INR to calculate Meld 3.0 score  - bilirubin steadily down-trending and Meld 3.0 score today: 18  - continue to monitor for signs of infection, especially while on steroids      Hepatology Team will continue to follow.     Arina Murray D.O.   Gastroenterology Fellow  Weekday 7am-5pm Pager: [phone]   63 [place] citizen ([facility]) M former smoker with PMH of HTN, severe 3 vessel CAD, HLD, pre-diabetes, gout, TIA (14 yrs ago), gastric ulcer, rectal bleed, LIZ and alcoholic liver disease c/b cirrhosis and portal hypertension m/b esophageal varices (and UGIB) and splenomegaly, presenting for OPCAB with CT surgery. Hepatology team consulted for sotero-operative optimization.     Underwent OPCAB on 09/14 and initially encephalopathic but mental status improved with rifaximin and lactulose.     Recommendations:   - continue rifaximin 550 mg PO BID and Miralax 17g for HE - can increase to BID given patient reports constipation   - continue low-dose gabapentin and tramadol for pain control  - would avoid high-dose narcotics   - agree with starting Coreg 6.25 mg PO BID   - continue pantoprazole 40 mg PO daily for mucosal protection   - daily CBC, CMP, and INR to calculate Meld 3.0 score  - bilirubin steadily down-trending and Meld 3.0 score today: 18  - continue to monitor for signs of infection, especially while on steroids    - ensure patient has out-patient follow-up with hepatology (Dr. Lawson) - stating that he prefers to see Dr. Lawson on the same day he follows up with Dr. Hinton     Hepatology Team will continue to follow.     Arina Murray D.O.   Gastroenterology Fellow  Weekday 7am-5pm Pager: [phone]

## 2023-09-20 NOTE — PROGRESS NOTE ADULT - PROVIDER SPECIALTY LIST ADULT
CT Surgery
Critical Care
Critical Care
CT Surgery
CT Surgery
Critical Care
Hepatology
CT Surgery
Hepatology
Hepatology

## 2023-09-21 ENCOUNTER — TRANSCRIPTION ENCOUNTER (OUTPATIENT)
Age: 63
End: 2023-09-21

## 2023-09-21 VITALS
OXYGEN SATURATION: 92 % | DIASTOLIC BLOOD PRESSURE: 90 MMHG | SYSTOLIC BLOOD PRESSURE: 151 MMHG | RESPIRATION RATE: 19 BRPM | HEART RATE: 73 BPM

## 2023-09-21 PROBLEM — E11.9 TYPE 2 DIABETES MELLITUS WITHOUT COMPLICATIONS: Chronic | Status: ACTIVE | Noted: 2023-09-12

## 2023-09-21 PROBLEM — Z86.73 PERSONAL HISTORY OF TRANSIENT ISCHEMIC ATTACK (TIA), AND CEREBRAL INFARCTION WITHOUT RESIDUAL DEFICITS: Chronic | Status: ACTIVE | Noted: 2023-09-12

## 2023-09-21 PROBLEM — K74.60 UNSPECIFIED CIRRHOSIS OF LIVER: Chronic | Status: ACTIVE | Noted: 2023-09-12

## 2023-09-21 PROBLEM — M10.9 GOUT, UNSPECIFIED: Chronic | Status: ACTIVE | Noted: 2023-09-12

## 2023-09-21 PROBLEM — I10 ESSENTIAL (PRIMARY) HYPERTENSION: Chronic | Status: ACTIVE | Noted: 2023-09-12

## 2023-09-21 PROBLEM — Z87.19 PERSONAL HISTORY OF OTHER DISEASES OF THE DIGESTIVE SYSTEM: Chronic | Status: ACTIVE | Noted: 2023-09-12

## 2023-09-21 PROBLEM — I25.10 ATHEROSCLEROTIC HEART DISEASE OF NATIVE CORONARY ARTERY WITHOUT ANGINA PECTORIS: Chronic | Status: ACTIVE | Noted: 2023-09-12

## 2023-09-21 PROBLEM — E78.5 HYPERLIPIDEMIA, UNSPECIFIED: Chronic | Status: ACTIVE | Noted: 2023-09-12

## 2023-09-21 LAB
ALBUMIN SERPL ELPH-MCNC: 3.4 G/DL — SIGNIFICANT CHANGE UP (ref 3.3–5)
ALP SERPL-CCNC: 160 U/L — HIGH (ref 40–120)
ALT FLD-CCNC: 47 U/L — HIGH (ref 10–45)
ANION GAP SERPL CALC-SCNC: 8 MMOL/L — SIGNIFICANT CHANGE UP (ref 5–17)
AST SERPL-CCNC: 54 U/L — HIGH (ref 10–40)
BILIRUB SERPL-MCNC: 4.1 MG/DL — HIGH (ref 0.2–1.2)
BUN SERPL-MCNC: 16 MG/DL — SIGNIFICANT CHANGE UP (ref 7–23)
CALCIUM SERPL-MCNC: 8.8 MG/DL — SIGNIFICANT CHANGE UP (ref 8.4–10.5)
CHLORIDE SERPL-SCNC: 106 MMOL/L — SIGNIFICANT CHANGE UP (ref 96–108)
CO2 SERPL-SCNC: 22 MMOL/L — SIGNIFICANT CHANGE UP (ref 22–31)
CREAT SERPL-MCNC: 0.73 MG/DL — SIGNIFICANT CHANGE UP (ref 0.5–1.3)
EGFR: 102 ML/MIN/1.73M2 — SIGNIFICANT CHANGE UP
GLUCOSE BLDC GLUCOMTR-MCNC: 169 MG/DL — HIGH (ref 70–99)
GLUCOSE BLDC GLUCOMTR-MCNC: 178 MG/DL — HIGH (ref 70–99)
GLUCOSE SERPL-MCNC: 188 MG/DL — HIGH (ref 70–99)
HCT VFR BLD CALC: 31.2 % — LOW (ref 39–50)
HGB BLD-MCNC: 10.3 G/DL — LOW (ref 13–17)
MAGNESIUM SERPL-MCNC: 1.9 MG/DL — SIGNIFICANT CHANGE UP (ref 1.6–2.6)
MCHC RBC-ENTMCNC: 30.1 PG — SIGNIFICANT CHANGE UP (ref 27–34)
MCHC RBC-ENTMCNC: 33 GM/DL — SIGNIFICANT CHANGE UP (ref 32–36)
MCV RBC AUTO: 91.2 FL — SIGNIFICANT CHANGE UP (ref 80–100)
NRBC # BLD: 0 /100 WBCS — SIGNIFICANT CHANGE UP (ref 0–0)
PLATELET # BLD AUTO: 111 K/UL — LOW (ref 150–400)
POTASSIUM SERPL-MCNC: 4.1 MMOL/L — SIGNIFICANT CHANGE UP (ref 3.5–5.3)
POTASSIUM SERPL-SCNC: 4.1 MMOL/L — SIGNIFICANT CHANGE UP (ref 3.5–5.3)
PROT SERPL-MCNC: 5.9 G/DL — LOW (ref 6–8.3)
RBC # BLD: 3.42 M/UL — LOW (ref 4.2–5.8)
RBC # FLD: 15.4 % — HIGH (ref 10.3–14.5)
SODIUM SERPL-SCNC: 136 MMOL/L — SIGNIFICANT CHANGE UP (ref 135–145)
WBC # BLD: 7.28 K/UL — SIGNIFICANT CHANGE UP (ref 3.8–10.5)
WBC # FLD AUTO: 7.28 K/UL — SIGNIFICANT CHANGE UP (ref 3.8–10.5)

## 2023-09-21 PROCEDURE — 71045 X-RAY EXAM CHEST 1 VIEW: CPT | Mod: 26

## 2023-09-21 RX ORDER — ATORVASTATIN CALCIUM 80 MG/1
1 TABLET, FILM COATED ORAL
Refills: 0 | DISCHARGE

## 2023-09-21 RX ORDER — CHOLECALCIFEROL (VITAMIN D3) 125 MCG
1 CAPSULE ORAL
Refills: 0 | DISCHARGE

## 2023-09-21 RX ORDER — PANTOPRAZOLE SODIUM 20 MG/1
1 TABLET, DELAYED RELEASE ORAL
Refills: 0 | DISCHARGE

## 2023-09-21 RX ORDER — SPIRONOLACTONE 25 MG/1
1 TABLET, FILM COATED ORAL
Refills: 0 | DISCHARGE

## 2023-09-21 RX ORDER — OXYCODONE HYDROCHLORIDE 5 MG/1
5 TABLET ORAL EVERY 6 HOURS
Refills: 0 | Status: DISCONTINUED | OUTPATIENT
Start: 2023-09-21 | End: 2023-09-21

## 2023-09-21 RX ORDER — RIFAXIMIN 200 MG/1
1 TABLET ORAL
Refills: 0 | DISCHARGE

## 2023-09-21 RX ORDER — POTASSIUM CHLORIDE 20 MEQ
1 PACKET (EA) ORAL
Qty: 14 | Refills: 0
Start: 2023-09-21 | End: 2023-10-04

## 2023-09-21 RX ORDER — PANTOPRAZOLE SODIUM 20 MG/1
1 TABLET, DELAYED RELEASE ORAL
Qty: 30 | Refills: 0
Start: 2023-09-21 | End: 2023-10-20

## 2023-09-21 RX ORDER — CARVEDILOL PHOSPHATE 80 MG/1
1 CAPSULE, EXTENDED RELEASE ORAL
Qty: 60 | Refills: 0
Start: 2023-09-21 | End: 2023-10-20

## 2023-09-21 RX ORDER — RIFAXIMIN 200 MG/1
1 TABLET ORAL
Qty: 60 | Refills: 0
Start: 2023-09-21 | End: 2023-10-20

## 2023-09-21 RX ORDER — GABAPENTIN 400 MG/1
1 CAPSULE ORAL
Qty: 90 | Refills: 0
Start: 2023-09-21 | End: 2023-10-20

## 2023-09-21 RX ORDER — LIDOCAINE 4 G/100G
1 CREAM TOPICAL EVERY 24 HOURS
Refills: 0 | Status: DISCONTINUED | OUTPATIENT
Start: 2023-09-21 | End: 2023-09-21

## 2023-09-21 RX ORDER — FUROSEMIDE 40 MG
20 TABLET ORAL ONCE
Refills: 0 | Status: COMPLETED | OUTPATIENT
Start: 2023-09-21 | End: 2023-09-21

## 2023-09-21 RX ORDER — LIDOCAINE 4 G/100G
1 CREAM TOPICAL
Qty: 2 | Refills: 0
Start: 2023-09-21 | End: 2023-09-30

## 2023-09-21 RX ORDER — AMLODIPINE BESYLATE 2.5 MG/1
1 TABLET ORAL
Refills: 0 | DISCHARGE

## 2023-09-21 RX ORDER — EZETIMIBE 10 MG/1
1 TABLET ORAL
Refills: 0 | DISCHARGE

## 2023-09-21 RX ORDER — FUROSEMIDE 40 MG
1 TABLET ORAL
Qty: 14 | Refills: 0
Start: 2023-09-21 | End: 2023-10-04

## 2023-09-21 RX ORDER — CLOPIDOGREL BISULFATE 75 MG/1
1 TABLET, FILM COATED ORAL
Qty: 31 | Refills: 0
Start: 2023-09-21 | End: 2023-10-21

## 2023-09-21 RX ORDER — POLYETHYLENE GLYCOL 3350 17 G/17G
17 POWDER, FOR SOLUTION ORAL
Qty: 170 | Refills: 0
Start: 2023-09-21 | End: 2023-09-30

## 2023-09-21 RX ORDER — OXYCODONE HYDROCHLORIDE 5 MG/1
1 TABLET ORAL
Qty: 28 | Refills: 0
Start: 2023-09-21 | End: 2023-09-27

## 2023-09-21 RX ORDER — FUROSEMIDE 40 MG
20 TABLET ORAL DAILY
Refills: 0 | Status: DISCONTINUED | OUTPATIENT
Start: 2023-09-21 | End: 2023-09-21

## 2023-09-21 RX ORDER — FOLIC ACID 0.8 MG
1 TABLET ORAL
Refills: 0 | DISCHARGE

## 2023-09-21 RX ORDER — FUROSEMIDE 40 MG
1 TABLET ORAL
Refills: 0 | DISCHARGE

## 2023-09-21 RX ADMIN — Medication 2: at 07:06

## 2023-09-21 RX ADMIN — CHLORHEXIDINE GLUCONATE 1 APPLICATION(S): 213 SOLUTION TOPICAL at 06:38

## 2023-09-21 RX ADMIN — Medication 2: at 12:00

## 2023-09-21 RX ADMIN — SODIUM CHLORIDE 3 MILLILITER(S): 9 INJECTION INTRAMUSCULAR; INTRAVENOUS; SUBCUTANEOUS at 06:39

## 2023-09-21 RX ADMIN — Medication 25 MILLIGRAM(S): at 06:40

## 2023-09-21 RX ADMIN — Medication 20 MILLIGRAM(S): at 08:00

## 2023-09-21 RX ADMIN — LIDOCAINE 1 PATCH: 4 CREAM TOPICAL at 04:27

## 2023-09-21 RX ADMIN — CLOPIDOGREL BISULFATE 75 MILLIGRAM(S): 75 TABLET, FILM COATED ORAL at 11:02

## 2023-09-21 RX ADMIN — CARVEDILOL PHOSPHATE 6.25 MILLIGRAM(S): 80 CAPSULE, EXTENDED RELEASE ORAL at 11:02

## 2023-09-21 RX ADMIN — GABAPENTIN 100 MILLIGRAM(S): 400 CAPSULE ORAL at 06:41

## 2023-09-21 RX ADMIN — LIDOCAINE 1 PATCH: 4 CREAM TOPICAL at 07:35

## 2023-09-21 RX ADMIN — PANTOPRAZOLE SODIUM 40 MILLIGRAM(S): 20 TABLET, DELAYED RELEASE ORAL at 11:03

## 2023-09-21 NOTE — DISCHARGE NOTE NURSING/CASE MANAGEMENT/SOCIAL WORK - PATIENT PORTAL LINK FT
You can access the FollowMyHealth Patient Portal offered by [facility] by registering at the following website: [email or web address]. By joining [email or web address] [email or web address] portal, you will also be able to view your health information using other applications (apps) compatible with our system.

## 2023-09-21 NOTE — DISCHARGE NOTE NURSING/CASE MANAGEMENT/SOCIAL WORK - NSDCFUADDAPPT_GEN_ALL_CORE_FT
Please attend all follow-up appointments as scheduled. If you have any questions are concerns, please call [phone].

## 2023-09-22 ENCOUNTER — TRANSCRIPTION ENCOUNTER (OUTPATIENT)
Age: 63
End: 2023-09-22

## 2023-09-22 ENCOUNTER — APPOINTMENT (OUTPATIENT)
Dept: CARE COORDINATION | Facility: HOME HEALTH | Age: 63
End: 2023-09-22
Payer: COMMERCIAL

## 2023-09-22 PROCEDURE — 99024 POSTOP FOLLOW-UP VISIT: CPT

## 2023-09-22 RX ORDER — AMLODIPINE BESYLATE 5 MG/1
5 TABLET ORAL DAILY
Qty: 90 | Refills: 3 | Status: COMPLETED | COMMUNITY
End: 2023-09-22

## 2023-09-22 RX ORDER — SPIRONOLACTONE 100 MG/1
100 TABLET, FILM COATED ORAL DAILY
Refills: 0 | Status: DISCONTINUED | COMMUNITY
End: 2023-09-22

## 2023-09-22 RX ORDER — FOLIC ACID 1 MG/1
1 TABLET ORAL DAILY
Qty: 90 | Refills: 0 | Status: DISCONTINUED | COMMUNITY
End: 2023-09-22

## 2023-09-22 RX ORDER — EZETIMIBE 10 MG/1
10 TABLET ORAL
Refills: 0 | Status: COMPLETED | COMMUNITY
End: 2023-09-22

## 2023-09-26 ENCOUNTER — OUTPATIENT (OUTPATIENT)
Dept: OUTPATIENT SERVICES | Facility: HOSPITAL | Age: 63
LOS: 1 days | End: 2023-09-26
Payer: COMMERCIAL

## 2023-09-26 ENCOUNTER — INPATIENT (INPATIENT)
Facility: HOSPITAL | Age: 63
LOS: 3 days | Discharge: HOME HEALTH | DRG: 205 | End: 2023-09-30
Attending: THORACIC SURGERY (CARDIOTHORACIC VASCULAR SURGERY) | Admitting: THORACIC SURGERY (CARDIOTHORACIC VASCULAR SURGERY)
Payer: COMMERCIAL

## 2023-09-26 ENCOUNTER — APPOINTMENT (OUTPATIENT)
Dept: CARDIOTHORACIC SURGERY | Facility: CLINIC | Age: 63
End: 2023-09-26
Payer: COMMERCIAL

## 2023-09-26 VITALS
TEMPERATURE: 97 F | HEIGHT: 68 IN | SYSTOLIC BLOOD PRESSURE: 126 MMHG | WEIGHT: 216.05 LBS | HEART RATE: 89 BPM | OXYGEN SATURATION: 97 % | RESPIRATION RATE: 18 BRPM | DIASTOLIC BLOOD PRESSURE: 79 MMHG

## 2023-09-26 VITALS
BODY MASS INDEX: 32.74 KG/M2 | HEART RATE: 90 BPM | WEIGHT: 216 LBS | TEMPERATURE: 98.2 F | DIASTOLIC BLOOD PRESSURE: 90 MMHG | RESPIRATION RATE: 15 BRPM | OXYGEN SATURATION: 92 % | HEIGHT: 68 IN | SYSTOLIC BLOOD PRESSURE: 121 MMHG

## 2023-09-26 DIAGNOSIS — E83.42 HYPOMAGNESEMIA: ICD-10-CM

## 2023-09-26 DIAGNOSIS — R00.1 BRADYCARDIA, UNSPECIFIED: ICD-10-CM

## 2023-09-26 DIAGNOSIS — Z95.1 PRESENCE OF AORTOCORONARY BYPASS GRAFT: ICD-10-CM

## 2023-09-26 DIAGNOSIS — F10.11 ALCOHOL ABUSE, IN REMISSION: ICD-10-CM

## 2023-09-26 DIAGNOSIS — I25.10 ATHEROSCLEROTIC HEART DISEASE OF NATIVE CORONARY ARTERY WITHOUT ANGINA PECTORIS: ICD-10-CM

## 2023-09-26 DIAGNOSIS — Z88.6 ALLERGY STATUS TO ANALGESIC AGENT: ICD-10-CM
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
## 2023-09-26 DIAGNOSIS — J95.1 ACUTE PULMONARY INSUFFICIENCY FOLLOWING THORACIC SURGERY: ICD-10-CM

## 2023-09-26 DIAGNOSIS — Z87.11 PERSONAL HISTORY OF PEPTIC ULCER DISEASE: ICD-10-CM

## 2023-09-26 DIAGNOSIS — K76.6 PORTAL HYPERTENSION: ICD-10-CM

## 2023-09-26 DIAGNOSIS — N28.9 DISORDER OF KIDNEY AND URETER, UNSPECIFIED: ICD-10-CM

## 2023-09-26 DIAGNOSIS — J90 PLEURAL EFFUSION, NOT ELSEWHERE CLASSIFIED: ICD-10-CM

## 2023-09-26 DIAGNOSIS — D69.6 THROMBOCYTOPENIA, UNSPECIFIED: ICD-10-CM

## 2023-09-26 DIAGNOSIS — M10.9 GOUT, UNSPECIFIED: ICD-10-CM

## 2023-09-26 DIAGNOSIS — I49.1 ATRIAL PREMATURE DEPOLARIZATION: ICD-10-CM

## 2023-09-26 DIAGNOSIS — J95.89 OTHER POSTPROCEDURAL COMPLICATIONS AND DISORDERS OF RESPIRATORY SYSTEM, NOT ELSEWHERE CLASSIFIED: ICD-10-CM

## 2023-09-26 DIAGNOSIS — F10.91 ALCOHOL USE, UNSPECIFIED, IN REMISSION: ICD-10-CM

## 2023-09-26 DIAGNOSIS — E78.5 HYPERLIPIDEMIA, UNSPECIFIED: ICD-10-CM

## 2023-09-26 DIAGNOSIS — G92.8 OTHER TOXIC ENCEPHALOPATHY: ICD-10-CM

## 2023-09-26 DIAGNOSIS — Z87.891 PERSONAL HISTORY OF NICOTINE DEPENDENCE: ICD-10-CM

## 2023-09-26 DIAGNOSIS — Z86.73 PERSONAL HISTORY OF TRANSIENT ISCHEMIC ATTACK (TIA), AND CEREBRAL INFARCTION WITHOUT RESIDUAL DEFICITS: ICD-10-CM

## 2023-09-26 DIAGNOSIS — K70.30 ALCOHOLIC CIRRHOSIS OF LIVER WITHOUT ASCITES: ICD-10-CM

## 2023-09-26 DIAGNOSIS — Z79.84 LONG TERM (CURRENT) USE OF ORAL HYPOGLYCEMIC DRUGS: ICD-10-CM

## 2023-09-26 DIAGNOSIS — Z86.010 PERSONAL HISTORY OF COLONIC POLYPS: ICD-10-CM

## 2023-09-26 DIAGNOSIS — Z79.4 LONG TERM (CURRENT) USE OF INSULIN: ICD-10-CM

## 2023-09-26 DIAGNOSIS — D62 ACUTE POSTHEMORRHAGIC ANEMIA: ICD-10-CM
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
## 2023-09-26 DIAGNOSIS — I10 ESSENTIAL (PRIMARY) HYPERTENSION: ICD-10-CM

## 2023-09-26 DIAGNOSIS — R57.8 OTHER SHOCK: ICD-10-CM

## 2023-09-26 DIAGNOSIS — F05 DELIRIUM DUE TO KNOWN PHYSIOLOGICAL CONDITION: ICD-10-CM

## 2023-09-26 DIAGNOSIS — T41.205A ADVERSE EFFECT OF UNSPECIFIED GENERAL ANESTHETICS, INITIAL ENCOUNTER: ICD-10-CM

## 2023-09-26 DIAGNOSIS — Z79.02 LONG TERM (CURRENT) USE OF ANTITHROMBOTICS/ANTIPLATELETS: ICD-10-CM

## 2023-09-26 DIAGNOSIS — E11.65 TYPE 2 DIABETES MELLITUS WITH HYPERGLYCEMIA: ICD-10-CM

## 2023-09-26 DIAGNOSIS — E11.9 TYPE 2 DIABETES MELLITUS WITHOUT COMPLICATIONS: ICD-10-CM

## 2023-09-26 DIAGNOSIS — R16.1 SPLENOMEGALY, NOT ELSEWHERE CLASSIFIED: ICD-10-CM

## 2023-09-26 DIAGNOSIS — R06.89 OTHER ABNORMALITIES OF BREATHING: ICD-10-CM

## 2023-09-26 DIAGNOSIS — I87.1 COMPRESSION OF VEIN: ICD-10-CM

## 2023-09-26 DIAGNOSIS — E78.00 PURE HYPERCHOLESTEROLEMIA, UNSPECIFIED: ICD-10-CM

## 2023-09-26 LAB
ALBUMIN FLD-MCNC: 2.5 G/DL — SIGNIFICANT CHANGE UP
ALBUMIN SERPL ELPH-MCNC: 3.7 G/DL — SIGNIFICANT CHANGE UP (ref 3.3–5)
ALP SERPL-CCNC: 185 U/L — HIGH (ref 40–120)
ALT FLD-CCNC: 41 U/L — SIGNIFICANT CHANGE UP (ref 10–45)
ANION GAP SERPL CALC-SCNC: 12 MMOL/L — SIGNIFICANT CHANGE UP (ref 5–17)
APTT BLD: 30.3 SEC — SIGNIFICANT CHANGE UP (ref 24.5–35.6)
AST SERPL-CCNC: 43 U/L — HIGH (ref 10–40)
B PERT IGG+IGM PNL SER: SIGNIFICANT CHANGE UP
BASOPHILS # BLD AUTO: 0.05 K/UL — SIGNIFICANT CHANGE UP (ref 0–0.2)
BASOPHILS NFR BLD AUTO: 0.5 % — SIGNIFICANT CHANGE UP (ref 0–2)
BILIRUB SERPL-MCNC: 3.8 MG/DL — HIGH (ref 0.2–1.2)
BLD GP AB SCN SERPL QL: NEGATIVE — SIGNIFICANT CHANGE UP
BUN SERPL-MCNC: 12 MG/DL — SIGNIFICANT CHANGE UP (ref 7–23)
CALCIUM SERPL-MCNC: 9.1 MG/DL — SIGNIFICANT CHANGE UP (ref 8.4–10.5)
CHLORIDE SERPL-SCNC: 100 MMOL/L — SIGNIFICANT CHANGE UP (ref 96–108)
CO2 SERPL-SCNC: 25 MMOL/L — SIGNIFICANT CHANGE UP (ref 22–31)
COLOR FLD: SIGNIFICANT CHANGE UP
CREAT SERPL-MCNC: 0.75 MG/DL — SIGNIFICANT CHANGE UP (ref 0.5–1.3)
EGFR: 101 ML/MIN/1.73M2 — SIGNIFICANT CHANGE UP
EOSINOPHIL # BLD AUTO: 0.14 K/UL — SIGNIFICANT CHANGE UP (ref 0–0.5)
EOSINOPHIL NFR BLD AUTO: 1.3 % — SIGNIFICANT CHANGE UP (ref 0–6)
FLUID INTAKE SUBSTANCE CLASS: SIGNIFICANT CHANGE UP
GAS PNL BLDA: SIGNIFICANT CHANGE UP
GLUCOSE SERPL-MCNC: 142 MG/DL — HIGH (ref 70–99)
HCT VFR BLD CALC: 34.5 % — LOW (ref 39–50)
HGB BLD-MCNC: 10.9 G/DL — LOW (ref 13–17)
IMM GRANULOCYTES NFR BLD AUTO: 0.5 % — SIGNIFICANT CHANGE UP (ref 0–0.9)
INR BLD: 1.37 — HIGH (ref 0.85–1.18)
LYMPHOCYTES # BLD AUTO: 0.98 K/UL — LOW (ref 1–3.3)
LYMPHOCYTES # BLD AUTO: 8.9 % — LOW (ref 13–44)
LYMPHOCYTES # FLD: 21 % — SIGNIFICANT CHANGE UP
MCHC RBC-ENTMCNC: 30.2 PG — SIGNIFICANT CHANGE UP (ref 27–34)
MCHC RBC-ENTMCNC: 31.6 GM/DL — LOW (ref 32–36)
MCV RBC AUTO: 95.6 FL — SIGNIFICANT CHANGE UP (ref 80–100)
MONOCYTES # BLD AUTO: 1.19 K/UL — HIGH (ref 0–0.9)
MONOCYTES NFR BLD AUTO: 10.9 % — SIGNIFICANT CHANGE UP (ref 2–14)
MONOS+MACROS # FLD: 5 % — SIGNIFICANT CHANGE UP
NEUTROPHILS # BLD AUTO: 8.55 K/UL — HIGH (ref 1.8–7.4)
NEUTROPHILS NFR BLD AUTO: 77.9 % — HIGH (ref 43–77)
NEUTROPHILS-BODY FLUID: 74 % — SIGNIFICANT CHANGE UP
NRBC # BLD: 0 /100 WBCS — SIGNIFICANT CHANGE UP (ref 0–0)
PLATELET # BLD AUTO: 170 K/UL — SIGNIFICANT CHANGE UP (ref 150–400)
POTASSIUM SERPL-MCNC: 4 MMOL/L — SIGNIFICANT CHANGE UP (ref 3.5–5.3)
POTASSIUM SERPL-SCNC: 4 MMOL/L — SIGNIFICANT CHANGE UP (ref 3.5–5.3)
PROT FLD-MCNC: 4 G/DL — SIGNIFICANT CHANGE UP
PROT SERPL-MCNC: 6.6 G/DL — SIGNIFICANT CHANGE UP (ref 6–8.3)
PROTHROM AB SERPL-ACNC: 15.5 SEC — HIGH (ref 9.5–13)
RBC # BLD: 3.61 M/UL — LOW (ref 4.2–5.8)
RBC # FLD: 15.3 % — HIGH (ref 10.3–14.5)
RCV VOL RI: HIGH /UL (ref 0–0)
RH IG SCN BLD-IMP: POSITIVE — SIGNIFICANT CHANGE UP
SODIUM SERPL-SCNC: 137 MMOL/L — SIGNIFICANT CHANGE UP (ref 135–145)
TOTAL NUCLEATED CELL COUNT, BODY FLUID: 846 /UL — SIGNIFICANT CHANGE UP
TUBE TYPE: SIGNIFICANT CHANGE UP
WBC # BLD: 10.96 K/UL — HIGH (ref 3.8–10.5)
WBC # FLD AUTO: 10.96 K/UL — HIGH (ref 3.8–10.5)

## 2023-09-26 PROCEDURE — 32557 INSERT CATH PLEURA W/ IMAGE: CPT

## 2023-09-26 PROCEDURE — 84484 ASSAY OF TROPONIN QUANT: CPT

## 2023-09-26 PROCEDURE — 82553 CREATINE MB FRACTION: CPT

## 2023-09-26 PROCEDURE — 84132 ASSAY OF SERUM POTASSIUM: CPT

## 2023-09-26 PROCEDURE — 97116 GAIT TRAINING THERAPY: CPT

## 2023-09-26 PROCEDURE — 93005 ELECTROCARDIOGRAM TRACING: CPT

## 2023-09-26 PROCEDURE — 83036 HEMOGLOBIN GLYCOSYLATED A1C: CPT

## 2023-09-26 PROCEDURE — 82140 ASSAY OF AMMONIA: CPT

## 2023-09-26 PROCEDURE — 82330 ASSAY OF CALCIUM: CPT

## 2023-09-26 PROCEDURE — 84295 ASSAY OF SERUM SODIUM: CPT

## 2023-09-26 PROCEDURE — 94002 VENT MGMT INPAT INIT DAY: CPT

## 2023-09-26 PROCEDURE — 74018 RADEX ABDOMEN 1 VIEW: CPT

## 2023-09-26 PROCEDURE — 82150 ASSAY OF AMYLASE: CPT

## 2023-09-26 PROCEDURE — 85730 THROMBOPLASTIN TIME PARTIAL: CPT

## 2023-09-26 PROCEDURE — 82947 ASSAY GLUCOSE BLOOD QUANT: CPT

## 2023-09-26 PROCEDURE — 86850 RBC ANTIBODY SCREEN: CPT

## 2023-09-26 PROCEDURE — 82272 OCCULT BLD FECES 1-3 TESTS: CPT

## 2023-09-26 PROCEDURE — 85027 COMPLETE CBC AUTOMATED: CPT

## 2023-09-26 PROCEDURE — 86901 BLOOD TYPING SEROLOGIC RH(D): CPT

## 2023-09-26 PROCEDURE — 80076 HEPATIC FUNCTION PANEL: CPT

## 2023-09-26 PROCEDURE — 71045 X-RAY EXAM CHEST 1 VIEW: CPT

## 2023-09-26 PROCEDURE — 36556 INSERT NON-TUNNEL CV CATH: CPT

## 2023-09-26 PROCEDURE — 80048 BASIC METABOLIC PNL TOTAL CA: CPT

## 2023-09-26 PROCEDURE — 36620 INSERTION CATHETER ARTERY: CPT

## 2023-09-26 PROCEDURE — P9047: CPT

## 2023-09-26 PROCEDURE — 82962 GLUCOSE BLOOD TEST: CPT

## 2023-09-26 PROCEDURE — 85014 HEMATOCRIT: CPT

## 2023-09-26 PROCEDURE — 86891 AUTOLOGOUS BLOOD OP SALVAGE: CPT

## 2023-09-26 PROCEDURE — 82550 ASSAY OF CK (CPK): CPT

## 2023-09-26 PROCEDURE — 84443 ASSAY THYROID STIM HORMONE: CPT

## 2023-09-26 PROCEDURE — 82533 TOTAL CORTISOL: CPT

## 2023-09-26 PROCEDURE — 83735 ASSAY OF MAGNESIUM: CPT

## 2023-09-26 PROCEDURE — C1889: CPT

## 2023-09-26 PROCEDURE — 97161 PT EVAL LOW COMPLEX 20 MIN: CPT

## 2023-09-26 PROCEDURE — 99292 CRITICAL CARE ADDL 30 MIN: CPT | Mod: 25

## 2023-09-26 PROCEDURE — C1751: CPT

## 2023-09-26 PROCEDURE — 71046 X-RAY EXAM CHEST 2 VIEWS: CPT | Mod: 26

## 2023-09-26 PROCEDURE — 71045 X-RAY EXAM CHEST 1 VIEW: CPT | Mod: 26,59

## 2023-09-26 PROCEDURE — 99024 POSTOP FOLLOW-UP VISIT: CPT

## 2023-09-26 PROCEDURE — P9045: CPT

## 2023-09-26 PROCEDURE — 99285 EMERGENCY DEPT VISIT HI MDM: CPT

## 2023-09-26 PROCEDURE — 99291 CRITICAL CARE FIRST HOUR: CPT | Mod: 25

## 2023-09-26 PROCEDURE — 83880 ASSAY OF NATRIURETIC PEPTIDE: CPT

## 2023-09-26 PROCEDURE — 71046 X-RAY EXAM CHEST 2 VIEWS: CPT

## 2023-09-26 PROCEDURE — C8929: CPT

## 2023-09-26 PROCEDURE — 93321 DOPPLER ECHO F-UP/LMTD STD: CPT

## 2023-09-26 PROCEDURE — 80061 LIPID PANEL: CPT

## 2023-09-26 PROCEDURE — 85610 PROTHROMBIN TIME: CPT

## 2023-09-26 PROCEDURE — 86803 HEPATITIS C AB TEST: CPT

## 2023-09-26 PROCEDURE — 85025 COMPLETE CBC W/AUTO DIFF WBC: CPT

## 2023-09-26 PROCEDURE — P9016: CPT

## 2023-09-26 PROCEDURE — 36415 COLL VENOUS BLD VENIPUNCTURE: CPT

## 2023-09-26 PROCEDURE — 86923 COMPATIBILITY TEST ELECTRIC: CPT

## 2023-09-26 PROCEDURE — 80053 COMPREHEN METABOLIC PANEL: CPT

## 2023-09-26 PROCEDURE — 93880 EXTRACRANIAL BILAT STUDY: CPT

## 2023-09-26 PROCEDURE — 76937 US GUIDE VASCULAR ACCESS: CPT | Mod: 26,LT

## 2023-09-26 PROCEDURE — 81003 URINALYSIS AUTO W/O SCOPE: CPT

## 2023-09-26 PROCEDURE — 82803 BLOOD GASES ANY COMBINATION: CPT

## 2023-09-26 PROCEDURE — 36430 TRANSFUSION BLD/BLD COMPNT: CPT

## 2023-09-26 PROCEDURE — 83605 ASSAY OF LACTIC ACID: CPT

## 2023-09-26 PROCEDURE — 86900 BLOOD TYPING SEROLOGIC ABO: CPT

## 2023-09-26 PROCEDURE — 84100 ASSAY OF PHOSPHORUS: CPT

## 2023-09-26 RX ORDER — INFLUENZA VIRUS VACCINE 15; 15; 15; 15 UG/.5ML; UG/.5ML; UG/.5ML; UG/.5ML
0.5 SUSPENSION INTRAMUSCULAR ONCE
Refills: 0 | Status: DISCONTINUED | OUTPATIENT
Start: 2023-09-26 | End: 2023-09-30

## 2023-09-26 RX ORDER — ALLOPURINOL 300 MG
100 TABLET ORAL DAILY
Refills: 0 | Status: DISCONTINUED | OUTPATIENT
Start: 2023-09-26 | End: 2023-09-30

## 2023-09-26 RX ORDER — POLYETHYLENE GLYCOL 3350 17 G/17G
17 POWDER, FOR SOLUTION ORAL DAILY
Refills: 0 | Status: DISCONTINUED | OUTPATIENT
Start: 2023-09-26 | End: 2023-09-30

## 2023-09-26 RX ORDER — SODIUM CHLORIDE 9 MG/ML
3 INJECTION INTRAMUSCULAR; INTRAVENOUS; SUBCUTANEOUS EVERY 8 HOURS
Refills: 0 | Status: DISCONTINUED | OUTPATIENT
Start: 2023-09-26 | End: 2023-09-30

## 2023-09-26 RX ORDER — GABAPENTIN 400 MG/1
100 CAPSULE ORAL EVERY 8 HOURS
Refills: 0 | Status: DISCONTINUED | OUTPATIENT
Start: 2023-09-26 | End: 2023-09-30

## 2023-09-26 RX ORDER — FENTANYL CITRATE 50 UG/ML
12.5 INJECTION INTRAVENOUS ONCE
Refills: 0 | Status: DISCONTINUED | OUTPATIENT
Start: 2023-09-26 | End: 2023-09-26

## 2023-09-26 RX ORDER — CARVEDILOL PHOSPHATE 80 MG/1
6.25 CAPSULE, EXTENDED RELEASE ORAL EVERY 12 HOURS
Refills: 0 | Status: DISCONTINUED | OUTPATIENT
Start: 2023-09-26 | End: 2023-09-30

## 2023-09-26 RX ORDER — FUROSEMIDE 40 MG
20 TABLET ORAL DAILY
Refills: 0 | Status: DISCONTINUED | OUTPATIENT
Start: 2023-09-26 | End: 2023-09-27

## 2023-09-26 RX ORDER — FENTANYL CITRATE 50 UG/ML
25 INJECTION INTRAVENOUS ONCE
Refills: 0 | Status: DISCONTINUED | OUTPATIENT
Start: 2023-09-26 | End: 2023-09-26

## 2023-09-26 RX ORDER — OXYCODONE HYDROCHLORIDE 5 MG/1
5 TABLET ORAL EVERY 6 HOURS
Refills: 0 | Status: DISCONTINUED | OUTPATIENT
Start: 2023-09-26 | End: 2023-09-28

## 2023-09-26 RX ORDER — PANTOPRAZOLE SODIUM 20 MG/1
40 TABLET, DELAYED RELEASE ORAL
Refills: 0 | Status: DISCONTINUED | OUTPATIENT
Start: 2023-09-26 | End: 2023-09-30

## 2023-09-26 RX ORDER — CLOPIDOGREL BISULFATE 75 MG/1
75 TABLET, FILM COATED ORAL DAILY
Refills: 0 | Status: DISCONTINUED | OUTPATIENT
Start: 2023-09-27 | End: 2023-09-30

## 2023-09-26 RX ADMIN — FENTANYL CITRATE 25 MICROGRAM(S): 50 INJECTION INTRAVENOUS at 14:25

## 2023-09-26 RX ADMIN — FENTANYL CITRATE 25 MICROGRAM(S): 50 INJECTION INTRAVENOUS at 14:10

## 2023-09-26 RX ADMIN — GABAPENTIN 100 MILLIGRAM(S): 400 CAPSULE ORAL at 22:50

## 2023-09-26 RX ADMIN — SODIUM CHLORIDE 3 MILLILITER(S): 9 INJECTION INTRAMUSCULAR; INTRAVENOUS; SUBCUTANEOUS at 14:11

## 2023-09-26 RX ADMIN — FENTANYL CITRATE 12.5 MICROGRAM(S): 50 INJECTION INTRAVENOUS at 20:45

## 2023-09-26 RX ADMIN — OXYCODONE HYDROCHLORIDE 5 MILLIGRAM(S): 5 TABLET ORAL at 20:00

## 2023-09-26 RX ADMIN — OXYCODONE HYDROCHLORIDE 5 MILLIGRAM(S): 5 TABLET ORAL at 19:18

## 2023-09-26 RX ADMIN — SODIUM CHLORIDE 3 MILLILITER(S): 9 INJECTION INTRAMUSCULAR; INTRAVENOUS; SUBCUTANEOUS at 21:43

## 2023-09-26 RX ADMIN — CARVEDILOL PHOSPHATE 6.25 MILLIGRAM(S): 80 CAPSULE, EXTENDED RELEASE ORAL at 19:06

## 2023-09-26 RX ADMIN — FENTANYL CITRATE 12.5 MICROGRAM(S): 50 INJECTION INTRAVENOUS at 21:00

## 2023-09-26 NOTE — ED ADULT NURSE NOTE - NSFALLUNIVINTERV_ED_ALL_ED
Bed/Stretcher in lowest position, wheels locked, appropriate side rails in place/Call bell, personal items and telephone in reach/Instruct patient to call for assistance before getting out of bed/chair/stretcher/Non-slip footwear applied when patient is off stretcher/[place] to call system/Physically safe environment - no spills, clutter or unnecessary equipment/Purposeful proactive rounding/Room/bathroom lighting operational, light cord in reach

## 2023-09-26 NOTE — H&P ADULT - HISTORY OF PRESENT ILLNESS
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 Patient originally found to have CAD on cardiac cath in 2/2023. During his hospital stay in february he also reported history of black stools. Work up revealed esophageal varices s/p banding. Post op course c/b hepatic encephalopathy treated with lactulose rifaxamin. Discharge CXR reveals bilateral pleural effusions, pet Dr. Hinton, no pigtail placement today given low platelet count. Pt with normal O2 sat on RA, denies SOB. Discharged home on lasix.     Pt seen in office today with some SOB. CXR revealed very large L pleural effusion. Pt will be admitted to CTICU for drainage and further monitoring.

## 2023-09-26 NOTE — ED ADULT TRIAGE NOTE - CHIEF COMPLAINT QUOTE
pt sent from Dr. Kev Hinton's office for pleural effusion. pt had quadruple bypass on 9/14/23 and was seen today for a follow up appointment. pt c/o discomfort at incision site and occasional sob

## 2023-09-26 NOTE — H&P ADULT - ASSESSMENT
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 Patient originally found to have CAD on cardiac cath in 2/2023. During his hospital stay in february he also reported history of black stools. Work up revealed esophageal varices s/p banding. Post op course c/b hepatic encephalopathy treated with lactulose rifaxamin. Discharge CXR reveals bilateral pleural effusions, pet Dr. Hinton, no pigtail placement today given low platelet count. Pt with normal O2 sat on RA, denies SOB. Discharged home on lasix.     Pt seen in office today with some SOB. CXR revealed very large L pleural effusion. Pt will be admitted to CTICU for drainage and further monitoring.     Plan:    Neurovascular:   -Pain well controlled with current regimen. PRN's: oxy, gabapentin    Cardiovascular:   -Hemodynamically stable.   -Monitor: BP, HR, tele  -CAD s/p CABG    -c/w plavix tomorrow  -HTN    -c/w coreg 6.25 BID  -statins held given liver dz  -Large pleural effusion    -plan for thoracentesis in ICU, c/w lasix 20mg QD    Respiratory:   -Oxygenating well on room air  -Encourage continued use of IS 10x/hr and frequent ambulation  -CXR: large pleural effusion    GI:  -GI PPX: protonix  -PO Diet  -Bowel Regimen: miralax    Renal / :  -Continue to monitor renal function: BUN/Cr pending  -Monitor I/O's daily     Endocrine:    -No hx of DM or thyroid dx  -A1c: pending  -TSH: pending    Hematologic:  -CBC: H/H- pending  -Coagulation Panel.    ID:  -Temperature: afebrile  -CBC: WBC- pending  -Continue to observe for SIRS/Sepsis Syndrome.    Prophylaxis:  -DVT prophylaxis hled today to drain effusion  -Continue with SCD's b/l while patient is at rest     Disposition:  -Discharge home once patient is medically ready

## 2023-09-26 NOTE — CONSULT NOTE ADULT - SUBJECTIVE AND OBJECTIVE BOX
Initial Hepatology Consult Note:    HPI:  63 [place] ([facility]) M former smoker with PMH of HTN, severe 3 vessel CAD, HLD, pre-diabetes, gout, TIA (14 yrs ago), gastric ulcer, rectal bleed, LIZ and alcoholic liver disease c/b cirrhosis and portal hypertension m/b esophageal varices (and UGIB) and splenomegaly. Patient underwent a cardiac cath (2/27/23), which revealed distal LM stenosis and 3 vessel CAD and was discharged home 3/3/23. Patient was referred by his cardiologist to see Dr. Lawson, and underwent repeat EGD (performed 4/26/23 cw EV s/p 3 bands placed) and was referred for liver transplant but declined. Patient underwent OPCAB with CT surgery at [facility] and was discharged on 09/21/23. Now representing for SOB in the setting of new left-sided pleural effusion. Patient admitted to the CT surgery ICU for further management and s/p pigtail placement.       VITAL SIGNS:  Vital Signs Last 24 Hrs  T(C): 36.6 (26 Sep 2023 17:21), Max: 36.6 (26 Sep 2023 14:01)  T(F): 97.8 (26 Sep 2023 17:21), Max: 97.9 (26 Sep 2023 14:01)  HR: 87 (26 Sep 2023 16:00) (80 - 92)  BP: 138/88 (26 Sep 2023 14:26) (118/72 - 138/88)  BP(mean): 107 (26 Sep 2023 14:26) (107 - 107)  RR: 27 (26 Sep 2023 16:00) (18 - 27)  SpO2: 100% (26 Sep 2023 16:00) (95% - 100%)    Parameters below as of 26 Sep 2023 16:00  Patient On (Oxygen Delivery Method): nasal cannula w/ humidification  O2 Flow (L/min): 4      PHYSICAL EXAM:  General: No acute distress  Lungs: Normal respiratory effort and no intercostal retractions, pig tail draining red lfuid  Cardiovascular: RRR  Abdomen: Soft, non-tender, mildly distended  Neurological: Alert and oriented x3  Skin: Warm and dry. No obvious rash      MEDICATIONS:  MEDICATIONS  (STANDING):  allopurinol 100 milliGRAM(s) Oral daily  carvedilol 6.25 milliGRAM(s) Oral every 12 hours  furosemide    Tablet 20 milliGRAM(s) Oral daily  gabapentin 100 milliGRAM(s) Oral every 8 hours  pantoprazole    Tablet 40 milliGRAM(s) Oral before breakfast  polyethylene glycol 3350 17 Gram(s) Oral daily  rifAXIMin 550 milliGRAM(s) Oral two times a day  sodium chloride 0.9% lock flush 3 milliLiter(s) IV Push every 8 hours    MEDICATIONS  (PRN):  oxyCODONE    IR 5 milliGRAM(s) Oral every 6 hours PRN for severe pain      ALLERGIES:  Allergies    aspirin (Pruritus)    Intolerances        LABS:                        10.9   10.96 )-----------( 170      ( 26 Sep 2023 14:00 )             34.5     09-26    137  |  100  |  12  ----------------------------<  142<H>  4.0   |  25  |  0.75    Ca    9.1      26 Sep 2023 14:00    TPro  6.6  /  Alb  3.7  /  TBili  3.8<H>  /  DBili  x   /  AST  43<H>  /  ALT  41  /  AlkPhos  185<H>  09-26    PT/INR - ( 26 Sep 2023 14:00 )   PT: 15.5 sec;   INR: 1.37          PTT - ( 26 Sep 2023 14:00 )  PTT:30.3 sec  Urinalysis Basic - ( 26 Sep 2023 14:00 )    Color: x / Appearance: x / SG: x / pH: x  Gluc: 142 mg/dL / Ketone: x  / Bili: x / Urobili: x   Blood: x / Protein: x / Nitrite: x   Leuk Esterase: x / RBC: x / WBC x   Sq Epi: x / Non Sq Epi: x / Bacteria: x      CAPILLARY BLOOD GLUCOSE  RADIOLOGY & ADDITIONAL TESTS: Reviewed.

## 2023-09-26 NOTE — ED ADULT NURSE NOTE - OBJECTIVE STATEMENT
patient arrived to the ER for SOB and incision site pain located to the middle of the abd. Pt states being told by his provider to come in for a pleural effusion located to the left side. Pt at this moment denies any other medical complaints. No active bleeding or drainage is noted from dressing. Pt is noted to be aox3, able to maintain airway, in no acute distress, able to maintain airway, having nonlabored breathing, no retractions noted, non diaphoretic and able to talk In clear full sentences. IV access was established to the right arm and labs were sent out.

## 2023-09-26 NOTE — ED CLERICAL - NS ED CLERK NOTE PRE-ARRIVAL INFORMATION; ADDITIONAL PRE-ARRIVAL INFORMATION
This patient is enrolled in the Follow Your Heart program and has undergone a cardiac surgery procedure and has active care navigation. This patient can be followed up by the care navigation team within 24 hours. To arrange close follow-up or to obtain additional clinical information about this patient, please call the contact number above. Please call the cardiac surgery team once patient is registered at [phone] for consultation PRIOR to disposition decision.  The patient recently underwent a cardiac surgery procedure and the team can assist in acute medical management.

## 2023-09-26 NOTE — H&P ADULT - NSHPREVIEWOFSYSTEMS_GEN_ALL_CORE
Review of Systems  CONSTITUTIONAL:  Denies Fevers / chills, sweats, fatigue, weight loss, weight gain                                      NEURO:  Denies changes in sensation, seizures, syncope, confusion                                                                            EYES:  Denies Blurry vision, discharge, pain, loss of vision                                                                                    ENMT:  Denies Difficulty hearing, vertigo, dysphagia, epistaxis, recent dental work                                       CV:  Still with some CP, denies palpitations, reports SOB                                                                                          RESPIRATORY:  Denies Wheezing, SOB, cough / sputum, hemoptysis                                                                GI:  Denies Nausea, vomiting, diarrhea, constipation, melena, difficulty swallowing                                               : Denies Hematuria, dysuria, urgency, incontinence                                                                                         MUSKULOSKELETAL:  Denies arthritis, joint swelling, muscle weakness                                                             SKIN/BREAST:  Denies rash, itching, hair loss, masses                                                                                            PSYCH:  Denies depression, anxiety, suicidal ideation                                                                                               HEME/LYMPH:  Denies bruises easily, enlarged lymph nodes, tender lymph nodes                                        ENDOCRINE:  Denies cold intolerance, heat intolerance, polydipsia

## 2023-09-26 NOTE — ED PROVIDER NOTE - MUSCULOSKELETAL, MLM
All bony prominences palpated and nontender.  Range of motion is not limited, no muscle or joint tenderness.  All soft tissue compartments palpated, normal, nontender without tension.  Bilateral radial pulses are normal, 2+ and symmetrical.  Bilateral Femoral/DP/PT pulses are normal 2+, and symmetrical.  No lower extremity edema or calf tenderness appreciated.  Negative Russ's sign bilaterally.

## 2023-09-26 NOTE — ED PROVIDER NOTE - CLINICAL SUMMARY MEDICAL DECISION MAKING FREE TEXT BOX
Patient post recent CABG now sent from CT surgery with new large left-sided pleural effusion with shortness of breath and mild hypoxia.  Patient denies fevers chills and cough, lower extremity edema or calf pains and do not suspect pneumonia infection or PE.  Likely secondary to surgery itself and inflammatory nature.  Will obtain labs, admit to CT surgery as per CT surgery PA recommendations for definitive care and thoracentesis.  EKG obtained and nonischemic in nature.  We will continue to monitor vital signs and provide oxygen as needed.

## 2023-09-26 NOTE — H&P ADULT - NSHPPHYSICALEXAM_GEN_ALL_CORE
PHYSICAL EXAM:  General: resting comfortably in stretcher in NAD  Neurological: AOx3. Motor skills grossly intact  Cardiovascular: Normal S1/S2. Regular rate/rhythm. No murmurs  Respiratory: Lungs CTA bilaterally. No wheezing or rales, L Lung sounds diminished  Gastrointestinal: +BS in all 4 quadrants. Non-distended. Soft. Non-tender  Extremities: Strength 5/5 b/l upper/lower extremities. Sensation grossly intact upper/lower extremities. No edema. No calf tenderness.  Vascular: Radial 2+bilaterally, DP 2+ b/l  Incision Sites: MSI clean, dry, intact

## 2023-09-26 NOTE — ED PROVIDER NOTE - NSFOLLOWUPINSTRUCTIONS_ED_ALL_ED_FT
Post CT surgery large pleural effusion causing hypoxia, sob.  Pt otherwise looks stable.  No fever, cough to suggest infection.  EKG nonischemic.  CT surgical team meeting patient upon arrival, rec admit for tap and monitoring.

## 2023-09-26 NOTE — PATIENT PROFILE ADULT - FALL HARM RISK - HARM RISK INTERVENTIONS
Assistance with ambulation/Assistance OOB with selected safe patient handling equipment/Communicate Risk of Fall with Harm to all staff/Monitor gait and stability/Reinforce activity limits and safety measures with patient and family/Review medications for side effects contributing to fall risk/Sit up slowly, dangle for a short time, stand at bedside before walking/Tailored Fall Risk Interventions/Toileting schedule using arm’s reach rule for commode and bathroom/Use of alarms - bed, chair and/or voice tab/Visual Cue: Yellow wristband and red socks/Bed in lowest position, wheels locked, appropriate side rails in place/Call bell, personal items and telephone in reach/Instruct patient to call for assistance before getting out of bed or chair/Non-slip footwear when patient is out of bed/[place] to call system/Physically safe environment - no spills, clutter or unnecessary equipment/Purposeful Proactive Rounding/Room/bathroom lighting operational, light cord in reach

## 2023-09-26 NOTE — PROCEDURE NOTE - NSPROCDETAILS_GEN_ALL_CORE
guidewire recovered/lumen(s) aspirated and flushed/sterile dressing applied/sterile technique, catheter placed/ultrasound guidance with use of sterile gel and probe cove
Seldinger technique/ultrasound assessment of fluid (location)
usn guidance sterile probe cover gel

## 2023-09-26 NOTE — PROGRESS NOTE ADULT - SUBJECTIVE AND OBJECTIVE BOX
CTICU  CRITICAL  CARE  attending     Hand off received 					   Pertinent clinical, laboratory, radiographic, hemodynamic, echocardiographic, respiratory data, microbiologic data and chart were reviewed and analyzed frequently throughout the course of the day and night  Patient seen and examined with CTS/ SH attending at bedside  Pt is a 63y , Male, HEALTH ISSUES - PROBLEM Dx:      , FAMILY HISTORY:  PAST MEDICAL & SURGICAL HISTORY:  CAD (coronary artery disease)      HTN (hypertension)      Cirrhosis      Esophageal varices in cirrhosis      History of TIAs      H/O: GI bleed      Gout      HLD (hyperlipidemia)      History of portal hypertension      Diabetes mellitus        Patient is a 63y old  Male who presents with a chief complaint of Pleural effusion (26 Sep 2023 17:23)      14 system review was unremarkable    Vital signs, hemodynamic and respiratory parameters were reviewed from the bedside nursing flowsheet.  ICU Vital Signs Last 24 Hrs  T(C): 36.4 (26 Sep 2023 21:12), Max: 36.6 (26 Sep 2023 14:01)  T(F): 97.5 (26 Sep 2023 21:12), Max: 97.9 (26 Sep 2023 14:01)  HR: 96 (26 Sep 2023 21:00) (80 - 104)  BP: 138/88 (26 Sep 2023 14:26) (118/72 - 138/88)  BP(mean): 107 (26 Sep 2023 14:26) (107 - 107)  ABP: 121/63 (26 Sep 2023 21:00) (107/64 - 164/93)  ABP(mean): 80 (26 Sep 2023 21:00) (79 - 116)  RR: 18 (26 Sep 2023 21:00) (18 - 35)  SpO2: 99% (26 Sep 2023 21:00) (95% - 100%)    O2 Parameters below as of 26 Sep 2023 21:00  Patient On (Oxygen Delivery Method): nasal cannula w/ humidification  O2 Flow (L/min): 4        Adult Advanced Hemodynamics Last 24 Hrs  CVP(mm Hg): --  CVP(cm H2O): --  CO: --  CI: --  PA: --  PA(mean): --  PCWP: --  SVR: --  SVRI: --  PVR: --  PVRI: --, ABG - ( 26 Sep 2023 15:23 )  pH, Arterial: 7.46  pH, Blood: x     /  pCO2: 32    /  pO2: 101   / HCO3: 23    / Base Excess: -0.3  /  SaO2: 98.6                Intake and output was reviewed and the fluid balance was calculated  Daily Height in cm: 172.72 (26 Sep 2023 12:32)    Daily   I&O's Summary    26 Sep 2023 07:01  -  26 Sep 2023 21:53  --------------------------------------------------------  IN: 431 mL / OUT: 3490 mL / NET: -3059 mL        All lines and drain sites were assessed  Glycemic trend was reviewedCAPILLARY BLOOD GLUCOSE        No acute change in mental status  Auscultation of the chest reveals equal bs  Abdomen is soft  Extremities are warm and well perfused  Wounds appear clean and unremarkable  Antibiotics are periop    labs  CBC Full  -  ( 26 Sep 2023 14:00 )  WBC Count : 10.96 K/uL  RBC Count : 3.61 M/uL  Hemoglobin : 10.9 g/dL  Hematocrit : 34.5 %  Platelet Count - Automated : 170 K/uL  Mean Cell Volume : 95.6 fl  Mean Cell Hemoglobin : 30.2 pg  Mean Cell Hemoglobin Concentration : 31.6 gm/dL  Auto Neutrophil # : 8.55 K/uL  Auto Lymphocyte # : 0.98 K/uL  Auto Monocyte # : 1.19 K/uL  Auto Eosinophil # : 0.14 K/uL  Auto Basophil # : 0.05 K/uL  Auto Neutrophil % : 77.9 %  Auto Lymphocyte % : 8.9 %  Auto Monocyte % : 10.9 %  Auto Eosinophil % : 1.3 %  Auto Basophil % : 0.5 %    09-26    137  |  100  |  12  ----------------------------<  142<H>  4.0   |  25  |  0.75    Ca    9.1      26 Sep 2023 14:00    TPro  6.6  /  Alb  3.7  /  TBili  3.8<H>  /  DBili  x   /  AST  43<H>  /  ALT  41  /  AlkPhos  185<H>  09-26    PT/INR - ( 26 Sep 2023 14:00 )   PT: 15.5 sec;   INR: 1.37          PTT - ( 26 Sep 2023 14:00 )  PTT:30.3 sec  The current medications were reviewed   MEDICATIONS  (STANDING):  allopurinol 100 milliGRAM(s) Oral daily  carvedilol 6.25 milliGRAM(s) Oral every 12 hours  fentaNYL    Injectable 12.5 MICROGram(s) IV Push once  furosemide    Tablet 20 milliGRAM(s) Oral daily  gabapentin 100 milliGRAM(s) Oral every 8 hours  influenza   Vaccine 0.5 milliLiter(s) IntraMuscular once  pantoprazole    Tablet 40 milliGRAM(s) Oral before breakfast  polyethylene glycol 3350 17 Gram(s) Oral daily  rifAXIMin 550 milliGRAM(s) Oral two times a day  sodium chloride 0.9% lock flush 3 milliLiter(s) IV Push every 8 hours    MEDICATIONS  (PRN):  oxyCODONE    IR 5 milliGRAM(s) Oral every 6 hours PRN for severe pain       PROBLEM LIST/ ASSESSMENT:  HEALTH ISSUES - PROBLEM Dx:      ,   Patient is a 63y old  Male who presents with a chief complaint of Pleural effusion (26 Sep 2023 17:23)     s/p cardiac surgery    massive left pleural effusion  Acute post operative pulmonary insufficiency ruled in due to hypoxemia, O2 sats < 91% on RA treated with HFNC            My plan includes :  close hemodynamic, ventilatory and drain monitoring and management per post op routine    Monitor for arrhythmias and monitor parameters for organ perfusion  beta blockade not administered due to hemodynamic instability and bradycardia  monitor neurologic status  Head of the bed should remain elevated to 45 deg .   chest PT and IS will be encouraged  monitor adequacy of oxygenation and ventilation and attempt to wean oxygen  antibiotic regimen will be tailored to the clinical, laboratory and microbiologic data  Nutritional goals will be met using po eventually , ensure adequate caloric intake and montior the same  Stress ulcer and VTE prophylaxis will be achieved    Glycemic control is satisfactory  Electrolytes have been repleted as necessary and wound care has been carried out. Pain control has been achieved.   agressive physical therapy and early mobility and ambulation goals will be met   The family was updated about the course and plan  CRITICAL CARE TIME Upon my evaluation, this patient had a high probability of imminent or life-threatening deterioration due to the above problems which required my direct attention, intervention, and personal management.  I have personally provided 110 minutes of critical care time exclusive of time spent on separately billable procedures. Time included review of laboratory data, radiology results, discussion with consultants, and monitoring for potential decompensation. Interventions were performed as documented abovepersonally provided by me  in evaluation and management, reassessments, review and interpretation of labs and x-rays, ventilator and hemodynamic management, formulating a plan and coordinating care: ___110___ MIN.  Time does not include procedural time.    CTICU ATTENDING     					    Corona Marie MD

## 2023-09-26 NOTE — CONSULT NOTE ADULT - ASSESSMENT
63 [place] ([facility]) M former smoker with PMH of HTN, severe 3 vessel CAD, HLD, pre-diabetes, gout, TIA (14 yrs ago), gastric ulcer, rectal bleed, LIZ and alcoholic liver disease c/b cirrhosis and portal hypertension m/b esophageal varices (and UGIB) and splenomegaly, and recent OPCAB with CT surgery on 09/14/23. Now re-presenting for SOB and found to have a new left-sided pleural effusion.     Given effusion is on the left side rather than right side and contains blood, suspect effusion is due to patient's most recent OPCAB surgery and not due to his underlying cirrhosis. S/p pigtail placement on left side and draining red fluid.     Recommendations:   - continue rifaximin 550 mg PO BID and Miralax 17g PO daily for HE   - continue low-dose gabapentin and tramadol for pain control  - would avoid high-dose narcotics   - continue Coreg 6.25 mg PO BID   - continue Lasix 20 mg PO daily   - continue pantoprazole 40 mg PO daily for mucosal protection   - daily CBC, CMP, and INR to calculate Meld 3.0 score  - continue to monitor for signs of infection      Case discussed with Dr. Hernandez. Hepatology Team will continue to follow.     Arina Murray D.O.   Gastroenterology Fellow  Weekday 7am-5pm Pager: [phone]

## 2023-09-26 NOTE — ED PROVIDER NOTE - OBJECTIVE STATEMENT
64 YO Male w/ PMHx of HTN, HLD, DMII, gout, former tobacco/ETOH use, cirrhosis, esophageal varices, GI bleed and TIA who was found to have 3vCAD s/p recent CABG now sent by CT surgery for new massive pleural effusion found today on follow up XR.  Pt with WARD since procedure.  No fever, chills, cough, leg pains. Patient originally found to have CAD on cardiac cath in 2/2023. During his hospital stay in february he also reported history of black stools. Work up revealed esophageal varices s/p banding. Patient was seen by Dr. Hinton for surgical consult. At the time, he was deemed not a PCI/Stents 2/2 inability to tolerate DAPT and high risk for surgery secondary to liver dysfunction with MELD score of 18. Patient has been seen by hepatologist Dr. Lawson. Patient presented to [facility] on 9/12/23 for pre-op optimization and on 9/14/23 patient underwent uncomplicated OPCAB x 4 (LIMA-LAD, [id number]-PDA). Arrived to the ICU intubated and on Cardene. POD1 levo was started for increased renal perfusion. Diuresis started. POD2 extubated to NC. Echo revealed grossly normal function and no pericardial effusion. Patient was confused. Ammonia level elevated and started on Rifampin and Lactulose. POD3 Given 1u PRBC for hgb of 7. Briefly on BIPAP but weaned to NC by the evening. POD4 Midodrine stopped but still holding BB for renal perfusion. Mental status improved. Ammonia and HIT panel pending. Transferred to the floor. POD5 Coreg started, Solu-cortef weaned down. POD 5 post op pushing, no changes in hepatology reccs today, likely DC in AM, LFTs stable.

## 2023-09-27 LAB
ALBUMIN SERPL ELPH-MCNC: 3 G/DL — LOW (ref 3.3–5)
ALP SERPL-CCNC: 161 U/L — HIGH (ref 40–120)
ALT FLD-CCNC: 30 U/L — SIGNIFICANT CHANGE UP (ref 10–45)
ANION GAP SERPL CALC-SCNC: 7 MMOL/L — SIGNIFICANT CHANGE UP (ref 5–17)
AST SERPL-CCNC: 34 U/L — SIGNIFICANT CHANGE UP (ref 10–40)
BILIRUB SERPL-MCNC: 2.6 MG/DL — HIGH (ref 0.2–1.2)
BUN SERPL-MCNC: 14 MG/DL — SIGNIFICANT CHANGE UP (ref 7–23)
CALCIUM SERPL-MCNC: 8.4 MG/DL — SIGNIFICANT CHANGE UP (ref 8.4–10.5)
CHLORIDE SERPL-SCNC: 105 MMOL/L — SIGNIFICANT CHANGE UP (ref 96–108)
CO2 SERPL-SCNC: 26 MMOL/L — SIGNIFICANT CHANGE UP (ref 22–31)
COMMENT - FLUIDS: SIGNIFICANT CHANGE UP
CREAT SERPL-MCNC: 0.75 MG/DL — SIGNIFICANT CHANGE UP (ref 0.5–1.3)
EGFR: 101 ML/MIN/1.73M2 — SIGNIFICANT CHANGE UP
GAS PNL BLDA: SIGNIFICANT CHANGE UP
GLUCOSE BLDC GLUCOMTR-MCNC: 139 MG/DL — HIGH (ref 70–99)
GLUCOSE BLDC GLUCOMTR-MCNC: 144 MG/DL — HIGH (ref 70–99)
GLUCOSE BLDC GLUCOMTR-MCNC: 146 MG/DL — HIGH (ref 70–99)
GLUCOSE SERPL-MCNC: 141 MG/DL — HIGH (ref 70–99)
HCT VFR BLD CALC: 29.1 % — LOW (ref 39–50)
HGB BLD-MCNC: 9.4 G/DL — LOW (ref 13–17)
INR BLD: 1.45 — HIGH (ref 0.85–1.18)
MAGNESIUM SERPL-MCNC: 1.6 MG/DL — SIGNIFICANT CHANGE UP (ref 1.6–2.6)
MCHC RBC-ENTMCNC: 30.1 PG — SIGNIFICANT CHANGE UP (ref 27–34)
MCHC RBC-ENTMCNC: 32.3 GM/DL — SIGNIFICANT CHANGE UP (ref 32–36)
MCV RBC AUTO: 93.3 FL — SIGNIFICANT CHANGE UP (ref 80–100)
NRBC # BLD: 0 /100 WBCS — SIGNIFICANT CHANGE UP (ref 0–0)
PHOSPHATE SERPL-MCNC: 3.7 MG/DL — SIGNIFICANT CHANGE UP (ref 2.5–4.5)
PLATELET # BLD AUTO: 165 K/UL — SIGNIFICANT CHANGE UP (ref 150–400)
POTASSIUM SERPL-MCNC: 4 MMOL/L — SIGNIFICANT CHANGE UP (ref 3.5–5.3)
POTASSIUM SERPL-SCNC: 4 MMOL/L — SIGNIFICANT CHANGE UP (ref 3.5–5.3)
PROT SERPL-MCNC: 5.4 G/DL — LOW (ref 6–8.3)
PROTHROM AB SERPL-ACNC: 16.4 SEC — HIGH (ref 9.5–13)
RBC # BLD: 3.12 M/UL — LOW (ref 4.2–5.8)
RBC # FLD: 15.3 % — HIGH (ref 10.3–14.5)
SODIUM SERPL-SCNC: 138 MMOL/L — SIGNIFICANT CHANGE UP (ref 135–145)
WBC # BLD: 8.63 K/UL — SIGNIFICANT CHANGE UP (ref 3.8–10.5)
WBC # FLD AUTO: 8.63 K/UL — SIGNIFICANT CHANGE UP (ref 3.8–10.5)

## 2023-09-27 PROCEDURE — 71045 X-RAY EXAM CHEST 1 VIEW: CPT | Mod: 26

## 2023-09-27 PROCEDURE — 99233 SBSQ HOSP IP/OBS HIGH 50: CPT

## 2023-09-27 RX ORDER — DEXTROSE 50 % IN WATER 50 %
25 SYRINGE (ML) INTRAVENOUS ONCE
Refills: 0 | Status: DISCONTINUED | OUTPATIENT
Start: 2023-09-27 | End: 2023-09-30

## 2023-09-27 RX ORDER — SODIUM CHLORIDE 9 MG/ML
1000 INJECTION, SOLUTION INTRAVENOUS
Refills: 0 | Status: DISCONTINUED | OUTPATIENT
Start: 2023-09-27 | End: 2023-09-30

## 2023-09-27 RX ORDER — MAGNESIUM SULFATE 500 MG/ML
2 VIAL (ML) INJECTION ONCE
Refills: 0 | Status: COMPLETED | OUTPATIENT
Start: 2023-09-27 | End: 2023-09-27

## 2023-09-27 RX ORDER — GLUCAGON INJECTION, SOLUTION 0.5 MG/.1ML
1 INJECTION, SOLUTION SUBCUTANEOUS ONCE
Refills: 0 | Status: DISCONTINUED | OUTPATIENT
Start: 2023-09-27 | End: 2023-09-30

## 2023-09-27 RX ORDER — HYDROMORPHONE HYDROCHLORIDE 2 MG/ML
0.25 INJECTION INTRAMUSCULAR; INTRAVENOUS; SUBCUTANEOUS ONCE
Refills: 0 | Status: DISCONTINUED | OUTPATIENT
Start: 2023-09-27 | End: 2023-09-27

## 2023-09-27 RX ORDER — DEXTROSE 50 % IN WATER 50 %
15 SYRINGE (ML) INTRAVENOUS ONCE
Refills: 0 | Status: DISCONTINUED | OUTPATIENT
Start: 2023-09-27 | End: 2023-09-30

## 2023-09-27 RX ORDER — MAGNESIUM OXIDE 400 MG ORAL TABLET 241.3 MG
800 TABLET ORAL ONCE
Refills: 0 | Status: COMPLETED | OUTPATIENT
Start: 2023-09-27 | End: 2023-09-27

## 2023-09-27 RX ORDER — CHLORHEXIDINE GLUCONATE 213 G/1000ML
1 SOLUTION TOPICAL DAILY
Refills: 0 | Status: DISCONTINUED | OUTPATIENT
Start: 2023-09-27 | End: 2023-09-30

## 2023-09-27 RX ORDER — POTASSIUM CHLORIDE 20 MEQ
40 PACKET (EA) ORAL ONCE
Refills: 0 | Status: COMPLETED | OUTPATIENT
Start: 2023-09-27 | End: 2023-09-27

## 2023-09-27 RX ORDER — DEXTROSE 50 % IN WATER 50 %
12.5 SYRINGE (ML) INTRAVENOUS ONCE
Refills: 0 | Status: DISCONTINUED | OUTPATIENT
Start: 2023-09-27 | End: 2023-09-30

## 2023-09-27 RX ORDER — HYDROMORPHONE HYDROCHLORIDE 2 MG/ML
0.5 INJECTION INTRAMUSCULAR; INTRAVENOUS; SUBCUTANEOUS ONCE
Refills: 0 | Status: DISCONTINUED | OUTPATIENT
Start: 2023-09-27 | End: 2023-09-28

## 2023-09-27 RX ORDER — ALBUMIN HUMAN 25 %
250 VIAL (ML) INTRAVENOUS ONCE
Refills: 0 | Status: COMPLETED | OUTPATIENT
Start: 2023-09-27 | End: 2023-09-27

## 2023-09-27 RX ORDER — FUROSEMIDE 40 MG
40 TABLET ORAL DAILY
Refills: 0 | Status: DISCONTINUED | OUTPATIENT
Start: 2023-09-28 | End: 2023-09-30

## 2023-09-27 RX ORDER — OXYCODONE HYDROCHLORIDE 5 MG/1
5 TABLET ORAL ONCE
Refills: 0 | Status: DISCONTINUED | OUTPATIENT
Start: 2023-09-27 | End: 2023-09-27

## 2023-09-27 RX ORDER — SPIRONOLACTONE 25 MG/1
25 TABLET, FILM COATED ORAL DAILY
Refills: 0 | Status: DISCONTINUED | OUTPATIENT
Start: 2023-09-27 | End: 2023-09-30

## 2023-09-27 RX ORDER — INSULIN LISPRO 100/ML
VIAL (ML) SUBCUTANEOUS
Refills: 0 | Status: DISCONTINUED | OUTPATIENT
Start: 2023-09-27 | End: 2023-09-30

## 2023-09-27 RX ORDER — FUROSEMIDE 40 MG
20 TABLET ORAL ONCE
Refills: 0 | Status: COMPLETED | OUTPATIENT
Start: 2023-09-27 | End: 2023-09-27

## 2023-09-27 RX ADMIN — HYDROMORPHONE HYDROCHLORIDE 0.25 MILLIGRAM(S): 2 INJECTION INTRAMUSCULAR; INTRAVENOUS; SUBCUTANEOUS at 19:01

## 2023-09-27 RX ADMIN — SPIRONOLACTONE 25 MILLIGRAM(S): 25 TABLET, FILM COATED ORAL at 12:35

## 2023-09-27 RX ADMIN — POLYETHYLENE GLYCOL 3350 17 GRAM(S): 17 POWDER, FOR SOLUTION ORAL at 11:01

## 2023-09-27 RX ADMIN — Medication 20 MILLIGRAM(S): at 10:34

## 2023-09-27 RX ADMIN — SODIUM CHLORIDE 3 MILLILITER(S): 9 INJECTION INTRAMUSCULAR; INTRAVENOUS; SUBCUTANEOUS at 08:03

## 2023-09-27 RX ADMIN — OXYCODONE HYDROCHLORIDE 5 MILLIGRAM(S): 5 TABLET ORAL at 12:35

## 2023-09-27 RX ADMIN — Medication 25 GRAM(S): at 05:19

## 2023-09-27 RX ADMIN — GABAPENTIN 100 MILLIGRAM(S): 400 CAPSULE ORAL at 22:07

## 2023-09-27 RX ADMIN — OXYCODONE HYDROCHLORIDE 5 MILLIGRAM(S): 5 TABLET ORAL at 18:36

## 2023-09-27 RX ADMIN — CARVEDILOL PHOSPHATE 6.25 MILLIGRAM(S): 80 CAPSULE, EXTENDED RELEASE ORAL at 05:20

## 2023-09-27 RX ADMIN — Medication 125 MILLILITER(S): at 09:29

## 2023-09-27 RX ADMIN — CHLORHEXIDINE GLUCONATE 1 APPLICATION(S): 213 SOLUTION TOPICAL at 07:43

## 2023-09-27 RX ADMIN — Medication 100 MILLIGRAM(S): at 11:01

## 2023-09-27 RX ADMIN — OXYCODONE HYDROCHLORIDE 5 MILLIGRAM(S): 5 TABLET ORAL at 06:25

## 2023-09-27 RX ADMIN — SODIUM CHLORIDE 3 MILLILITER(S): 9 INJECTION INTRAMUSCULAR; INTRAVENOUS; SUBCUTANEOUS at 13:03

## 2023-09-27 RX ADMIN — CARVEDILOL PHOSPHATE 6.25 MILLIGRAM(S): 80 CAPSULE, EXTENDED RELEASE ORAL at 18:18

## 2023-09-27 RX ADMIN — GABAPENTIN 100 MILLIGRAM(S): 400 CAPSULE ORAL at 05:19

## 2023-09-27 RX ADMIN — OXYCODONE HYDROCHLORIDE 5 MILLIGRAM(S): 5 TABLET ORAL at 19:18

## 2023-09-27 RX ADMIN — PANTOPRAZOLE SODIUM 40 MILLIGRAM(S): 20 TABLET, DELAYED RELEASE ORAL at 08:13

## 2023-09-27 RX ADMIN — OXYCODONE HYDROCHLORIDE 5 MILLIGRAM(S): 5 TABLET ORAL at 13:33

## 2023-09-27 RX ADMIN — CLOPIDOGREL BISULFATE 75 MILLIGRAM(S): 75 TABLET, FILM COATED ORAL at 11:01

## 2023-09-27 RX ADMIN — Medication 20 MILLIGRAM(S): at 05:19

## 2023-09-27 RX ADMIN — SODIUM CHLORIDE 3 MILLILITER(S): 9 INJECTION INTRAMUSCULAR; INTRAVENOUS; SUBCUTANEOUS at 21:47

## 2023-09-27 RX ADMIN — GABAPENTIN 100 MILLIGRAM(S): 400 CAPSULE ORAL at 13:08

## 2023-09-27 RX ADMIN — OXYCODONE HYDROCHLORIDE 5 MILLIGRAM(S): 5 TABLET ORAL at 10:00

## 2023-09-27 RX ADMIN — OXYCODONE HYDROCHLORIDE 5 MILLIGRAM(S): 5 TABLET ORAL at 05:27

## 2023-09-27 RX ADMIN — OXYCODONE HYDROCHLORIDE 5 MILLIGRAM(S): 5 TABLET ORAL at 09:29

## 2023-09-27 RX ADMIN — Medication 40 MILLIEQUIVALENT(S): at 05:19

## 2023-09-27 NOTE — DIETITIAN INITIAL EVALUATION ADULT - OTHER CALCULATIONS
IBW used to calculate energy needs due to pt's current body weight exceeding 120% of IBW  Adjust for age, current medical conditions - fluids per team

## 2023-09-27 NOTE — PROGRESS NOTE ADULT - ATTENDING COMMENTS
Cirrhosis due to AUD with recent CABG, presented with left sided pleural effusion  Blood nature of fluid and left side location are more in favor of a surgical etiology. However, the amount of fluid and rate of re-accumulation bring up the possibility of hepatic hydrothorax.   Check sono of abdomen with doppler

## 2023-09-27 NOTE — PROGRESS NOTE ADULT - ASSESSMENT
64 y/o Male with a PMHx HTN, HLD, DMII, gout, former tobacco/ETOH use, cirrhosis, esophageal varices, GI bleed and TIA who was found to have 3vCAD. Readmitted in the setting of a very large L pleural effusion and is now s/p pigtail placement in ICU. Pt was given central line and [place]. [place] was removed, stepped down to floor with TLC and pigtail in place. Pigtail continues to have high output (>3L of fluid have been removed) and monitoring fluid balance status closely. Hepatology following for cirrhosis hx and monitoring closely for possible portal vein stenosis if output continues to remain high.     Plan:    Neurovascular:   -Pain well controlled with current regimen. PRN's: oxycodone  -acetaminophen held given liver cirrhosis hx   -former tobacco/ETOH abuse disorder     Cardiovascular:   -pod 13 OPCAB    -continue with plavix, coreg    -follow up with ASA in the AM   -HTN: continue with coreg, stable   -Hemodynamically stable.   -Monitor: BP, HR, tele    Respiratory:   -Oxygenating well on room air  -Encourage continued use of IS 10x/hr and frequent ambulation  -CXR: no acute pathology, no PTX     GI:  -hx of liver cirrhosis       -continue with rifaximin   -GI PPX: pantoprazole 40mg daily   -PO Diet: DASH/TLC   -Bowel Regimen: miralax     Renal / :  -diuresis: lasix 40mg daily, spironolactone 25mg daily   -Gout: continue with allopurinol   -Continue to monitor renal function: BUN/Cr 14/0.75   -Monitor I/O's daily     Endocrine:    -No hx of DM or thyroid dx  -A1c: 7.0  -TSH: 1.31     Hematologic:  -CBC: H/H- 9.4/29  -Coagulation Panel: WNL     ID:  -Temperature: afebrile   -CBC: WBC- 8.6   -Continue to observe for SIRS/Sepsis Syndrome.    Prophylaxis:  -DVT prophylaxis with 5000 SubQ Heparin q8h.  -Continue with SCD's b/l while patient is at rest     Disposition:  -Discharge home once patient is medically ready   62 y/o Male with a PMHx HTN, HLD, DMII, gout, former tobacco/ETOH use, cirrhosis, esophageal varices, GI bleed and TIA who was found to have 3vCAD. Readmitted in the setting of a very large L pleural effusion and is now s/p pigtail placement in ICU. Pt was given central line and [place]. [place] was removed, stepped down to floor with TLC and pigtail in place. Pigtail continues to have high output (>3L of fluid have been removed) and monitoring fluid balance status closely. Hepatology following for cirrhosis hx and monitoring closely for possible portal vein stenosis if output continues to remain high.     Plan:  Neurovascular:   -Pain well controlled with current regimen. PRN's: oxycodone  -acetaminophen held given liver cirrhosis hx   -former tobacco/ETOH abuse disorder     Cardiovascular:   -pod 13 OPCAB    -continue with plavix, coreg    -follow up with ASA in the AM   -HTN: continue with coreg, stable   -Hemodynamically stable.   -Monitor: BP, HR, tele    Respiratory:   -Pigtail in place for pleural effusion    -high output pigtail, remains in place    -monitor closely    -concern for portal vein stenosis   -Oxygenating well on room air  -Encourage continued use of IS 10x/hr and frequent ambulation  -CXR: no acute pathology, no PTX     GI:  -hx of liver cirrhosis     -continue with rifaximin   -GI PPX: pantoprazole 40mg daily   -PO Diet: DASH/TLC   -Bowel Regimen: miralax     Renal / :  -diuresis: lasix 40mg daily, spironolactone 25mg daily   -Gout: continue with allopurinol   -Continue to monitor renal function: BUN/Cr 14/0.75   -Monitor I/O's daily     Endocrine:    -No hx of DM or thyroid dx  -A1c: 7.0  -TSH: 1.31     Hematologic:  -CBC: H/H- 9.4/29  -Coagulation Panel: WNL     ID:  -Temperature: afebrile   -CBC: WBC- 8.6   -Continue to observe for SIRS/Sepsis Syndrome.    Prophylaxis:  -DVT prophylaxis with 5000 SubQ Heparin Q8hr  -Continue with SCD's b/l while patient is at rest     Disposition:  -Discharge home once patient is medically ready

## 2023-09-27 NOTE — PROGRESS NOTE ADULT - SUBJECTIVE AND OBJECTIVE BOX
CTICU  CRITICAL  CARE  attending     Hand off received 					   Pertinent clinical, laboratory, radiographic, hemodynamic, echocardiographic, respiratory data, microbiologic data and chart were reviewed and analyzed frequently throughout the course of the day  Patient seen and examined with CTS/ SH attending at bedside  Pt is a 63yr old male with PMH HTN, HLD, DM, gout, etoh cirrhosis cb esophageal varices cb GI bleed, Recent admission 9/12-9/21 for OPCABG x 4 (LIMA-LAD, XYX-Lbeyo-EQ-PDA, EF 65%) with Dr. Hinton 9/14/23, presented to ED from outpatient office for L pleural effusion 9/26. Admitted to CTICU where pt underwent TLC, radial arterial line placement, plt transfusion and L pigtail with 3.7L out.       FAMILY HISTORY:  PAST MEDICAL & SURGICAL HISTORY:  CAD (coronary artery disease)  HTN (hypertension)  Cirrhosis  Esophageal varices in cirrhosis  History of TIAs  H/O: GI bleed  Gout  HLD (hyperlipidemia)  History of portal hypertension  Diabetes mellitus        Patient is a 63y old  Male who presents with a chief complaint of Pleural effusion.      14 system review was unremarkable    Vital signs, hemodynamic and respiratory parameters were reviewed from the bedside nursing flowsheet.  ICU Vital Signs Last 24 Hrs  T(C): 36.7 (27 Sep 2023 09:20), Max: 36.7 (27 Sep 2023 01:01)  T(F): 98.1 (27 Sep 2023 09:20), Max: 98.1 (27 Sep 2023 09:20)  HR: 83 (27 Sep 2023 10:00) (80 - 104)  BP: 102/70 (27 Sep 2023 10:00) (94/68 - 138/88)  BP(mean): 82 (27 Sep 2023 10:00) (76 - 107)  ABP: 102/59 (27 Sep 2023 09:00) (94/56 - 164/93)  ABP(mean): 74 (27 Sep 2023 09:00) (69 - 116)  RR: 18 (27 Sep 2023 10:00) (12 - 35)  SpO2: 98% (27 Sep 2023 10:00) (95% - 100%)    O2 Parameters below as of 27 Sep 2023 10:00  Patient On (Oxygen Delivery Method): nasal cannula w/ humidification  O2 Flow (L/min): 4        Adult Advanced Hemodynamics Last 24 Hrs  CVP(mm Hg): 7 (27 Sep 2023 10:00) (1 - 9)  CVP(cm H2O): --  CO: --  CI: --  PA: --  PA(mean): --  PCWP: --  SVR: --  SVRI: --  PVR: --  PVRI: --, ABG - ( 27 Sep 2023 02:53 )  pH, Arterial: 7.44  pH, Blood: x     /  pCO2: 37    /  pO2: 128   / HCO3: 25    / Base Excess: 1.1   /  SaO2: 99.6                Intake and output was reviewed and the fluid balance was calculated  Daily Height in cm: 172.72 (26 Sep 2023 12:32)    Daily   I&O's Summary    26 Sep 2023 07:01  -  27 Sep 2023 07:00  --------------------------------------------------------  IN: 601 mL / OUT: 4900 mL / NET: -4299 mL    27 Sep 2023 07:01  -  27 Sep 2023 10:23  --------------------------------------------------------  IN: 250 mL / OUT: 1590 mL / NET: -1340 mL        All lines and drain sites were assessed  Glycemic trend was reviewed        Neuro: nad  HEENT: mmm  Heart: s1 s2  Lungs: decreased l side  Abdomen: soft nt nd  Extremities: wwp    Lines:  LIJ TLC 9/26  R radial arterial line 9/26    Tubes:  L pigtail    labs  CBC Full  -  ( 27 Sep 2023 03:04 )  WBC Count : 8.63 K/uL  RBC Count : 3.12 M/uL  Hemoglobin : 9.4 g/dL  Hematocrit : 29.1 %  Platelet Count - Automated : 165 K/uL  Mean Cell Volume : 93.3 fl  Mean Cell Hemoglobin : 30.1 pg  Mean Cell Hemoglobin Concentration : 32.3 gm/dL  Auto Neutrophil # : x  Auto Lymphocyte # : x  Auto Monocyte # : x  Auto Eosinophil # : x  Auto Basophil # : x  Auto Neutrophil % : x  Auto Lymphocyte % : x  Auto Monocyte % : x  Auto Eosinophil % : x  Auto Basophil % : x    09-27    138  |  105  |  14  ----------------------------<  141<H>  4.0   |  26  |  0.75    Ca    8.4      27 Sep 2023 03:04  Phos  3.7     09-27  Mg     1.6     09-27    TPro  5.4<L>  /  Alb  3.0<L>  /  TBili  2.6<H>  /  DBili  x   /  AST  34  /  ALT  30  /  AlkPhos  161<H>  09-27    PT/INR - ( 27 Sep 2023 03:04 )   PT: 16.4 sec;   INR: 1.45          PTT - ( 26 Sep 2023 14:00 )  PTT:30.3 sec  The current medications were reviewed   MEDICATIONS  (STANDING):  allopurinol 100 milliGRAM(s) Oral daily  carvedilol 6.25 milliGRAM(s) Oral every 12 hours  chlorhexidine 2% Cloths 1 Application(s) Topical daily  clopidogrel Tablet 75 milliGRAM(s) Oral daily  dextrose 5%. 1000 milliLiter(s) (100 mL/Hr) IV Continuous <Continuous>  dextrose 5%. 1000 milliLiter(s) (50 mL/Hr) IV Continuous <Continuous>  dextrose 50% Injectable 25 Gram(s) IV Push once  dextrose 50% Injectable 12.5 Gram(s) IV Push once  dextrose 50% Injectable 25 Gram(s) IV Push once  furosemide    Tablet 20 milliGRAM(s) Oral once  gabapentin 100 milliGRAM(s) Oral every 8 hours  glucagon  Injectable 1 milliGRAM(s) IntraMuscular once  influenza   Vaccine 0.5 milliLiter(s) IntraMuscular once  insulin lispro (ADMELOG) corrective regimen sliding scale   SubCutaneous Before meals and at bedtime  pantoprazole    Tablet 40 milliGRAM(s) Oral before breakfast  polyethylene glycol 3350 17 Gram(s) Oral daily  rifAXIMin 550 milliGRAM(s) Oral two times a day  sodium chloride 0.9% lock flush 3 milliLiter(s) IV Push every 8 hours  spironolactone 25 milliGRAM(s) Oral daily    MEDICATIONS  (PRN):  dextrose Oral Gel 15 Gram(s) Oral once PRN Blood Glucose LESS THAN 70 milliGRAM(s)/deciliter  oxyCODONE    IR 5 milliGRAM(s) Oral every 6 hours PRN for severe pain      Assessment/Plan:  63yr old male with PMH HTN, HLD, DM, gout, etoh cirrhosis cb esophageal varices cb GI bleed, Recent admission 9/12-9/21 for OPCABG x 4 (LIMA-LAD, MDY-Rhujz-GH-PDA, EF 65%) with Dr. Hinton 9/14/23, presented to ED from outpatient office for L pleural effusion 9/26. Admitted to CTICU where pt underwent TLC, radial arterial line placement, plt transfusion and L pigtail with 3.7L out.       Large L pleural effusion sp pigtail  Monitor CT output  Remove arterial line  Fu hepatology recs  Diuresis  BBlocker  Diet as tolerated  Replete lytes prn  GI/DVT PPX  Bowel Regimen  Pain control  OOB with PT  Close hemodynamic, ventilatory and drain monitoring and management per post op routine  Monitor for arrhythmias and monitor parameters for organ perfusion  Monitor neurologic status  Head of the bed should remain elevated to 45 deg   Chest PT and IS will be encouraged  Monitor adequacy of oxygenation and ventilation and attempt to wean oxygen  Antibiotic regimen will be tailored to the clinical, laboratory and microbiologic data  Nutritional goals will be met using po eventually, ensure adequate caloric intake and monitor the same  Stress ulcer and VTE prophylaxis will be achieved    Glycemic control is satisfactory  Electrolytes have been repleted as necessary and wound care has been carried out   Pain control has been achieved.   Aggressive physical therapy and early mobility and ambulation goals will be met   The family was updated about the course and plan.    CRITICAL CARE TIME personally provided by me  in evaluation and management, reassessments, review and interpretation of labs and x-rays, ventilator and hemodynamic management, formulating a plan and coordinating care: ___35____ MIN.  Time does not include procedural time.    CTICU ATTENDING     					  Marii Dill MD

## 2023-09-27 NOTE — DIETITIAN INITIAL EVALUATION ADULT - NSFNSGIIOFT_GEN_A_CORE
09-26-23 @ 07:01 - 09-27-23 @ 07:00  --------------------------------------------------------  OUT:    Chest Tube (mL): 4050 mL  Total OUT: 4050 mL    Total NET: -4050 mL      09-27-23 @ 07:01 - 09-27-23 @ 14:37  --------------------------------------------------------  OUT:    Chest Tube (mL): 1320 mL  Total OUT: 1320 mL    Total NET: -1320 mL

## 2023-09-27 NOTE — PROGRESS NOTE ADULT - SUBJECTIVE AND OBJECTIVE BOX
Hepatology Consult Progress Note:     OVERNIGHT EVENTS: RAZIA.     SUBJECTIVE / INTERVAL HPI:   Patient seen and examined at bedside. Complaining of some pain at area of pigtail placement but otherwise has no acute complaints at this time. Denies n/v/d, complains of some constipation. Also denies abdominal pain.       VITAL SIGNS:  Vital Signs Last 24 Hrs  T(C): 36.8 (27 Sep 2023 17:03), Max: 36.8 (27 Sep 2023 17:03)  T(F): 98.3 (27 Sep 2023 17:03), Max: 98.3 (27 Sep 2023 17:03)  HR: 92 (27 Sep 2023 16:15) (80 - 100)  BP: 117/72 (27 Sep 2023 16:15) (94/68 - 130/68)  BP(mean): 90 (27 Sep 2023 16:15) (76 - 93)  RR: 17 (27 Sep 2023 16:15) (12 - 23)  SpO2: 96% (27 Sep 2023 16:15) (95% - 99%)    Parameters below as of 27 Sep 2023 16:15  Patient On (Oxygen Delivery Method): room air        09-26-23 @ 07:01  -  09-27-23 @ 07:00  --------------------------------------------------------  IN: 601 mL / OUT: 4900 mL / NET: -4299 mL    09-27-23 @ 07:01 - 09-27-23 @ 18:14  --------------------------------------------------------  IN: 250 mL / OUT: 2230 mL / NET: -1980 mL      PHYSICAL EXAM:  General: No acute distress  Lungs: Normal respiratory effort and no intercostal retractions  Cardiovascular: RRR, +pig tail in place, +LIJ   Abdomen: Soft, non-tender, non-distended   Neurological: Alert and oriented x3  Skin: Warm and dry. No obvious rash         MEDICATIONS:  MEDICATIONS  (STANDING):  allopurinol 100 milliGRAM(s) Oral daily  carvedilol 6.25 milliGRAM(s) Oral every 12 hours  chlorhexidine 2% Cloths 1 Application(s) Topical daily  clopidogrel Tablet 75 milliGRAM(s) Oral daily  dextrose 5%. 1000 milliLiter(s) (50 mL/Hr) IV Continuous <Continuous>  dextrose 5%. 1000 milliLiter(s) (100 mL/Hr) IV Continuous <Continuous>  dextrose 50% Injectable 25 Gram(s) IV Push once  dextrose 50% Injectable 25 Gram(s) IV Push once  dextrose 50% Injectable 12.5 Gram(s) IV Push once  gabapentin 100 milliGRAM(s) Oral every 8 hours  glucagon  Injectable 1 milliGRAM(s) IntraMuscular once  influenza   Vaccine 0.5 milliLiter(s) IntraMuscular once  insulin lispro (ADMELOG) corrective regimen sliding scale   SubCutaneous Before meals and at bedtime  pantoprazole    Tablet 40 milliGRAM(s) Oral before breakfast  polyethylene glycol 3350 17 Gram(s) Oral daily  rifAXIMin 550 milliGRAM(s) Oral two times a day  sodium chloride 0.9% lock flush 3 milliLiter(s) IV Push every 8 hours  spironolactone 25 milliGRAM(s) Oral daily    MEDICATIONS  (PRN):  dextrose Oral Gel 15 Gram(s) Oral once PRN Blood Glucose LESS THAN 70 milliGRAM(s)/deciliter  oxyCODONE    IR 5 milliGRAM(s) Oral every 6 hours PRN for severe pain      ALLERGIES:  Allergies    aspirin (Pruritus)    Intolerances        LABS:                        9.4    8.63  )-----------( 165      ( 27 Sep 2023 03:04 )             29.1     09-27    138  |  105  |  14  ----------------------------<  141<H>  4.0   |  26  |  0.75    Ca    8.4      27 Sep 2023 03:04  Phos  3.7     09-27  Mg     1.6     09-27    TPro  5.4<L>  /  Alb  3.0<L>  /  TBili  2.6<H>  /  DBili  x   /  AST  34  /  ALT  30  /  AlkPhos  161<H>  09-27    PT/INR - ( 27 Sep 2023 03:04 )   PT: 16.4 sec;   INR: 1.45          PTT - ( 26 Sep 2023 14:00 )  PTT:30.3 sec  Urinalysis Basic - ( 27 Sep 2023 03:04 )    Color: x / Appearance: x / SG: x / pH: x  Gluc: 141 mg/dL / Ketone: x  / Bili: x / Urobili: x   Blood: x / Protein: x / Nitrite: x   Leuk Esterase: x / RBC: x / WBC x   Sq Epi: x / Non Sq Epi: x / Bacteria: x      CAPILLARY BLOOD GLUCOSE      POCT Blood Glucose.: 139 mg/dL (27 Sep 2023 16:35)        RADIOLOGY & ADDITIONAL TESTS: Reviewed.    ASSESSMENT:    PLAN:

## 2023-09-27 NOTE — DIETITIAN INITIAL EVALUATION ADULT - ADD RECOMMEND
1. Monitor PO intake/appetite, GI distress, diet tolerance, labs, weights.  2. Honor pt food preferences as able.  3. Micro-nutrients/Vitamins/Minerals per team..  4. Should pt be noted with s/s of issues swallowing, recommend NPO/SLP.  5. RD to remain available for additional nutrition interventions as needed.  ** Moderate Nutrition Risk.

## 2023-09-27 NOTE — DIETITIAN INITIAL EVALUATION ADULT - OTHER INFO
62 YO Male PMHx of HTN, HLD, DMII, gout, former tobacco/ETOH use, cirrhosis, esophageal varices, GI bleed and TIA who was found to have 3vCAD. Pt originally found to have CAD on cardiac cath in 2/2023. During his hospital stay in february he also reported history of black stools. Work up revealed esophageal varices s/p banding. Post op course c/b hepatic encephalopathy treated with lactulose rifaxamin. Discharge CXR reveals bilateral pleural effusions, pet Dr. Hinton, no pigtail placement today given low platelet count. Pt with normal O2 sat on RA, denies SOB. Discharged home on lasix. Pt will be admitted to CTICU for drainage and further monitoring- underwent TLC, radial arterial line placement, plt transfusion and L pigtail with 3.7L out. CXR with some improvement.    Pt seen this AM on 9EAST. RN at bedside. No issues reported from RN. Diet: DASH TLC consistent CHO diet. Fair PO at this time, 50% breakfast consumed. PTA reports eating Russian foods. Does not consume milk and has not done so for 60 years however could not report reason why. Does consume cheese. Pt could not provide RD with any known food allergies or intolerances today. Reports some mild ABD Pain. Denies D/C, reports normal consistency BM on 9/26. Protonix and MIRALAX are ordered. No issues chewing/swallowing at this time. Mcftji83. 2+BL foot and ankle Edema noted. No pressure ulcers. Lipids WDL, POCT 91 121 123,  142.   Please see below for nutritions recommendations.

## 2023-09-27 NOTE — DIETITIAN INITIAL EVALUATION ADULT - NS FNS DIET ORDER
Diet, DASH/TLC:   Sodium & Cholesterol Restricted  Consistent Carbohydrate {No Snacks} (CSTCHO) (09-26-23 @ 17:06)

## 2023-09-27 NOTE — DIETITIAN INITIAL EVALUATION ADULT - PERTINENT LABORATORY DATA
09-27    138  |  105  |  14  ----------------------------<  141<H>  4.0   |  26  |  0.75    Ca    8.4      27 Sep 2023 03:04  Phos  3.7     09-27  Mg     1.6     09-27    TPro  5.4<L>  /  Alb  3.0<L>  /  TBili  2.6<H>  /  DBili  x   /  AST  34  /  ALT  30  /  AlkPhos  161<H>  09-27  POCT Blood Glucose.: 146 mg/dL (09-27-23 @ 11:07)  A1C with Estimated Average Glucose Result: 7.0 % (09-13-23 @ 05:30)

## 2023-09-27 NOTE — DIETITIAN INITIAL EVALUATION ADULT - PERTINENT MEDS FT
MEDICATIONS  (STANDING):  allopurinol 100 milliGRAM(s) Oral daily  carvedilol 6.25 milliGRAM(s) Oral every 12 hours  chlorhexidine 2% Cloths 1 Application(s) Topical daily  clopidogrel Tablet 75 milliGRAM(s) Oral daily  dextrose 5%. 1000 milliLiter(s) (50 mL/Hr) IV Continuous <Continuous>  dextrose 5%. 1000 milliLiter(s) (100 mL/Hr) IV Continuous <Continuous>  dextrose 50% Injectable 25 Gram(s) IV Push once  dextrose 50% Injectable 25 Gram(s) IV Push once  dextrose 50% Injectable 12.5 Gram(s) IV Push once  gabapentin 100 milliGRAM(s) Oral every 8 hours  glucagon  Injectable 1 milliGRAM(s) IntraMuscular once  influenza   Vaccine 0.5 milliLiter(s) IntraMuscular once  insulin lispro (ADMELOG) corrective regimen sliding scale   SubCutaneous Before meals and at bedtime  pantoprazole    Tablet 40 milliGRAM(s) Oral before breakfast  polyethylene glycol 3350 17 Gram(s) Oral daily  rifAXIMin 550 milliGRAM(s) Oral two times a day  sodium chloride 0.9% lock flush 3 milliLiter(s) IV Push every 8 hours  spironolactone 25 milliGRAM(s) Oral daily    MEDICATIONS  (PRN):  dextrose Oral Gel 15 Gram(s) Oral once PRN Blood Glucose LESS THAN 70 milliGRAM(s)/deciliter  oxyCODONE    IR 5 milliGRAM(s) Oral every 6 hours PRN for severe pain

## 2023-09-27 NOTE — PROGRESS NOTE ADULT - SUBJECTIVE AND OBJECTIVE BOX
Patient discussed on morning rounds with Dr. Hinton     OPERATION & DATE: 9/14/23 -- OPCAB (LIMA-LAD, ROU-Uowtf-EMS), EF 65%     SUBJECTIVE ASSESSMENT: Pt is feeling well no complaints, having pain at chest tube site but is otherwise aware. Denies any chest pain, palpitations, orthopnea, dyspnea on exertion, shortness of breath, wheezing, abd pain, nausea, vomiting, constipation, lightheadedness, headaches, fevers, or chills.    VITAL SIGNS:  Vital Signs Last 24 Hrs  T(C): 36.8 (27 Sep 2023 17:03), Max: 36.8 (27 Sep 2023 17:03)  T(F): 98.3 (27 Sep 2023 17:03), Max: 98.3 (27 Sep 2023 17:03)  HR: 92 (27 Sep 2023 16:15) (80 - 100)  BP: 117/72 (27 Sep 2023 16:15) (94/68 - 130/68)  BP(mean): 90 (27 Sep 2023 16:15) (76 - 93)  RR: 17 (27 Sep 2023 16:15) (12 - 23)  SpO2: 96% (27 Sep 2023 16:15) (95% - 99%)    Parameters below as of 27 Sep 2023 16:15  Patient On (Oxygen Delivery Method): room air      I&O's Detail    26 Sep 2023 07:01  -  27 Sep 2023 07:00  --------------------------------------------------------  IN:    IV PiggyBack: 50 mL    Oral Fluid: 120 mL    Platelets - Single Donor: 431 mL  Total IN: 601 mL    OUT:    Chest Tube (mL): 4050 mL    Voided (mL): 850 mL  Total OUT: 4900 mL    Total NET: -4299 mL      27 Sep 2023 07:01  -  27 Sep 2023 18:20  --------------------------------------------------------  IN:    IV PiggyBack: 250 mL  Total IN: 250 mL    OUT:    Chest Tube (mL): 1420 mL    Voided (mL): 810 mL  Total OUT: 2230 mL    Total NET: -1980 mL    CHEST TUBE:  yes -- left pigtail   SUNITHA DRAIN:   no  EPICARDIAL WIRES: no  STITCHES: no  STAPLES: n o  MOSLEY:  no  CENTRAL LINE: YES -- can be removed tomorrow   MIDLINE/PICC: no  WOUND VAC: no    PHYSICAL EXAM:  General: well appearing sitting in chair in NAD   HEENT: normocephalic, atraumatic, PERRL   Cardio: normal s1/s2, no murmurs   Pulm: lungs CTA, no wheezing   GI: +BS 4 quadrants, soft, non tender   Extremities: no edema, calf tenderness   Vascular: DP 2+ bl, radial 2+ b/l   Incisions: previous sternotomy healing well no erythema, purulence or ecchymosis. Pigtail chest tube in place with dressing c/d/i on back     LABS:                        9.4    8.63  )-----------( 165      ( 27 Sep 2023 03:04 )             29.1     PT/INR - ( 27 Sep 2023 03:04 )   PT: 16.4 sec;   INR: 1.45          PTT - ( 26 Sep 2023 14:00 )  PTT:30.3 sec  09-27    138  |  105  |  14  ----------------------------<  141<H>  4.0   |  26  |  0.75    Ca    8.4      27 Sep 2023 03:04  Phos  3.7     09-27  Mg     1.6     09-27    TPro  5.4<L>  /  Alb  3.0<L>  /  TBili  2.6<H>  /  DBili  x   /  AST  34  /  ALT  30  /  AlkPhos  161<H>  09-27    Urinalysis Basic - ( 27 Sep 2023 03:04 )    Color: x / Appearance: x / SG: x / pH: x  Gluc: 141 mg/dL / Ketone: x  / Bili: x / Urobili: x   Blood: x / Protein: x / Nitrite: x   Leuk Esterase: x / RBC: x / WBC x   Sq Epi: x / Non Sq Epi: x / Bacteria: x      MEDICATIONS  (STANDING):  allopurinol 100 milliGRAM(s) Oral daily  carvedilol 6.25 milliGRAM(s) Oral every 12 hours  chlorhexidine 2% Cloths 1 Application(s) Topical daily  clopidogrel Tablet 75 milliGRAM(s) Oral daily  gabapentin 100 milliGRAM(s) Oral every 8 hours  glucagon  Injectable 1 milliGRAM(s) IntraMuscular once  influenza   Vaccine 0.5 milliLiter(s) IntraMuscular once  insulin lispro (ADMELOG) corrective regimen sliding scale   SubCutaneous Before meals and at bedtime  pantoprazole    Tablet 40 milliGRAM(s) Oral before breakfast  polyethylene glycol 3350 17 Gram(s) Oral daily  rifAXIMin 550 milliGRAM(s) Oral two times a day  sodium chloride 0.9% lock flush 3 milliLiter(s) IV Push every 8 hours  spironolactone 25 milliGRAM(s) Oral daily    MEDICATIONS  (PRN):  dextrose Oral Gel 15 Gram(s) Oral once PRN Blood Glucose LESS THAN 70 milliGRAM(s)/deciliter  oxyCODONE    IR 5 milliGRAM(s) Oral every 6 hours PRN for severe pain

## 2023-09-27 NOTE — PROGRESS NOTE ADULT - ASSESSMENT
63 [place] ([facility]) M former smoker with PMH of HTN, severe 3 vessel CAD, HLD, pre-diabetes, gout, TIA (14 yrs ago), gastric ulcer, rectal bleed, LIZ and alcoholic liver disease c/b cirrhosis and portal hypertension m/b esophageal varices (and UGIB) and splenomegaly, and recent OPCAB with CT surgery on 09/14/23. Now re-presenting for SOB and found to have a new left-sided pleural effusion.     Given effusion is on the left side rather than right side and contains blood, suspect effusion is due to patient's most recent OPCAB surgery and not due to his underlying cirrhosis, though given the amount of volume cirrhosis could also be contributing. S/p pigtail placement on left side and draining red fluid.     Recommendations:   - obtain RUQ US with doppler   - continue rifaximin 550 mg PO BID and Miralax 17g PO daily for HE   - continue low-dose gabapentin and tramadol for pain control  - would avoid high-dose narcotics   - continue Coreg 6.25 mg PO BID   - agree with increasing Lasix to 40 mg PO daily   - continue pantoprazole 40 mg PO daily for mucosal protection   - avoid further use of steroids if possible   - daily CBC, CMP, and INR to calculate Meld 3.0 score; today is 15  - continue to monitor for signs of infection      Case discussed with Dr. Lawson. Hepatology Team will continue to follow.     Arina Murray D.O.   Gastroenterology Fellow  Weekday 7am-5pm Pager: [phone]

## 2023-09-28 ENCOUNTER — TRANSCRIPTION ENCOUNTER (OUTPATIENT)
Age: 63
End: 2023-09-28

## 2023-09-28 ENCOUNTER — APPOINTMENT (OUTPATIENT)
Dept: HEPATOLOGY | Facility: CLINIC | Age: 63
End: 2023-09-28

## 2023-09-28 ENCOUNTER — RESULT REVIEW (OUTPATIENT)
Age: 63
End: 2023-09-28

## 2023-09-28 LAB
ANION GAP SERPL CALC-SCNC: 6 MMOL/L — SIGNIFICANT CHANGE UP (ref 5–17)
ANISOCYTOSIS BLD QL: SLIGHT — SIGNIFICANT CHANGE UP
BASOPHILS # BLD AUTO: 0 K/UL — SIGNIFICANT CHANGE UP (ref 0–0.2)
BASOPHILS NFR BLD AUTO: 0 % — SIGNIFICANT CHANGE UP (ref 0–2)
BUN SERPL-MCNC: 14 MG/DL — SIGNIFICANT CHANGE UP (ref 7–23)
BURR CELLS BLD QL SMEAR: PRESENT — SIGNIFICANT CHANGE UP
CALCIUM SERPL-MCNC: 8.4 MG/DL — SIGNIFICANT CHANGE UP (ref 8.4–10.5)
CHLORIDE SERPL-SCNC: 102 MMOL/L — SIGNIFICANT CHANGE UP (ref 96–108)
CO2 SERPL-SCNC: 27 MMOL/L — SIGNIFICANT CHANGE UP (ref 22–31)
CREAT SERPL-MCNC: 0.8 MG/DL — SIGNIFICANT CHANGE UP (ref 0.5–1.3)
EGFR: 99 ML/MIN/1.73M2 — SIGNIFICANT CHANGE UP
EOSINOPHIL # BLD AUTO: 0.06 K/UL — SIGNIFICANT CHANGE UP (ref 0–0.5)
EOSINOPHIL NFR BLD AUTO: 0.9 % — SIGNIFICANT CHANGE UP (ref 0–6)
GIANT PLATELETS BLD QL SMEAR: PRESENT — SIGNIFICANT CHANGE UP
GLUCOSE BLDC GLUCOMTR-MCNC: 126 MG/DL — HIGH (ref 70–99)
GLUCOSE BLDC GLUCOMTR-MCNC: 128 MG/DL — HIGH (ref 70–99)
GLUCOSE BLDC GLUCOMTR-MCNC: 131 MG/DL — HIGH (ref 70–99)
GLUCOSE BLDC GLUCOMTR-MCNC: 147 MG/DL — HIGH (ref 70–99)
GLUCOSE SERPL-MCNC: 145 MG/DL — HIGH (ref 70–99)
HCT VFR BLD CALC: 31 % — LOW (ref 39–50)
HGB BLD-MCNC: 9.8 G/DL — LOW (ref 13–17)
LYMPHOCYTES # BLD AUTO: 0.85 K/UL — LOW (ref 1–3.3)
LYMPHOCYTES # BLD AUTO: 12.6 % — LOW (ref 13–44)
MACROCYTES BLD QL: SLIGHT — SIGNIFICANT CHANGE UP
MAGNESIUM SERPL-MCNC: 1.7 MG/DL — SIGNIFICANT CHANGE UP (ref 1.6–2.6)
MANUAL SMEAR VERIFICATION: SIGNIFICANT CHANGE UP
MCHC RBC-ENTMCNC: 30.2 PG — SIGNIFICANT CHANGE UP (ref 27–34)
MCHC RBC-ENTMCNC: 31.6 GM/DL — LOW (ref 32–36)
MCV RBC AUTO: 95.7 FL — SIGNIFICANT CHANGE UP (ref 80–100)
MONOCYTES # BLD AUTO: 0.97 K/UL — HIGH (ref 0–0.9)
MONOCYTES NFR BLD AUTO: 14.4 % — HIGH (ref 2–14)
NEUTROPHILS # BLD AUTO: 4.85 K/UL — SIGNIFICANT CHANGE UP (ref 1.8–7.4)
NEUTROPHILS NFR BLD AUTO: 72.1 % — SIGNIFICANT CHANGE UP (ref 43–77)
OVALOCYTES BLD QL SMEAR: SLIGHT — SIGNIFICANT CHANGE UP
PLAT MORPH BLD: ABNORMAL
PLATELET # BLD AUTO: 128 K/UL — LOW (ref 150–400)
POIKILOCYTOSIS BLD QL AUTO: SLIGHT — SIGNIFICANT CHANGE UP
POLYCHROMASIA BLD QL SMEAR: SLIGHT — SIGNIFICANT CHANGE UP
POTASSIUM SERPL-MCNC: 4 MMOL/L — SIGNIFICANT CHANGE UP (ref 3.5–5.3)
POTASSIUM SERPL-SCNC: 4 MMOL/L — SIGNIFICANT CHANGE UP (ref 3.5–5.3)
RBC # BLD: 3.24 M/UL — LOW (ref 4.2–5.8)
RBC # FLD: 15.2 % — HIGH (ref 10.3–14.5)
RBC BLD AUTO: ABNORMAL
SMUDGE CELLS # BLD: PRESENT — SIGNIFICANT CHANGE UP
SODIUM SERPL-SCNC: 135 MMOL/L — SIGNIFICANT CHANGE UP (ref 135–145)
WBC # BLD: 6.72 K/UL — SIGNIFICANT CHANGE UP (ref 3.8–10.5)
WBC # FLD AUTO: 6.72 K/UL — SIGNIFICANT CHANGE UP (ref 3.8–10.5)

## 2023-09-28 PROCEDURE — 71045 X-RAY EXAM CHEST 1 VIEW: CPT | Mod: 26,77

## 2023-09-28 PROCEDURE — 88305 TISSUE EXAM BY PATHOLOGIST: CPT | Mod: 26

## 2023-09-28 PROCEDURE — 99232 SBSQ HOSP IP/OBS MODERATE 35: CPT | Mod: 24

## 2023-09-28 PROCEDURE — 71046 X-RAY EXAM CHEST 2 VIEWS: CPT

## 2023-09-28 PROCEDURE — 88112 CYTOPATH CELL ENHANCE TECH: CPT | Mod: 26

## 2023-09-28 PROCEDURE — 71045 X-RAY EXAM CHEST 1 VIEW: CPT | Mod: 26

## 2023-09-28 PROCEDURE — 82962 GLUCOSE BLOOD TEST: CPT

## 2023-09-28 PROCEDURE — 93975 VASCULAR STUDY: CPT | Mod: 26

## 2023-09-28 RX ORDER — MAGNESIUM OXIDE 400 MG ORAL TABLET 241.3 MG
800 TABLET ORAL ONCE
Refills: 0 | Status: COMPLETED | OUTPATIENT
Start: 2023-09-28 | End: 2023-09-28

## 2023-09-28 RX ORDER — TRAMADOL HYDROCHLORIDE 50 MG/1
25 TABLET ORAL EVERY 6 HOURS
Refills: 0 | Status: DISCONTINUED | OUTPATIENT
Start: 2023-09-28 | End: 2023-09-30

## 2023-09-28 RX ORDER — LIDOCAINE 4 G/100G
1 CREAM TOPICAL DAILY
Refills: 0 | Status: DISCONTINUED | OUTPATIENT
Start: 2023-09-28 | End: 2023-09-30

## 2023-09-28 RX ADMIN — SODIUM CHLORIDE 3 MILLILITER(S): 9 INJECTION INTRAMUSCULAR; INTRAVENOUS; SUBCUTANEOUS at 13:53

## 2023-09-28 RX ADMIN — CLOPIDOGREL BISULFATE 75 MILLIGRAM(S): 75 TABLET, FILM COATED ORAL at 13:01

## 2023-09-28 RX ADMIN — TRAMADOL HYDROCHLORIDE 25 MILLIGRAM(S): 50 TABLET ORAL at 13:00

## 2023-09-28 RX ADMIN — TRAMADOL HYDROCHLORIDE 25 MILLIGRAM(S): 50 TABLET ORAL at 20:01

## 2023-09-28 RX ADMIN — POLYETHYLENE GLYCOL 3350 17 GRAM(S): 17 POWDER, FOR SOLUTION ORAL at 13:01

## 2023-09-28 RX ADMIN — SODIUM CHLORIDE 3 MILLILITER(S): 9 INJECTION INTRAMUSCULAR; INTRAVENOUS; SUBCUTANEOUS at 06:02

## 2023-09-28 RX ADMIN — MAGNESIUM OXIDE 400 MG ORAL TABLET 800 MILLIGRAM(S): 241.3 TABLET ORAL at 08:36

## 2023-09-28 RX ADMIN — TRAMADOL HYDROCHLORIDE 25 MILLIGRAM(S): 50 TABLET ORAL at 21:00

## 2023-09-28 RX ADMIN — Medication 100 MILLIGRAM(S): at 13:01

## 2023-09-28 RX ADMIN — TRAMADOL HYDROCHLORIDE 25 MILLIGRAM(S): 50 TABLET ORAL at 13:53

## 2023-09-28 RX ADMIN — SPIRONOLACTONE 25 MILLIGRAM(S): 25 TABLET, FILM COATED ORAL at 06:06

## 2023-09-28 RX ADMIN — HYDROMORPHONE HYDROCHLORIDE 0.5 MILLIGRAM(S): 2 INJECTION INTRAMUSCULAR; INTRAVENOUS; SUBCUTANEOUS at 02:00

## 2023-09-28 RX ADMIN — OXYCODONE HYDROCHLORIDE 5 MILLIGRAM(S): 5 TABLET ORAL at 08:54

## 2023-09-28 RX ADMIN — CARVEDILOL PHOSPHATE 6.25 MILLIGRAM(S): 80 CAPSULE, EXTENDED RELEASE ORAL at 06:06

## 2023-09-28 RX ADMIN — GABAPENTIN 100 MILLIGRAM(S): 400 CAPSULE ORAL at 06:06

## 2023-09-28 RX ADMIN — HYDROMORPHONE HYDROCHLORIDE 0.5 MILLIGRAM(S): 2 INJECTION INTRAMUSCULAR; INTRAVENOUS; SUBCUTANEOUS at 00:51

## 2023-09-28 RX ADMIN — PANTOPRAZOLE SODIUM 40 MILLIGRAM(S): 20 TABLET, DELAYED RELEASE ORAL at 06:07

## 2023-09-28 RX ADMIN — OXYCODONE HYDROCHLORIDE 5 MILLIGRAM(S): 5 TABLET ORAL at 08:14

## 2023-09-28 RX ADMIN — Medication 40 MILLIGRAM(S): at 06:06

## 2023-09-28 RX ADMIN — SODIUM CHLORIDE 3 MILLILITER(S): 9 INJECTION INTRAMUSCULAR; INTRAVENOUS; SUBCUTANEOUS at 21:07

## 2023-09-28 RX ADMIN — GABAPENTIN 100 MILLIGRAM(S): 400 CAPSULE ORAL at 22:42

## 2023-09-28 RX ADMIN — CARVEDILOL PHOSPHATE 6.25 MILLIGRAM(S): 80 CAPSULE, EXTENDED RELEASE ORAL at 17:57

## 2023-09-28 RX ADMIN — LIDOCAINE 1 PATCH: 4 CREAM TOPICAL at 10:00

## 2023-09-28 RX ADMIN — LIDOCAINE 1 PATCH: 4 CREAM TOPICAL at 19:26

## 2023-09-28 RX ADMIN — LIDOCAINE 1 PATCH: 4 CREAM TOPICAL at 22:41

## 2023-09-28 NOTE — DISCHARGE NOTE PROVIDER - NSDCMRMEDTOKEN_GEN_ALL_CORE_FT
allopurinol 100 mg oral tablet: 1 tab(s) orally once a day  carvedilol 6.25 mg oral tablet: 1 tab(s) orally every 12 hours  clopidogrel 75 mg oral tablet: 1 tab(s) orally once a day  furosemide 20 mg oral tablet: 1 tab(s) orally once a day  gabapentin 100 mg oral capsule: 1 cap(s) orally every 8 hours  HumaLOG Mix 50/50 subcutaneous suspension: 100 unit(s) subcutaneous once a day  Lidoderm 5% topical film: Apply topically to affected area once a day  Medrol Dosepak 4 mg oral tablet: 1 tab(s) orally once a day Take as directed by package instructions  metFORMIN 500 mg oral tablet: 1 tab(s) orally 2 times a day  oxyCODONE 5 mg oral tablet: 1 tab(s) orally every 6 hours as needed for  severe pain MDD: 4  pantoprazole 40 mg oral delayed release tablet: 1 tab(s) orally once a day  polyethylene glycol 3350 oral powder for reconstitution: 17 gram(s) orally once a day  potassium chloride 10 mEq oral capsule, extended release: 1 cap(s) orally once a day  rifAXIMin 550 mg oral tablet: 1 tab(s) orally 2 times a day   allopurinol 100 mg oral tablet: 1 tab(s) orally once a day  carvedilol 6.25 mg oral tablet: 1 tab(s) orally every 12 hours  clopidogrel 75 mg oral tablet: 1 tab(s) orally once a day  furosemide 40 mg oral tablet: 1 tab(s) orally once a day  gabapentin 100 mg oral capsule: 1 cap(s) orally every 8 hours  HumaLOG Mix 50/50 subcutaneous suspension: 100 unit(s) subcutaneous once a day  Lidoderm 5% topical film: Apply topically to affected area once a day  metFORMIN 500 mg oral tablet: 1 tab(s) orally 2 times a day  pantoprazole 40 mg oral delayed release tablet: 1 tab(s) orally once a day  polyethylene glycol 3350 oral powder for reconstitution: 17 gram(s) orally once a day  rifAXIMin 550 mg oral tablet: 1 tab(s) orally 2 times a day  spironolactone 25 mg oral tablet: 1 tab(s) orally once a day

## 2023-09-28 NOTE — DISCHARGE NOTE PROVIDER - NSDCFUSCHEDAPPT_GEN_ALL_CORE_FT
Drew Box  [facility] Physician [facility]  HEARTVASC [address]  Scheduled Appointment: 10/05/2023     Drew Box  [facility] Physician [facility]  HEARTVASC [address]  Scheduled Appointment: 10/05/2023    Kev Hinton  [facility]  CTSURG [address]  Scheduled Appointment: 10/11/2023     John Lawson  [facility] Physician [facility]  HEPATOLOGY [address]  Scheduled Appointment: 10/23/2023    Kev Hinton  [facility]  CTSURG [address]  Scheduled Appointment: 10/24/2023

## 2023-09-28 NOTE — DISCHARGE NOTE PROVIDER - HOSPITAL COURSE
Patient discussed on morning rounds with  _____    Operation Date:  9/14/23 s/p OPCAB x 4 (LIMA-LAD, [id number]), EF 65%   Re-admitted 9/26 with L pleural effusion    Primary Surgeon/Attending MD: Dr. Hinton    Referring Physician: No referring  _ _ _ _ _ _ _ _ _ _ _ _  HOSPITAL COURSE:    _ _ _ _ _ _ _ _ _ _ _ _  DISCHARGE PHYSICAL EXAM:  General:  Neuro:  Cardio:  Pulm:  GI/:  Vascular:  Extremities:  Incisions:  _ _ _ _ _ _ _ _ _ _ _ _  REMOVAL CHECKLIST:        [ ] Epicardial wires          [ ] Stitches/tie downs,   If no, why? ______          [ ] PICC/Midline,   If no, why? ________  _ _ _ _ _ _ _ _ _ _ _ _   MEDICATION DISCHARGE CHECKLIST    CABG        [ ] Aspirin, [  ] Contraindicated, Reason_______________________________        [ ] Plavix, [  ] Contraindicated, Reason_______________________________        [ ] Statin, [  ] Contraindicated, Reason_______________________________        [ ] Lasix , [  ] Contraindicated, Reason_______________________________              Duration: _____        [ ] Beta-Blocker, [  ] Contraindicated, Reason_______________________________    _ _ _ _ _ _ _ _ _ _ _ _  CLINICAL FOLLOW UP NEEDS:     [ ] Labwork           Labs needed:           When/Timing:           Outpatient team aware: YES/NO         [ ] Imaging            Type:           When/Timing:           Outpatient team aware: YES/NO       [ ] Home equipment           Type: (i.e. wound vac, pneumostat, prevena, wet/dry dressings, picc/midlines, MCOT, keith etc)           Specific needs:           Outpatient team aware: YES/NO  _ _ _ _ _ _ _ _ _ _ _ _  Over 35 minutes was spent with the patient reviewing the discharge material including medications, follow up appointments, recovery, concerning symptoms, and how to contact their health care providers if they have questions   Patient discussed on morning rounds with  _____    Operation Date:  9/14/23 s/p OPCAB x 4 (LIMA-LAD, [id number]), EF 65%   Re-admitted 9/26 with L pleural effusion    Primary Surgeon/Attending MD: Dr. Hinton    Referring Physician: No referring  _ _ _ _ _ _ _ _ _ _ _ _  HOSPITAL COURSE:  62 YO Male w/ PMHx HTN, HLD, DMII, gout, former tobacco/ETOH use, cirrhosis, esophageal varices, GI bleed and TIA who was found to have 3vCAD. Readmitted in the setting of a very large L pleural effusion and is now s/p pigtail placement in ICU. Central line and [place] were placed then [place] was removed and patient transferred to [facility] with pigtail. Pigtail continues to have high output (>3L of fluid have been removed) and fluid balances were closely monitored. Hepatology consulted for cirrhosis hx and monitoring closely for possible portal vein stenosis if output continues to remain high. On 9/28 Pigtail removed and pt pending U/S abdomen per hepatology recs.  _ _ _ _ _ _ _ _ _ _ _ _  DISCHARGE PHYSICAL EXAM:  General:  Neuro:  Cardio:  Pulm:  GI/:  Vascular:  Extremities:  Incisions:  _ _ _ _ _ _ _ _ _ _ _ _  REMOVAL CHECKLIST:        [ ] Epicardial wires          [ ] Stitches/tie downs,   If no, why? ______          [ ] PICC/Midline,   If no, why? ________  _ _ _ _ _ _ _ _ _ _ _ _   MEDICATION DISCHARGE CHECKLIST    CABG        [ ] Aspirin, [  ] Contraindicated, Reason_______________________________        [ ] Plavix, [  ] Contraindicated, Reason_______________________________        [ ] Statin, [  ] Contraindicated, Reason_______________________________        [ ] Lasix , [  ] Contraindicated, Reason_______________________________              Duration: _____        [ ] Beta-Blocker, [  ] Contraindicated, Reason_______________________________    _ _ _ _ _ _ _ _ _ _ _ _  CLINICAL FOLLOW UP NEEDS:     [ ] Labwork           Labs needed:           When/Timing:           Outpatient team aware: YES/NO         [ ] Imaging            Type:           When/Timing:           Outpatient team aware: YES/NO       [ ] Home equipment           Type: (i.e. wound vac, pneumostat, prevena, wet/dry dressings, picc/midlines, MCOT, keith etc)           Specific needs:           Outpatient team aware: YES/NO  _ _ _ _ _ _ _ _ _ _ _ _  Over 35 minutes was spent with the patient reviewing the discharge material including medications, follow up appointments, recovery, concerning symptoms, and how to contact their health care providers if they have questions   Patient discussed on morning rounds with Dr. Hinton    Operation Date:  9/14/23 s/p OPCAB x 4 (LIMA-LAD, VUO-[id number]), EF 65%   Re-admitted 9/26 with L pleural effusion    Primary Surgeon/Attending MD: Dr. Hinton    Referring Physician: No referring  _ _ _ _ _ _ _ _ _ _ _ _  HOSPITAL COURSE:  62 YO Male w/ PMHx HTN, HLD, DMII, gout, former tobacco/ETOH use, cirrhosis, esophageal varices, GI bleed and TIA who was found to have 3vCAD. Readmitted in the setting of a very large L pleural effusion and is now s/p pigtail placement in ICU. Central line and [place] were placed then [place] was removed and patient transferred to [facility] with pigtail. Pigtail continues to have high output (>3L of fluid have been removed) and fluid balances were closely monitored. Hepatology consulted for cirrhosis hx and monitoring closely for possible portal vein stenosis if output continues to remain high. On 9/28 Pigtail removed and pt pending U/S abdomen per hepatology recs.  _ _ _ _ _ _ _ _ _ _ _ _  DISCHARGE PHYSICAL EXAM:    General: resting comfortably in bed in NAD  Neurological: AOx3. Motor skills grossly intact  Cardiovascular: Normal S1/S2. Regular rate/rhythm. No murmurs  Respiratory: Lungs CTA bilaterally. No wheezing or rales  Gastrointestinal: +BS in all 4 quadrants. Non-distended. Soft. Non-tender  Extremities: Strength 5/5 b/l upper/lower extremities. Sensation grossly intact upper/lower extremities. No edema. No calf tenderness.  Vascular: Radial 2+bilaterally, DP 2+ b/l  Incision Sites: MSI clean, dry, intact       _ _ _ _ _ _ _ _ _ _ _ _  Over 35 minutes was spent with the patient reviewing the discharge material including medications, follow up appointments, recovery, concerning symptoms, and how to contact their health care providers if they have questions   Patient discussed on morning rounds with Dr. Hinton yesterday    Operation Date:  9/14/23 s/p OPCAB x 4 (LIMA-LAD, [id number]-[id number]), EF 65%   Re-admitted 9/26 with L pleural effusion    Primary Surgeon/Attending MD: Dr. Hinton    Referring Physician: No referring  _ _ _ _ _ _ _ _ _ _ _ _  HOSPITAL COURSE:  62 YO Male w/ PMHx HTN, HLD, DMII, gout, former tobacco/ETOH use, cirrhosis, esophageal varices, GI bleed and TIA who was found to have 3vCAD. Readmitted in the setting of a very large L pleural effusion and is now s/p pigtail placement in ICU. Central line and [place] were placed then [place] was removed and patient transferred to [facility] with pigtail. Pigtail continues to have high output (>3L of fluid have been removed) and fluid balances were closely monitored. Hepatology consulted for cirrhosis hx and monitoring closely for possible portal vein stenosis if output continues to remain high. On 9/28 Pigtail removed and pt pending U/S abdomen reveals portal hypertension but no acute pathology. 9/29 L pleural effusion still present D/w Dr Hinton who is recommending discharge with conservative management at this time. DC deferred because of rain and subway closures. 9/30 no events overnight, will be discharged home with family.   _ _ _ _ _ _ _ _ _ _ _ _  DISCHARGE PHYSICAL EXAM:    General: resting comfortably in bed in NAD  Neurological: AOx3. Motor skills grossly intact  Cardiovascular: Normal S1/S2. Regular rate/rhythm. No murmurs  Respiratory: Lungs CTA bilaterally. No wheezing or rales  Gastrointestinal: +BS in all 4 quadrants. Non-distended. Soft. Non-tender  Extremities: Strength 5/5 b/l upper/lower extremities. Sensation grossly intact upper/lower extremities. No edema. No calf tenderness.  Vascular: Radial 2+bilaterally, DP 2+ b/l  Incision Sites: MSI clean, dry, intact       _ _ _ _ _ _ _ _ _ _ _ _  Over 35 minutes was spent with the patient reviewing the discharge material including medications, follow up appointments, recovery, concerning symptoms, and how to contact their health care providers if they have questions

## 2023-09-28 NOTE — PROGRESS NOTE ADULT - SUBJECTIVE AND OBJECTIVE BOX
Patient discussed on morning rounds with Dr. Hinton    Operation / Date: 9/14/23 s/p OPCAB x 4 (LIMA-LAD, [id number]-Hmewn-[id number]), EF 65%   Re-admitted 9/26 with L pleural effusion    SUBJECTIVE ASSESSMENT: Patient seen and examined at bedside. Patient admits to some pain in his chest incision as well as swelling in his L foot. Denies chills, chest pain, SOB, palpitations, N/V.     Vital Signs Last 24 Hrs  T(C): 36.2 (28 Sep 2023 09:11), Max: 37.1 (27 Sep 2023 22:00)  T(F): 97.1 (28 Sep 2023 09:11), Max: 98.7 (27 Sep 2023 22:00)  HR: 90 (28 Sep 2023 09:00) (74 - 92)  BP: 142/79 (28 Sep 2023 09:00) (111/64 - 148/62)  BP(mean): 100 (28 Sep 2023 09:00) (83 - 100)  RR: 16 (28 Sep 2023 09:00) (16 - 18)  SpO2: 94% (28 Sep 2023 09:00) (91% - 99%)    Parameters below as of 28 Sep 2023 09:00  Patient On (Oxygen Delivery Method): room air    I&O's Detail    27 Sep 2023 07:01  -  28 Sep 2023 07:00  --------------------------------------------------------  IN:    IV PiggyBack: 250 mL  Total IN: 250 mL    OUT:    Chest Tube (mL): 2050 mL    Voided (mL): 1460 mL  Total OUT: 3510 mL    Total NET: -3260 mL    28 Sep 2023 07:01  -  28 Sep 2023 11:35  --------------------------------------------------------  IN:  Total IN: 0 mL    OUT:    Chest Tube (mL): 100 mL    Voided (mL): 400 mL  Total OUT: 500 mL    Total NET: -500 mL    CHEST TUBE:  None  SUNITHA DRAIN:  None  EPICARDIAL WIRES: None  TIE DOWNS: None  MOSLEY: None    PHYSICAL EXAM:  GENERAL: NAD, lying in bed comfortably  HEAD:  Atraumatic, Normocephalic  EYES: EOMI, PERRLA, conjunctiva and sclera clear  ENT: Moist mucous membranes  NECK: Supple, No JVD  CHEST/LUNG: Clear to auscultation bilaterally; No rales, rhonchi, wheezing, or rubs. Unlabored respirations  HEART: Regular rate and rhythm; No murmurs, rubs, or gallops  ABDOMEN: Bowel sounds present; Soft, Nontender, Nondistended. No hepatomegally  EXTREMITIES:  2+ Peripheral Pulses, brisk capillary refill. No clubbing, cyanosis, or edema  NERVOUS SYSTEM:  Alert & Oriented X3, speech clear. No deficits     LABS:                        9.8    6.72  )-----------( 128      ( 28 Sep 2023 06:22 )             31.0     PT/INR - ( 27 Sep 2023 03:04 )   PT: 16.4 sec;   INR: 1.45        PTT - ( 26 Sep 2023 14:00 )  PTT:30.3 sec    09-28    135  |  102  |  14  ----------------------------<  145<H>  4.0   |  27  |  0.80    Ca    8.4      28 Sep 2023 06:22  Phos  3.7     09-27  Mg     1.7     09-28    TPro  5.4<L>  /  Alb  3.0<L>  /  TBili  2.6<H>  /  DBili  x   /  AST  34  /  ALT  30  /  AlkPhos  161<H>  09-27    Urinalysis Basic - ( 28 Sep 2023 06:22 )    Color: x / Appearance: x / SG: x / pH: x  Gluc: 145 mg/dL / Ketone: x  / Bili: x / Urobili: x   Blood: x / Protein: x / Nitrite: x   Leuk Esterase: x / RBC: x / WBC x   Sq Epi: x / Non Sq Epi: x / Bacteria: x    MEDICATIONS  (STANDING):  allopurinol 100 milliGRAM(s) Oral daily  carvedilol 6.25 milliGRAM(s) Oral every 12 hours  chlorhexidine 2% Cloths 1 Application(s) Topical daily  clopidogrel Tablet 75 milliGRAM(s) Oral daily  dextrose 5%. 1000 milliLiter(s) (50 mL/Hr) IV Continuous <Continuous>  dextrose 5%. 1000 milliLiter(s) (100 mL/Hr) IV Continuous <Continuous>  dextrose 50% Injectable 25 Gram(s) IV Push once  dextrose 50% Injectable 25 Gram(s) IV Push once  dextrose 50% Injectable 12.5 Gram(s) IV Push once  furosemide    Tablet 40 milliGRAM(s) Oral daily  gabapentin 100 milliGRAM(s) Oral every 8 hours  glucagon  Injectable 1 milliGRAM(s) IntraMuscular once  influenza   Vaccine 0.5 milliLiter(s) IntraMuscular once  insulin lispro (ADMELOG) corrective regimen sliding scale   SubCutaneous Before meals and at bedtime  pantoprazole    Tablet 40 milliGRAM(s) Oral before breakfast  polyethylene glycol 3350 17 Gram(s) Oral daily  rifAXIMin 550 milliGRAM(s) Oral two times a day  sodium chloride 0.9% lock flush 3 milliLiter(s) IV Push every 8 hours  spironolactone 25 milliGRAM(s) Oral daily    MEDICATIONS  (PRN):  dextrose Oral Gel 15 Gram(s) Oral once PRN Blood Glucose LESS THAN 70 milliGRAM(s)/deciliter  oxyCODONE    IR 5 milliGRAM(s) Oral every 6 hours PRN for severe pain        RADIOLOGY & ADDITIONAL TESTS:     Patient discussed on morning rounds with Dr. Hinton    Operation / Date: 9/14/23 s/p OPCAB x 4 (LIMA-LAD, [id number]-[place]-RXT), EF 65%   Re-admitted 9/26 with L pleural effusion    SUBJECTIVE ASSESSMENT: Patient seen and examined at bedside. Patient admits to some pain in his chest incision as well as swelling in his L foot. Denies chills, chest pain, SOB, palpitations, N/V.     Vital Signs Last 24 Hrs  T(C): 36.2 (28 Sep 2023 09:11), Max: 37.1 (27 Sep 2023 22:00)  T(F): 97.1 (28 Sep 2023 09:11), Max: 98.7 (27 Sep 2023 22:00)  HR: 90 (28 Sep 2023 09:00) (74 - 92)  BP: 142/79 (28 Sep 2023 09:00) (111/64 - 148/62)  BP(mean): 100 (28 Sep 2023 09:00) (83 - 100)  RR: 16 (28 Sep 2023 09:00) (16 - 18)  SpO2: 94% (28 Sep 2023 09:00) (91% - 99%)    Parameters below as of 28 Sep 2023 09:00  Patient On (Oxygen Delivery Method): room air    I&O's Detail    27 Sep 2023 07:01  -  28 Sep 2023 07:00  --------------------------------------------------------  IN:    IV PiggyBack: 250 mL  Total IN: 250 mL    OUT:    Chest Tube (mL): 2050 mL    Voided (mL): 1460 mL  Total OUT: 3510 mL    Total NET: -3260 mL    28 Sep 2023 07:01  -  28 Sep 2023 11:35  --------------------------------------------------------  IN:  Total IN: 0 mL    OUT:    Chest Tube (mL): 100 mL    Voided (mL): 400 mL  Total OUT: 500 mL    Total NET: -500 mL    CHEST TUBE:  None  SUNITHA DRAIN:  None  EPICARDIAL WIRES: None  TIE DOWNS: None  MOSLEY: None    PHYSICAL EXAM:  GENERAL: NAD, lying in bed comfortably  HEAD:  Atraumatic, Normocephalic  EYES: EOMI, PERRLA, conjunctiva and sclera clear  ENT: Moist mucous membranes  NECK: Supple, No JVD  CHEST/LUNG: CTAB; Pigtail removed, occlusive dressing c/d/i  HEART: Regular rate and rhythm; No murmurs, rubs, or gallops  ABDOMEN: Bowel sounds present; Soft, Nontender, Nondistended. No hepatomegally  EXTREMITIES: LLE incision site well-approximated; +LLE edema L >R  NERVOUS SYSTEM:  Alert & Oriented X3, speech clear. No deficits     LABS:                        9.8    6.72  )-----------( 128      ( 28 Sep 2023 06:22 )             31.0     PT/INR - ( 27 Sep 2023 03:04 )   PT: 16.4 sec;   INR: 1.45        PTT - ( 26 Sep 2023 14:00 )  PTT:30.3 sec    09-28    135  |  102  |  14  ----------------------------<  145<H>  4.0   |  27  |  0.80    Ca    8.4      28 Sep 2023 06:22  Phos  3.7     09-27  Mg     1.7     09-28    TPro  5.4<L>  /  Alb  3.0<L>  /  TBili  2.6<H>  /  DBili  x   /  AST  34  /  ALT  30  /  AlkPhos  161<H>  09-27    Urinalysis Basic - ( 28 Sep 2023 06:22 )    Color: x / Appearance: x / SG: x / pH: x  Gluc: 145 mg/dL / Ketone: x  / Bili: x / Urobili: x   Blood: x / Protein: x / Nitrite: x   Leuk Esterase: x / RBC: x / WBC x   Sq Epi: x / Non Sq Epi: x / Bacteria: x    MEDICATIONS  (STANDING):  allopurinol 100 milliGRAM(s) Oral daily  carvedilol 6.25 milliGRAM(s) Oral every 12 hours  chlorhexidine 2% Cloths 1 Application(s) Topical daily  clopidogrel Tablet 75 milliGRAM(s) Oral daily  dextrose 5%. 1000 milliLiter(s) (50 mL/Hr) IV Continuous <Continuous>  dextrose 5%. 1000 milliLiter(s) (100 mL/Hr) IV Continuous <Continuous>  dextrose 50% Injectable 25 Gram(s) IV Push once  dextrose 50% Injectable 25 Gram(s) IV Push once  dextrose 50% Injectable 12.5 Gram(s) IV Push once  furosemide    Tablet 40 milliGRAM(s) Oral daily  gabapentin 100 milliGRAM(s) Oral every 8 hours  glucagon  Injectable 1 milliGRAM(s) IntraMuscular once  influenza   Vaccine 0.5 milliLiter(s) IntraMuscular once  insulin lispro (ADMELOG) corrective regimen sliding scale   SubCutaneous Before meals and at bedtime  pantoprazole    Tablet 40 milliGRAM(s) Oral before breakfast  polyethylene glycol 3350 17 Gram(s) Oral daily  rifAXIMin 550 milliGRAM(s) Oral two times a day  sodium chloride 0.9% lock flush 3 milliLiter(s) IV Push every 8 hours  spironolactone 25 milliGRAM(s) Oral daily    MEDICATIONS  (PRN):  dextrose Oral Gel 15 Gram(s) Oral once PRN Blood Glucose LESS THAN 70 milliGRAM(s)/deciliter  oxyCODONE    IR 5 milliGRAM(s) Oral every 6 hours PRN for severe pain    RADIOLOGY & ADDITIONAL TESTS:  < from: Xray Chest 1 View-PORTABLE IMMEDIATE (Xray Chest 1 View-PORTABLE IMMEDIATE .) (09.28.23 @ 09:44) >  IMPRESSION:  Post left pleural pigtail catheter removal since the previous study.  Tiny left apical pneumothorax. No other significant interval change.

## 2023-09-28 NOTE — DISCHARGE NOTE PROVIDER - NSDCCPCAREPLAN_GEN_ALL_CORE_FT
PRINCIPAL DISCHARGE DIAGNOSIS  Diagnosis: Large pleural effusion  Assessment and Plan of Treatment:

## 2023-09-28 NOTE — CHART NOTE - NSCHARTNOTEFT_GEN_A_CORE
63 [place] [facility] M former smoker with PMH of HTN, severe 3 vessel CAD, HLD, pre-diabetes, gout, TIA (14 yrs ago), gastric ulcer, rectal bleed, LIZ and alcoholic liver disease c/b cirrhosis and portal hypertension m/b esophageal varices (and UGIB) and splenomegaly, and recent OPCAB with CT surgery on 09/14/23. Now re-presenting for SOB and found to have a new left-sided pleural effusion, s/p pigtail placement on 09/27, now removed. Planned for possible discharge later today by CT surgery.     Recommendations:   - if planned for discharge, patient can be sent home on 40 mg Lasix PO daily (previously on 20 mg PO Lasix daily)   - continue all home meds from last admission upon discharge   - ensure patient has follow-up appointment with Dr. Lawson     [facility]  [address] [place] [address], [address] [place] [address]    Case discussed with Dr. Lawson. Hepatology Team will continue to follow.     Arina Murray D.O.   Gastroenterology Fellow  Weekday 7am-5pm Pager: [phone]  Weeknights/Weekend/Holiday Coverage: Please call the  for contact info

## 2023-09-28 NOTE — DISCHARGE NOTE PROVIDER - PROVIDER TOKENS
PROVIDER:[TOKEN:[[id number]],FOLLOWUP:[1 week]],PROVIDER:[TOKEN:[[id number]],FOLLOWUP:[1 week]] PROVIDER:[TOKEN:[[id number]],SCHEDULEDAPPT:[10/11/2023],SCHEDULEDAPPTTIME:[11:30 AM]],PROVIDER:[TOKEN:[[id number]],FOLLOWUP:[1 week]]

## 2023-09-28 NOTE — DISCHARGE NOTE PROVIDER - NSDCFUADDAPPT_GEN_ALL_CORE_FT
Please attend all follow-up appointments as scheduled.  Please attend all follow-up appointments as scheduled. Please call Dr. Coyle office to confirm your follow up appointments. If you have any questions, please call [phone].

## 2023-09-28 NOTE — DISCHARGE NOTE PROVIDER - CARE PROVIDER_API CALL
Kev Hinton  Thoracic and Cardiac Surgery  [address]  [place] [address]  Phone: [phone]  Fax: [phone]  Follow Up Time: 1 week    John Lawson  Internal Medicine  [address], [address]  [place] [address]  Phone: [phone]  Fax: [phone]  Follow Up Time: 1 week   Kev Hinton  Thoracic and Cardiac Surgery  [address], [address]  [place] [address]  Phone: [phone]  Fax: [phone]  Scheduled Appointment: 10/11/2023 11:30 AM    John Lawson  Internal Medicine  [address]  [place] [address]  Phone: [phone]  Fax: [phone]  Follow Up Time: 1 week

## 2023-09-28 NOTE — PROGRESS NOTE ADULT - ASSESSMENT
Plan:    Neurovascular:   -Pain well controlled with current regimen. PRN's: Tramadol   -Cont Gabapentin    Rheum:  -Hx of gout  -Cont Allopurinol    Cardiovascular:   -hx of CAD s/p OPCAB x 4 on 9/14/23  -Cont Plavix, (no ASA 2/2 allergy), BB  -Cont Aldactone  -No statin 2/2 liver ds  -Hx of HTN  -Cont BB  -Hx fo HLD  -Hemodynamically stable.   -Monitor: BP, HR, tele    Respiratory:  -L pleural effusion  -s/p L pigtail, now removed   -Cont Lasix 40mg daily per Hepatology recs  -Oxygenating well on room air  -Encourage continued use of IS 10x/hr and frequent ambulation  -CXR: Tiny L apical PTX    GI:  -Hx of Cirrhosis and esophageal varices  -Hepatology team following  -Cont Rifiaximin  -GI PPX: Protonix  -PO Diet  -Bowel Regimen: Miralax    Renal / :  -Continue to monitor renal function: BUN/Cr: 14/0.80  -Monitor I/O's daily     Endocrine:    -Hx of DMII  -Cont ISS  -A1c: 7  -No hx of thyroid dx  -TSH: 1.31    Hematologic:  -CBC: H/H- 9.8/31  -Coagulation Panel.    ID:  -Temperature: Afebrile  -CBC: WBC- 6.72  -Continue to observe for SIRS/Sepsis Syndrome.    Prophylaxis:  -DVT prophylaxis with 5000 SubQ Heparin q8h.  -Continue with SCD's b/l while patient is at rest     Disposition:  -Discharge home once tomorrow   62 YO Male w/ PMHx HTN, HLD, DMII, gout, former tobacco/ETOH use, cirrhosis, esophageal varices, GI bleed and TIA who was found to have 3vCAD. Readmitted in the setting of a very large L pleural effusion and is now s/p pigtail placement in ICU. Central line and [place] were placed then [place] was removed and patient transferred to [facility] with pigtail. Pigtail continues to have high output (>3L of fluid have been removed) and fluid balances were closely monitored. Hepatology consulted for cirrhosis hx and monitoring closely for possible portal vein stenosis if output continues to remain high. On 9/28 Pigtail removed and pt pending U/S abdomen per hepatology recs.    Plan:    Neurovascular:   -Pain well controlled with current regimen. PRN's: Tramadol   -Cont Gabapentin    Rheum:  -Hx of gout  -Cont Allopurinol    Cardiovascular:   -hx of CAD s/p OPCAB x 4 on 9/14/23  -Cont Plavix, (no ASA 2/2 allergy), BB  -Cont Aldactone  -No statin 2/2 liver ds  -Hx of HTN  -Cont BB  -Hx fo HLD  -Hemodynamically stable.   -Monitor: BP, HR, tele    Respiratory:  -L pleural effusion  -s/p L pigtail, now removed   -Cont Lasix 40mg daily per Hepatology recs  -Oxygenating well on room air  -Encourage continued use of IS 10x/hr and frequent ambulation  -CXR: Tiny L apical PTX    GI:  -Hx of Cirrhosis and esophageal varices  -Hepatology team following  -Cont Rifiaximin  -GI PPX: Protonix  -PO Diet  -Bowel Regimen: Miralax    Renal / :  -Continue to monitor renal function: BUN/Cr: 14/0.80  -Monitor I/O's daily     Endocrine:    -Hx of DMII  -Cont ISS  -A1c: 7  -No hx of thyroid dx  -TSH: 1.31    Hematologic:  -CBC: H/H- 9.8/31  -Coagulation Panel.    ID:  -Temperature: Afebrile  -CBC: WBC- 6.72  -Continue to observe for SIRS/Sepsis Syndrome.    Prophylaxis:  -DVT prophylaxis with 5000 SubQ Heparin q8h.  -Continue with SCD's b/l while patient is at rest     Disposition:  -Discharge home once tomorrow

## 2023-09-29 LAB
ANION GAP SERPL CALC-SCNC: 8 MMOL/L — SIGNIFICANT CHANGE UP (ref 5–17)
BUN SERPL-MCNC: 14 MG/DL — SIGNIFICANT CHANGE UP (ref 7–23)
CALCIUM SERPL-MCNC: 8.3 MG/DL — LOW (ref 8.4–10.5)
CHLORIDE SERPL-SCNC: 100 MMOL/L — SIGNIFICANT CHANGE UP (ref 96–108)
CO2 SERPL-SCNC: 26 MMOL/L — SIGNIFICANT CHANGE UP (ref 22–31)
CREAT SERPL-MCNC: 0.76 MG/DL — SIGNIFICANT CHANGE UP (ref 0.5–1.3)
EGFR: 100 ML/MIN/1.73M2 — SIGNIFICANT CHANGE UP
GLUCOSE BLDC GLUCOMTR-MCNC: 112 MG/DL — HIGH (ref 70–99)
GLUCOSE BLDC GLUCOMTR-MCNC: 127 MG/DL — HIGH (ref 70–99)
GLUCOSE BLDC GLUCOMTR-MCNC: 130 MG/DL — HIGH (ref 70–99)
GLUCOSE BLDC GLUCOMTR-MCNC: 172 MG/DL — HIGH (ref 70–99)
GLUCOSE SERPL-MCNC: 129 MG/DL — HIGH (ref 70–99)
HCT VFR BLD CALC: 31 % — LOW (ref 39–50)
HGB BLD-MCNC: 10.2 G/DL — LOW (ref 13–17)
MAGNESIUM SERPL-MCNC: 1.6 MG/DL — SIGNIFICANT CHANGE UP (ref 1.6–2.6)
MCHC RBC-ENTMCNC: 29.7 PG — SIGNIFICANT CHANGE UP (ref 27–34)
MCHC RBC-ENTMCNC: 32.9 GM/DL — SIGNIFICANT CHANGE UP (ref 32–36)
MCV RBC AUTO: 90.4 FL — SIGNIFICANT CHANGE UP (ref 80–100)
NRBC # BLD: 0 /100 WBCS — SIGNIFICANT CHANGE UP (ref 0–0)
PLATELET # BLD AUTO: 124 K/UL — LOW (ref 150–400)
POTASSIUM SERPL-MCNC: 4 MMOL/L — SIGNIFICANT CHANGE UP (ref 3.5–5.3)
POTASSIUM SERPL-SCNC: 4 MMOL/L — SIGNIFICANT CHANGE UP (ref 3.5–5.3)
RBC # BLD: 3.43 M/UL — LOW (ref 4.2–5.8)
RBC # FLD: 14.9 % — HIGH (ref 10.3–14.5)
SODIUM SERPL-SCNC: 134 MMOL/L — LOW (ref 135–145)
WBC # BLD: 6.6 K/UL — SIGNIFICANT CHANGE UP (ref 3.8–10.5)
WBC # FLD AUTO: 6.6 K/UL — SIGNIFICANT CHANGE UP (ref 3.8–10.5)

## 2023-09-29 PROCEDURE — 99232 SBSQ HOSP IP/OBS MODERATE 35: CPT | Mod: 24

## 2023-09-29 PROCEDURE — 71046 X-RAY EXAM CHEST 2 VIEWS: CPT | Mod: 26

## 2023-09-29 PROCEDURE — 71045 X-RAY EXAM CHEST 1 VIEW: CPT | Mod: 26,59

## 2023-09-29 RX ORDER — MAGNESIUM OXIDE 400 MG ORAL TABLET 241.3 MG
400 TABLET ORAL ONCE
Refills: 0 | Status: COMPLETED | OUTPATIENT
Start: 2023-09-29 | End: 2023-09-29

## 2023-09-29 RX ADMIN — GABAPENTIN 100 MILLIGRAM(S): 400 CAPSULE ORAL at 21:55

## 2023-09-29 RX ADMIN — SODIUM CHLORIDE 3 MILLILITER(S): 9 INJECTION INTRAMUSCULAR; INTRAVENOUS; SUBCUTANEOUS at 06:55

## 2023-09-29 RX ADMIN — CARVEDILOL PHOSPHATE 6.25 MILLIGRAM(S): 80 CAPSULE, EXTENDED RELEASE ORAL at 06:58

## 2023-09-29 RX ADMIN — SODIUM CHLORIDE 3 MILLILITER(S): 9 INJECTION INTRAMUSCULAR; INTRAVENOUS; SUBCUTANEOUS at 13:37

## 2023-09-29 RX ADMIN — CARVEDILOL PHOSPHATE 6.25 MILLIGRAM(S): 80 CAPSULE, EXTENDED RELEASE ORAL at 18:20

## 2023-09-29 RX ADMIN — Medication 100 MILLIGRAM(S): at 11:27

## 2023-09-29 RX ADMIN — MAGNESIUM OXIDE 400 MG ORAL TABLET 400 MILLIGRAM(S): 241.3 TABLET ORAL at 09:51

## 2023-09-29 RX ADMIN — TRAMADOL HYDROCHLORIDE 25 MILLIGRAM(S): 50 TABLET ORAL at 07:00

## 2023-09-29 RX ADMIN — PANTOPRAZOLE SODIUM 40 MILLIGRAM(S): 20 TABLET, DELAYED RELEASE ORAL at 06:59

## 2023-09-29 RX ADMIN — CLOPIDOGREL BISULFATE 75 MILLIGRAM(S): 75 TABLET, FILM COATED ORAL at 11:28

## 2023-09-29 RX ADMIN — GABAPENTIN 100 MILLIGRAM(S): 400 CAPSULE ORAL at 17:12

## 2023-09-29 RX ADMIN — SPIRONOLACTONE 25 MILLIGRAM(S): 25 TABLET, FILM COATED ORAL at 06:59

## 2023-09-29 RX ADMIN — Medication 40 MILLIGRAM(S): at 06:59

## 2023-09-29 RX ADMIN — SODIUM CHLORIDE 3 MILLILITER(S): 9 INJECTION INTRAMUSCULAR; INTRAVENOUS; SUBCUTANEOUS at 22:05

## 2023-09-29 RX ADMIN — CHLORHEXIDINE GLUCONATE 1 APPLICATION(S): 213 SOLUTION TOPICAL at 07:03

## 2023-09-29 RX ADMIN — GABAPENTIN 100 MILLIGRAM(S): 400 CAPSULE ORAL at 06:59

## 2023-09-29 RX ADMIN — Medication 2: at 12:28

## 2023-09-29 RX ADMIN — TRAMADOL HYDROCHLORIDE 25 MILLIGRAM(S): 50 TABLET ORAL at 06:57

## 2023-09-29 NOTE — PROGRESS NOTE ADULT - SUBJECTIVE AND OBJECTIVE BOX
Patient discussed on morning rounds with Dr. Hinton    OPERATION & DATE: 9/14 OPCABx4, 9/26 readmitted for L pleural effusion    SUBJECTIVE ASSESSMENT: resting comfortably in chair in NAD, denies SOB. at this time.     VITAL SIGNS:  Vital Signs Last 24 Hrs  T(C): 36.4 (29 Sep 2023 13:50), Max: 36.9 (29 Sep 2023 01:01)  T(F): 97.6 (29 Sep 2023 13:50), Max: 98.5 (29 Sep 2023 01:01)  HR: 90 (29 Sep 2023 14:01) (80 - 90)  BP: 97/66 (29 Sep 2023 14:01) (97/66 - 125/71)  BP(mean): 76 (29 Sep 2023 14:01) (76 - 89)  RR: 18 (29 Sep 2023 14:01) (18 - 18)  SpO2: 95% (29 Sep 2023 14:01) (92% - 98%)    Parameters below as of 29 Sep 2023 14:01  Patient On (Oxygen Delivery Method): room air      I&O's Detail    28 Sep 2023 07:01  -  29 Sep 2023 07:00  --------------------------------------------------------  IN:  Total IN: 0 mL    OUT:    Chest Tube (mL): 100 mL    Voided (mL): 1400 mL  Total OUT: 1500 mL    Total NET: -1500 mL        CHEST TUBE:  none  SUNITHA DRAIN:  none  EPICARDIAL WIRES: none  STITCHES: none  STAPLES: none  MOSLEY: none  CENTRAL LINE: none  MIDLINE/PICC: none  WOUND VAC: none    PHYSICAL EXAM:  General: resting comfortably in chair in NAD  Neurological: AOx3. Motor skills grossly intact  Cardiovascular: Normal S1/S2. Regular rate/rhythm. No murmurs  Respiratory: Lungs CTA bilaterally. No wheezing or rales  Gastrointestinal: +BS in all 4 quadrants. Non-distended. Soft. Non-tender  Extremities: Strength 5/5 b/l upper/lower extremities. Sensation grossly intact upper/lower extremities. No edema. No calf tenderness.  Vascular: Radial 2+bilaterally, DP 2+ b/l  Incision Sites: MSI clean, dry, intact      LABS:                        10.2   6.60  )-----------( 124      ( 29 Sep 2023 05:49 )             31.0       09-29    134<L>  |  100  |  14  ----------------------------<  129<H>  4.0   |  26  |  0.76    Ca    8.3<L>      29 Sep 2023 05:49  Mg     1.6     09-29      Urinalysis Basic - ( 29 Sep 2023 05:49 )    Color: x / Appearance: x / SG: x / pH: x  Gluc: 129 mg/dL / Ketone: x  / Bili: x / Urobili: x   Blood: x / Protein: x / Nitrite: x   Leuk Esterase: x / RBC: x / WBC x   Sq Epi: x / Non Sq Epi: x / Bacteria: x      MEDICATIONS  (STANDING):  allopurinol 100 milliGRAM(s) Oral daily  carvedilol 6.25 milliGRAM(s) Oral every 12 hours  chlorhexidine 2% Cloths 1 Application(s) Topical daily  clopidogrel Tablet 75 milliGRAM(s) Oral daily  dextrose 5%. 1000 milliLiter(s) (50 mL/Hr) IV Continuous <Continuous>  dextrose 5%. 1000 milliLiter(s) (100 mL/Hr) IV Continuous <Continuous>  dextrose 50% Injectable 25 Gram(s) IV Push once  dextrose 50% Injectable 25 Gram(s) IV Push once  dextrose 50% Injectable 12.5 Gram(s) IV Push once  furosemide    Tablet 40 milliGRAM(s) Oral daily  gabapentin 100 milliGRAM(s) Oral every 8 hours  glucagon  Injectable 1 milliGRAM(s) IntraMuscular once  influenza   Vaccine 0.5 milliLiter(s) IntraMuscular once  insulin lispro (ADMELOG) corrective regimen sliding scale   SubCutaneous Before meals and at bedtime  lidocaine   4% Patch 1 Patch Transdermal daily  pantoprazole    Tablet 40 milliGRAM(s) Oral before breakfast  polyethylene glycol 3350 17 Gram(s) Oral daily  rifAXIMin 550 milliGRAM(s) Oral two times a day  sodium chloride 0.9% lock flush 3 milliLiter(s) IV Push every 8 hours  spironolactone 25 milliGRAM(s) Oral daily    MEDICATIONS  (PRN):  dextrose Oral Gel 15 Gram(s) Oral once PRN Blood Glucose LESS THAN 70 milliGRAM(s)/deciliter  traMADol 25 milliGRAM(s) Oral every 6 hours PRN Severe Pain (7 - 10)    RADIOLOGY & ADDITIONAL TESTS:

## 2023-09-29 NOTE — PROGRESS NOTE ADULT - ASSESSMENT
64 YO Male w/ PMHx HTN, HLD, DMII, gout, former tobacco/ETOH use, cirrhosis, esophageal varices, GI bleed and TIA who was found to have 3vCAD. Readmitted in the setting of a very large L pleural effusion and is now s/p pigtail placement in ICU. Central line and [place] were placed then [place] was removed and patient transferred to [facility] with pigtail. Pigtail continues to have high output (>3L of fluid have been removed) and fluid balances were closely monitored. Hepatology consulted for cirrhosis hx and monitoring closely for possible portal vein stenosis if output continues to remain high. On 9/28 Pigtail removed and pt pending U/S abdomen reveals portal hypertension but no acute pathology. 9/29 L pleural effusion still present D/w Dr Hinton who is recommending conservative management at this time. DC deffered today because of rain and subway closures.     Plan:    Neurovascular:   -Pain well controlled with current regimen. PRN's: tramadol, gabapentin    Cardiovascular:   -Hemodynamically stable.   -Monitor: BP, HR, tele  -CAD s/p CABG    -c/w plavix  -Pleural effusion    -c/w lasix 40 mg POqd    -c/w aldactone 25mg QD  -HTN    -c/w coreg 6.25 x12h    Respiratory:   -Oxygenating well on room air  -Encourage continued use of IS 10x/hr and frequent ambulation  -CXR: L pleural effusion    GI:  -GI PPX: protonix  -PO Diet  -Bowel Regimen: miralax  -cirhosis    -c/w rifaxamin     Renal / :  -Continue to monitor renal function: BUN/Cr 14/.76  -Monitor I/O's daily     Endocrine:    -No hx of DM or thyroid dx  -A1c: 7  -TSH: 1.3    Hematologic:  -CBC: H/H- 10.2/31  -Coagulation Panel.    ID:  -Temperature: afebrile  -CBC: WBC- 6  -Continue to observe for SIRS/Sepsis Syndrome.    Prophylaxis:  -DVT prophylaxis held given possibility of thoracentesis  -Continue with SCD's b/l while patient is at rest     Disposition:  -Discharge home once patient is medically ready

## 2023-09-30 ENCOUNTER — TRANSCRIPTION ENCOUNTER (OUTPATIENT)
Age: 63
End: 2023-09-30

## 2023-09-30 ENCOUNTER — NON-APPOINTMENT (OUTPATIENT)
Age: 63
End: 2023-09-30

## 2023-09-30 VITALS
DIASTOLIC BLOOD PRESSURE: 79 MMHG | OXYGEN SATURATION: 96 % | RESPIRATION RATE: 18 BRPM | SYSTOLIC BLOOD PRESSURE: 116 MMHG | TEMPERATURE: 98 F | HEART RATE: 80 BPM

## 2023-09-30 LAB
GLUCOSE BLDC GLUCOMTR-MCNC: 129 MG/DL — HIGH (ref 70–99)
GLUCOSE BLDC GLUCOMTR-MCNC: 182 MG/DL — HIGH (ref 70–99)

## 2023-09-30 PROCEDURE — 82042 OTHER SOURCE ALBUMIN QUAN EA: CPT

## 2023-09-30 PROCEDURE — 89051 BODY FLUID CELL COUNT: CPT

## 2023-09-30 PROCEDURE — 84100 ASSAY OF PHOSPHORUS: CPT

## 2023-09-30 PROCEDURE — 36430 TRANSFUSION BLD/BLD COMPNT: CPT

## 2023-09-30 PROCEDURE — 83735 ASSAY OF MAGNESIUM: CPT

## 2023-09-30 PROCEDURE — P9100: CPT

## 2023-09-30 PROCEDURE — 84132 ASSAY OF SERUM POTASSIUM: CPT

## 2023-09-30 PROCEDURE — P9037: CPT

## 2023-09-30 PROCEDURE — 85730 THROMBOPLASTIN TIME PARTIAL: CPT

## 2023-09-30 PROCEDURE — 80053 COMPREHEN METABOLIC PANEL: CPT

## 2023-09-30 PROCEDURE — 88305 TISSUE EXAM BY PATHOLOGIST: CPT

## 2023-09-30 PROCEDURE — 88112 CYTOPATH CELL ENHANCE TECH: CPT

## 2023-09-30 PROCEDURE — 71045 X-RAY EXAM CHEST 1 VIEW: CPT

## 2023-09-30 PROCEDURE — 84295 ASSAY OF SERUM SODIUM: CPT

## 2023-09-30 PROCEDURE — 82330 ASSAY OF CALCIUM: CPT

## 2023-09-30 PROCEDURE — 82962 GLUCOSE BLOOD TEST: CPT

## 2023-09-30 PROCEDURE — 99285 EMERGENCY DEPT VISIT HI MDM: CPT

## 2023-09-30 PROCEDURE — 71046 X-RAY EXAM CHEST 2 VIEWS: CPT

## 2023-09-30 PROCEDURE — 85610 PROTHROMBIN TIME: CPT

## 2023-09-30 PROCEDURE — 85025 COMPLETE CBC W/AUTO DIFF WBC: CPT

## 2023-09-30 PROCEDURE — 93975 VASCULAR STUDY: CPT

## 2023-09-30 PROCEDURE — 84157 ASSAY OF PROTEIN OTHER: CPT

## 2023-09-30 PROCEDURE — 93005 ELECTROCARDIOGRAM TRACING: CPT

## 2023-09-30 PROCEDURE — 82803 BLOOD GASES ANY COMBINATION: CPT

## 2023-09-30 PROCEDURE — 36415 COLL VENOUS BLD VENIPUNCTURE: CPT

## 2023-09-30 PROCEDURE — 85027 COMPLETE CBC AUTOMATED: CPT

## 2023-09-30 PROCEDURE — 80048 BASIC METABOLIC PNL TOTAL CA: CPT

## 2023-09-30 PROCEDURE — P9045: CPT

## 2023-09-30 RX ORDER — FUROSEMIDE 40 MG
1 TABLET ORAL
Qty: 14 | Refills: 0
Start: 2023-09-30 | End: 2023-10-13

## 2023-09-30 RX ORDER — SPIRONOLACTONE 25 MG/1
1 TABLET, FILM COATED ORAL
Qty: 14 | Refills: 0 | DISCHARGE
Start: 2023-09-30 | End: 2023-10-13

## 2023-09-30 RX ORDER — SPIRONOLACTONE 25 MG/1
1 TABLET, FILM COATED ORAL
Qty: 14 | Refills: 0
Start: 2023-09-30 | End: 2023-10-13

## 2023-09-30 RX ADMIN — Medication 40 MILLIGRAM(S): at 05:34

## 2023-09-30 RX ADMIN — GABAPENTIN 100 MILLIGRAM(S): 400 CAPSULE ORAL at 05:34

## 2023-09-30 RX ADMIN — Medication 100 MILLIGRAM(S): at 11:49

## 2023-09-30 RX ADMIN — LIDOCAINE 1 PATCH: 4 CREAM TOPICAL at 11:48

## 2023-09-30 RX ADMIN — CLOPIDOGREL BISULFATE 75 MILLIGRAM(S): 75 TABLET, FILM COATED ORAL at 11:48

## 2023-09-30 RX ADMIN — CHLORHEXIDINE GLUCONATE 1 APPLICATION(S): 213 SOLUTION TOPICAL at 05:34

## 2023-09-30 RX ADMIN — POLYETHYLENE GLYCOL 3350 17 GRAM(S): 17 POWDER, FOR SOLUTION ORAL at 11:47

## 2023-09-30 RX ADMIN — SPIRONOLACTONE 25 MILLIGRAM(S): 25 TABLET, FILM COATED ORAL at 05:34

## 2023-09-30 RX ADMIN — SODIUM CHLORIDE 3 MILLILITER(S): 9 INJECTION INTRAMUSCULAR; INTRAVENOUS; SUBCUTANEOUS at 05:31

## 2023-09-30 RX ADMIN — Medication 2: at 12:07

## 2023-09-30 RX ADMIN — CARVEDILOL PHOSPHATE 6.25 MILLIGRAM(S): 80 CAPSULE, EXTENDED RELEASE ORAL at 05:34

## 2023-09-30 RX ADMIN — PANTOPRAZOLE SODIUM 40 MILLIGRAM(S): 20 TABLET, DELAYED RELEASE ORAL at 05:34

## 2023-09-30 NOTE — DISCHARGE NOTE NURSING/CASE MANAGEMENT/SOCIAL WORK - NSDCFUADDAPPT_GEN_ALL_CORE_FT
Please attend all follow-up appointments as scheduled. Please call Dr. Coyle office to confirm your follow up appointments. If you have any questions, please call [phone].

## 2023-09-30 NOTE — DISCHARGE NOTE NURSING/CASE MANAGEMENT/SOCIAL WORK - PATIENT PORTAL LINK FT
You can access the FollowMyHealth Patient Portal offered by [facility] by registering at the following website: [email or web address]. By joining [employer]’s FollowMyHealth portal, you will also be able to view your health information using other applications (apps) compatible with our system.

## 2023-10-01 ENCOUNTER — TRANSCRIPTION ENCOUNTER (OUTPATIENT)
Age: 63
End: 2023-10-01

## 2023-10-02 VITALS — RESPIRATION RATE: 17 BRPM

## 2023-10-02 LAB — NON-GYNECOLOGICAL CYTOLOGY STUDY: SIGNIFICANT CHANGE UP

## 2023-10-02 RX ORDER — OXYCODONE 5 MG/1
5 TABLET ORAL
Refills: 0 | Status: COMPLETED | COMMUNITY
End: 2023-10-02

## 2023-10-02 RX ORDER — METHYLPREDNISOLONE 4 MG/1
4 TABLET ORAL
Refills: 0 | Status: COMPLETED | COMMUNITY
End: 2023-10-02

## 2023-10-02 RX ORDER — POTASSIUM CHLORIDE 750 MG/1
10 CAPSULE, EXTENDED RELEASE ORAL
Qty: 30 | Refills: 0 | Status: COMPLETED | COMMUNITY
End: 2023-10-02

## 2023-10-02 RX ORDER — LIDOCAINE 50 MG/G
5 PATCH CUTANEOUS
Refills: 0 | Status: COMPLETED | COMMUNITY
End: 2023-10-02

## 2023-10-03 ENCOUNTER — TRANSCRIPTION ENCOUNTER (OUTPATIENT)
Age: 63
End: 2023-10-03

## 2023-10-03 ENCOUNTER — NON-APPOINTMENT (OUTPATIENT)
Age: 63
End: 2023-10-03

## 2023-10-04 ENCOUNTER — TRANSCRIPTION ENCOUNTER (OUTPATIENT)
Age: 63
End: 2023-10-04

## 2023-10-05 ENCOUNTER — TRANSCRIPTION ENCOUNTER (OUTPATIENT)
Age: 63
End: 2023-10-05

## 2023-10-05 ENCOUNTER — APPOINTMENT (OUTPATIENT)
Dept: HEART AND VASCULAR | Facility: CLINIC | Age: 63
End: 2023-10-05

## 2023-10-06 ENCOUNTER — TRANSCRIPTION ENCOUNTER (OUTPATIENT)
Age: 63
End: 2023-10-06

## 2023-10-11 ENCOUNTER — NON-APPOINTMENT (OUTPATIENT)
Age: 63
End: 2023-10-11

## 2023-10-11 ENCOUNTER — APPOINTMENT (OUTPATIENT)
Dept: CARDIOTHORACIC SURGERY | Facility: CLINIC | Age: 63
End: 2023-10-11

## 2023-10-11 ENCOUNTER — APPOINTMENT (OUTPATIENT)
Dept: CARDIOTHORACIC SURGERY | Facility: CLINIC | Age: 63
End: 2023-10-11
Payer: COMMERCIAL

## 2023-10-11 ENCOUNTER — OUTPATIENT (OUTPATIENT)
Dept: OUTPATIENT SERVICES | Facility: HOSPITAL | Age: 63
LOS: 1 days | End: 2023-10-11
Payer: COMMERCIAL

## 2023-10-11 VITALS
HEART RATE: 80 BPM | SYSTOLIC BLOOD PRESSURE: 116 MMHG | RESPIRATION RATE: 17 BRPM | TEMPERATURE: 97.4 F | BODY MASS INDEX: 31.67 KG/M2 | HEIGHT: 68 IN | WEIGHT: 209 LBS | OXYGEN SATURATION: 95 % | DIASTOLIC BLOOD PRESSURE: 79 MMHG

## 2023-10-11 DIAGNOSIS — Z09 ENCOUNTER FOR FOLLOW-UP EXAMINATION AFTER COMPLETED TREATMENT FOR CONDITIONS OTHER THAN MALIGNANT NEOPLASM: ICD-10-CM

## 2023-10-11 PROCEDURE — 71046 X-RAY EXAM CHEST 2 VIEWS: CPT | Mod: 26

## 2023-10-11 PROCEDURE — 99024 POSTOP FOLLOW-UP VISIT: CPT

## 2023-10-11 PROCEDURE — 71046 X-RAY EXAM CHEST 2 VIEWS: CPT

## 2023-10-12 ENCOUNTER — TRANSCRIPTION ENCOUNTER (OUTPATIENT)
Age: 63
End: 2023-10-12

## 2023-10-16 ENCOUNTER — LABORATORY RESULT (OUTPATIENT)
Age: 63
End: 2023-10-16

## 2023-10-16 ENCOUNTER — APPOINTMENT (OUTPATIENT)
Age: 63
End: 2023-10-16
Payer: COMMERCIAL

## 2023-10-16 VITALS
TEMPERATURE: 98.8 F | OXYGEN SATURATION: 93 % | WEIGHT: 213 LBS | BODY MASS INDEX: 32.28 KG/M2 | HEIGHT: 68 IN | RESPIRATION RATE: 17 BRPM | DIASTOLIC BLOOD PRESSURE: 88 MMHG | SYSTOLIC BLOOD PRESSURE: 118 MMHG | HEART RATE: 90 BPM

## 2023-10-16 PROCEDURE — 99214 OFFICE O/P EST MOD 30 MIN: CPT

## 2023-10-16 RX ORDER — ATORVASTATIN CALCIUM 40 MG/1
40 TABLET, FILM COATED ORAL
Qty: 30 | Refills: 0 | Status: COMPLETED | COMMUNITY
End: 2023-10-16

## 2023-10-16 RX ORDER — ALLOPURINOL 100 MG/1
100 TABLET ORAL DAILY
Refills: 0 | Status: COMPLETED | COMMUNITY
End: 2023-10-16

## 2023-10-16 RX ORDER — INSULIN LISPRO 100 [IU]/ML
(50-50) 100 INJECTION, SUSPENSION SUBCUTANEOUS DAILY
Refills: 0 | Status: COMPLETED | COMMUNITY
End: 2023-10-16

## 2023-10-16 RX ORDER — ERGOCALCIFEROL (VITAMIN D2) 1250 MCG
50000 CAPSULE ORAL
Refills: 0 | Status: COMPLETED | COMMUNITY
End: 2023-10-16

## 2023-10-16 RX ORDER — PANTOPRAZOLE 40 MG/1
40 TABLET, DELAYED RELEASE ORAL DAILY
Qty: 30 | Refills: 0 | Status: COMPLETED | COMMUNITY
End: 2023-10-16

## 2023-10-17 ENCOUNTER — NON-APPOINTMENT (OUTPATIENT)
Age: 63
End: 2023-10-17

## 2023-10-18 ENCOUNTER — APPOINTMENT (OUTPATIENT)
Dept: HEPATOLOGY | Facility: CLINIC | Age: 63
End: 2023-10-18

## 2023-10-19 LAB
ALBUMIN SERPL ELPH-MCNC: 3.4 G/DL
ALP BLD-CCNC: 133 U/L
ALT SERPL-CCNC: 25 U/L
ANION GAP SERPL CALC-SCNC: 18 MMOL/L
AST SERPL-CCNC: 44 U/L
BASOPHILS # BLD AUTO: 0.08 K/UL
BASOPHILS NFR BLD AUTO: 1.7 %
BILIRUB SERPL-MCNC: 2.5 MG/DL
BUN SERPL-MCNC: 12 MG/DL
CALCIUM SERPL-MCNC: 8.9 MG/DL
CHLORIDE SERPL-SCNC: 96 MMOL/L
CO2 SERPL-SCNC: 25 MMOL/L
CREAT SERPL-MCNC: 0.95 MG/DL
EGFR: 89 ML/MIN/1.73M2
EOSINOPHIL # BLD AUTO: 0.12 K/UL
EOSINOPHIL NFR BLD AUTO: 2.6 %
GLUCOSE SERPL-MCNC: 114 MG/DL
HCT VFR BLD CALC: 30.5 %
HGB BLD-MCNC: 10.8 G/DL
INR PPP: 1.38 RATIO
LYMPHOCYTES # BLD AUTO: 0.74 K/UL
LYMPHOCYTES NFR BLD AUTO: 16.7 %
MAN DIFF?: NORMAL
MCHC RBC-ENTMCNC: 33.2 PG
MCHC RBC-ENTMCNC: 35.4 GM/DL
MCV RBC AUTO: 93.8 FL
MONOCYTES # BLD AUTO: 0.55 K/UL
MONOCYTES NFR BLD AUTO: 12.3 %
NEUTROPHILS # BLD AUTO: 2.97 K/UL
NEUTROPHILS NFR BLD AUTO: 66.7 %
PLATELET # BLD AUTO: 173 K/UL
POTASSIUM SERPL-SCNC: 3.5 MMOL/L
POTASSIUM UR-SCNC: 42.9 MMOL/L
PROT SERPL-MCNC: 6.9 G/DL
PT BLD: 15.5 SEC
RBC # BLD: 3.25 M/UL
RBC # FLD: 14.9 %
SODIUM ?TM SUB UR QN: 44 MMOL/L
SODIUM SERPL-SCNC: 139 MMOL/L
WBC # FLD AUTO: 4.45 K/UL

## 2023-10-20 LAB
AFP-TM SERPL-MCNC: <1.8 NG/ML
PETH 16:0/18:1: NEGATIVE NG/ML
PETH 16:0/18:2: NEGATIVE NG/ML
PETH COMMENTS: NORMAL

## 2023-10-23 ENCOUNTER — RESULT REVIEW (OUTPATIENT)
Age: 63
End: 2023-10-23

## 2023-10-23 ENCOUNTER — APPOINTMENT (OUTPATIENT)
Dept: INTERVENTIONAL RADIOLOGY/VASCULAR | Facility: HOSPITAL | Age: 63
End: 2023-10-23

## 2023-10-23 ENCOUNTER — OUTPATIENT (OUTPATIENT)
Dept: OUTPATIENT SERVICES | Facility: HOSPITAL | Age: 63
LOS: 1 days | End: 2023-10-23
Payer: COMMERCIAL

## 2023-10-23 ENCOUNTER — APPOINTMENT (OUTPATIENT)
Dept: HEPATOLOGY | Facility: CLINIC | Age: 63
End: 2023-10-23
Payer: COMMERCIAL

## 2023-10-23 VITALS
HEIGHT: 68 IN | WEIGHT: 201 LBS | BODY MASS INDEX: 30.46 KG/M2 | OXYGEN SATURATION: 96 % | SYSTOLIC BLOOD PRESSURE: 116 MMHG | HEART RATE: 82 BPM | DIASTOLIC BLOOD PRESSURE: 85 MMHG | RESPIRATION RATE: 18 BRPM | TEMPERATURE: 98.6 F

## 2023-10-23 PROCEDURE — 32555 ASPIRATE PLEURA W/ IMAGING: CPT

## 2023-10-23 PROCEDURE — C1729: CPT

## 2023-10-23 PROCEDURE — 71045 X-RAY EXAM CHEST 1 VIEW: CPT

## 2023-10-23 PROCEDURE — 71045 X-RAY EXAM CHEST 1 VIEW: CPT | Mod: 26

## 2023-10-23 PROCEDURE — 88112 CYTOPATH CELL ENHANCE TECH: CPT

## 2023-10-23 PROCEDURE — 83615 LACTATE (LD) (LDH) ENZYME: CPT

## 2023-10-23 PROCEDURE — 32555 ASPIRATE PLEURA W/ IMAGING: CPT | Mod: LT,59

## 2023-10-23 PROCEDURE — 99213 OFFICE O/P EST LOW 20 MIN: CPT

## 2023-10-23 PROCEDURE — 89051 BODY FLUID CELL COUNT: CPT

## 2023-10-23 PROCEDURE — 88112 CYTOPATH CELL ENHANCE TECH: CPT | Mod: 26

## 2023-10-23 PROCEDURE — 49083 ABD PARACENTESIS W/IMAGING: CPT

## 2023-10-23 PROCEDURE — 88305 TISSUE EXAM BY PATHOLOGIST: CPT | Mod: 26

## 2023-10-23 PROCEDURE — 82042 OTHER SOURCE ALBUMIN QUAN EA: CPT

## 2023-10-23 PROCEDURE — 87075 CULTR BACTERIA EXCEPT BLOOD: CPT

## 2023-10-23 PROCEDURE — 88305 TISSUE EXAM BY PATHOLOGIST: CPT

## 2023-10-23 PROCEDURE — 82945 GLUCOSE OTHER FLUID: CPT

## 2023-10-23 PROCEDURE — 84157 ASSAY OF PROTEIN OTHER: CPT

## 2023-10-23 PROCEDURE — 87205 SMEAR GRAM STAIN: CPT

## 2023-10-23 PROCEDURE — 82962 GLUCOSE BLOOD TEST: CPT

## 2023-10-23 PROCEDURE — 87070 CULTURE OTHR SPECIMN AEROBIC: CPT

## 2023-10-24 ENCOUNTER — APPOINTMENT (OUTPATIENT)
Dept: CARDIOTHORACIC SURGERY | Facility: CLINIC | Age: 63
End: 2023-10-24

## 2023-10-26 ENCOUNTER — APPOINTMENT (OUTPATIENT)
Dept: CT IMAGING | Facility: HOSPITAL | Age: 63
End: 2023-10-26

## 2023-10-26 LAB
ALBUMIN SERPL ELPH-MCNC: 3.3 G/DL
ALP BLD-CCNC: 125 U/L
ALT SERPL-CCNC: 15 U/L
ANION GAP SERPL CALC-SCNC: 16 MMOL/L
AST SERPL-CCNC: 39 U/L
BILIRUB SERPL-MCNC: 1.9 MG/DL
BUN SERPL-MCNC: 10 MG/DL
CALCIUM SERPL-MCNC: 9.1 MG/DL
CHLORIDE SERPL-SCNC: 97 MMOL/L
CO2 SERPL-SCNC: 27 MMOL/L
CREAT SERPL-MCNC: 0.92 MG/DL
EGFR: 93 ML/MIN/1.73M2
GLUCOSE SERPL-MCNC: 151 MG/DL
POTASSIUM SERPL-SCNC: 3.7 MMOL/L
PROT SERPL-MCNC: 7.2 G/DL
SODIUM SERPL-SCNC: 139 MMOL/L

## 2023-10-27 ENCOUNTER — NON-APPOINTMENT (OUTPATIENT)
Age: 63
End: 2023-10-27

## 2023-10-29 LAB
NON-GYNECOLOGICAL CYTOLOGY STUDY: SIGNIFICANT CHANGE UP
NON-GYNECOLOGICAL CYTOLOGY STUDY: SIGNIFICANT CHANGE UP

## 2023-10-30 ENCOUNTER — APPOINTMENT (OUTPATIENT)
Dept: HEPATOLOGY | Facility: CLINIC | Age: 63
End: 2023-10-30
Payer: COMMERCIAL

## 2023-10-30 VITALS
BODY MASS INDEX: 28.49 KG/M2 | RESPIRATION RATE: 18 BRPM | DIASTOLIC BLOOD PRESSURE: 75 MMHG | SYSTOLIC BLOOD PRESSURE: 109 MMHG | WEIGHT: 188 LBS | TEMPERATURE: 98 F | HEART RATE: 79 BPM | OXYGEN SATURATION: 97 % | HEIGHT: 68 IN

## 2023-10-30 DIAGNOSIS — K76.6 PORTAL HYPERTENSION: ICD-10-CM

## 2023-10-30 PROCEDURE — 99214 OFFICE O/P EST MOD 30 MIN: CPT

## 2023-10-31 ENCOUNTER — OUTPATIENT (OUTPATIENT)
Dept: OUTPATIENT SERVICES | Facility: HOSPITAL | Age: 63
LOS: 1 days | End: 2023-10-31
Payer: COMMERCIAL

## 2023-10-31 ENCOUNTER — APPOINTMENT (OUTPATIENT)
Dept: CARDIOTHORACIC SURGERY | Facility: CLINIC | Age: 63
End: 2023-10-31

## 2023-10-31 ENCOUNTER — APPOINTMENT (OUTPATIENT)
Dept: CT IMAGING | Facility: HOSPITAL | Age: 63
End: 2023-10-31
Payer: COMMERCIAL

## 2023-10-31 PROCEDURE — 74170 CT ABD WO CNTRST FLWD CNTRST: CPT

## 2023-10-31 PROCEDURE — 74170 CT ABD WO CNTRST FLWD CNTRST: CPT | Mod: 26

## 2023-11-02 DIAGNOSIS — J90 PLEURAL EFFUSION, NOT ELSEWHERE CLASSIFIED: ICD-10-CM

## 2023-11-06 ENCOUNTER — APPOINTMENT (OUTPATIENT)
Dept: HEPATOLOGY | Facility: CLINIC | Age: 63
End: 2023-11-06

## 2023-11-06 NOTE — DISCHARGE NOTE NURSING/CASE MANAGEMENT/SOCIAL WORK - NSDCPEFALRISK_GEN_ALL_CORE
For information on Fall & Injury Prevention, visit: [email or web address] OR  [email or web address] OR  https://www.cdc.gov/steadi/patient.html Yes

## 2023-11-07 LAB
ALBUMIN SERPL ELPH-MCNC: 3.5 G/DL
ALP BLD-CCNC: 170 U/L
ALT SERPL-CCNC: 19 U/L
ANION GAP SERPL CALC-SCNC: 14 MMOL/L
AST SERPL-CCNC: 45 U/L
BILIRUB SERPL-MCNC: 1.7 MG/DL
BUN SERPL-MCNC: 12 MG/DL
CALCIUM SERPL-MCNC: 9.2 MG/DL
CHLORIDE SERPL-SCNC: 99 MMOL/L
CO2 SERPL-SCNC: 27 MMOL/L
CREAT SERPL-MCNC: 0.88 MG/DL
EGFR: 96 ML/MIN/1.73M2
GLUCOSE SERPL-MCNC: 105 MG/DL
HCT VFR BLD CALC: 33.2 %
HGB BLD-MCNC: 11 G/DL
INR PPP: 1.35 RATIO
MCHC RBC-ENTMCNC: 30.7 PG
MCHC RBC-ENTMCNC: 33.1 GM/DL
MCV RBC AUTO: 92.7 FL
PLATELET # BLD AUTO: 117 K/UL
POTASSIUM SERPL-SCNC: 3.8 MMOL/L
PROT SERPL-MCNC: 7.6 G/DL
PT BLD: 15.1 SEC
RBC # BLD: 3.58 M/UL
RBC # FLD: 14.7 %
SODIUM SERPL-SCNC: 140 MMOL/L
WBC # FLD AUTO: 4.49 K/UL

## 2023-11-12 ENCOUNTER — INPATIENT (INPATIENT)
Facility: HOSPITAL | Age: 63
LOS: 5 days | Discharge: HOME | DRG: 432 | End: 2023-11-18
Attending: STUDENT IN AN ORGANIZED HEALTH CARE EDUCATION/TRAINING PROGRAM | Admitting: INTERNAL MEDICINE
Payer: COMMERCIAL

## 2023-11-12 VITALS
RESPIRATION RATE: 18 BRPM | TEMPERATURE: 98 F | SYSTOLIC BLOOD PRESSURE: 108 MMHG | WEIGHT: 179.9 LBS | DIASTOLIC BLOOD PRESSURE: 69 MMHG | HEIGHT: 68 IN | OXYGEN SATURATION: 98 % | HEART RATE: 98 BPM

## 2023-11-12 DIAGNOSIS — I10 ESSENTIAL (PRIMARY) HYPERTENSION: ICD-10-CM

## 2023-11-12 DIAGNOSIS — I25.10 ATHEROSCLEROTIC HEART DISEASE OF NATIVE CORONARY ARTERY WITHOUT ANGINA PECTORIS: ICD-10-CM

## 2023-11-12 DIAGNOSIS — E11.9 TYPE 2 DIABETES MELLITUS WITHOUT COMPLICATIONS: ICD-10-CM

## 2023-11-12 DIAGNOSIS — Z29.9 ENCOUNTER FOR PROPHYLACTIC MEASURES, UNSPECIFIED: ICD-10-CM

## 2023-11-12 DIAGNOSIS — K74.60 UNSPECIFIED CIRRHOSIS OF LIVER: ICD-10-CM

## 2023-11-12 DIAGNOSIS — E87.20 ACIDOSIS, UNSPECIFIED: ICD-10-CM

## 2023-11-12 LAB
ALBUMIN SERPL ELPH-MCNC: 3.5 G/DL — SIGNIFICANT CHANGE UP (ref 3.3–5)
ALBUMIN SERPL ELPH-MCNC: 3.5 G/DL — SIGNIFICANT CHANGE UP (ref 3.3–5)
ALP SERPL-CCNC: 138 U/L — HIGH (ref 40–120)
ALP SERPL-CCNC: 138 U/L — HIGH (ref 40–120)
ALT FLD-CCNC: 17 U/L — SIGNIFICANT CHANGE UP (ref 10–45)
ALT FLD-CCNC: 17 U/L — SIGNIFICANT CHANGE UP (ref 10–45)
AMMONIA BLD-MCNC: 41 UMOL/L — SIGNIFICANT CHANGE UP (ref 11–55)
AMMONIA BLD-MCNC: 41 UMOL/L — SIGNIFICANT CHANGE UP (ref 11–55)
ANION GAP SERPL CALC-SCNC: 16 MMOL/L — SIGNIFICANT CHANGE UP (ref 5–17)
ANION GAP SERPL CALC-SCNC: 16 MMOL/L — SIGNIFICANT CHANGE UP (ref 5–17)
ANION GAP SERPL CALC-SCNC: 20 MMOL/L — HIGH (ref 5–17)
ANION GAP SERPL CALC-SCNC: 20 MMOL/L — HIGH (ref 5–17)
APTT BLD: 36.5 SEC — HIGH (ref 24.5–35.6)
APTT BLD: 36.5 SEC — HIGH (ref 24.5–35.6)
AST SERPL-CCNC: 36 U/L — SIGNIFICANT CHANGE UP (ref 10–40)
AST SERPL-CCNC: 36 U/L — SIGNIFICANT CHANGE UP (ref 10–40)
BASOPHILS # BLD AUTO: 0.08 K/UL — SIGNIFICANT CHANGE UP (ref 0–0.2)
BASOPHILS # BLD AUTO: 0.08 K/UL — SIGNIFICANT CHANGE UP (ref 0–0.2)
BASOPHILS NFR BLD AUTO: 0.9 % — SIGNIFICANT CHANGE UP (ref 0–2)
BASOPHILS NFR BLD AUTO: 0.9 % — SIGNIFICANT CHANGE UP (ref 0–2)
BILIRUB SERPL-MCNC: 3.2 MG/DL — HIGH (ref 0.2–1.2)
BILIRUB SERPL-MCNC: 3.2 MG/DL — HIGH (ref 0.2–1.2)
BLD GP AB SCN SERPL QL: NEGATIVE — SIGNIFICANT CHANGE UP
BLD GP AB SCN SERPL QL: NEGATIVE — SIGNIFICANT CHANGE UP
BUN SERPL-MCNC: 31 MG/DL — HIGH (ref 7–23)
BUN SERPL-MCNC: 31 MG/DL — HIGH (ref 7–23)
BUN SERPL-MCNC: 37 MG/DL — HIGH (ref 7–23)
BUN SERPL-MCNC: 37 MG/DL — HIGH (ref 7–23)
CALCIUM SERPL-MCNC: 8.8 MG/DL — SIGNIFICANT CHANGE UP (ref 8.4–10.5)
CALCIUM SERPL-MCNC: 8.8 MG/DL — SIGNIFICANT CHANGE UP (ref 8.4–10.5)
CALCIUM SERPL-MCNC: 9.6 MG/DL — SIGNIFICANT CHANGE UP (ref 8.4–10.5)
CALCIUM SERPL-MCNC: 9.6 MG/DL — SIGNIFICANT CHANGE UP (ref 8.4–10.5)
CHLORIDE SERPL-SCNC: 103 MMOL/L — SIGNIFICANT CHANGE UP (ref 96–108)
CHLORIDE SERPL-SCNC: 103 MMOL/L — SIGNIFICANT CHANGE UP (ref 96–108)
CHLORIDE SERPL-SCNC: 95 MMOL/L — LOW (ref 96–108)
CHLORIDE SERPL-SCNC: 95 MMOL/L — LOW (ref 96–108)
CO2 SERPL-SCNC: 21 MMOL/L — LOW (ref 22–31)
CO2 SERPL-SCNC: 21 MMOL/L — LOW (ref 22–31)
CO2 SERPL-SCNC: 23 MMOL/L — SIGNIFICANT CHANGE UP (ref 22–31)
CO2 SERPL-SCNC: 23 MMOL/L — SIGNIFICANT CHANGE UP (ref 22–31)
CREAT SERPL-MCNC: 0.8 MG/DL — SIGNIFICANT CHANGE UP (ref 0.5–1.3)
CREAT SERPL-MCNC: 0.8 MG/DL — SIGNIFICANT CHANGE UP (ref 0.5–1.3)
CREAT SERPL-MCNC: 0.82 MG/DL — SIGNIFICANT CHANGE UP (ref 0.5–1.3)
CREAT SERPL-MCNC: 0.82 MG/DL — SIGNIFICANT CHANGE UP (ref 0.5–1.3)
EGFR: 98 ML/MIN/1.73M2 — SIGNIFICANT CHANGE UP
EGFR: 98 ML/MIN/1.73M2 — SIGNIFICANT CHANGE UP
EGFR: 99 ML/MIN/1.73M2 — SIGNIFICANT CHANGE UP
EGFR: 99 ML/MIN/1.73M2 — SIGNIFICANT CHANGE UP
EOSINOPHIL # BLD AUTO: 0.12 K/UL — SIGNIFICANT CHANGE UP (ref 0–0.5)
EOSINOPHIL # BLD AUTO: 0.12 K/UL — SIGNIFICANT CHANGE UP (ref 0–0.5)
EOSINOPHIL NFR BLD AUTO: 1.3 % — SIGNIFICANT CHANGE UP (ref 0–6)
EOSINOPHIL NFR BLD AUTO: 1.3 % — SIGNIFICANT CHANGE UP (ref 0–6)
GLUCOSE BLDC GLUCOMTR-MCNC: 129 MG/DL — HIGH (ref 70–99)
GLUCOSE BLDC GLUCOMTR-MCNC: 129 MG/DL — HIGH (ref 70–99)
GLUCOSE SERPL-MCNC: 141 MG/DL — HIGH (ref 70–99)
GLUCOSE SERPL-MCNC: 141 MG/DL — HIGH (ref 70–99)
GLUCOSE SERPL-MCNC: 185 MG/DL — HIGH (ref 70–99)
GLUCOSE SERPL-MCNC: 185 MG/DL — HIGH (ref 70–99)
HCT VFR BLD CALC: 23.5 % — LOW (ref 39–50)
HCT VFR BLD CALC: 23.5 % — LOW (ref 39–50)
HCT VFR BLD CALC: 25.8 % — LOW (ref 39–50)
HCT VFR BLD CALC: 25.8 % — LOW (ref 39–50)
HCT VFR BLD CALC: 31.9 % — LOW (ref 39–50)
HCT VFR BLD CALC: 31.9 % — LOW (ref 39–50)
HGB BLD-MCNC: 10.1 G/DL — LOW (ref 13–17)
HGB BLD-MCNC: 10.1 G/DL — LOW (ref 13–17)
HGB BLD-MCNC: 7.7 G/DL — LOW (ref 13–17)
HGB BLD-MCNC: 7.7 G/DL — LOW (ref 13–17)
HGB BLD-MCNC: 8.3 G/DL — LOW (ref 13–17)
HGB BLD-MCNC: 8.3 G/DL — LOW (ref 13–17)
IMM GRANULOCYTES NFR BLD AUTO: 0.4 % — SIGNIFICANT CHANGE UP (ref 0–0.9)
IMM GRANULOCYTES NFR BLD AUTO: 0.4 % — SIGNIFICANT CHANGE UP (ref 0–0.9)
INR BLD: 1.54 — HIGH (ref 0.85–1.18)
INR BLD: 1.54 — HIGH (ref 0.85–1.18)
LACTATE SERPL-SCNC: 5.9 MMOL/L — CRITICAL HIGH (ref 0.5–2)
LACTATE SERPL-SCNC: 5.9 MMOL/L — CRITICAL HIGH (ref 0.5–2)
LACTATE SERPL-SCNC: 6.7 MMOL/L — CRITICAL HIGH (ref 0.5–2)
LACTATE SERPL-SCNC: 6.7 MMOL/L — CRITICAL HIGH (ref 0.5–2)
LACTATE SERPL-SCNC: 7.5 MMOL/L — CRITICAL HIGH (ref 0.5–2)
LACTATE SERPL-SCNC: 7.5 MMOL/L — CRITICAL HIGH (ref 0.5–2)
LIDOCAIN IGE QN: 24 U/L — SIGNIFICANT CHANGE UP (ref 7–60)
LIDOCAIN IGE QN: 24 U/L — SIGNIFICANT CHANGE UP (ref 7–60)
LYMPHOCYTES # BLD AUTO: 1.3 K/UL — SIGNIFICANT CHANGE UP (ref 1–3.3)
LYMPHOCYTES # BLD AUTO: 1.3 K/UL — SIGNIFICANT CHANGE UP (ref 1–3.3)
LYMPHOCYTES # BLD AUTO: 14 % — SIGNIFICANT CHANGE UP (ref 13–44)
LYMPHOCYTES # BLD AUTO: 14 % — SIGNIFICANT CHANGE UP (ref 13–44)
MCHC RBC-ENTMCNC: 27.4 PG — SIGNIFICANT CHANGE UP (ref 27–34)
MCHC RBC-ENTMCNC: 27.4 PG — SIGNIFICANT CHANGE UP (ref 27–34)
MCHC RBC-ENTMCNC: 27.7 PG — SIGNIFICANT CHANGE UP (ref 27–34)
MCHC RBC-ENTMCNC: 27.7 PG — SIGNIFICANT CHANGE UP (ref 27–34)
MCHC RBC-ENTMCNC: 27.8 PG — SIGNIFICANT CHANGE UP (ref 27–34)
MCHC RBC-ENTMCNC: 27.8 PG — SIGNIFICANT CHANGE UP (ref 27–34)
MCHC RBC-ENTMCNC: 31.7 GM/DL — LOW (ref 32–36)
MCHC RBC-ENTMCNC: 31.7 GM/DL — LOW (ref 32–36)
MCHC RBC-ENTMCNC: 32.2 GM/DL — SIGNIFICANT CHANGE UP (ref 32–36)
MCHC RBC-ENTMCNC: 32.2 GM/DL — SIGNIFICANT CHANGE UP (ref 32–36)
MCHC RBC-ENTMCNC: 32.8 GM/DL — SIGNIFICANT CHANGE UP (ref 32–36)
MCHC RBC-ENTMCNC: 32.8 GM/DL — SIGNIFICANT CHANGE UP (ref 32–36)
MCV RBC AUTO: 84.5 FL — SIGNIFICANT CHANGE UP (ref 80–100)
MCV RBC AUTO: 84.5 FL — SIGNIFICANT CHANGE UP (ref 80–100)
MCV RBC AUTO: 86.3 FL — SIGNIFICANT CHANGE UP (ref 80–100)
MCV RBC AUTO: 86.3 FL — SIGNIFICANT CHANGE UP (ref 80–100)
MCV RBC AUTO: 86.7 FL — SIGNIFICANT CHANGE UP (ref 80–100)
MCV RBC AUTO: 86.7 FL — SIGNIFICANT CHANGE UP (ref 80–100)
MONOCYTES # BLD AUTO: 0.87 K/UL — SIGNIFICANT CHANGE UP (ref 0–0.9)
MONOCYTES # BLD AUTO: 0.87 K/UL — SIGNIFICANT CHANGE UP (ref 0–0.9)
MONOCYTES NFR BLD AUTO: 9.4 % — SIGNIFICANT CHANGE UP (ref 2–14)
MONOCYTES NFR BLD AUTO: 9.4 % — SIGNIFICANT CHANGE UP (ref 2–14)
NEUTROPHILS # BLD AUTO: 6.85 K/UL — SIGNIFICANT CHANGE UP (ref 1.8–7.4)
NEUTROPHILS # BLD AUTO: 6.85 K/UL — SIGNIFICANT CHANGE UP (ref 1.8–7.4)
NEUTROPHILS NFR BLD AUTO: 74 % — SIGNIFICANT CHANGE UP (ref 43–77)
NEUTROPHILS NFR BLD AUTO: 74 % — SIGNIFICANT CHANGE UP (ref 43–77)
NRBC # BLD: 0 /100 WBCS — SIGNIFICANT CHANGE UP (ref 0–0)
PLATELET # BLD AUTO: 102 K/UL — LOW (ref 150–400)
PLATELET # BLD AUTO: 102 K/UL — LOW (ref 150–400)
PLATELET # BLD AUTO: 116 K/UL — LOW (ref 150–400)
PLATELET # BLD AUTO: 116 K/UL — LOW (ref 150–400)
PLATELET # BLD AUTO: 190 K/UL — SIGNIFICANT CHANGE UP (ref 150–400)
PLATELET # BLD AUTO: 190 K/UL — SIGNIFICANT CHANGE UP (ref 150–400)
POTASSIUM SERPL-MCNC: 3.7 MMOL/L — SIGNIFICANT CHANGE UP (ref 3.5–5.3)
POTASSIUM SERPL-MCNC: 3.7 MMOL/L — SIGNIFICANT CHANGE UP (ref 3.5–5.3)
POTASSIUM SERPL-MCNC: 3.8 MMOL/L — SIGNIFICANT CHANGE UP (ref 3.5–5.3)
POTASSIUM SERPL-MCNC: 3.8 MMOL/L — SIGNIFICANT CHANGE UP (ref 3.5–5.3)
POTASSIUM SERPL-SCNC: 3.7 MMOL/L — SIGNIFICANT CHANGE UP (ref 3.5–5.3)
POTASSIUM SERPL-SCNC: 3.7 MMOL/L — SIGNIFICANT CHANGE UP (ref 3.5–5.3)
POTASSIUM SERPL-SCNC: 3.8 MMOL/L — SIGNIFICANT CHANGE UP (ref 3.5–5.3)
POTASSIUM SERPL-SCNC: 3.8 MMOL/L — SIGNIFICANT CHANGE UP (ref 3.5–5.3)
PROT SERPL-MCNC: 8 G/DL — SIGNIFICANT CHANGE UP (ref 6–8.3)
PROT SERPL-MCNC: 8 G/DL — SIGNIFICANT CHANGE UP (ref 6–8.3)
PROTHROM AB SERPL-ACNC: 17.3 SEC — HIGH (ref 9.5–13)
PROTHROM AB SERPL-ACNC: 17.3 SEC — HIGH (ref 9.5–13)
RBC # BLD: 2.78 M/UL — LOW (ref 4.2–5.8)
RBC # BLD: 2.78 M/UL — LOW (ref 4.2–5.8)
RBC # BLD: 2.99 M/UL — LOW (ref 4.2–5.8)
RBC # BLD: 2.99 M/UL — LOW (ref 4.2–5.8)
RBC # BLD: 3.68 M/UL — LOW (ref 4.2–5.8)
RBC # BLD: 3.68 M/UL — LOW (ref 4.2–5.8)
RBC # FLD: 14.7 % — HIGH (ref 10.3–14.5)
RBC # FLD: 14.7 % — HIGH (ref 10.3–14.5)
RBC # FLD: 14.9 % — HIGH (ref 10.3–14.5)
RH IG SCN BLD-IMP: POSITIVE — SIGNIFICANT CHANGE UP
RH IG SCN BLD-IMP: POSITIVE — SIGNIFICANT CHANGE UP
SODIUM SERPL-SCNC: 138 MMOL/L — SIGNIFICANT CHANGE UP (ref 135–145)
SODIUM SERPL-SCNC: 138 MMOL/L — SIGNIFICANT CHANGE UP (ref 135–145)
SODIUM SERPL-SCNC: 140 MMOL/L — SIGNIFICANT CHANGE UP (ref 135–145)
SODIUM SERPL-SCNC: 140 MMOL/L — SIGNIFICANT CHANGE UP (ref 135–145)
WBC # BLD: 4.76 K/UL — SIGNIFICANT CHANGE UP (ref 3.8–10.5)
WBC # BLD: 4.76 K/UL — SIGNIFICANT CHANGE UP (ref 3.8–10.5)
WBC # BLD: 6.25 K/UL — SIGNIFICANT CHANGE UP (ref 3.8–10.5)
WBC # BLD: 6.25 K/UL — SIGNIFICANT CHANGE UP (ref 3.8–10.5)
WBC # BLD: 9.26 K/UL — SIGNIFICANT CHANGE UP (ref 3.8–10.5)
WBC # BLD: 9.26 K/UL — SIGNIFICANT CHANGE UP (ref 3.8–10.5)
WBC # FLD AUTO: 4.76 K/UL — SIGNIFICANT CHANGE UP (ref 3.8–10.5)
WBC # FLD AUTO: 4.76 K/UL — SIGNIFICANT CHANGE UP (ref 3.8–10.5)
WBC # FLD AUTO: 6.25 K/UL — SIGNIFICANT CHANGE UP (ref 3.8–10.5)
WBC # FLD AUTO: 6.25 K/UL — SIGNIFICANT CHANGE UP (ref 3.8–10.5)
WBC # FLD AUTO: 9.26 K/UL — SIGNIFICANT CHANGE UP (ref 3.8–10.5)
WBC # FLD AUTO: 9.26 K/UL — SIGNIFICANT CHANGE UP (ref 3.8–10.5)

## 2023-11-12 PROCEDURE — 99291 CRITICAL CARE FIRST HOUR: CPT

## 2023-11-12 PROCEDURE — 76937 US GUIDE VASCULAR ACCESS: CPT | Mod: 26,59

## 2023-11-12 PROCEDURE — 99221 1ST HOSP IP/OBS SF/LOW 40: CPT

## 2023-11-12 PROCEDURE — 36569 INSJ PICC 5 YR+ W/O IMAGING: CPT

## 2023-11-12 PROCEDURE — 99223 1ST HOSP IP/OBS HIGH 75: CPT | Mod: GC

## 2023-11-12 PROCEDURE — 76705 ECHO EXAM OF ABDOMEN: CPT | Mod: 26

## 2023-11-12 RX ORDER — CEFTRIAXONE 500 MG/1
1000 INJECTION, POWDER, FOR SOLUTION INTRAMUSCULAR; INTRAVENOUS EVERY 24 HOURS
Refills: 0 | Status: DISCONTINUED | OUTPATIENT
Start: 2023-11-13 | End: 2023-11-13

## 2023-11-12 RX ORDER — SODIUM CHLORIDE 9 MG/ML
500 INJECTION, SOLUTION INTRAVENOUS ONCE
Refills: 0 | Status: COMPLETED | OUTPATIENT
Start: 2023-11-12 | End: 2023-11-12

## 2023-11-12 RX ORDER — INSULIN LISPRO 100/ML
VIAL (ML) SUBCUTANEOUS
Refills: 0 | Status: DISCONTINUED | OUTPATIENT
Start: 2023-11-12 | End: 2023-11-18

## 2023-11-12 RX ORDER — DEXTROSE 50 % IN WATER 50 %
25 SYRINGE (ML) INTRAVENOUS ONCE
Refills: 0 | Status: DISCONTINUED | OUTPATIENT
Start: 2023-11-12 | End: 2023-11-18

## 2023-11-12 RX ORDER — GABAPENTIN 400 MG/1
100 CAPSULE ORAL AT BEDTIME
Refills: 0 | Status: DISCONTINUED | OUTPATIENT
Start: 2023-11-12 | End: 2023-11-18

## 2023-11-12 RX ORDER — INFLUENZA VIRUS VACCINE 15; 15; 15; 15 UG/.5ML; UG/.5ML; UG/.5ML; UG/.5ML
0.5 SUSPENSION INTRAMUSCULAR ONCE
Refills: 0 | Status: DISCONTINUED | OUTPATIENT
Start: 2023-11-12 | End: 2023-11-18

## 2023-11-12 RX ORDER — GLUCAGON INJECTION, SOLUTION 0.5 MG/.1ML
1 INJECTION, SOLUTION SUBCUTANEOUS ONCE
Refills: 0 | Status: DISCONTINUED | OUTPATIENT
Start: 2023-11-12 | End: 2023-11-18

## 2023-11-12 RX ORDER — CEFTRIAXONE 500 MG/1
1000 INJECTION, POWDER, FOR SOLUTION INTRAMUSCULAR; INTRAVENOUS ONCE
Refills: 0 | Status: COMPLETED | OUTPATIENT
Start: 2023-11-12 | End: 2023-11-12

## 2023-11-12 RX ORDER — PANTOPRAZOLE SODIUM 20 MG/1
80 TABLET, DELAYED RELEASE ORAL ONCE
Refills: 0 | Status: COMPLETED | OUTPATIENT
Start: 2023-11-12 | End: 2023-11-12

## 2023-11-12 RX ORDER — SODIUM CHLORIDE 9 MG/ML
1000 INJECTION, SOLUTION INTRAVENOUS
Refills: 0 | Status: DISCONTINUED | OUTPATIENT
Start: 2023-11-12 | End: 2023-11-18

## 2023-11-12 RX ORDER — SODIUM CHLORIDE 9 MG/ML
1000 INJECTION INTRAMUSCULAR; INTRAVENOUS; SUBCUTANEOUS ONCE
Refills: 0 | Status: COMPLETED | OUTPATIENT
Start: 2023-11-12 | End: 2023-11-12

## 2023-11-12 RX ORDER — OCTREOTIDE ACETATE 200 UG/ML
50 INJECTION, SOLUTION INTRAVENOUS; SUBCUTANEOUS ONCE
Refills: 0 | Status: COMPLETED | OUTPATIENT
Start: 2023-11-12 | End: 2023-11-12

## 2023-11-12 RX ORDER — SODIUM CHLORIDE 9 MG/ML
500 INJECTION INTRAMUSCULAR; INTRAVENOUS; SUBCUTANEOUS ONCE
Refills: 0 | Status: DISCONTINUED | OUTPATIENT
Start: 2023-11-12 | End: 2023-11-12

## 2023-11-12 RX ORDER — ALLOPURINOL 300 MG
100 TABLET ORAL DAILY
Refills: 0 | Status: DISCONTINUED | OUTPATIENT
Start: 2023-11-12 | End: 2023-11-16

## 2023-11-12 RX ORDER — DEXTROSE 50 % IN WATER 50 %
12.5 SYRINGE (ML) INTRAVENOUS ONCE
Refills: 0 | Status: DISCONTINUED | OUTPATIENT
Start: 2023-11-12 | End: 2023-11-18

## 2023-11-12 RX ORDER — PANTOPRAZOLE SODIUM 20 MG/1
8 TABLET, DELAYED RELEASE ORAL
Qty: 80 | Refills: 0 | Status: DISCONTINUED | OUTPATIENT
Start: 2023-11-12 | End: 2023-11-13

## 2023-11-12 RX ORDER — DEXTROSE 50 % IN WATER 50 %
15 SYRINGE (ML) INTRAVENOUS ONCE
Refills: 0 | Status: DISCONTINUED | OUTPATIENT
Start: 2023-11-12 | End: 2023-11-18

## 2023-11-12 RX ORDER — OCTREOTIDE ACETATE 200 UG/ML
50 INJECTION, SOLUTION INTRAVENOUS; SUBCUTANEOUS
Qty: 500 | Refills: 0 | Status: DISCONTINUED | OUTPATIENT
Start: 2023-11-12 | End: 2023-11-13

## 2023-11-12 RX ORDER — METFORMIN HYDROCHLORIDE 850 MG/1
1 TABLET ORAL
Refills: 0 | DISCHARGE

## 2023-11-12 RX ADMIN — SODIUM CHLORIDE 1000 MILLILITER(S): 9 INJECTION INTRAMUSCULAR; INTRAVENOUS; SUBCUTANEOUS at 11:52

## 2023-11-12 RX ADMIN — GABAPENTIN 100 MILLIGRAM(S): 400 CAPSULE ORAL at 23:39

## 2023-11-12 RX ADMIN — OCTREOTIDE ACETATE 50 MICROGRAM(S): 200 INJECTION, SOLUTION INTRAVENOUS; SUBCUTANEOUS at 12:57

## 2023-11-12 RX ADMIN — PANTOPRAZOLE SODIUM 10 MG/HR: 20 TABLET, DELAYED RELEASE ORAL at 11:50

## 2023-11-12 RX ADMIN — Medication 100 MILLIGRAM(S): at 19:28

## 2023-11-12 RX ADMIN — SODIUM CHLORIDE 1000 MILLILITER(S): 9 INJECTION, SOLUTION INTRAVENOUS at 20:05

## 2023-11-12 RX ADMIN — CEFTRIAXONE 1000 MILLIGRAM(S): 500 INJECTION, POWDER, FOR SOLUTION INTRAMUSCULAR; INTRAVENOUS at 13:05

## 2023-11-12 RX ADMIN — SODIUM CHLORIDE 1000 MILLILITER(S): 9 INJECTION, SOLUTION INTRAVENOUS at 16:59

## 2023-11-12 RX ADMIN — CEFTRIAXONE 100 MILLIGRAM(S): 500 INJECTION, POWDER, FOR SOLUTION INTRAMUSCULAR; INTRAVENOUS at 12:24

## 2023-11-12 RX ADMIN — SODIUM CHLORIDE 1000 MILLILITER(S): 9 INJECTION INTRAMUSCULAR; INTRAVENOUS; SUBCUTANEOUS at 11:06

## 2023-11-12 RX ADMIN — OCTREOTIDE ACETATE 10 MICROGRAM(S)/HR: 200 INJECTION, SOLUTION INTRAVENOUS; SUBCUTANEOUS at 12:57

## 2023-11-12 RX ADMIN — PANTOPRAZOLE SODIUM 80 MILLIGRAM(S): 20 TABLET, DELAYED RELEASE ORAL at 11:06

## 2023-11-12 RX ADMIN — SODIUM CHLORIDE 1000 MILLILITER(S): 9 INJECTION INTRAMUSCULAR; INTRAVENOUS; SUBCUTANEOUS at 13:05

## 2023-11-12 NOTE — CONSULT NOTE ADULT - ASSESSMENT
64 yo [place] ([place]) M former smoker with hx HTN, HLD, severe 3 vessel CAD, pre-diabetes, gout, TIA (14 yrs ago), gastric ulcer, rectal bleed, LIZ and alcoholic liver disease c/b cirrhosis and portal hypertension m/b esophageal varices (and UGIB) and splenomegaly presents for hematemesis x2 this AM.     #Neuro A&0x3, no asterixis on exam      #Pulm     #Cardiology       #GI    #Renal      #Heme    #ID      #MSK    #PPX         Case discussed with Dr. Martin and ICU fellow.  62 [facility] [place] [facility] M former smoker with hx HTN, HLD, severe 3 vessel CAD, pre-diabetes, gout, TIA (14 yrs ago), gastric ulcer, rectal bleed, LIZ and alcoholic liver disease c/b cirrhosis and portal hypertension m/b esophageal varices (and UGIB) and splenomegaly presents for hematemesis x2 this AM.     Neuro A&0x3, no asterixis on exam, ammonia level normal.   - NTD  - consider lactulose prn if develops encephalopathy     Pulm - history of recent left  transudative pleural effusion s/p CABG w/ pigtail placed. No further  reaccumulation.   - NTD  - Lasix prn if becomes overloaded.    Cardiology   #CAD - hx of CAD s/p OPCAB x 4 on 9/14/23. On Plavix 75mg last taken 11/11 AM.   - Hold Plavix i/s/o c/f active UGIB  - restart Plavix if GIB r/o   - SCDs     #HTN - on coreg 12.5mg BID  - Normotensive currently   - will hold BB to not mask tachycardia from GIB  - consider prn Hydralazine 5mg for SBP >160   - restart lasix 40mg QD and aldactone 25mg as tolerated     #GI    #Renal      #Heme    #ID      #MSK    #PPX         Case discussed with Dr. Martin and ICU fellow.  62 yo Portuguese ([facility]) M former smoker with hx HTN, HLD, severe 3 vessel CAD, pre-diabetes, gout, TIA (14 yrs ago), gastric ulcer, rectal bleed, LIZ and alcoholic liver disease c/b cirrhosis and portal hypertension m/b esophageal varices (and UGIB) and splenomegaly presents for hematemesis x2 this AM.     Neuro A&0x3, no asterixis on exam, ammonia level normal.   - NTD  - consider lactulose prn if develops encephalopathy     Pulm - history of recent left  transudative pleural effusion s/p CABG w/ pigtail placed. No further  reaccumulation.   - NTD  - Lasix prn if becomes overloaded.    Cardiology   #CAD - hx of CAD s/p OPCAB x 4 on 9/14/23. On Plavix 75mg last taken 11/11 AM.   - Hold Plavix i/s/o c/f active UGIB  - restart Plavix if GIB r/o   - SCDs     #HTN - on coreg 12.5mg BID  - Normotensive currently   - will hold BB to not mask tachycardia from GIB  - consider prn Hydralazine 5mg for SBP >160   - restart lasix 40mg QD and aldactone 25mg as tolerated    GI   #Cirrhosis - history of decompensated cirrhosis with esophageal varices w/ banding back in 04/2023. Follows with Dr. Lawson. On coreg 6.25mg BID, aldactone 25mg QD, Lasix 40mg.   #UGIB - c/f UGIB with multiple episodes of coffee ground emesis and melena. No episodes seen during ED evaluation. Hgb stable at 10, which is around baseline. HD stable.   - repeat Hgb tonight at 8pm  - maintain active T&S  - transfuse <7  - Clear liquid diet today, NPO at midnight   -  Protonix gtt, octerotide gtt  - GI following plan for EGD in endo suite 11/13.     Renal -   Normal creatinine, BUN likely elevated i/s/o UGIB  #Lactic acidosis - appears hypovolemic on exam - improved from 7.6 -->6.7 s/p 2L NS.   - additional 1L LR   - Trend lactate, likely wont fully clear i/s/o decompensated cirrhosis     Endocrine   history of DM on metformin at home  - c/w mISS    Heme  - stable H/H and plt count   - monitor for hematemesis and melena    ID  Afebrile and w/o leukocytosis, mild epigastric tenderness  - c/w ceftriaxone for ppx     MSK  - no active issues     PPX   F: Clear liquid diet until midnight then NPO except medications   GI: PPI gtt  DVT: SCDs  Dispo: 7 lachman - pending EGD results for further dispo         Case discussed with Dr. Martin and ICU fellow.

## 2023-11-12 NOTE — CONSULT NOTE ADULT - ATTENDING COMMENTS
62yo M with PMH of alcohol related cirrhosis (+varices on EGD 4/2023), CAD (s/p CABG 9/2023), TIA, gastric ulcer, rectal bleed, LIZ p/w coffee-ground emesis and melena x1d.    Plan for EGD. Agree with empiric medical management of variceal hemorrhage despite low overall concern.     Diagnostic paracentesis if ascites is present.    Agree with plan above.    TERA Nunez MD  GI Attending

## 2023-11-12 NOTE — CONSULT NOTE ADULT - ASSESSMENT
Recommendations:  - Large-bore IV x 2, active T&S, and close monitoring of vitals  - Continue PPI gtt and octreotide gtt  - Start ceftriaxone 1g QD  - Clear liquid diet today and make NPO except meds tomorrow for EGD    We will continue to follow. Please see attending addendum for final recommendations.     Ryan (Saige) MD [place]  Gastroenterology Fellow  Pager (M-F 7a-5p): [phone]  Pager (after hours): Please call  for on-call fellow       Recommendations:  - Large-bore IV x 2, active T&S, and close monitoring of vitals  - Continue PPI gtt and octreotide gtt  - Start ceftriaxone 1g QD  - RUQ ultrasound with Doppler (to evaluate both liver and portal/hepatic veins)  - Clear liquid diet today and make NPO except meds tomorrow for EGD    We will continue to follow. Please see attending addendum for final recommendations.     Ryan (Luis Emarleen) MD [place]  Gastroenterology Fellow  Pager (M-F 7a-5p): [phone]  Pager (after hours): Please call  for on-call fellow   63M h/o alcoholic cirrhosis (+varices on EGD 4/2023), CAD (s/p CABG 9/2023), TIA, gastric ulcer, rectal bleed, LIZ p/w coffee-ground emesis and melena x1d.    Given hx of prior varices ddx includes variceal bleed however pt remains stable and well-appearing. Could also be Krissy-Cintron tear in setting of N/V vs other causes of UGIB.    Recommendations:  - Large-bore IV x 2, active T&S, and close monitoring of vitals  - Continue PPI gtt and octreotide gtt  - Start ceftriaxone 1g QD  - RUQ ultrasound with Doppler (to evaluate both liver and portal/hepatic veins)  - Clear liquid diet today and make NPO except meds tomorrow for EGD    We will continue to follow. Please see attending addendum for final recommendations.     Ryan ([facility]) MD [place]  Gastroenterology Fellow  Pager (M-F 7a-5p): [phone]  Pager (after hours): Please call  for on-call fellow

## 2023-11-12 NOTE — H&P ADULT - PROBLEM SELECTOR PLAN 6
F: Clear liquid diet until midnight then NPO except medications   GI: PPI gtt  DVT: SCDs  Dispo: 7 lachman - pending EGD results for further dispo

## 2023-11-12 NOTE — H&P ADULT - PROBLEM SELECTOR PLAN 1
history of decompensated cirrhosis with esophageal varices w/ banding back in 04/2023. Follows with Dr. Lawson. On coreg 6.25mg BID, aldactone 25mg QD, Lasix 40mg.   #UGIB - c/f UGIB with multiple episodes of coffee ground emesis and melena. No episodes seen during ED evaluation. Hgb stable at 10, which is around baseline. HD stable.   - repeat Hgb tonight at 8pm  - maintain active T&S  - transfuse <7  - Clear liquid diet today, NPO at midnight   -  Protonix gtt, octerotide gtt  - GI following plan for EGD in endo suite 11/13  - c/w ceftriaxone for ppx

## 2023-11-12 NOTE — PATIENT PROFILE ADULT - FALL HARM RISK - HARM RISK INTERVENTIONS

## 2023-11-12 NOTE — ED PROVIDER NOTE - OBJECTIVE STATEMENT
62 yo [place] ([place]) M former smoker with hx HTN, HLD, severe 3 vessel CAD, pre-diabetes, gout, TIA (14 yrs ago), gastric ulcer, rectal bleed, LIZ and alcoholic liver disease c/b cirrhosis and portal hypertension m/b esophageal varices (and UGIB) and splenomegaly. Patient sent to [facility] ED from PCP office on 2/18/23 with c/o intermittent chest pain x 2 weeks that was worse on exertion and relieved with rest. In ED his EKG noted to have ST depressions and troponin 0.27. Additionally his history is significant for black stools and anemia (Hg7.7). Work up revealed Gr 3 EV s/p incomplete banding and elevated LFTs (TB 2.2, ). Cardiac cath on 2/27/23 revealed distal LM stenosis and 3 vessel CAD. He was discharged home on 3/3.    Pt was referred by cardiologist Dr. Box for GI/hep f/u given EVB and GIB and need for repeat EGD (performed 4/26 cw EV s/p 3 bands placed) and subsequently referred for liver transplant evaluation by Dr. Lawson, though pt declined undergoing liver transplant evaluation.  In May 2023, pt had an upper endoscopy while in the hospital (EV with history of severe anemia. A small columns of esophageal varix was seen. He has significant portal hypertensive gastropathy.  Pt s/p OPCAB x 4 on 9/14. Readmitted from office on 9/26 for large left pluural effusion s/p pigtail drainage (~4L) and discharged 9/30.    Pt presents to ED for multiple episodes of coffee ground emesis this morning associated with abdominal pain.  Pt has never had hematemesis.  Pt also noticed dark stool.  Pt on plavix for CAD.  Complains of dizziness and weakness.  No syncope, chest pain, shortness of breath.

## 2023-11-12 NOTE — H&P ADULT - HISTORY OF PRESENT ILLNESS
63y old  Male who presents with a chief complaint of   HPI:      ED vitals: T 97.5, HR 98, /69, RR 18, O2 sat 98%  Labs: Hb 10.1 (baseline 9-10) , INR 1.54, lactate 7.5-> 6.7, BUN 37, Cr 0.82, Tbilli 3.2,   Imaging/EKG:   Interventions: CTX 1g, octreotide IVP 50mcg, IVP protonix 80, 1L NS x2, started on octreotide and protonix gtt     Allergies    aspirin (Pruritus)    Intolerances      Home Medications:  allopurinol 100 mg oral tablet: 1 tab(s) orally once a day (26 Sep 2023 13:01)  HumaLOG Mix 50/50 subcutaneous suspension: 100 unit(s) subcutaneous once a day (26 Sep 2023 13:01)  metFORMIN 500 mg oral tablet: 1 tab(s) orally 2 times a day (26 Sep 2023 13:01)      SOCIAL HX:     Smoking          ETOH/Illicit drugs          Occupation    PAST MEDICAL & SURGICAL HISTORY:  CAD (coronary artery disease)      HTN (hypertension)      Cirrhosis      Esophageal varices in cirrhosis      History of TIAs      H/O: GI bleed      Gout      HLD (hyperlipidemia)      History of portal hypertension      Diabetes mellitus          FAMILY HISTORY:  :     HPI:  64 yo [place] ([place]) M former smoker with hx HTN, HLD, severe 3 vessel CAD, pre-diabetes, gout, TIA (14 yrs ago), gastric ulcer, rectal bleed, LIZ and alcoholic liver disease c/b cirrhosis and portal hypertension m/b esophageal varices (and UGIB) and splenomegaly. Patient sent to [facility] ED from PCP office on 2/18/23 with c/o intermittent chest pain x 2 weeks that was worse on exertion and relieved with rest. In ED his EKG noted to have ST depressions and troponin 0.27. Additionally his history is significant for black stools and anemia (Hg7.7). Work up revealed Gr 3 EV s/p incomplete banding and elevated LFTs (TB 2.2, ). Cardiac cath on 2/27/23 revealed distal LM stenosis and 3 vessel CAD. He was discharged home on 3/3.    Pt was referred by cardiologist Dr. Box for GI/hep f/u given EVB and GIB and need for repeat EGD (performed 4/26 cw EV s/p 3 bands placed) and subsequently referred for liver transplant evaluation by Dr. Lawson, though pt declined undergoing liver transplant evaluation.  In May 2023, pt had an upper endoscopy while in the hospital (EV with history of severe anemia. A small columns of esophageal varix was seen. He has significant portal hypertensive gastropathy.  Pt s/p OPCAB x 4 on 9/14. Readmitted from office on 9/26 for large left pleural effusion s/p pigtail drainage (~4L) and discharged 9/30.    Pt presents to ED for multiple episodes of coffee ground emesis this morning associated with abdominal pain.  Pt has never had hematemesis.  Pt also noticed dark stool.  Pt on plavix for CAD.  Complains of dizziness and weakness.  No syncope, chest pain, shortness of breath. Did not take Plavix or any medications today. No further episodes of hematemesis since being in the ED or melena. He is c/o continued epigastric abdominal pain.       ED vitals: T 97.5, HR 98, /69, RR 18, O2 sat 98%  Labs: Hb 10.1 (baseline 9-10) , INR 1.54, lactate 7.5-> 6.7, BUN 37, Cr 0.82, Tbilli 3.2,   Imaging/EKG:   Interventions: CTX 1g, octreotide IVP 50mcg, IVP protonix 80, 1L NS x2, started on octreotide and protonix gtt     Allergies    aspirin (Pruritus)    Intolerances      SOCIAL HX:     Smoking          ETOH/Illicit drugs          Occupation    PAST MEDICAL & SURGICAL HISTORY:  CAD (coronary artery disease)      HTN (hypertension)      Cirrhosis      Esophageal varices in cirrhosis      History of TIAs      H/O: GI bleed      Gout      HLD (hyperlipidemia)      History of portal hypertension      Diabetes mellitus          FAMILY HISTORY:  :

## 2023-11-12 NOTE — ED PROVIDER NOTE - CLINICAL SUMMARY MEDICAL DECISION MAKING FREE TEXT BOX
Pt with hx of cirrhosis, esophageal varices presenting with hematemesis and abd pain.  VSS  Exam weak appearing with epigastric abd tenderness  hgb 10.1  LA 7.5  Bili 3.2    Received IVFs, PPI bolus and drip and will contact GI and likely admit Pt with hx of cirrhosis, esophageal varices presenting with hematemesis and abd pain.  VSS  Exam weak appearing with epigastric abd tenderness  hgb 10.1  LA 7.5  Bili 3.2    Received IVFs, PPI bolus and drip and will contact GI and likely admit  ICU consult in ED  Agrees on admission  Octreotide bolus and infusion -  Repeat lactate 6.7  Pt to be admitted to stepdown

## 2023-11-12 NOTE — ED PROVIDER NOTE - CARE PLAN
Principal Discharge DX:	Hematemesis  Secondary Diagnosis:	Abdominal pain  Secondary Diagnosis:	Esophageal varices   1

## 2023-11-12 NOTE — ED ADULT NURSE NOTE - NS ED NURSE LEVEL OF CONSCIOUSNESS AFFECT
Calm/Appropriate Qbrexza Pregnancy And Lactation Text: There is no available data on Qbrexza use in pregnant women.  There is no available data on Qbrexza use in lactation.

## 2023-11-12 NOTE — H&P ADULT - ASSESSMENT
64 yo [place] ([facility]) M former smoker with hx HTN, HLD, severe 3 vessel CAD, pre-diabetes, gout, TIA (14 yrs ago), gastric ulcer, rectal bleed, LIZ and alcoholic liver disease c/b cirrhosis and portal hypertension c/b esophageal varices (and UGIB) and splenomegaly presents for hematemesis x2 11/12 AM.

## 2023-11-12 NOTE — CONSULT NOTE ADULT - SUBJECTIVE AND OBJECTIVE BOX
Patient is a 63y old  Male who presents with a chief complaint of coffee ground emesis     Consult reason: UGIB, lactic acidosis   Consults: GI     64 yo [place] ([place]) M former smoker with hx HTN, HLD, severe 3 vessel CAD, pre-diabetes, gout, TIA (14 yrs ago), gastric ulcer, rectal bleed, LIZ and alcoholic liver disease c/b cirrhosis and portal hypertension m/b esophageal varices (and UGIB) and splenomegaly. Patient sent to [facility] ED from PCP office on 2/18/23 with c/o intermittent chest pain x 2 weeks that was worse on exertion and relieved with rest. In ED his EKG noted to have ST depressions and troponin 0.27. Additionally his history is significant for black stools and anemia (Hg7.7). Work up revealed Gr 3 EV s/p incomplete banding and elevated LFTs (TB 2.2, ). Cardiac cath on 2/27/23 revealed distal LM stenosis and 3 vessel CAD. He was discharged home on 3/3.    Pt was referred by cardiologist Dr. Box for GI/hep f/u given EVB and GIB and need for repeat EGD (performed 4/26 cw EV s/p 3 bands placed) and subsequently referred for liver transplant evaluation by Dr. Lawson, though pt declined undergoing liver transplant evaluation.  In May 2023, pt had an upper endoscopy while in the hospital (EV with history of severe anemia. A small columns of esophageal varix was seen. He has significant portal hypertensive gastropathy.  Pt s/p OPCAB x 4 on 9/14. Readmitted from office on 9/26 for large left pleural effusion s/p pigtail drainage (~4L) and discharged 9/30.    Pt presents to ED for multiple episodes of coffee ground emesis this morning associated with abdominal pain.  Pt has never had hematemesis.  Pt also noticed dark stool.  Pt on plavix for CAD.  Complains of dizziness and weakness.  No syncope, chest pain, shortness of breath. Did not take Plavix or any medications today. No further episodes of hematemesis since being in the ED or melena. He is c/o continued epigastric abdominal pain.       ROS:  General: + dizziness   Pulm: - dyspnea, cough   CV: - chest pain, palpitations  GI: + abdominal pain, nausea   : - dysuria   Neuro: -   MSK: - negative       PAST MEDICAL & SURGICAL HISTORY:  CAD (coronary artery disease) - s/p OP- CAB in september 2023 x 4 (LIMA-LAD, [id number]), EF 65% with readmission for left pleural effusion. Plavix 75mg, last taken 11/11  HTN (hypertension)  Cirrhosis  Esophageal varices in cirrhosis - s/p banding x3 in 04/2023, declined liver transplant evaluation   History of TIAs  H/O: GI bleed  Gout - on allopurinol   HLD (hyperlipidemia)  History of portal hypertension  Diabetes mellitus      Home Medications:  allopurinol 100 mg oral tablet: 1 tab(s) orally once a day (26 Sep 2023 13:01)  HumaLOG Mix 50/50 subcutaneous suspension: 100 unit(s) subcutaneous once a day (26 Sep 2023 13:01)  metFORMIN 500 mg oral tablet: 1 tab(s) orally 2 times a day (26 Sep 2023 13:01)    MEDICATIONS  (STANDING):  octreotide  Infusion 50 MICROgram(s)/Hr (10 mL/Hr) IV Continuous <Continuous>  octreotide  Injectable 50 MICROGram(s) IV Push Once  pantoprazole Infusion 8 mG/Hr (10 mL/Hr) IV Continuous <Continuous>    MEDICATIONS  (PRN):    Allergies    aspirin (Pruritus)    Intolerances    SOCIAL HX:    Lives at home with wife   ETOH abuse in the past last drink feb 2023.  never drug use   former smoker smoked 2-3 packs a day for 40 years.      PHYSICAL EXAM:    ICU Vital Signs Last 24 Hrs  T(C): 36.4 (12 Nov 2023 10:10), Max: 36.4 (12 Nov 2023 10:10)  T(F): 97.5 (12 Nov 2023 10:10), Max: 97.5 (12 Nov 2023 10:10)  HR: 98 (12 Nov 2023 10:10) (98 - 98)  BP: 108/69 (12 Nov 2023 10:10) (108/69 - 108/69)  BP(mean): --  ABP: --  ABP(mean): --  RR: 18 (12 Nov 2023 10:10) (18 - 18)  SpO2: 98% (12 Nov 2023 10:10) (98% - 98%)    O2 Parameters below as of 12 Nov 2023 10:10  Patient On (Oxygen Delivery Method): room air            General: NC/AT, lying in bed, mild jaundice   HEENT:  NC/AT, EOMI, faint sclera ictericus, PERRL       Lymphatic system: No LN  Lungs: Bilateral BS  Cardiovascular: RRR, nl s1, s2, no m/r/g appreciated  Gastrointestinal: abdomen soft, TTP epigastrium, bowel sounds present  Rectal + melena   Musculoskeletal: No clubbing.  Moves all extremities.  No edema   Skin: Warm, dry, intact. No rashes noted.  Neurological: A&Ox3, strength 5/5 and sensation intact in all extremities         LABS:                          10.1   9.26  )-----------( 190      ( 12 Nov 2023 10:52 )             31.9                                               11-12    138  |  95<L>  |  37<H>  ----------------------------<  141<H>  3.7   |  23  |  0.82    Ca    9.6      12 Nov 2023 10:52    TPro  8.0  /  Alb  3.5  /  TBili  3.2<H>  /  DBili  x   /  AST  36  /  ALT  17  /  AlkPhos  138<H>  11-12      PT/INR - ( 12 Nov 2023 10:52 )   PT: 17.3 sec;   INR: 1.54          PTT - ( 12 Nov 2023 10:52 )  PTT:36.5 sec                                       Urinalysis Basic - ( 12 Nov 2023 10:52 )    Color: x / Appearance: x / SG: x / pH: x  Gluc: 141 mg/dL / Ketone: x  / Bili: x / Urobili: x   Blood: x / Protein: x / Nitrite: x   Leuk Esterase: x / RBC: x / WBC x   Sq Epi: x / Non Sq Epi: x / Bacteria: x                                                  LIVER FUNCTIONS - ( 12 Nov 2023 10:52 )  Alb: 3.5 g/dL / Pro: 8.0 g/dL / ALK PHOS: 138 U/L / ALT: 17 U/L / AST: 36 U/L / GGT: x                                                                                                                                        Patient is a 63y old  Male who presents with a chief complaint of coffee ground emesi    Consult reason: UGIB, lactic acidosis   Consults: GI     62 yo [place] ([place]) M former smoker with hx HTN, HLD, severe 3 vessel CAD, pre-diabetes, gout, TIA (14 yrs ago), gastric ulcer, rectal bleed, LIZ and alcoholic liver disease c/b cirrhosis and portal hypertension m/b esophageal varices (and UGIB) and splenomegaly. Patient sent to [facility] ED from PCP office on 2/18/23 with c/o intermittent chest pain x 2 weeks that was worse on exertion and relieved with rest. In ED his EKG noted to have ST depressions and troponin 0.27. Additionally his history is significant for black stools and anemia (Hg7.7). Work up revealed Gr 3 EV s/p incomplete banding and elevated LFTs (TB 2.2, ). Cardiac cath on 2/27/23 revealed distal LM stenosis and 3 vessel CAD. He was discharged home on 3/3.    Pt was referred by cardiologist Dr. Box for GI/hep f/u given EVB and GIB and need for repeat EGD (performed 4/26 cw EV s/p 3 bands placed) and subsequently referred for liver transplant evaluation by Dr. Lawson, though pt declined undergoing liver transplant evaluation.  In May 2023, pt had an upper endoscopy while in the hospital (EV with history of severe anemia. A small columns of esophageal varix was seen. He has significant portal hypertensive gastropathy.  Pt s/p OPCAB x 4 on 9/14. Readmitted from office on 9/26 for large left pleural effusion s/p pigtail drainage (~4L) and discharged 9/30.    Pt presents to ED for multiple episodes of coffee ground emesis this morning associated with abdominal pain.  Pt has never had hematemesis.  Pt also noticed dark stool.  Pt on plavix for CAD.  Complains of dizziness and weakness.  No syncope, chest pain, shortness of breath. Did not take Plavix or any medications today. No further episodes of hematemesis since being in the ED or melena. He is c/o continued epigastric abdominal pain.       ROS:  General: + dizziness   Pulm: - dyspnea, cough   CV: - chest pain, palpitations  GI: + abdominal pain, nausea   : - dysuria   Neuro: -   MSK: - negative       PAST MEDICAL & SURGICAL HISTORY:  CAD (coronary artery disease) - s/p OP- CAB in september 2023 x 4 (LIMA-LAD, [id number]), EF 65% with readmission for left pleural effusion. Plavix 75mg, last taken 11/11  HTN (hypertension)  Cirrhosis  Esophageal varices in cirrhosis - s/p banding x3 in 04/2023, declined liver transplant evaluation   History of TIAs  H/O: GI bleed  Gout - on allopurinol   HLD (hyperlipidemia)  History of portal hypertension  Diabetes mellitus      Home Medications:  allopurinol 100 mg oral tablet: 1 tab(s) orally once a day (26 Sep 2023 13:01)  HumaLOG Mix 50/50 subcutaneous suspension: 100 unit(s) subcutaneous once a day (26 Sep 2023 13:01)  metFORMIN 500 mg oral tablet: 1 tab(s) orally 2 times a day (26 Sep 2023 13:01)    MEDICATIONS  (STANDING):  octreotide  Infusion 50 MICROgram(s)/Hr (10 mL/Hr) IV Continuous <Continuous>  octreotide  Injectable 50 MICROGram(s) IV Push Once  pantoprazole Infusion 8 mG/Hr (10 mL/Hr) IV Continuous <Continuous>    MEDICATIONS  (PRN):    Allergies    aspirin (Pruritus)    Intolerances    SOCIAL HX:    Lives at home with wife   ETOH abuse in the past last drink feb 2023.  never drug use   former smoker smoked 2-3 packs a day for 40 years.      PHYSICAL EXAM:    ICU Vital Signs Last 24 Hrs  T(C): 36.4 (12 Nov 2023 10:10), Max: 36.4 (12 Nov 2023 10:10)  T(F): 97.5 (12 Nov 2023 10:10), Max: 97.5 (12 Nov 2023 10:10)  HR: 98 (12 Nov 2023 10:10) (98 - 98)  BP: 108/69 (12 Nov 2023 10:10) (108/69 - 108/69)  BP(mean): --  ABP: --  ABP(mean): --  RR: 18 (12 Nov 2023 10:10) (18 - 18)  SpO2: 98% (12 Nov 2023 10:10) (98% - 98%)    O2 Parameters below as of 12 Nov 2023 10:10  Patient On (Oxygen Delivery Method): room air            General: NC/AT, lying in bed, mild jaundice   HEENT:  NC/AT, EOMI, faint sclera ictericus, PERRL       Lymphatic system: No LN  Lungs: Bilateral BS  Cardiovascular: RRR, nl s1, s2, no m/r/g appreciated  Gastrointestinal: abdomen soft, TTP epigastrium, bowel sounds present  Rectal + melena   Musculoskeletal: No clubbing.  Moves all extremities.  No edema   Skin: Warm, dry, intact. No rashes noted.  Neurological: A&Ox3, strength 5/5 and sensation intact in all extremities         LABS:                          10.1   9.26  )-----------( 190      ( 12 Nov 2023 10:52 )             31.9                                               11-12    138  |  95<L>  |  37<H>  ----------------------------<  141<H>  3.7   |  23  |  0.82    Ca    9.6      12 Nov 2023 10:52    TPro  8.0  /  Alb  3.5  /  TBili  3.2<H>  /  DBili  x   /  AST  36  /  ALT  17  /  AlkPhos  138<H>  11-12      PT/INR - ( 12 Nov 2023 10:52 )   PT: 17.3 sec;   INR: 1.54          PTT - ( 12 Nov 2023 10:52 )  PTT:36.5 sec                                       Urinalysis Basic - ( 12 Nov 2023 10:52 )    Color: x / Appearance: x / SG: x / pH: x  Gluc: 141 mg/dL / Ketone: x  / Bili: x / Urobili: x   Blood: x / Protein: x / Nitrite: x   Leuk Esterase: x / RBC: x / WBC x   Sq Epi: x / Non Sq Epi: x / Bacteria: x                                                  LIVER FUNCTIONS - ( 12 Nov 2023 10:52 )  Alb: 3.5 g/dL / Pro: 8.0 g/dL / ALK PHOS: 138 U/L / ALT: 17 U/L / AST: 36 U/L / GGT: x                                                                                                                                        Patient is a 63y old  Male who presents with a chief complaint of coffee ground emesis    Consult reason: UGIB, lactic acidosis   Consults: GI     64 yo [place] ([place]) M former smoker with hx HTN, HLD, severe 3 vessel CAD, pre-diabetes, gout, TIA (14 yrs ago), gastric ulcer, rectal bleed, LIZ and alcoholic liver disease c/b cirrhosis and portal hypertension m/b esophageal varices (and UGIB) and splenomegaly. Patient sent to [facility] ED from PCP office on 2/18/23 with c/o intermittent chest pain x 2 weeks that was worse on exertion and relieved with rest. In ED his EKG noted to have ST depressions and troponin 0.27. Additionally his history is significant for black stools and anemia (Hg7.7). Work up revealed Gr 3 EV s/p incomplete banding and elevated LFTs (TB 2.2, ). Cardiac cath on 2/27/23 revealed distal LM stenosis and 3 vessel CAD. He was discharged home on 3/3.    Pt was referred by cardiologist Dr. Box for GI/hep f/u given EVB and GIB and need for repeat EGD (performed 4/26 cw EV s/p 3 bands placed) and subsequently referred for liver transplant evaluation by Dr. Lawson, though pt declined undergoing liver transplant evaluation.  In May 2023, pt had an upper endoscopy while in the hospital (EV with history of severe anemia. A small columns of esophageal varix was seen. He has significant portal hypertensive gastropathy.  Pt s/p OPCAB x 4 on 9/14. Readmitted from office on 9/26 for large left pleural effusion s/p pigtail drainage (~4L) and discharged 9/30.    Pt presents to ED for multiple episodes of coffee ground emesis this morning associated with abdominal pain.  Pt has never had hematemesis.  Pt also noticed dark stool.  Pt on plavix for CAD.  Complains of dizziness and weakness.  No syncope, chest pain, shortness of breath. Did not take Plavix or any medications today. No further episodes of hematemesis since being in the ED or melena. He is c/o continued epigastric abdominal pain.       ROS:  General: + dizziness   Pulm: - dyspnea, cough   CV: - chest pain, palpitations  GI: + abdominal pain, nausea   : - dysuria   Neuro: -   MSK: - negative       PAST MEDICAL & SURGICAL HISTORY:  CAD (coronary artery disease) - s/p OP- CAB in september 2023 x 4 (LIMA-LAD, [id number]), EF 65% with readmission for left pleural effusion. Plavix 75mg, last taken 11/11  HTN (hypertension)  Cirrhosis  Esophageal varices in cirrhosis - s/p banding x3 in 04/2023, declined liver transplant evaluation   History of TIAs  H/O: GI bleed  Gout - on allopurinol   HLD (hyperlipidemia)  History of portal hypertension  Diabetes mellitus      Home Medications:  allopurinol 100 mg oral tablet: 1 tab(s) orally once a day (26 Sep 2023 13:01)  HumaLOG Mix 50/50 subcutaneous suspension: 100 unit(s) subcutaneous once a day (26 Sep 2023 13:01)  metFORMIN 500 mg oral tablet: 1 tab(s) orally 2 times a day (26 Sep 2023 13:01)    MEDICATIONS  (STANDING):  octreotide  Infusion 50 MICROgram(s)/Hr (10 mL/Hr) IV Continuous <Continuous>  octreotide  Injectable 50 MICROGram(s) IV Push Once  pantoprazole Infusion 8 mG/Hr (10 mL/Hr) IV Continuous <Continuous>    MEDICATIONS  (PRN):    Allergies    aspirin (Pruritus)    Intolerances    SOCIAL HX:    Lives at home with wife   ETOH abuse in the past last drink feb 2023.  never drug use   former smoker smoked 2-3 packs a day for 40 years.      PHYSICAL EXAM:    ICU Vital Signs Last 24 Hrs  T(C): 36.4 (12 Nov 2023 10:10), Max: 36.4 (12 Nov 2023 10:10)  T(F): 97.5 (12 Nov 2023 10:10), Max: 97.5 (12 Nov 2023 10:10)  HR: 98 (12 Nov 2023 10:10) (98 - 98)  BP: 108/69 (12 Nov 2023 10:10) (108/69 - 108/69)  BP(mean): --  ABP: --  ABP(mean): --  RR: 18 (12 Nov 2023 10:10) (18 - 18)  SpO2: 98% (12 Nov 2023 10:10) (98% - 98%)    O2 Parameters below as of 12 Nov 2023 10:10  Patient On (Oxygen Delivery Method): room air            General: NC/AT, lying in bed, mild jaundice   HEENT:  NC/AT, EOMI, faint sclera ictericus, PERRL       Lymphatic system: No LN  Lungs: Bilateral BS  Cardiovascular: RRR, nl s1, s2, no m/r/g appreciated  Gastrointestinal: abdomen soft, TTP epigastrium, bowel sounds present  Rectal + melena   Musculoskeletal: No clubbing.  Moves all extremities.  No edema   Skin: Warm, dry, intact. No rashes noted.  Neurological: A&Ox3, strength 5/5 and sensation intact in all extremities         LABS:                          10.1   9.26  )-----------( 190      ( 12 Nov 2023 10:52 )             31.9                                               11-12    138  |  95<L>  |  37<H>  ----------------------------<  141<H>  3.7   |  23  |  0.82    Ca    9.6      12 Nov 2023 10:52    TPro  8.0  /  Alb  3.5  /  TBili  3.2<H>  /  DBili  x   /  AST  36  /  ALT  17  /  AlkPhos  138<H>  11-12      PT/INR - ( 12 Nov 2023 10:52 )   PT: 17.3 sec;   INR: 1.54          PTT - ( 12 Nov 2023 10:52 )  PTT:36.5 sec                                       Urinalysis Basic - ( 12 Nov 2023 10:52 )    Color: x / Appearance: x / SG: x / pH: x  Gluc: 141 mg/dL / Ketone: x  / Bili: x / Urobili: x   Blood: x / Protein: x / Nitrite: x   Leuk Esterase: x / RBC: x / WBC x   Sq Epi: x / Non Sq Epi: x / Bacteria: x                                                  LIVER FUNCTIONS - ( 12 Nov 2023 10:52 )  Alb: 3.5 g/dL / Pro: 8.0 g/dL / ALK PHOS: 138 U/L / ALT: 17 U/L / AST: 36 U/L / GGT: x

## 2023-11-12 NOTE — ED ADULT TRIAGE NOTE - CHIEF COMPLAINT QUOTE
Pt co epigastric pain with +vomiting and dark colored diarrhea since last night. + AC use on Plavix. EKG done. Hx cirrhosis, portal HTN, esophageal varices.

## 2023-11-12 NOTE — PATIENT PROFILE ADULT - SAFE PLACE TO LIVE
Home [facility] Delivered order was signed & faxed to [phone], Copy placed in scan folder to be scanned to chart.   
no

## 2023-11-12 NOTE — H&P ADULT - PROBLEM SELECTOR PLAN 4
on coreg 12.5mg BID  - Normotensive currently   - will hold BB to not mask tachycardia from GIB  - consider prn Hydralazine 5mg for SBP >160   - restart lasix 40mg QD and aldactone 25mg as tolerate

## 2023-11-12 NOTE — H&P ADULT - PROBLEM SELECTOR PLAN 2
appears hypovolemic on exam - improved from 7.6 -->6.7 s/p 2L NS.   - additional 1L LR   - Trend lactate, likely wont fully clear i/s/o decompensated cirrhosis

## 2023-11-12 NOTE — H&P ADULT - NSHPPHYSICALEXAM_GEN_ALL_CORE
PHYSICAL EXAM:  General: NC/AT, lying in bed, mild jaundice   HEENT:  NC/AT, EOMI, faint sclera ictericus, PERRL       Lymphatic system: No LN  Lungs: Bilateral BS  Cardiovascular: RRR, nl s1, s2, no m/r/g appreciated  Gastrointestinal: abdomen soft, TTP epigastrium, bowel sounds present  Rectal + melena   Musculoskeletal: No clubbing.  Moves all extremities.  No edema   Skin: Warm, dry, intact. No rashes noted.  Neurological: A&Ox3, strength 5/5 and sensation intact in all extremities     LYMPH: No lymphadenopathy noted   SKIN: No rashes or lesions  PSYCHIATRIC: affect and characteristics of appearance, verbalizations, behaviors are appropriate

## 2023-11-12 NOTE — ED ADULT NURSE NOTE - NSFALLHARMRISKINTERV_ED_ALL_ED

## 2023-11-12 NOTE — H&P ADULT - PROBLEM SELECTOR PLAN 3
hx of CAD s/p OPCAB x 4 on 9/14/23. On Plavix 75mg last taken 11/11 AM.   - Hold Plavix i/s/o c/f active UGIB  - restart Plavix if GIB r/o   - SCDs

## 2023-11-12 NOTE — H&P ADULT - NSHPLABSRESULTS_GEN_ALL_CORE
LABS:                         10.1   9.26  )-----------( 190      ( 12 Nov 2023 10:52 )             31.9     11-12    138  |  95<L>  |  37<H>  ----------------------------<  141<H>  3.7   |  23  |  0.82    Ca    9.6      12 Nov 2023 10:52    TPro  8.0  /  Alb  3.5  /  TBili  3.2<H>  /  DBili  x   /  AST  36  /  ALT  17  /  AlkPhos  138<H>  11-12    PT/INR - ( 12 Nov 2023 10:52 )   PT: 17.3 sec;   INR: 1.54          PTT - ( 12 Nov 2023 10:52 )  PTT:36.5 sec  Urinalysis Basic - ( 12 Nov 2023 10:52 )    Color: x / Appearance: x / SG: x / pH: x  Gluc: 141 mg/dL / Ketone: x  / Bili: x / Urobili: x   Blood: x / Protein: x / Nitrite: x   Leuk Esterase: x / RBC: x / WBC x   Sq Epi: x / Non Sq Epi: x / Bacteria: x            Lactate, Blood: 6.7 mmol/L (11-12 @ 13:15)  Lactate, Blood: 7.5 mmol/L (11-12 @ 10:52)      RADIOLOGY, EKG & ADDITIONAL TESTS: Reviewed.

## 2023-11-12 NOTE — CONSULT NOTE ADULT - SUBJECTIVE AND OBJECTIVE BOX
*** DRAFT NOTE ***    HPI:  63y old  Male who presents with a chief complaint of   HPI:      ED vitals: T 97.5, HR 98, /69, RR 18, O2 sat 98%  Labs: Hb 10.1 (baseline 9-10) , INR 1.54, lactate 7.5-> 6.7, BUN 37, Cr 0.82, Tbilli 3.2,   Interventions: CTX 1g, octreotide IVP 50mcg, IVP protonix 80, 1L NS x2, started on octreotide and protonix gtt       Last EGD 4/2023 with 1 column of large varices w/ red spot (s/p band x3) and moderate PHG.    Allergies    aspirin (Pruritus)    Intolerances      Home Medications:  allopurinol 100 mg oral tablet: 1 tab(s) orally once a day (26 Sep 2023 13:01)  HumaLOG Mix 50/50 subcutaneous suspension: 100 unit(s) subcutaneous once a day (26 Sep 2023 13:01)  metFORMIN 500 mg oral tablet: 1 tab(s) orally 2 times a day (26 Sep 2023 13:01)      SOCIAL HX:     Smoking          ETOH/Illicit drugs          Occupation    PAST MEDICAL & SURGICAL HISTORY:  CAD (coronary artery disease)      HTN (hypertension)      Cirrhosis      Esophageal varices in cirrhosis      History of TIAs      H/O: GI bleed      Gout      HLD (hyperlipidemia)      History of portal hypertension      Diabetes mellitus          FAMILY HISTORY:  :   (12 Nov 2023 13:46)    Allergies    aspirin (Pruritus)    Intolerances      Home Medications:  allopurinol 100 mg oral tablet: 1 tab(s) orally once a day (26 Sep 2023 13:01)  HumaLOG Mix 50/50 subcutaneous suspension: 100 unit(s) subcutaneous once a day (26 Sep 2023 13:01)  metFORMIN 500 mg oral tablet: 1 tab(s) orally 2 times a day (26 Sep 2023 13:01)    MEDICATIONS:  MEDICATIONS  (STANDING):  octreotide  Infusion 50 MICROgram(s)/Hr (10 mL/Hr) IV Continuous <Continuous>  pantoprazole Infusion 8 mG/Hr (10 mL/Hr) IV Continuous <Continuous>    MEDICATIONS  (PRN):    PAST MEDICAL & SURGICAL HISTORY:  CAD (coronary artery disease)      HTN (hypertension)      Cirrhosis      Esophageal varices in cirrhosis      History of TIAs      H/O: GI bleed      Gout      HLD (hyperlipidemia)      History of portal hypertension      Diabetes mellitus          Vital Signs Last 24 Hrs  T(C): 36.7 (12 Nov 2023 13:08), Max: 36.7 (12 Nov 2023 13:08)  T(F): 98.1 (12 Nov 2023 13:08), Max: 98.1 (12 Nov 2023 13:08)  HR: 89 (12 Nov 2023 13:08) (89 - 98)  BP: 140/75 (12 Nov 2023 13:08) (108/69 - 140/75)  BP(mean): --  RR: 16 (12 Nov 2023 13:08) (16 - 18)  SpO2: 98% (12 Nov 2023 13:08) (98% - 98%)    Parameters below as of 12 Nov 2023 13:08  Patient On (Oxygen Delivery Method): room air          PHYSICAL EXAM:  General: Alert, no acute distress  Lungs: Normal respiratory effort  Abdomen: Nondistended, soft, nontender, No rebound or guarding  Extremities: No edema  Neurological: Moving all extremities spontaneously    LABS:                        10.1   9.26  )-----------( 190      ( 12 Nov 2023 10:52 )             31.9     11-12    138  |  95<L>  |  37<H>  ----------------------------<  141<H>  3.7   |  23  |  0.82    Ca    9.6      12 Nov 2023 10:52    TPro  8.0  /  Alb  3.5  /  TBili  3.2<H>  /  DBili  x   /  AST  36  /  ALT  17  /  AlkPhos  138<H>  11-12    PT/INR - ( 12 Nov 2023 10:52 )   PT: 17.3 sec;   INR: 1.54          PTT - ( 12 Nov 2023 10:52 )  PTT:36.5 sec    RADIOLOGY & ADDITIONAL STUDIES:  Reviewed. HPI:  63M h/o alcoholic cirrhosis (+varices on EGD 4/2023), CAD (s/p CABG 9/2023), TIA, gastric ulcer, rectal bleed, LIZ p/w coffee-ground emesis and melena x1d.    Pt was in usual state of health when he developed N/V yesterday night. Initially was nonbloody however this AM pt had 2 episodes of coffee-ground emesis. Also noted to pass several black BMs today which prompted admission. +Dizziness but denies F/C, abd pain, SOB, CP. Last EGD 4/2023 with 1 column of large varices w/ red spot (s/p band x3) and moderate PHG. On carvedilol at home. On admission VSS, Hgb 10.1 (near baseline), synthetic fxn at baseline, and lactate 7.5 -> 6.7.     Allergies  aspirin (Pruritus)    Intolerances      Home Medications:  allopurinol 100 mg oral tablet: 1 tab(s) orally once a day (26 Sep 2023 13:01)  HumaLOG Mix 50/50 subcutaneous suspension: 100 unit(s) subcutaneous once a day (26 Sep 2023 13:01)  metFORMIN 500 mg oral tablet: 1 tab(s) orally 2 times a day (26 Sep 2023 13:01)      SOCIAL HX:     Smoking          ETOH/Illicit drugs          Occupation    PAST MEDICAL & SURGICAL HISTORY:  CAD (coronary artery disease)      HTN (hypertension)      Cirrhosis      Esophageal varices in cirrhosis      History of TIAs      H/O: GI bleed      Gout      HLD (hyperlipidemia)      History of portal hypertension      Diabetes mellitus          FAMILY HISTORY:  :   (12 Nov 2023 13:46)    Allergies    aspirin (Pruritus)    Intolerances      Home Medications:  allopurinol 100 mg oral tablet: 1 tab(s) orally once a day (26 Sep 2023 13:01)  HumaLOG Mix 50/50 subcutaneous suspension: 100 unit(s) subcutaneous once a day (26 Sep 2023 13:01)  metFORMIN 500 mg oral tablet: 1 tab(s) orally 2 times a day (26 Sep 2023 13:01)    MEDICATIONS:  MEDICATIONS  (STANDING):  octreotide  Infusion 50 MICROgram(s)/Hr (10 mL/Hr) IV Continuous <Continuous>  pantoprazole Infusion 8 mG/Hr (10 mL/Hr) IV Continuous <Continuous>    MEDICATIONS  (PRN):    PAST MEDICAL & SURGICAL HISTORY:  CAD (coronary artery disease)      HTN (hypertension)      Cirrhosis      Esophageal varices in cirrhosis      History of TIAs      H/O: GI bleed      Gout      HLD (hyperlipidemia)      History of portal hypertension      Diabetes mellitus          Vital Signs Last 24 Hrs  T(C): 36.7 (12 Nov 2023 13:08), Max: 36.7 (12 Nov 2023 13:08)  T(F): 98.1 (12 Nov 2023 13:08), Max: 98.1 (12 Nov 2023 13:08)  HR: 89 (12 Nov 2023 13:08) (89 - 98)  BP: 140/75 (12 Nov 2023 13:08) (108/69 - 140/75)  BP(mean): --  RR: 16 (12 Nov 2023 13:08) (16 - 18)  SpO2: 98% (12 Nov 2023 13:08) (98% - 98%)    Parameters below as of 12 Nov 2023 13:08  Patient On (Oxygen Delivery Method): room air          PHYSICAL EXAM:  General: Alert, no acute distress  Lungs: Normal respiratory effort  Abdomen: Nondistended, soft, nontender  Extremities: No edema  Neurological: Moving all extremities spontaneously, no asterixis    LABS:                        10.1   9.26  )-----------( 190      ( 12 Nov 2023 10:52 )             31.9     11-12    138  |  95<L>  |  37<H>  ----------------------------<  141<H>  3.7   |  23  |  0.82    Ca    9.6      12 Nov 2023 10:52    TPro  8.0  /  Alb  3.5  /  TBili  3.2<H>  /  DBili  x   /  AST  36  /  ALT  17  /  AlkPhos  138<H>  11-12    PT/INR - ( 12 Nov 2023 10:52 )   PT: 17.3 sec;   INR: 1.54          PTT - ( 12 Nov 2023 10:52 )  PTT:36.5 sec    RADIOLOGY & ADDITIONAL STUDIES:  Reviewed.

## 2023-11-13 ENCOUNTER — TRANSCRIPTION ENCOUNTER (OUTPATIENT)
Age: 63
End: 2023-11-13

## 2023-11-13 DIAGNOSIS — E83.42 HYPOMAGNESEMIA: ICD-10-CM

## 2023-11-13 DIAGNOSIS — D64.9 ANEMIA, UNSPECIFIED: ICD-10-CM

## 2023-11-13 DIAGNOSIS — J90 PLEURAL EFFUSION, NOT ELSEWHERE CLASSIFIED: ICD-10-CM

## 2023-11-13 DIAGNOSIS — K92.2 GASTROINTESTINAL HEMORRHAGE, UNSPECIFIED: ICD-10-CM

## 2023-11-13 LAB
ALBUMIN SERPL ELPH-MCNC: 2.6 G/DL — LOW (ref 3.3–5)
ALBUMIN SERPL ELPH-MCNC: 2.6 G/DL — LOW (ref 3.3–5)
ALP SERPL-CCNC: 77 U/L — SIGNIFICANT CHANGE UP (ref 40–120)
ALP SERPL-CCNC: 77 U/L — SIGNIFICANT CHANGE UP (ref 40–120)
ALT FLD-CCNC: 12 U/L — SIGNIFICANT CHANGE UP (ref 10–45)
ALT FLD-CCNC: 12 U/L — SIGNIFICANT CHANGE UP (ref 10–45)
ANION GAP SERPL CALC-SCNC: 11 MMOL/L — SIGNIFICANT CHANGE UP (ref 5–17)
ANION GAP SERPL CALC-SCNC: 11 MMOL/L — SIGNIFICANT CHANGE UP (ref 5–17)
APTT BLD: 36.7 SEC — HIGH (ref 24.5–35.6)
APTT BLD: 36.7 SEC — HIGH (ref 24.5–35.6)
AST SERPL-CCNC: 32 U/L — SIGNIFICANT CHANGE UP (ref 10–40)
AST SERPL-CCNC: 32 U/L — SIGNIFICANT CHANGE UP (ref 10–40)
BILIRUB SERPL-MCNC: 2.2 MG/DL — HIGH (ref 0.2–1.2)
BILIRUB SERPL-MCNC: 2.2 MG/DL — HIGH (ref 0.2–1.2)
BUN SERPL-MCNC: 24 MG/DL — HIGH (ref 7–23)
BUN SERPL-MCNC: 24 MG/DL — HIGH (ref 7–23)
CALCIUM SERPL-MCNC: 8.7 MG/DL — SIGNIFICANT CHANGE UP (ref 8.4–10.5)
CALCIUM SERPL-MCNC: 8.7 MG/DL — SIGNIFICANT CHANGE UP (ref 8.4–10.5)
CHLORIDE SERPL-SCNC: 108 MMOL/L — SIGNIFICANT CHANGE UP (ref 96–108)
CHLORIDE SERPL-SCNC: 108 MMOL/L — SIGNIFICANT CHANGE UP (ref 96–108)
CO2 SERPL-SCNC: 24 MMOL/L — SIGNIFICANT CHANGE UP (ref 22–31)
CO2 SERPL-SCNC: 24 MMOL/L — SIGNIFICANT CHANGE UP (ref 22–31)
CREAT SERPL-MCNC: 0.85 MG/DL — SIGNIFICANT CHANGE UP (ref 0.5–1.3)
CREAT SERPL-MCNC: 0.85 MG/DL — SIGNIFICANT CHANGE UP (ref 0.5–1.3)
EGFR: 97 ML/MIN/1.73M2 — SIGNIFICANT CHANGE UP
EGFR: 97 ML/MIN/1.73M2 — SIGNIFICANT CHANGE UP
GLUCOSE BLDC GLUCOMTR-MCNC: 134 MG/DL — HIGH (ref 70–99)
GLUCOSE BLDC GLUCOMTR-MCNC: 134 MG/DL — HIGH (ref 70–99)
GLUCOSE BLDC GLUCOMTR-MCNC: 137 MG/DL — HIGH (ref 70–99)
GLUCOSE BLDC GLUCOMTR-MCNC: 137 MG/DL — HIGH (ref 70–99)
GLUCOSE BLDC GLUCOMTR-MCNC: 229 MG/DL — HIGH (ref 70–99)
GLUCOSE BLDC GLUCOMTR-MCNC: 229 MG/DL — HIGH (ref 70–99)
GLUCOSE BLDC GLUCOMTR-MCNC: 277 MG/DL — HIGH (ref 70–99)
GLUCOSE BLDC GLUCOMTR-MCNC: 277 MG/DL — HIGH (ref 70–99)
GLUCOSE BLDC GLUCOMTR-MCNC: 291 MG/DL — HIGH (ref 70–99)
GLUCOSE BLDC GLUCOMTR-MCNC: 291 MG/DL — HIGH (ref 70–99)
GLUCOSE SERPL-MCNC: 132 MG/DL — HIGH (ref 70–99)
GLUCOSE SERPL-MCNC: 132 MG/DL — HIGH (ref 70–99)
HCT VFR BLD CALC: 24.1 % — LOW (ref 39–50)
HCT VFR BLD CALC: 24.1 % — LOW (ref 39–50)
HCT VFR BLD CALC: 26 % — LOW (ref 39–50)
HCT VFR BLD CALC: 26 % — LOW (ref 39–50)
HGB BLD-MCNC: 8.5 G/DL — LOW (ref 13–17)
HGB BLD-MCNC: 8.5 G/DL — LOW (ref 13–17)
HGB BLD-MCNC: 8.9 G/DL — LOW (ref 13–17)
HGB BLD-MCNC: 8.9 G/DL — LOW (ref 13–17)
INR BLD: 1.58 — HIGH (ref 0.85–1.18)
INR BLD: 1.58 — HIGH (ref 0.85–1.18)
LACTATE SERPL-SCNC: 2.6 MMOL/L — HIGH (ref 0.5–2)
LACTATE SERPL-SCNC: 2.6 MMOL/L — HIGH (ref 0.5–2)
LACTATE SERPL-SCNC: 3.7 MMOL/L — HIGH (ref 0.5–2)
LACTATE SERPL-SCNC: 3.7 MMOL/L — HIGH (ref 0.5–2)
LACTATE SERPL-SCNC: 4.8 MMOL/L — CRITICAL HIGH (ref 0.5–2)
LACTATE SERPL-SCNC: 4.8 MMOL/L — CRITICAL HIGH (ref 0.5–2)
MAGNESIUM SERPL-MCNC: 1.4 MG/DL — LOW (ref 1.6–2.6)
MAGNESIUM SERPL-MCNC: 1.4 MG/DL — LOW (ref 1.6–2.6)
MCHC RBC-ENTMCNC: 29.2 PG — SIGNIFICANT CHANGE UP (ref 27–34)
MCHC RBC-ENTMCNC: 29.2 PG — SIGNIFICANT CHANGE UP (ref 27–34)
MCHC RBC-ENTMCNC: 32.7 GM/DL — SIGNIFICANT CHANGE UP (ref 32–36)
MCHC RBC-ENTMCNC: 32.7 GM/DL — SIGNIFICANT CHANGE UP (ref 32–36)
MCHC RBC-ENTMCNC: 34.8 PG — HIGH (ref 27–34)
MCHC RBC-ENTMCNC: 34.8 PG — HIGH (ref 27–34)
MCHC RBC-ENTMCNC: 36.9 GM/DL — HIGH (ref 32–36)
MCHC RBC-ENTMCNC: 36.9 GM/DL — HIGH (ref 32–36)
MCV RBC AUTO: 89.3 FL — SIGNIFICANT CHANGE UP (ref 80–100)
MCV RBC AUTO: 89.3 FL — SIGNIFICANT CHANGE UP (ref 80–100)
MCV RBC AUTO: 94.1 FL — SIGNIFICANT CHANGE UP (ref 80–100)
MCV RBC AUTO: 94.1 FL — SIGNIFICANT CHANGE UP (ref 80–100)
NRBC # BLD: 0 /100 WBCS — SIGNIFICANT CHANGE UP (ref 0–0)
PHOSPHATE SERPL-MCNC: 3.6 MG/DL — SIGNIFICANT CHANGE UP (ref 2.5–4.5)
PHOSPHATE SERPL-MCNC: 3.6 MG/DL — SIGNIFICANT CHANGE UP (ref 2.5–4.5)
PLATELET # BLD AUTO: 94 K/UL — LOW (ref 150–400)
PLATELET # BLD AUTO: 94 K/UL — LOW (ref 150–400)
PLATELET # BLD AUTO: 95 K/UL — LOW (ref 150–400)
PLATELET # BLD AUTO: 95 K/UL — LOW (ref 150–400)
POTASSIUM SERPL-MCNC: 3.5 MMOL/L — SIGNIFICANT CHANGE UP (ref 3.5–5.3)
POTASSIUM SERPL-MCNC: 3.5 MMOL/L — SIGNIFICANT CHANGE UP (ref 3.5–5.3)
POTASSIUM SERPL-SCNC: 3.5 MMOL/L — SIGNIFICANT CHANGE UP (ref 3.5–5.3)
POTASSIUM SERPL-SCNC: 3.5 MMOL/L — SIGNIFICANT CHANGE UP (ref 3.5–5.3)
PROT SERPL-MCNC: 5.7 G/DL — LOW (ref 6–8.3)
PROT SERPL-MCNC: 5.7 G/DL — LOW (ref 6–8.3)
PROTHROM AB SERPL-ACNC: 17.8 SEC — HIGH (ref 9.5–13)
PROTHROM AB SERPL-ACNC: 17.8 SEC — HIGH (ref 9.5–13)
RBC # BLD: 2.56 M/UL — LOW (ref 4.2–5.8)
RBC # BLD: 2.56 M/UL — LOW (ref 4.2–5.8)
RBC # BLD: 2.91 M/UL — LOW (ref 4.2–5.8)
RBC # BLD: 2.91 M/UL — LOW (ref 4.2–5.8)
RBC # FLD: 14.8 % — HIGH (ref 10.3–14.5)
RBC # FLD: 14.8 % — HIGH (ref 10.3–14.5)
RBC # FLD: 15.9 % — HIGH (ref 10.3–14.5)
RBC # FLD: 15.9 % — HIGH (ref 10.3–14.5)
SODIUM SERPL-SCNC: 143 MMOL/L — SIGNIFICANT CHANGE UP (ref 135–145)
SODIUM SERPL-SCNC: 143 MMOL/L — SIGNIFICANT CHANGE UP (ref 135–145)
WBC # BLD: 2.58 K/UL — LOW (ref 3.8–10.5)
WBC # BLD: 2.58 K/UL — LOW (ref 3.8–10.5)
WBC # BLD: 3.37 K/UL — LOW (ref 3.8–10.5)
WBC # BLD: 3.37 K/UL — LOW (ref 3.8–10.5)
WBC # FLD AUTO: 2.58 K/UL — LOW (ref 3.8–10.5)
WBC # FLD AUTO: 2.58 K/UL — LOW (ref 3.8–10.5)
WBC # FLD AUTO: 3.37 K/UL — LOW (ref 3.8–10.5)
WBC # FLD AUTO: 3.37 K/UL — LOW (ref 3.8–10.5)

## 2023-11-13 PROCEDURE — 99222 1ST HOSP IP/OBS MODERATE 55: CPT | Mod: GC

## 2023-11-13 PROCEDURE — 71045 X-RAY EXAM CHEST 1 VIEW: CPT | Mod: 26

## 2023-11-13 PROCEDURE — 99232 SBSQ HOSP IP/OBS MODERATE 35: CPT | Mod: GC

## 2023-11-13 PROCEDURE — 43235 EGD DIAGNOSTIC BRUSH WASH: CPT | Mod: GC

## 2023-11-13 RX ORDER — CARVEDILOL PHOSPHATE 80 MG/1
6.25 CAPSULE, EXTENDED RELEASE ORAL EVERY 12 HOURS
Refills: 0 | Status: DISCONTINUED | OUTPATIENT
Start: 2023-11-13 | End: 2023-11-14

## 2023-11-13 RX ORDER — PANTOPRAZOLE SODIUM 20 MG/1
40 TABLET, DELAYED RELEASE ORAL EVERY 12 HOURS
Refills: 0 | Status: DISCONTINUED | OUTPATIENT
Start: 2023-11-13 | End: 2023-11-14

## 2023-11-13 RX ORDER — FUROSEMIDE 40 MG
40 TABLET ORAL DAILY
Refills: 0 | Status: DISCONTINUED | OUTPATIENT
Start: 2023-11-13 | End: 2023-11-14

## 2023-11-13 RX ORDER — SODIUM CHLORIDE 9 MG/ML
1000 INJECTION INTRAMUSCULAR; INTRAVENOUS; SUBCUTANEOUS ONCE
Refills: 0 | Status: COMPLETED | OUTPATIENT
Start: 2023-11-13 | End: 2023-11-13

## 2023-11-13 RX ORDER — CEFTRIAXONE 500 MG/1
1000 INJECTION, POWDER, FOR SOLUTION INTRAMUSCULAR; INTRAVENOUS EVERY 24 HOURS
Refills: 0 | Status: DISCONTINUED | OUTPATIENT
Start: 2023-11-14 | End: 2023-11-15

## 2023-11-13 RX ORDER — MAGNESIUM SULFATE 500 MG/ML
1 VIAL (ML) INJECTION ONCE
Refills: 0 | Status: COMPLETED | OUTPATIENT
Start: 2023-11-13 | End: 2023-11-13

## 2023-11-13 RX ORDER — SPIRONOLACTONE 25 MG/1
25 TABLET, FILM COATED ORAL DAILY
Refills: 0 | Status: DISCONTINUED | OUTPATIENT
Start: 2023-11-13 | End: 2023-11-14

## 2023-11-13 RX ORDER — MAGNESIUM SULFATE 500 MG/ML
2 VIAL (ML) INJECTION ONCE
Refills: 0 | Status: COMPLETED | OUTPATIENT
Start: 2023-11-13 | End: 2023-11-13

## 2023-11-13 RX ORDER — POTASSIUM CHLORIDE 20 MEQ
10 PACKET (EA) ORAL ONCE
Refills: 0 | Status: COMPLETED | OUTPATIENT
Start: 2023-11-13 | End: 2023-11-13

## 2023-11-13 RX ADMIN — Medication 100 MILLIGRAM(S): at 14:03

## 2023-11-13 RX ADMIN — Medication 6: at 18:26

## 2023-11-13 RX ADMIN — CARVEDILOL PHOSPHATE 6.25 MILLIGRAM(S): 80 CAPSULE, EXTENDED RELEASE ORAL at 18:25

## 2023-11-13 RX ADMIN — GABAPENTIN 100 MILLIGRAM(S): 400 CAPSULE ORAL at 22:32

## 2023-11-13 RX ADMIN — Medication 4: at 23:12

## 2023-11-13 RX ADMIN — Medication 100 GRAM(S): at 13:45

## 2023-11-13 RX ADMIN — PANTOPRAZOLE SODIUM 40 MILLIGRAM(S): 20 TABLET, DELAYED RELEASE ORAL at 14:03

## 2023-11-13 RX ADMIN — Medication 40 MILLIGRAM(S): at 19:51

## 2023-11-13 RX ADMIN — Medication 25 GRAM(S): at 15:27

## 2023-11-13 RX ADMIN — OCTREOTIDE ACETATE 10 MICROGRAM(S)/HR: 200 INJECTION, SOLUTION INTRAVENOUS; SUBCUTANEOUS at 04:42

## 2023-11-13 RX ADMIN — PANTOPRAZOLE SODIUM 10 MG/HR: 20 TABLET, DELAYED RELEASE ORAL at 04:42

## 2023-11-13 RX ADMIN — CEFTRIAXONE 100 MILLIGRAM(S): 500 INJECTION, POWDER, FOR SOLUTION INTRAMUSCULAR; INTRAVENOUS at 03:11

## 2023-11-13 RX ADMIN — SODIUM CHLORIDE 500 MILLILITER(S): 9 INJECTION INTRAMUSCULAR; INTRAVENOUS; SUBCUTANEOUS at 19:51

## 2023-11-13 RX ADMIN — Medication 100 MILLIEQUIVALENT(S): at 13:46

## 2023-11-13 NOTE — PROGRESS NOTE ADULT - ASSESSMENT
64 yo [place] ([facility]) M former smoker with hx HTN, HLD, severe 3 vessel CAD, pre-diabetes, gout, TIA (14 yrs ago), gastric ulcer, rectal bleed, LIZ and alcoholic liver disease c/b cirrhosis and portal hypertension c/b esophageal varices (and UGIB) and splenomegaly presents for hematemesis x2 11/12 AM.  64 yo [place] ([facility]) M former smoker with hx HTN, HLD, severe 3 vessel CAD, pre-diabetes, gout, TIA (14 yrs ago), gastric ulcer, rectal bleed, LIZ and alcoholic liver disease c/b cirrhosis and portal hypertension c/b esophageal varices (and UGIB) and splenomegaly presents for hematemesis x2 11/12 AM.

## 2023-11-13 NOTE — DIETITIAN NUTRITION RISK NOTIFICATION - ADDITIONAL COMMENTS/DIETITIAN RECOMMENDATIONS
-Continue current diet   -Encourage good PO intake  -Honor food preferences; food preferences updated and relayed to kitchen   -Monitor chemistry, GI function, skin integrity

## 2023-11-13 NOTE — PROGRESS NOTE ADULT - PROBLEM SELECTOR PLAN 8
Pt with HTN. On home coreg 12.5mg BID, aldactone 25mg qd, Lasix 40mg qd.   - Continue holding home med until cleared by GI  - Resume Carvedilol 6.25mg BID  - Consider Hydralazine 5mg PRN SBP >160

## 2023-11-13 NOTE — PROGRESS NOTE ADULT - PROBLEM SELECTOR PLAN 6
F: NPO for EGD  GI: PPI gtt  DVT: SCDs  Dispo: 7 [facility] Pt with recurrent pleural effusion, despite being on lasix and aldactone. Pt was admitted to [facility] on 9/26 to 9/30, s/p pigtail drainage (4L). CTAP on 10/31 showed moderate to severe left pleural effusion with near complete atelectasis of the left lower lobe. Pt is scheduled to have outpatient thoracentesis on 11/16.

## 2023-11-13 NOTE — PROGRESS NOTE ADULT - PROBLEM SELECTOR PLAN 1
history of decompensated cirrhosis with esophageal varices w/ banding back in 04/2023. Follows with Dr. Lawson. On coreg 6.25mg BID, aldactone 25mg QD, Lasix 40mg.     #UGIB - c/f UGIB with multiple episodes of coffee ground emesis and melena. No episodes seen during ED evaluation. Hgb stable at 10, which is around baseline. HD stable.   - maintain active T&S  - Protonix gtt, octerotide gtt  - EGD in endo suite today  - c/w ceftriaxone for ppx  - c/w home rifaximin Pt with baseline Hb of 10. Had Hb of 7.7 on 11/2, s/p 1U pRBCs with goal Hb >8 due to hx of CAD with stents. Hb of 8.5 on 11/13.  - Continue to monitor Hb   - Maintain active T&S  - Transfuse if Hb <8    #Thrombocytopenia.  Plt of 95 on 11/13. Likely iso cirrhosis.   - Continue to monitor

## 2023-11-13 NOTE — DIETITIAN NUTRITION RISK NOTIFICATION - TREATMENT: THE FOLLOWING DIET HAS BEEN RECOMMENDED
Diet, DASH/TLC:   Sodium & Cholesterol Restricted (11-13-23 @ 14:05) [Active]  Diet, NPO after Midnight:      NPO Start Date: 12-Nov-2023,   NPO Start Time: 23:59 (11-12-23 @ 14:51) [Active]

## 2023-11-13 NOTE — DIETITIAN INITIAL EVALUATION ADULT - PERTINENT MEDS FT
MEDICATIONS  (STANDING):  allopurinol 100 milliGRAM(s) Oral daily  dextrose 5%. 1000 milliLiter(s) (100 mL/Hr) IV Continuous <Continuous>  dextrose 5%. 1000 milliLiter(s) (50 mL/Hr) IV Continuous <Continuous>  dextrose 50% Injectable 25 Gram(s) IV Push once  dextrose 50% Injectable 12.5 Gram(s) IV Push once  dextrose 50% Injectable 25 Gram(s) IV Push once  gabapentin 100 milliGRAM(s) Oral at bedtime  glucagon  Injectable 1 milliGRAM(s) IntraMuscular once  influenza   Vaccine 0.5 milliLiter(s) IntraMuscular once  insulin lispro (ADMELOG) corrective regimen sliding scale   SubCutaneous Before meals and at bedtime  pantoprazole    Tablet 40 milliGRAM(s) Oral every 12 hours  rifAXIMin 550 milliGRAM(s) Oral every 12 hours    MEDICATIONS  (PRN):  dextrose Oral Gel 15 Gram(s) Oral once PRN Blood Glucose LESS THAN 70 milliGRAM(s)/deciliter

## 2023-11-13 NOTE — PROGRESS NOTE ADULT - PROBLEM SELECTOR PLAN 2
appears hypovolemic on exam - improved from 7.6 -->6.7 s/p 2L NS.   - downtrending  - c to trend, likely wont fully clear i/s/o decompensated cirrhosis

## 2023-11-13 NOTE — PROGRESS NOTE ADULT - SUBJECTIVE AND OBJECTIVE BOX
INTERVAL HPI/OVERNIGHT EVENTS:  One episode of black stool this AM per patient     VITAL SIGNS:  T(F): 97.8 (11-13-23 @ 06:00)  HR: 72 (11-13-23 @ 09:47)  BP: 133/73 (11-13-23 @ 09:47)  RR: 18 (11-13-23 @ 09:47)  SpO2: 99% (11-13-23 @ 09:47)  Wt(kg): --      11-12-23 @ 07:01  -  11-13-23 @ 07:00  --------------------------------------------------------  IN: 120 mL / OUT: 400 mL / NET: -280 mL        PHYSICAL EXAM:     GENERAL: resting comfortably in bed; NAD  HEAD:  Atraumatic, Normocephalic  EYES: EOMI, PERRLA, conjunctiva and sclera clear   NECK: Supple, No JVD, Normal thyroid, no enlarged nodes   NERVOUS SYSTEM:  AAOx3; CNII-XII grossly intact; no focal deficits  CHEST/LUNG: CTA B/L; no W/R/R, no retractions  HEART: S1/S2 normal, no S3, Regular rate and rhythm; No murmurs   ABDOMEN: Soft, Nondistended; Bowel sounds present. Midline epigastric TTP   EXTREMITIES:  2+ Peripheral Pulses, No clubbing, cyanosis, or edema   LYMPH: No lymphadenopathy noted   SKIN: No rashes or lesions  PSYCHIATRIC: affect and characteristics of appearance, verbalizations, behaviors are appropriate    MEDICATIONS  (STANDING):  allopurinol 100 milliGRAM(s) Oral daily  cefTRIAXone   IVPB 1000 milliGRAM(s) IV Intermittent every 24 hours  dextrose 5%. 1000 milliLiter(s) (100 mL/Hr) IV Continuous <Continuous>  dextrose 5%. 1000 milliLiter(s) (50 mL/Hr) IV Continuous <Continuous>  dextrose 50% Injectable 25 Gram(s) IV Push once  dextrose 50% Injectable 25 Gram(s) IV Push once  dextrose 50% Injectable 12.5 Gram(s) IV Push once  gabapentin 100 milliGRAM(s) Oral at bedtime  glucagon  Injectable 1 milliGRAM(s) IntraMuscular once  influenza   Vaccine 0.5 milliLiter(s) IntraMuscular once  insulin lispro (ADMELOG) corrective regimen sliding scale   SubCutaneous Before meals and at bedtime  magnesium sulfate  IVPB 1 Gram(s) IV Intermittent once  magnesium sulfate  IVPB 2 Gram(s) IV Intermittent once  octreotide  Infusion 50 MICROgram(s)/Hr (10 mL/Hr) IV Continuous <Continuous>  pantoprazole Infusion 8 mG/Hr (10 mL/Hr) IV Continuous <Continuous>  potassium chloride  10 mEq/100 mL IVPB 10 milliEquivalent(s) IV Intermittent once  rifAXIMin 550 milliGRAM(s) Oral every 12 hours    MEDICATIONS  (PRN):  dextrose Oral Gel 15 Gram(s) Oral once PRN Blood Glucose LESS THAN 70 milliGRAM(s)/deciliter      Allergies    aspirin (Pruritus)    Intolerances        LABS:                        8.5    3.37  )-----------( 95       ( 13 Nov 2023 05:30 )             26.0     11-13    143  |  108  |  24<H>  ----------------------------<  132<H>  3.5   |  24  |  0.85    Ca    8.7      13 Nov 2023 05:30  Phos  3.6     11-13  Mg     1.4     11-13    TPro  5.7<L>  /  Alb  2.6<L>  /  TBili  2.2<H>  /  DBili  x   /  AST  32  /  ALT  12  /  AlkPhos  77  11-13    PT/INR - ( 13 Nov 2023 05:30 )   PT: 17.8 sec;   INR: 1.58          PTT - ( 13 Nov 2023 05:30 )  PTT:36.7 sec  Urinalysis Basic - ( 13 Nov 2023 05:30 )    Color: x / Appearance: x / SG: x / pH: x  Gluc: 132 mg/dL / Ketone: x  / Bili: x / Urobili: x   Blood: x / Protein: x / Nitrite: x   Leuk Esterase: x / RBC: x / WBC x   Sq Epi: x / Non Sq Epi: x / Bacteria: x        RADIOLOGY & ADDITIONAL TESTS:  Reviewed ******TRANSFER FROM [facility] TO [facility] [[email or web address]******  62 yo [place] ([facility]) M former smoker with hx HTN, HLD, severe 3 vessel CAD, pre-diabetes, gout, TIA (14 yrs ago), gastric ulcer, rectal bleed, LIZ and alcoholic liver disease c/b cirrhosis and portal hypertension c/b esophageal varices (and UGIB) and splenomegaly that presented for hematemesis x2 11/12 AM. While in the hospital, pt had one reported episode of melena, but no episodes of hematemesis. Pt was started on protonix, octreotide, and CTX, on admission, while plavix was held as there was concern for UGI bleed. EGD performed on 11/13 w/ out evidence of active bleeding, protonix, octreotide, and CTX d/salty; though will c/w pantoprazole 40mg qd x2weeks followed by 40mg PO daily. Per GI & CTSx, OK to resume plavix, and CTSx will evaluate for duration upon follow up o/p.     INTERVAL HPI/OVERNIGHT EVENTS:  One episode of black stool this AM per patient     VITAL SIGNS:  T(F): 97.8 (11-13-23 @ 06:00)  HR: 72 (11-13-23 @ 09:47)  BP: 133/73 (11-13-23 @ 09:47)  RR: 18 (11-13-23 @ 09:47)  SpO2: 99% (11-13-23 @ 09:47)  Wt(kg): --      11-12-23 @ 07:01  -  11-13-23 @ 07:00  --------------------------------------------------------  IN: 120 mL / OUT: 400 mL / NET: -280 mL        PHYSICAL EXAM:     GENERAL: resting comfortably in bed; NAD  HEAD:  Atraumatic, Normocephalic  EYES: EOMI, PERRLA, conjunctiva and sclera clear   NECK: Supple, No JVD, Normal thyroid, no enlarged nodes   NERVOUS SYSTEM:  AAOx3; CNII-XII grossly intact; no focal deficits  CHEST/LUNG: CTA B/L; no W/R/R, no retractions  HEART: S1/S2 normal, no S3, Regular rate and rhythm; No murmurs   ABDOMEN: Soft, Nondistended; Bowel sounds present. Midline epigastric TTP   EXTREMITIES:  2+ Peripheral Pulses, No clubbing, cyanosis, or edema   LYMPH: No lymphadenopathy noted   SKIN: No rashes or lesions  PSYCHIATRIC: affect and characteristics of appearance, verbalizations, behaviors are appropriate    MEDICATIONS  (STANDING):  allopurinol 100 milliGRAM(s) Oral daily  cefTRIAXone   IVPB 1000 milliGRAM(s) IV Intermittent every 24 hours  dextrose 5%. 1000 milliLiter(s) (100 mL/Hr) IV Continuous <Continuous>  dextrose 5%. 1000 milliLiter(s) (50 mL/Hr) IV Continuous <Continuous>  dextrose 50% Injectable 25 Gram(s) IV Push once  dextrose 50% Injectable 25 Gram(s) IV Push once  dextrose 50% Injectable 12.5 Gram(s) IV Push once  gabapentin 100 milliGRAM(s) Oral at bedtime  glucagon  Injectable 1 milliGRAM(s) IntraMuscular once  influenza   Vaccine 0.5 milliLiter(s) IntraMuscular once  insulin lispro (ADMELOG) corrective regimen sliding scale   SubCutaneous Before meals and at bedtime  magnesium sulfate  IVPB 1 Gram(s) IV Intermittent once  magnesium sulfate  IVPB 2 Gram(s) IV Intermittent once  octreotide  Infusion 50 MICROgram(s)/Hr (10 mL/Hr) IV Continuous <Continuous>  pantoprazole Infusion 8 mG/Hr (10 mL/Hr) IV Continuous <Continuous>  potassium chloride  10 mEq/100 mL IVPB 10 milliEquivalent(s) IV Intermittent once  rifAXIMin 550 milliGRAM(s) Oral every 12 hours    MEDICATIONS  (PRN):  dextrose Oral Gel 15 Gram(s) Oral once PRN Blood Glucose LESS THAN 70 milliGRAM(s)/deciliter      Allergies    aspirin (Pruritus)    Intolerances        LABS:                        8.5    3.37  )-----------( 95       ( 13 Nov 2023 05:30 )             26.0     11-13    143  |  108  |  24<H>  ----------------------------<  132<H>  3.5   |  24  |  0.85    Ca    8.7      13 Nov 2023 05:30  Phos  3.6     11-13  Mg     1.4     11-13    TPro  5.7<L>  /  Alb  2.6<L>  /  TBili  2.2<H>  /  DBili  x   /  AST  32  /  ALT  12  /  AlkPhos  77  11-13    PT/INR - ( 13 Nov 2023 05:30 )   PT: 17.8 sec;   INR: 1.58          PTT - ( 13 Nov 2023 05:30 )  PTT:36.7 sec  Urinalysis Basic - ( 13 Nov 2023 05:30 )    Color: x / Appearance: x / SG: x / pH: x  Gluc: 132 mg/dL / Ketone: x  / Bili: x / Urobili: x   Blood: x / Protein: x / Nitrite: x   Leuk Esterase: x / RBC: x / WBC x   Sq Epi: x / Non Sq Epi: x / Bacteria: x        RADIOLOGY & ADDITIONAL TESTS:  Reviewed ******TRANSFER FROM [facility] TO [facility]******  62 yo [place] ([facility]) M former smoker with hx HTN, HLD, severe 3 vessel CAD, pre-diabetes, gout, TIA (14 yrs ago), gastric ulcer, rectal bleed, LIZ and alcoholic liver disease c/b cirrhosis and portal hypertension c/b esophageal varices (and UGIB) and splenomegaly that presented for hematemesis x2 11/12 AM. While in the hospital, pt had one reported episode of melena, but no episodes of hematemesis. Pt was started on protonix, octreotide, and CTX, on admission, while plavix was held as there was concern for UGI bleed. EGD performed on 11/13 w/ out evidence of active bleeding, protonix, octreotide, and CTX d/salty; though will c/w pantoprazole 40mg qd x2weeks followed by 40mg PO daily.     INTERVAL HPI/OVERNIGHT EVENTS:  One episode of black stool this AM per patient     VITAL SIGNS:  T(F): 97.8 (11-13-23 @ 06:00)  HR: 72 (11-13-23 @ 09:47)  BP: 133/73 (11-13-23 @ 09:47)  RR: 18 (11-13-23 @ 09:47)  SpO2: 99% (11-13-23 @ 09:47)  Wt(kg): --      11-12-23 @ 07:01  -  11-13-23 @ 07:00  --------------------------------------------------------  IN: 120 mL / OUT: 400 mL / NET: -280 mL        PHYSICAL EXAM:     GENERAL: resting comfortably in bed; NAD  HEAD:  Atraumatic, Normocephalic  EYES: EOMI, PERRLA, conjunctiva and sclera clear   NECK: Supple, No JVD, Normal thyroid, no enlarged nodes   NERVOUS SYSTEM:  AAOx3; CNII-XII grossly intact; no focal deficits  CHEST/LUNG: CTA B/L; no W/R/R, no retractions  HEART: S1/S2 normal, no S3, Regular rate and rhythm; No murmurs   ABDOMEN: Soft, Nondistended; Bowel sounds present. Midline epigastric TTP   EXTREMITIES:  2+ Peripheral Pulses, No clubbing, cyanosis, or edema   LYMPH: No lymphadenopathy noted   SKIN: No rashes or lesions  PSYCHIATRIC: affect and characteristics of appearance, verbalizations, behaviors are appropriate    MEDICATIONS  (STANDING):  allopurinol 100 milliGRAM(s) Oral daily  cefTRIAXone   IVPB 1000 milliGRAM(s) IV Intermittent every 24 hours  dextrose 5%. 1000 milliLiter(s) (100 mL/Hr) IV Continuous <Continuous>  dextrose 5%. 1000 milliLiter(s) (50 mL/Hr) IV Continuous <Continuous>  dextrose 50% Injectable 25 Gram(s) IV Push once  dextrose 50% Injectable 25 Gram(s) IV Push once  dextrose 50% Injectable 12.5 Gram(s) IV Push once  gabapentin 100 milliGRAM(s) Oral at bedtime  glucagon  Injectable 1 milliGRAM(s) IntraMuscular once  influenza   Vaccine 0.5 milliLiter(s) IntraMuscular once  insulin lispro (ADMELOG) corrective regimen sliding scale   SubCutaneous Before meals and at bedtime  magnesium sulfate  IVPB 1 Gram(s) IV Intermittent once  magnesium sulfate  IVPB 2 Gram(s) IV Intermittent once  octreotide  Infusion 50 MICROgram(s)/Hr (10 mL/Hr) IV Continuous <Continuous>  pantoprazole Infusion 8 mG/Hr (10 mL/Hr) IV Continuous <Continuous>  potassium chloride  10 mEq/100 mL IVPB 10 milliEquivalent(s) IV Intermittent once  rifAXIMin 550 milliGRAM(s) Oral every 12 hours    MEDICATIONS  (PRN):  dextrose Oral Gel 15 Gram(s) Oral once PRN Blood Glucose LESS THAN 70 milliGRAM(s)/deciliter      Allergies    aspirin (Pruritus)    Intolerances        LABS:                        8.5    3.37  )-----------( 95       ( 13 Nov 2023 05:30 )             26.0     11-13    143  |  108  |  24<H>  ----------------------------<  132<H>  3.5   |  24  |  0.85    Ca    8.7      13 Nov 2023 05:30  Phos  3.6     11-13  Mg     1.4     11-13    TPro  5.7<L>  /  Alb  2.6<L>  /  TBili  2.2<H>  /  DBili  x   /  AST  32  /  ALT  12  /  AlkPhos  77  11-13    PT/INR - ( 13 Nov 2023 05:30 )   PT: 17.8 sec;   INR: 1.58          PTT - ( 13 Nov 2023 05:30 )  PTT:36.7 sec  Urinalysis Basic - ( 13 Nov 2023 05:30 )    Color: x / Appearance: x / SG: x / pH: x  Gluc: 132 mg/dL / Ketone: x  / Bili: x / Urobili: x   Blood: x / Protein: x / Nitrite: x   Leuk Esterase: x / RBC: x / WBC x   Sq Epi: x / Non Sq Epi: x / Bacteria: x        RADIOLOGY & ADDITIONAL TESTS:  Reviewed ******TRANSFER FROM [facility] TO [facility]******  64 yo [place] ([facility]) M former smoker with hx HTN, HLD, severe 3 vessel CAD, pre-diabetes, gout, TIA (14 yrs ago), gastric ulcer, rectal bleed, LIZ and alcoholic liver disease c/b cirrhosis and portal hypertension c/b esophageal varices (and UGIB) and splenomegaly that presented for hematemesis x2 11/12 AM. While in the hospital, pt had one reported episode of melena, but no episodes of hematemesis. Pt was started on protonix, octreotide, and CTX, on admission, while plavix was held as there was concern for UGI bleed. EGD performed on 11/13 w/ out evidence of active bleeding, protonix, octreotide, and CTX d/salty; though will c/w pantoprazole 40mg qd x2weeks followed by 40mg PO daily.     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    INTERVAL HPI/OVERNIGHT EVENTS:  One episode of black stool this AM per patient     VITAL SIGNS:  T(F): 97.8 (11-13-23 @ 06:00)  HR: 72 (11-13-23 @ 09:47)  BP: 133/73 (11-13-23 @ 09:47)  RR: 18 (11-13-23 @ 09:47)  SpO2: 99% (11-13-23 @ 09:47)  Wt(kg): --      11-12-23 @ 07:01  -  11-13-23 @ 07:00  --------------------------------------------------------  IN: 120 mL / OUT: 400 mL / NET: -280 mL        PHYSICAL EXAM:     GENERAL: resting comfortably in bed; NAD  HEAD:  Atraumatic, Normocephalic  EYES: EOMI, PERRLA, conjunctiva and sclera clear   NECK: Supple, No JVD, Normal thyroid, no enlarged nodes   NERVOUS SYSTEM:  AAOx3; CNII-XII grossly intact; no focal deficits  CHEST/LUNG: CTA B/L; no W/R/R, no retractions  HEART: S1/S2 normal, no S3, Regular rate and rhythm; No murmurs   ABDOMEN: Soft, Nondistended; Bowel sounds present. Midline epigastric TTP   EXTREMITIES:  2+ Peripheral Pulses, No clubbing, cyanosis, or edema   LYMPH: No lymphadenopathy noted   SKIN: No rashes or lesions  PSYCHIATRIC: affect and characteristics of appearance, verbalizations, behaviors are appropriate    MEDICATIONS  (STANDING):  allopurinol 100 milliGRAM(s) Oral daily  cefTRIAXone   IVPB 1000 milliGRAM(s) IV Intermittent every 24 hours  dextrose 5%. 1000 milliLiter(s) (100 mL/Hr) IV Continuous <Continuous>  dextrose 5%. 1000 milliLiter(s) (50 mL/Hr) IV Continuous <Continuous>  dextrose 50% Injectable 25 Gram(s) IV Push once  dextrose 50% Injectable 25 Gram(s) IV Push once  dextrose 50% Injectable 12.5 Gram(s) IV Push once  gabapentin 100 milliGRAM(s) Oral at bedtime  glucagon  Injectable 1 milliGRAM(s) IntraMuscular once  influenza   Vaccine 0.5 milliLiter(s) IntraMuscular once  insulin lispro (ADMELOG) corrective regimen sliding scale   SubCutaneous Before meals and at bedtime  magnesium sulfate  IVPB 1 Gram(s) IV Intermittent once  magnesium sulfate  IVPB 2 Gram(s) IV Intermittent once  octreotide  Infusion 50 MICROgram(s)/Hr (10 mL/Hr) IV Continuous <Continuous>  pantoprazole Infusion 8 mG/Hr (10 mL/Hr) IV Continuous <Continuous>  potassium chloride  10 mEq/100 mL IVPB 10 milliEquivalent(s) IV Intermittent once  rifAXIMin 550 milliGRAM(s) Oral every 12 hours    MEDICATIONS  (PRN):  dextrose Oral Gel 15 Gram(s) Oral once PRN Blood Glucose LESS THAN 70 milliGRAM(s)/deciliter      Allergies    aspirin (Pruritus)    Intolerances        LABS:                        8.5    3.37  )-----------( 95       ( 13 Nov 2023 05:30 )             26.0     11-13    143  |  108  |  24<H>  ----------------------------<  132<H>  3.5   |  24  |  0.85    Ca    8.7      13 Nov 2023 05:30  Phos  3.6     11-13  Mg     1.4     11-13    TPro  5.7<L>  /  Alb  2.6<L>  /  TBili  2.2<H>  /  DBili  x   /  AST  32  /  ALT  12  /  AlkPhos  77  11-13    PT/INR - ( 13 Nov 2023 05:30 )   PT: 17.8 sec;   INR: 1.58          PTT - ( 13 Nov 2023 05:30 )  PTT:36.7 sec  Urinalysis Basic - ( 13 Nov 2023 05:30 )    Color: x / Appearance: x / SG: x / pH: x  Gluc: 132 mg/dL / Ketone: x  / Bili: x / Urobili: x   Blood: x / Protein: x / Nitrite: x   Leuk Esterase: x / RBC: x / WBC x   Sq Epi: x / Non Sq Epi: x / Bacteria: x        RADIOLOGY & ADDITIONAL TESTS:  Reviewed ******TRANSFER FROM [facility] TO [facility]******  64 yo [place] ([facility]) M former smoker with hx HTN, HLD, severe 3 vessel CAD, pre-diabetes, gout, TIA (14 yrs ago), gastric ulcer, rectal bleed, LIZ and alcoholic liver disease c/b cirrhosis and portal hypertension c/b esophageal varices (and UGIB) and splenomegaly that presented for hematemesis x2 11/12 AM. While in the hospital, pt had one reported episode of melena, but no episodes of hematemesis. Pt was started on protonix, octreotide, and CTX, on admission, while plavix was held as there was concern for UGI bleed. EGD performed on 11/13 w/ out evidence of active bleeding, protonix, octreotide, and CTX d/salty; though will c/w pantoprazole 40mg BID x2weeks followed by 40mg PO daily.     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    INTERVAL HPI/OVERNIGHT EVENTS:  One episode of black stool this AM per patient     VITAL SIGNS:  T(F): 97.8 (11-13-23 @ 06:00)  HR: 72 (11-13-23 @ 09:47)  BP: 133/73 (11-13-23 @ 09:47)  RR: 18 (11-13-23 @ 09:47)  SpO2: 99% (11-13-23 @ 09:47)  Wt(kg): --      11-12-23 @ 07:01  -  11-13-23 @ 07:00  --------------------------------------------------------  IN: 120 mL / OUT: 400 mL / NET: -280 mL        PHYSICAL EXAM:     GENERAL: resting comfortably in bed; NAD  HEAD:  Atraumatic, Normocephalic  EYES: EOMI, PERRLA, conjunctiva and sclera clear   NECK: Supple, No JVD, Normal thyroid, no enlarged nodes   NERVOUS SYSTEM:  AAOx3; CNII-XII grossly intact; no focal deficits  CHEST/LUNG: CTA B/L; no W/R/R, no retractions  HEART: S1/S2 normal, no S3, Regular rate and rhythm; No murmurs   ABDOMEN: Soft, Nondistended; Bowel sounds present. Midline epigastric TTP   EXTREMITIES:  2+ Peripheral Pulses, No clubbing, cyanosis, or edema   LYMPH: No lymphadenopathy noted   SKIN: No rashes or lesions  PSYCHIATRIC: affect and characteristics of appearance, verbalizations, behaviors are appropriate    MEDICATIONS  (STANDING):  allopurinol 100 milliGRAM(s) Oral daily  cefTRIAXone   IVPB 1000 milliGRAM(s) IV Intermittent every 24 hours  dextrose 5%. 1000 milliLiter(s) (100 mL/Hr) IV Continuous <Continuous>  dextrose 5%. 1000 milliLiter(s) (50 mL/Hr) IV Continuous <Continuous>  dextrose 50% Injectable 25 Gram(s) IV Push once  dextrose 50% Injectable 25 Gram(s) IV Push once  dextrose 50% Injectable 12.5 Gram(s) IV Push once  gabapentin 100 milliGRAM(s) Oral at bedtime  glucagon  Injectable 1 milliGRAM(s) IntraMuscular once  influenza   Vaccine 0.5 milliLiter(s) IntraMuscular once  insulin lispro (ADMELOG) corrective regimen sliding scale   SubCutaneous Before meals and at bedtime  magnesium sulfate  IVPB 1 Gram(s) IV Intermittent once  magnesium sulfate  IVPB 2 Gram(s) IV Intermittent once  octreotide  Infusion 50 MICROgram(s)/Hr (10 mL/Hr) IV Continuous <Continuous>  pantoprazole Infusion 8 mG/Hr (10 mL/Hr) IV Continuous <Continuous>  potassium chloride  10 mEq/100 mL IVPB 10 milliEquivalent(s) IV Intermittent once  rifAXIMin 550 milliGRAM(s) Oral every 12 hours    MEDICATIONS  (PRN):  dextrose Oral Gel 15 Gram(s) Oral once PRN Blood Glucose LESS THAN 70 milliGRAM(s)/deciliter      Allergies    aspirin (Pruritus)    Intolerances        LABS:                        8.5    3.37  )-----------( 95       ( 13 Nov 2023 05:30 )             26.0     11-13    143  |  108  |  24<H>  ----------------------------<  132<H>  3.5   |  24  |  0.85    Ca    8.7      13 Nov 2023 05:30  Phos  3.6     11-13  Mg     1.4     11-13    TPro  5.7<L>  /  Alb  2.6<L>  /  TBili  2.2<H>  /  DBili  x   /  AST  32  /  ALT  12  /  AlkPhos  77  11-13    PT/INR - ( 13 Nov 2023 05:30 )   PT: 17.8 sec;   INR: 1.58          PTT - ( 13 Nov 2023 05:30 )  PTT:36.7 sec  Urinalysis Basic - ( 13 Nov 2023 05:30 )    Color: x / Appearance: x / SG: x / pH: x  Gluc: 132 mg/dL / Ketone: x  / Bili: x / Urobili: x   Blood: x / Protein: x / Nitrite: x   Leuk Esterase: x / RBC: x / WBC x   Sq Epi: x / Non Sq Epi: x / Bacteria: x        RADIOLOGY & ADDITIONAL TESTS:  Reviewed

## 2023-11-13 NOTE — PROGRESS NOTE ADULT - PROBLEM SELECTOR PLAN 4
on coreg 12.5mg BID  - Normotensive currently   - will hold BB to not mask tachycardia from GIB  - consider prn Hydralazine 5mg for SBP >160   - restart lasix 40mg QD and aldactone 25mg as tolerate Pt had lactate of 7.5 on 11/12 iso decompensated cirrhosis, s/p 2L NS, improved lactate to 2.6 on 11/13.   - Continue to trend

## 2023-11-13 NOTE — DIETITIAN INITIAL EVALUATION ADULT - OTHER CALCULATIONS
Estimated nutritional needs determined using [facility] Standards of Nutrition Care for repletion using current body weight 81.6 kg (117%IBW).

## 2023-11-13 NOTE — PROGRESS NOTE ADULT - TIME BILLING
chart review including outpatient records, patient interview/exam, review of labs/imaging, management of medical conditions, counseling/educating patient/family, discussion with consultants, documentation

## 2023-11-13 NOTE — DIETITIAN INITIAL EVALUATION ADULT - NUTRITIONGOAL OUTCOME1
Pt will meet 75% or more of protein/energy needs via most appropriate route for nutrition and reduce/resolve s/sx protein-calorie malnutrition.

## 2023-11-13 NOTE — PROGRESS NOTE ADULT - PROBLEM SELECTOR PLAN 1
history of decompensated cirrhosis with esophageal varices w/ banding back in 04/2023. Follows with Dr. Lawson. On coreg 6.25mg BID, aldactone 25mg QD, Lasix 40mg.     #UGIB - c/f UGIB with multiple episodes of coffee ground emesis and melena. No episodes seen during ED evaluation. Hgb stable at 10, which is around baseline. HD stable.   - maintain active T&S  - Protonix gtt, octerotide gtt  - EGD in endo suite today  - c/w ceftriaxone for ppx history of decompensated cirrhosis with esophageal varices w/ banding back in 04/2023. Follows with Dr. Lawson. On coreg 6.25mg BID, aldactone 25mg QD, Lasix 40mg.     #UGIB - c/f UGIB with multiple episodes of coffee ground emesis and melena. No episodes seen during ED evaluation. Hgb stable at 10, which is around baseline. HD stable.   - maintain active T&S  - Protonix gtt, octerotide gtt  - EGD in endo suite today  - c/w ceftriaxone for ppx  - c/w home rifaximin

## 2023-11-13 NOTE — PROGRESS NOTE ADULT - PROBLEM SELECTOR PLAN 3
hx of CAD s/p OPCAB x 4 on 9/14/23. On Plavix 75mg last taken 11/11 AM.   - Hold Plavix i/s/o c/f active UGIB  - ask CTS if we can restart Plavix if GIB r/o   - SCDs

## 2023-11-13 NOTE — PROGRESS NOTE ADULT - SUBJECTIVE AND OBJECTIVE BOX
******ACCEPTANCE TO [facility] FROM [facility]*******  62 yo [place] ([facility]) M former smoker with hx HTN, HLD, severe 3 vessel CAD s/p CABG (09/2023), pre-diabetes, gout, TIA (14 yrs ago), gastric ulcer, rectal bleed, LIZ and alcoholic liver disease c/b cirrhosis and portal hypertension c/b esophageal varices (and UGIB) and splenomegaly that presented for hematemesis x2 11/12 AM.     While in the hospital, pt had one reported episode of melena, but no episodes of hematemesis. Pt was started on protonix, octreotide, and CTX, on admission, while plavix was held as there was concern for UGI bleed. EGD performed on 11/13 w/ out evidence of active bleeding, protonix, octreotide, and CTX d/salty; though will c/w pantoprazole 40mg BID x2weeks followed by 40mg PO daily.     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    VITAL SIGNS:  T(F): 97.5 (11-13-23 @ 16:06)  HR: 71 (11-13-23 @ 16:06)  BP: 144/84 (11-13-23 @ 16:06)  RR: 18 (11-13-23 @ 16:06)  SpO2: 95% (11-13-23 @ 16:06)  Wt(kg): --      11-12-23 @ 07:01  -  11-13-23 @ 07:00  --------------------------------------------------------  IN: 120 mL / OUT: 400 mL / NET: -280 mL        PHYSICAL EXAM:    Constitutional: resting comfortably in bed; NAD  HEENT: NC/AT, PER, anicteric sclera, no nasal discharge; MMM  Neck: supple; no JVD or thyromegaly  Respiratory: CTA B/L; no W/R/R, no retractions  Cardiac: +S1/S2; RRR; no M/R/G  Gastrointestinal: soft, NT/ND; no rebound or guarding  Back: spine midline, no bony tenderness or step-offs; no CVAT B/L  Extremities: WWP, no clubbing or cyanosis; no peripheral edema  Musculoskeletal: NROM x4; no joint swelling, tenderness or erythema  Vascular: 2+ radial, DP/PT pulses B/L  Dermatologic: skin warm, dry and intact; no rashes, wounds, or scars  Neurologic: AAOx3; CNII-XII grossly intact; no focal deficits  Psychiatric: affect and characteristics of appearance, verbalizations, behaviors are appropriate    MEDICATIONS  (STANDING):  allopurinol 100 milliGRAM(s) Oral daily  dextrose 5%. 1000 milliLiter(s) (100 mL/Hr) IV Continuous <Continuous>  dextrose 5%. 1000 milliLiter(s) (50 mL/Hr) IV Continuous <Continuous>  dextrose 50% Injectable 25 Gram(s) IV Push once  dextrose 50% Injectable 25 Gram(s) IV Push once  dextrose 50% Injectable 12.5 Gram(s) IV Push once  gabapentin 100 milliGRAM(s) Oral at bedtime  glucagon  Injectable 1 milliGRAM(s) IntraMuscular once  influenza   Vaccine 0.5 milliLiter(s) IntraMuscular once  insulin lispro (ADMELOG) corrective regimen sliding scale   SubCutaneous Before meals and at bedtime  pantoprazole    Tablet 40 milliGRAM(s) Oral every 12 hours  rifAXIMin 550 milliGRAM(s) Oral every 12 hours    MEDICATIONS  (PRN):  dextrose Oral Gel 15 Gram(s) Oral once PRN Blood Glucose LESS THAN 70 milliGRAM(s)/deciliter      Allergies    aspirin (Pruritus)    Intolerances        LABS:                        8.5    3.37  )-----------( 95       ( 13 Nov 2023 05:30 )             26.0     11-13    143  |  108  |  24<H>  ----------------------------<  132<H>  3.5   |  24  |  0.85    Ca    8.7      13 Nov 2023 05:30  Phos  3.6     11-13  Mg     1.4     11-13    TPro  5.7<L>  /  Alb  2.6<L>  /  TBili  2.2<H>  /  DBili  x   /  AST  32  /  ALT  12  /  AlkPhos  77  11-13    PT/INR - ( 13 Nov 2023 05:30 )   PT: 17.8 sec;   INR: 1.58          PTT - ( 13 Nov 2023 05:30 )  PTT:36.7 sec  Urinalysis Basic - ( 13 Nov 2023 05:30 )    Color: x / Appearance: x / SG: x / pH: x  Gluc: 132 mg/dL / Ketone: x  / Bili: x / Urobili: x   Blood: x / Protein: x / Nitrite: x   Leuk Esterase: x / RBC: x / WBC x   Sq Epi: x / Non Sq Epi: x / Bacteria: x        RADIOLOGY & ADDITIONAL TESTS:  Reviewed ******ACCEPTANCE TO [facility] FROM [facility]*******  62 yo [place] ([facility]) M former smoker with hx HTN, HLD, severe 3 vessel CAD s/p CABG (09/2023), pre-diabetes, gout, TIA (14 yrs ago), gastric ulcer, rectal bleed, LIZ and alcoholic liver disease c/b cirrhosis and portal hypertension c/b esophageal varices (and UGIB) and splenomegaly that presented for hematemesis x2 11/12 AM. While in the hospital, pt had one reported episode of melena, but no episodes of hematemesis. Pt was started on protonix, octreotide, and CTX, on admission, while plavix was held as there was concern for UGI bleed. S/p 1U pRBCS. EGD performed on 11/13 w/ out evidence of active bleeding, protonix, octreotide, and CTX d/salty; though will c/w pantoprazole 40mg BID x2weeks followed by 40mg PO daily.     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    VITAL SIGNS:  T(F): 97.5 (11-13-23 @ 16:06)  HR: 71 (11-13-23 @ 16:06)  BP: 144/84 (11-13-23 @ 16:06)  RR: 18 (11-13-23 @ 16:06)  SpO2: 95% (11-13-23 @ 16:06)  Wt(kg): --      11-12-23 @ 07:01  -  11-13-23 @ 07:00  --------------------------------------------------------  IN: 120 mL / OUT: 400 mL / NET: -280 mL        PHYSICAL EXAM:    GENERAL: resting comfortably in bed; NAD, jaundice all over  EYES: EOMI, PERRLA, conjunctiva and sclera clear   NECK: Supple  LUNGS: CTA B/L; no W/R/R  HEART: S1/S2 normal, RRR; No murmurs   ABDOMEN: Soft, Nondistended; Bowel sounds present. Mild RUQ tenderness  EXTREMITIES:  2+ Peripheral Pulses, No clubbing, cyanosis, or edema   NERVOUS SYSTEM:  AAOx3; CNII-XII grossly intact; no focal deficits  PSYCHIATRIC: affect and characteristics of appearance, verbalizations, behaviors are appropriate    MEDICATIONS  (STANDING):  allopurinol 100 milliGRAM(s) Oral daily  dextrose 5%. 1000 milliLiter(s) (100 mL/Hr) IV Continuous <Continuous>  dextrose 5%. 1000 milliLiter(s) (50 mL/Hr) IV Continuous <Continuous>  dextrose 50% Injectable 25 Gram(s) IV Push once  dextrose 50% Injectable 25 Gram(s) IV Push once  dextrose 50% Injectable 12.5 Gram(s) IV Push once  gabapentin 100 milliGRAM(s) Oral at bedtime  glucagon  Injectable 1 milliGRAM(s) IntraMuscular once  influenza   Vaccine 0.5 milliLiter(s) IntraMuscular once  insulin lispro (ADMELOG) corrective regimen sliding scale   SubCutaneous Before meals and at bedtime  pantoprazole    Tablet 40 milliGRAM(s) Oral every 12 hours  rifAXIMin 550 milliGRAM(s) Oral every 12 hours    MEDICATIONS  (PRN):  dextrose Oral Gel 15 Gram(s) Oral once PRN Blood Glucose LESS THAN 70 milliGRAM(s)/deciliter      Allergies    aspirin (Pruritus)    Intolerances        LABS:                        8.5    3.37  )-----------( 95       ( 13 Nov 2023 05:30 )             26.0     11-13    143  |  108  |  24<H>  ----------------------------<  132<H>  3.5   |  24  |  0.85    Ca    8.7      13 Nov 2023 05:30  Phos  3.6     11-13  Mg     1.4     11-13    TPro  5.7<L>  /  Alb  2.6<L>  /  TBili  2.2<H>  /  DBili  x   /  AST  32  /  ALT  12  /  AlkPhos  77  11-13    PT/INR - ( 13 Nov 2023 05:30 )   PT: 17.8 sec;   INR: 1.58          PTT - ( 13 Nov 2023 05:30 )  PTT:36.7 sec  Urinalysis Basic - ( 13 Nov 2023 05:30 )    Color: x / Appearance: x / SG: x / pH: x  Gluc: 132 mg/dL / Ketone: x  / Bili: x / Urobili: x   Blood: x / Protein: x / Nitrite: x   Leuk Esterase: x / RBC: x / WBC x   Sq Epi: x / Non Sq Epi: x / Bacteria: x        RADIOLOGY & ADDITIONAL TESTS:  Reviewed

## 2023-11-13 NOTE — DIETITIAN INITIAL EVALUATION ADULT - OTHER INFO
64 yo [place] ([place]) M former smoker with hx HTN, HLD, severe 3 vessel CAD, pre-diabetes, gout, TIA (14 yrs ago), gastric ulcer, rectal bleed, LIZ and alcoholic liver disease c/b cirrhosis and portal hypertension c/b esophageal varices (and UGIB) and splenomegaly presents for hematemesis x2 11/12 AM.     Pt assessed at bedside on 4 UR. Rx and labs reviewed. Pt presents with no overt signs of nutritional wasting. Pt received awake in bed and participatory in nutrition interview. Pt reports a usual body weight of 270 pounds but has been losing wt and the last time he weighed 270 pounds was ~6 months ago. Current body weight of 180 pounds reflects a wt loss of 90 pounds over 6 months (32%BW). Pt reports wt loss secondary to a surgery; pt with hx cardiac cath procedure in February (?). Pt states he does not drink milk or dairy products; oral nutrition supplement regimen not ideal at this time. Food preferences updated and relayed to kitchen. Encouraged good PO intake with emphasis on protein; pt agreeable and verbalized understanding. No complaints of nausea/vomiting/constipation/diarrhea or difficult chew/swallow. NKA to food. RDN will continue to reassess, intervene, and monitor as appropriate.     Pain: 0 per chart review   GI: Abdomen non-distended/non-tender, +BS x4, last bowel movement 11/12 -hematemesis yesterday   Skin: Warm/Dry/Intact, no edema assessed

## 2023-11-13 NOTE — PROGRESS NOTE ADULT - PROBLEM SELECTOR PLAN 7
Pt with hx of CAD s/p OPCAB x 4 on 9/14/23. On Plavix 75mg last taken 11/11 AM (was stopped for a week recently and was restarted on 11/10 by Dr. Lawson and Dr. Hinton CTS) .   - F/u GI recs in regards to resuming plavix post egd  - C/w SCDs

## 2023-11-13 NOTE — PROGRESS NOTE ADULT - ASSESSMENT
62 yo [place] ([facility]) M former smoker with hx HTN, HLD, severe 3 vessel CAD, pre-diabetes, gout, TIA (14 yrs ago), gastric ulcer, rectal bleed, LIZ and alcoholic liver disease c/b cirrhosis and portal hypertension c/b esophageal varices (and UGIB) and splenomegaly presents for hematemesis x2 11/12 AM.  62 yo [place] ([facility] [place]) M former smoker with hx HTN, HLD, severe 3 vessel CAD, pre-diabetes, gout, TIA (14 yrs ago), gastric ulcer, rectal bleed, LIZ and alcoholic liver disease c/b cirrhosis and portal hypertension c/b esophageal varices (and UGIB) and splenomegaly presents for hematemesis x2 11/12 AM. S/p EGD on 11/13 which showed duodenitis, nonbleeding varices, healing PUD, and portal hypertensive gastropathy. Switched protonix to PO. Stepped down to RMF on 11/13.

## 2023-11-13 NOTE — CHART NOTE - NSCHARTNOTEFT_GEN_A_CORE
Patient is s/p EGD in endoscopy unit.     Findings are as follows:   - A single patch of gastric ectopic mucosa was seen in the lower third of the esophagus. Inlet patch was located 43 cm from the incisors.  - A sliding small size hiatal hernia was seen, the esophagogastric junction (EGJ) was noted at 44 cm, with hiatal narrowing at 45 cm from the incisors. Retroflexion view in the stomach confirmed the size and morphology of the hernia.  - 1 cord of grade I varices were seen in the lower third of the esophagus. The varices were not bleeding.  - A post-ulcer gastric deformity was noted in the antrum. Small erosions were seen as well. Endoscopic findings were suggestive of healing from prior peptic ulcer disease.  - Diffuse continuous congestion, petechiae and mosaic mucosal pattern of the mucosa with no bleeding was noted in the whole stomach. These findings were suggestive of portal hypertensive gastropathy.  - Localized discontinuous erythema of the mucosa with no bleeding was noted in the duodenal bulb. These findings are compatible with duodenitis.    Recommendations:   - Discontinue octreotide gtt, PPI gtt, and ceftriaxone    - Pantoprazole 40 mg daily for 2 weeks, followed by 40 mg PO daily    - Advance diet as tolerated    - Return to floor for further management    Case discussed with Dr. Lima. GI Team will continue to follow.     Arina Murray D.O.   Gastroenterology Fellow  Weekday 7am-5pm Pager: [phone]  Weeknights/Weekend/Holiday Coverage: Please call the  for contact info Patient is s/p EGD in endoscopy unit.     Findings are as follows:   - A single patch of gastric ectopic mucosa was seen in the lower third of the esophagus. Inlet patch was located 43 cm from the incisors.  - A sliding small size hiatal hernia was seen, the esophagogastric junction (EGJ) was noted at 44 cm, with hiatal narrowing at 45 cm from the incisors. Retroflexion view in the stomach confirmed the size and morphology of the hernia.  - 1 cord of grade I varices were seen in the lower third of the esophagus. The varices were not bleeding.  - A post-ulcer gastric deformity was noted in the antrum. Small erosions were seen as well. Endoscopic findings were suggestive of healing from prior peptic ulcer disease.  - Diffuse continuous congestion, petechiae and mosaic mucosal pattern of the mucosa with no bleeding was noted in the whole stomach. These findings were suggestive of portal hypertensive gastropathy.  - Localized discontinuous erythema of the mucosa with no bleeding was noted in the duodenal bulb. These findings are compatible with duodenitis.    Recommendations:   - Discontinue octreotide gtt and PPI gtt   - Give ceftriaxone 1g IV q24 for 5 days total (can be discharged on cefpodoxime)   - Resume home Coreg and diuretics gradually   - Pantoprazole 40 mg daily for 2 weeks, followed by 40 mg PO daily    - Advance diet as tolerated    - Return to floor for further management    Case discussed with Dr. Lima. GI Team will continue to follow.     Arina Murray D.O.   Gastroenterology Fellow  Weekday 7am-5pm Pager: [phone]  Weeknights/Weekend/Holiday Coverage: Please call the  for contact info Patient is s/p EGD in endoscopy unit.     Findings are as follows:   - A single patch of gastric ectopic mucosa was seen in the lower third of the esophagus. Inlet patch was located 43 cm from the incisors.  - A sliding small size hiatal hernia was seen, the esophagogastric junction (EGJ) was noted at 44 cm, with hiatal narrowing at 45 cm from the incisors. Retroflexion view in the stomach confirmed the size and morphology of the hernia.  - 1 cord of grade I varices were seen in the lower third of the esophagus. The varices were not bleeding.  - A post-ulcer gastric deformity was noted in the antrum. Small erosions were seen as well. Endoscopic findings were suggestive of healing from prior peptic ulcer disease.  - Diffuse continuous congestion, petechiae and mosaic mucosal pattern of the mucosa with no bleeding was noted in the whole stomach. These findings were suggestive of portal hypertensive gastropathy.  - Localized discontinuous erythema of the mucosa with no bleeding was noted in the duodenal bulb. These findings are compatible with duodenitis.    Recommendations:   - Discontinue octreotide gtt and PPI gtt   - Give ceftriaxone 1g IV q24 for 5 days total (can be discharged on cefpodoxime)   - Resume home Coreg and diuretics gradually   - Pantoprazole 40 mg BID for 2 weeks, followed by 40 mg PO daily    - Advance diet as tolerated    - Return to floor for further management    Case discussed with Dr. Lima. GI Team will continue to follow.     Arina Murray D.O.   Gastroenterology Fellow  Weekday 7am-5pm Pager: [phone]  Weeknights/Weekend/Holiday Coverage: Please call the  for contact info

## 2023-11-13 NOTE — PROGRESS NOTE ADULT - PROBLEM SELECTOR PLAN 3
hx of CAD s/p OPCAB x 4 on 9/14/23. On Plavix 75mg last taken 11/11 AM.   - Hold Plavix i/s/o c/f active UGIB  - ask CTS if we can restart Plavix if GIB r/o   - SCDs Pt with history of decompensated cirrhosis with esophageal varices w/ banding back in 04/2023. Follows with Dr. Lawson. On home rifaximin 550mg PO BID, coreg 6.25mg BID, aldactone 25mg QD, Lasix 40mg.   - C/w home rifaximin 550mg PO q12h  - F/u with Dr. Lawson   - C/w holding home coreg, aldactone, lasix

## 2023-11-13 NOTE — DIETITIAN INITIAL EVALUATION ADULT - PERTINENT LABORATORY DATA
11-13    143  |  108  |  24<H>  ----------------------------<  132<H>  3.5   |  24  |  0.85    Ca    8.7      13 Nov 2023 05:30  Phos  3.6     11-13  Mg     1.4     11-13    TPro  5.7<L>  /  Alb  2.6<L>  /  TBili  2.2<H>  /  DBili  x   /  AST  32  /  ALT  12  /  AlkPhos  77  11-13  POCT Blood Glucose.: 137 mg/dL (11-13-23 @ 12:14)  A1C with Estimated Average Glucose Result: 7.0 % (09-13-23 @ 05:30)

## 2023-11-13 NOTE — PRE-ANESTHESIA EVALUATION ADULT - NSANTHPMHFT_GEN_ALL_CORE
64yo M with HTN, HL, 3v CAD s/p OPCAB 09/14/2023 c/b pericardiac effusion, IDDM, alcohol advanced liver disease, cirrhosis, esophageal varices s/p variceal banding 64yo M with HTN, HL, 3v CAD s/p OPCAB 09/14/2023 c/b pericardiac effusion, IDDM, LIZ, gout, advanced alcohol liver disease, cirrhosis, esophageal varices s/p variceal bandings. He presented to ED with coffee ground hematemesis. He is on Plavix for his CAD, 62yo M with HTN, HL, 3v CAD s/p OPCAB 09/14/2023 c/b pericardiac effusion, IDDM, LIZ, gout, advanced alcohol liver disease, cirrhosis, esophageal varices s/p variceal bandings. He presented to ED with coffee ground hematemesis. He is on Plavix for his CAD

## 2023-11-14 ENCOUNTER — TRANSCRIPTION ENCOUNTER (OUTPATIENT)
Age: 63
End: 2023-11-14

## 2023-11-14 LAB
ALBUMIN SERPL ELPH-MCNC: 2.8 G/DL — LOW (ref 3.3–5)
ALBUMIN SERPL ELPH-MCNC: 2.8 G/DL — LOW (ref 3.3–5)
ALP SERPL-CCNC: 80 U/L — SIGNIFICANT CHANGE UP (ref 40–120)
ALP SERPL-CCNC: 80 U/L — SIGNIFICANT CHANGE UP (ref 40–120)
ALT FLD-CCNC: 14 U/L — SIGNIFICANT CHANGE UP (ref 10–45)
ALT FLD-CCNC: 14 U/L — SIGNIFICANT CHANGE UP (ref 10–45)
ANION GAP SERPL CALC-SCNC: 9 MMOL/L — SIGNIFICANT CHANGE UP (ref 5–17)
ANION GAP SERPL CALC-SCNC: 9 MMOL/L — SIGNIFICANT CHANGE UP (ref 5–17)
AST SERPL-CCNC: 32 U/L — SIGNIFICANT CHANGE UP (ref 10–40)
AST SERPL-CCNC: 32 U/L — SIGNIFICANT CHANGE UP (ref 10–40)
BILIRUB SERPL-MCNC: 1.8 MG/DL — HIGH (ref 0.2–1.2)
BILIRUB SERPL-MCNC: 1.8 MG/DL — HIGH (ref 0.2–1.2)
BUN SERPL-MCNC: 15 MG/DL — SIGNIFICANT CHANGE UP (ref 7–23)
BUN SERPL-MCNC: 15 MG/DL — SIGNIFICANT CHANGE UP (ref 7–23)
CALCIUM SERPL-MCNC: 8.6 MG/DL — SIGNIFICANT CHANGE UP (ref 8.4–10.5)
CALCIUM SERPL-MCNC: 8.6 MG/DL — SIGNIFICANT CHANGE UP (ref 8.4–10.5)
CHLORIDE SERPL-SCNC: 112 MMOL/L — HIGH (ref 96–108)
CHLORIDE SERPL-SCNC: 112 MMOL/L — HIGH (ref 96–108)
CO2 SERPL-SCNC: 21 MMOL/L — LOW (ref 22–31)
CO2 SERPL-SCNC: 21 MMOL/L — LOW (ref 22–31)
CREAT SERPL-MCNC: 0.86 MG/DL — SIGNIFICANT CHANGE UP (ref 0.5–1.3)
CREAT SERPL-MCNC: 0.86 MG/DL — SIGNIFICANT CHANGE UP (ref 0.5–1.3)
EGFR: 97 ML/MIN/1.73M2 — SIGNIFICANT CHANGE UP
EGFR: 97 ML/MIN/1.73M2 — SIGNIFICANT CHANGE UP
GLUCOSE BLDC GLUCOMTR-MCNC: 131 MG/DL — HIGH (ref 70–99)
GLUCOSE BLDC GLUCOMTR-MCNC: 131 MG/DL — HIGH (ref 70–99)
GLUCOSE BLDC GLUCOMTR-MCNC: 143 MG/DL — HIGH (ref 70–99)
GLUCOSE BLDC GLUCOMTR-MCNC: 143 MG/DL — HIGH (ref 70–99)
GLUCOSE BLDC GLUCOMTR-MCNC: 146 MG/DL — HIGH (ref 70–99)
GLUCOSE BLDC GLUCOMTR-MCNC: 146 MG/DL — HIGH (ref 70–99)
GLUCOSE BLDC GLUCOMTR-MCNC: 152 MG/DL — HIGH (ref 70–99)
GLUCOSE BLDC GLUCOMTR-MCNC: 152 MG/DL — HIGH (ref 70–99)
GLUCOSE SERPL-MCNC: 152 MG/DL — HIGH (ref 70–99)
GLUCOSE SERPL-MCNC: 152 MG/DL — HIGH (ref 70–99)
HCT VFR BLD CALC: 25.5 % — LOW (ref 39–50)
HCT VFR BLD CALC: 25.5 % — LOW (ref 39–50)
HCT VFR BLD CALC: 27.1 % — LOW (ref 39–50)
HCT VFR BLD CALC: 27.1 % — LOW (ref 39–50)
HGB BLD-MCNC: 8.1 G/DL — LOW (ref 13–17)
HGB BLD-MCNC: 8.1 G/DL — LOW (ref 13–17)
HGB BLD-MCNC: 8.7 G/DL — LOW (ref 13–17)
HGB BLD-MCNC: 8.7 G/DL — LOW (ref 13–17)
INR BLD: 1.62 — HIGH (ref 0.85–1.18)
INR BLD: 1.62 — HIGH (ref 0.85–1.18)
LACTATE SERPL-SCNC: 2.2 MMOL/L — HIGH (ref 0.5–2)
LACTATE SERPL-SCNC: 2.2 MMOL/L — HIGH (ref 0.5–2)
LIDOCAIN IGE QN: 23 U/L — SIGNIFICANT CHANGE UP (ref 7–60)
LIDOCAIN IGE QN: 23 U/L — SIGNIFICANT CHANGE UP (ref 7–60)
MAGNESIUM SERPL-MCNC: 1.8 MG/DL — SIGNIFICANT CHANGE UP (ref 1.6–2.6)
MAGNESIUM SERPL-MCNC: 1.8 MG/DL — SIGNIFICANT CHANGE UP (ref 1.6–2.6)
MCHC RBC-ENTMCNC: 27.6 PG — SIGNIFICANT CHANGE UP (ref 27–34)
MCHC RBC-ENTMCNC: 27.6 PG — SIGNIFICANT CHANGE UP (ref 27–34)
MCHC RBC-ENTMCNC: 28.1 PG — SIGNIFICANT CHANGE UP (ref 27–34)
MCHC RBC-ENTMCNC: 28.1 PG — SIGNIFICANT CHANGE UP (ref 27–34)
MCHC RBC-ENTMCNC: 31.8 GM/DL — LOW (ref 32–36)
MCHC RBC-ENTMCNC: 31.8 GM/DL — LOW (ref 32–36)
MCHC RBC-ENTMCNC: 32.1 GM/DL — SIGNIFICANT CHANGE UP (ref 32–36)
MCHC RBC-ENTMCNC: 32.1 GM/DL — SIGNIFICANT CHANGE UP (ref 32–36)
MCV RBC AUTO: 87 FL — SIGNIFICANT CHANGE UP (ref 80–100)
MCV RBC AUTO: 87 FL — SIGNIFICANT CHANGE UP (ref 80–100)
MCV RBC AUTO: 87.4 FL — SIGNIFICANT CHANGE UP (ref 80–100)
MCV RBC AUTO: 87.4 FL — SIGNIFICANT CHANGE UP (ref 80–100)
MELD SCORE WITH DIALYSIS: 27 POINTS — SIGNIFICANT CHANGE UP
MELD SCORE WITH DIALYSIS: 27 POINTS — SIGNIFICANT CHANGE UP
MELD SCORE WITHOUT DIALYSIS: 14 POINTS — SIGNIFICANT CHANGE UP
MELD SCORE WITHOUT DIALYSIS: 14 POINTS — SIGNIFICANT CHANGE UP
NRBC # BLD: 0 /100 WBCS — SIGNIFICANT CHANGE UP (ref 0–0)
PHOSPHATE SERPL-MCNC: 2.6 MG/DL — SIGNIFICANT CHANGE UP (ref 2.5–4.5)
PHOSPHATE SERPL-MCNC: 2.6 MG/DL — SIGNIFICANT CHANGE UP (ref 2.5–4.5)
PLATELET # BLD AUTO: 109 K/UL — LOW (ref 150–400)
PLATELET # BLD AUTO: 109 K/UL — LOW (ref 150–400)
PLATELET # BLD AUTO: 88 K/UL — LOW (ref 150–400)
PLATELET # BLD AUTO: 88 K/UL — LOW (ref 150–400)
POTASSIUM SERPL-MCNC: 4.1 MMOL/L — SIGNIFICANT CHANGE UP (ref 3.5–5.3)
POTASSIUM SERPL-MCNC: 4.1 MMOL/L — SIGNIFICANT CHANGE UP (ref 3.5–5.3)
POTASSIUM SERPL-SCNC: 4.1 MMOL/L — SIGNIFICANT CHANGE UP (ref 3.5–5.3)
POTASSIUM SERPL-SCNC: 4.1 MMOL/L — SIGNIFICANT CHANGE UP (ref 3.5–5.3)
PROT SERPL-MCNC: 6.4 G/DL — SIGNIFICANT CHANGE UP (ref 6–8.3)
PROT SERPL-MCNC: 6.4 G/DL — SIGNIFICANT CHANGE UP (ref 6–8.3)
PROTHROM AB SERPL-ACNC: 18.2 SEC — HIGH (ref 9.5–13)
PROTHROM AB SERPL-ACNC: 18.2 SEC — HIGH (ref 9.5–13)
RBC # BLD: 2.93 M/UL — LOW (ref 4.2–5.8)
RBC # BLD: 2.93 M/UL — LOW (ref 4.2–5.8)
RBC # BLD: 3.1 M/UL — LOW (ref 4.2–5.8)
RBC # BLD: 3.1 M/UL — LOW (ref 4.2–5.8)
RBC # FLD: 14.6 % — HIGH (ref 10.3–14.5)
RBC # FLD: 14.6 % — HIGH (ref 10.3–14.5)
RBC # FLD: 15 % — HIGH (ref 10.3–14.5)
RBC # FLD: 15 % — HIGH (ref 10.3–14.5)
SODIUM SERPL-SCNC: 142 MMOL/L — SIGNIFICANT CHANGE UP (ref 135–145)
SODIUM SERPL-SCNC: 142 MMOL/L — SIGNIFICANT CHANGE UP (ref 135–145)
WBC # BLD: 6.33 K/UL — SIGNIFICANT CHANGE UP (ref 3.8–10.5)
WBC # BLD: 6.33 K/UL — SIGNIFICANT CHANGE UP (ref 3.8–10.5)
WBC # BLD: 7.9 K/UL — SIGNIFICANT CHANGE UP (ref 3.8–10.5)
WBC # BLD: 7.9 K/UL — SIGNIFICANT CHANGE UP (ref 3.8–10.5)
WBC # FLD AUTO: 6.33 K/UL — SIGNIFICANT CHANGE UP (ref 3.8–10.5)
WBC # FLD AUTO: 6.33 K/UL — SIGNIFICANT CHANGE UP (ref 3.8–10.5)
WBC # FLD AUTO: 7.9 K/UL — SIGNIFICANT CHANGE UP (ref 3.8–10.5)
WBC # FLD AUTO: 7.9 K/UL — SIGNIFICANT CHANGE UP (ref 3.8–10.5)

## 2023-11-14 PROCEDURE — 74019 RADEX ABDOMEN 2 VIEWS: CPT | Mod: 26

## 2023-11-14 RX ORDER — CARVEDILOL PHOSPHATE 80 MG/1
12.5 CAPSULE, EXTENDED RELEASE ORAL EVERY 12 HOURS
Refills: 0 | Status: DISCONTINUED | OUTPATIENT
Start: 2023-11-14 | End: 2023-11-15

## 2023-11-14 RX ORDER — OXYCODONE HYDROCHLORIDE 5 MG/1
5 TABLET ORAL EVERY 6 HOURS
Refills: 0 | Status: DISCONTINUED | OUTPATIENT
Start: 2023-11-14 | End: 2023-11-15

## 2023-11-14 RX ORDER — POLYETHYLENE GLYCOL 3350 17 G/17G
17 POWDER, FOR SOLUTION ORAL EVERY 24 HOURS
Refills: 0 | Status: DISCONTINUED | OUTPATIENT
Start: 2023-11-14 | End: 2023-11-15

## 2023-11-14 RX ORDER — CLOPIDOGREL BISULFATE 75 MG/1
75 TABLET, FILM COATED ORAL DAILY
Refills: 0 | Status: DISCONTINUED | OUTPATIENT
Start: 2023-11-14 | End: 2023-11-15

## 2023-11-14 RX ORDER — MAGNESIUM SULFATE 500 MG/ML
1 VIAL (ML) INJECTION ONCE
Refills: 0 | Status: COMPLETED | OUTPATIENT
Start: 2023-11-14 | End: 2023-11-14

## 2023-11-14 RX ORDER — SPIRONOLACTONE 25 MG/1
25 TABLET, FILM COATED ORAL EVERY 24 HOURS
Refills: 0 | Status: DISCONTINUED | OUTPATIENT
Start: 2023-11-15 | End: 2023-11-15

## 2023-11-14 RX ORDER — PANTOPRAZOLE SODIUM 20 MG/1
40 TABLET, DELAYED RELEASE ORAL
Refills: 0 | Status: DISCONTINUED | OUTPATIENT
Start: 2023-11-14 | End: 2023-11-15

## 2023-11-14 RX ORDER — FUROSEMIDE 40 MG
40 TABLET ORAL DAILY
Refills: 0 | Status: DISCONTINUED | OUTPATIENT
Start: 2023-11-15 | End: 2023-11-15

## 2023-11-14 RX ADMIN — POLYETHYLENE GLYCOL 3350 17 GRAM(S): 17 POWDER, FOR SOLUTION ORAL at 11:58

## 2023-11-14 RX ADMIN — Medication 2: at 18:35

## 2023-11-14 RX ADMIN — CARVEDILOL PHOSPHATE 6.25 MILLIGRAM(S): 80 CAPSULE, EXTENDED RELEASE ORAL at 06:21

## 2023-11-14 RX ADMIN — CEFTRIAXONE 100 MILLIGRAM(S): 500 INJECTION, POWDER, FOR SOLUTION INTRAMUSCULAR; INTRAVENOUS at 08:37

## 2023-11-14 RX ADMIN — PANTOPRAZOLE SODIUM 40 MILLIGRAM(S): 20 TABLET, DELAYED RELEASE ORAL at 17:17

## 2023-11-14 RX ADMIN — CARVEDILOL PHOSPHATE 12.5 MILLIGRAM(S): 80 CAPSULE, EXTENDED RELEASE ORAL at 17:17

## 2023-11-14 RX ADMIN — SPIRONOLACTONE 25 MILLIGRAM(S): 25 TABLET, FILM COATED ORAL at 06:21

## 2023-11-14 RX ADMIN — GABAPENTIN 100 MILLIGRAM(S): 400 CAPSULE ORAL at 22:11

## 2023-11-14 RX ADMIN — PANTOPRAZOLE SODIUM 40 MILLIGRAM(S): 20 TABLET, DELAYED RELEASE ORAL at 06:21

## 2023-11-14 RX ADMIN — CLOPIDOGREL BISULFATE 75 MILLIGRAM(S): 75 TABLET, FILM COATED ORAL at 15:45

## 2023-11-14 RX ADMIN — Medication 40 MILLIGRAM(S): at 06:21

## 2023-11-14 RX ADMIN — OXYCODONE HYDROCHLORIDE 5 MILLIGRAM(S): 5 TABLET ORAL at 12:03

## 2023-11-14 RX ADMIN — Medication 100 MILLIGRAM(S): at 11:58

## 2023-11-14 RX ADMIN — OXYCODONE HYDROCHLORIDE 5 MILLIGRAM(S): 5 TABLET ORAL at 13:05

## 2023-11-14 RX ADMIN — Medication 100 GRAM(S): at 17:14

## 2023-11-14 NOTE — DISCHARGE NOTE PROVIDER - HOSPITAL COURSE
#Discharge: do not delete    Patient is a 63-year-old [place] ([facility]) Male, former smoker with hx HTN, HLD, severe 3 vessel CAD, pre-diabetes, gout, TIA (14 yrs ago), gastric ulcer, rectal bleed, LIZ and alcoholic liver disease c/b cirrhosis and portal hypertension c/b esophageal varices (and UGIB) and splenomegaly presents for hematemesis x2 11/12 AM. S/p EGD on 11/13 which showed duodenitis, nonbleeding varices, healing PUD, and portal hypertensive gastropathy. Switched protonix to PO. Stepped down to RMF on 11/13.       Hospital course (by problem):     #Acute on chronic anemia.   Pt with baseline Hb of 10. Had Hb of 7.7 on 11/2, s/p 1U pRBCs with goal Hb >8 due to hx of CAD with stents. Hb of 8.5 on 11/13. Hb of 8.1 on 11/14 with repeat Hb of 8.7 later that day.    #Thrombocytopenia.  Plt of 95 on 11/13. Likely iso cirrhosis. Improved to 109 on 11/14.  - Continue to monitor.    #Upper GI bleed.   Pt presented with dark brown/black emesis x2 and melena. 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 S/p IV protonix.   Had RUQ pain on 11/14 with one episode of melena likely residual. XR abdomen: Nonspecific bowel gas pattern with no obstruction.  - C/w protonix 40mg PO q12h (11/13-11/26) x2 weeks then switch to qd  - Oxycodone 5mg q6h PRN severe pain.    #Cirrhosis.   Pt with history of decompensated cirrhosis with esophageal varices w/ banding back in 04/2023. Follows with Dr. Lawson. On home rifaximin 550mg PO BID, coreg 12.5mg BID, aldactone 25mg QD, Lasix 40mg.   - C/w home rifaximin 550mg PO q12h  - C/w CTX 1g IV qd x5 days (11/12-11/16), can discharge on cefpodoxime if does not complete course prior to discharge  - Restarted home meds: Lasix 40mg PO qd, aldactone 25mg PO qd, carvedilol 12.5mg PO q12h (with holding parameters)   - F/u with Dr. Lawson outpatient     #Lactic acidosis.   Pt had lactate of 7.5 on 11/12 iso decompensated cirrhosis, s/p 2L NS, improved lactate to 2.6 on 11/13. Lactate of 2.2 on 11/14.   - No need to trend further.    #Hypomagnesemia.   Mg of 1.4 on 11/13. Improved to 1.8 on 11/14.   - Repleted  - F/u AM Mg level.    #Pleural effusion, left.   Pt with recurrent pleural effusion, despite being on lasix and aldactone. Pt was admitted to [facility] on 9/26 to 9/30, s/p pigtail drainage (4L). CTAP on 10/31 showed moderate to severe left pleural effusion with near complete atelectasis of the left lower lobe. CXR on 11/13 showed left lower lung opacification/pleural effusion has substantially improved compared to prior exam 10/23/2023. Small left pleural effusion remains.   - C/w home diuretic as above.    #CAD (coronary artery disease).   Pt with hx of CAD s/p OPCAB x 4 on 9/14/23. On Plavix 75mg last taken 11/11 AM (was stopped for a week recently and was restarted on 11/10 by Dr. Lawson and Dr. Hinton [facility])   - Will resume plavix on 11/14 at 4PM  - Monitor for any signs/symptoms of bleeding    #Hypertension.   Pt with HTN. On home coreg 12.5mg BID, aldactone 25mg qd, Lasix 40mg qd.   - C/w home meds.    #DM (diabetes mellitus).   History of DM on metformin at home. HbA1c of 7.0 on 9/23/23.   - C/w home meds.    Patient was discharged to: home    New medications:   - C/w protonix 40mg PO q12h (11/13-11/26) x2 weeks then switch to qd  - C/w CTX 1g IV qd x5 days (11/12-11/16), can discharge on cefpodoxime if does not complete course prior to discharge    Changes to old medications:  Medications that were stopped:    Items to follow up as outpatient:    Physical exam at the time of discharge:       #Discharge: do not delete    Patient is a 63-year-old [place] ([facility]) Male, former smoker with hx HTN, HLD, severe 3 vessel CAD, pre-diabetes, gout, TIA (14 yrs ago), gastric ulcer, rectal bleed, LIZ and alcoholic liver disease c/b cirrhosis and portal hypertension c/b esophageal varices (and UGIB) and splenomegaly presents for hematemesis x2 11/12 AM. S/p EGD on 11/13 which showed duodenitis, nonbleeding varices, healing PUD, and portal hypertensive gastropathy. Switched protonix to PO. Stepped down to RMF on 11/13.       Hospital course (by problem):     #Upper GI bleed.   Pt presented with dark brown/black emesis x2 and melena. 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 S/p IV protonix.   Had RUQ pain on 11/14 with one episode of melena likely residual. XR abdomen: Nonspecific bowel gas pattern with no obstruction.  - c/w Protonix 40mg PO q12h (11/13-11/26) x2 weeks then switch to qd  - f/u with Hepatology, Dr. Lawson    #Cirrhosis.   Pt with history of decompensated cirrhosis with esophageal varices w/ banding back in 04/2023. Follows with Dr. Lawson. On home rifaximin 550mg PO BID, coreg 12.5mg BID, aldactone 25mg QD, Lasix 40mg.   - c/w home rifaximin 550mg PO q12h  - Completed five day course of CTX/Cefpodoxime on 11/16 (had CTX 11/12 - 11/14, and Cefpodoxime 11/15 - 11/16)  - c/w home meds: Lasix 40mg PO qd, aldactone 25mg PO qd, carvedilol 6.25mg PO q12h (was on carvedilol 3.125 after episode of fall d/t orthostatics 11/15, but can take home dose on discharge)  - f/u with Dr. Lawson outpatient     #CAD (coronary artery disease).   Pt with hx of CAD s/p OPCAB x 4 (CABG) on 9/14/23. On Plavix 75mg (d/t aspirin allergy) last taken 11/11 AM (was stopped for a week recently and was restarted on 11/10 by Dr. Lawson and Dr. Hinton CTS). Plavix was resumed during hospitalization on 11/14 at 4PM, however was stopped on 11/15 after orthostatic episode w/ fall. Hgb continued to range between 9-7 subsequently during hospitalization, with no acute signs of bleeding. Patient complained of intermittent chest pain at rest, and EKG did not show signs of ACS. Troponin T also not elevated.   - Holding Plavix until f/u with CTS outpatient  - f/u with Cardiologist, Dr. Box    #Orthostatics  Pt had episode of fall w/o LOC or headstrike when walking to the bathroom on 11/15 to brush his teeth. EKG was performed without acute changes, Troponin T was WNL (18) after the episode, CK and CKMB were also normal. Orthostatics were performed       #Acute on chronic anemia.   Pt with baseline Hb of 10. Had Hb of 7.7 on 11/2, s/p 1U pRBCs with goal Hb >8 due to hx of CAD with stents. Hb of 8.5 on 11/13. Hb of 8.1 on 11/14 with repeat Hb of 8.7 later that day.    #Thrombocytopenia.  Plt of 95 on 11/13. Likely iso cirrhosis. Improved to 109 on 11/14.  - Continue to monitor.    #Lactic acidosis.   Pt had lactate of 7.5 on 11/12 iso decompensated cirrhosis, s/p 2L NS, improved lactate to 2.6 on 11/13. Lactate of 2.2 on 11/14.   - No need to trend further.    #Hypomagnesemia.   Mg of 1.4 on 11/13. Improved to 1.8 on 11/14.   - Repleted  - F/u AM Mg level.    #Pleural effusion, left.   Pt with recurrent pleural effusion, despite being on Lasix and aldactone. Pt was admitted to [facility] on 9/26 to 9/30, s/p pigtail drainage (4L). CTAP on 10/31 showed moderate to severe left pleural effusion with near complete atelectasis of the left lower lobe. CXR on 11/13 showed left lower lung opacification/pleural effusion has substantially improved compared to prior exam 10/23/2023. Small left pleural effusion remains.   - C/w home diuretics as above.    #Hypertension.   Pt with HTN. On home coreg 12.5mg BID, aldactone 25mg qd, Lasix 40mg qd.   - C/w home meds.    #DM (diabetes mellitus).   History of DM on metformin at home. HbA1c of 7.0 on 9/23/23.   - C/w home meds.    Patient was discharged to: home    New medications:   Protonix 40mg PO q12h for 2 weeks (11/13-11/26), then switch to qd    Changes to old medications: N/A  Medications that were stopped: Plavix 75mg     Items to follow up as outpatient: You will be called by Dr. Hinton's office (CT Surgery) to make an appointment within the next 1 - 2 weeks. It is important that you go to this appointment to follow up on your Plavix which was stopped during this hospitalization.   - f/u with Dr. Box  - f/u with Dr. Lawson     Physical exam at the time of discharge:  GENERAL: resting comfortably in bed; NAD, mild diffuse jaundice  EYES: EOMI, PERRLA, conjunctiva and sclera clear   NECK: Supple  LUNGS: CTA B/L; no W/R/R  HEART: S1/S2 normal, RRR; No murmurs   ABDOMEN: Soft, Nondistended; Bowel sounds present, hepatomegaly present, moderate RUQ tenderness, mild epigastric tenderness, no rebound tenderness, negative travis's sign.  EXTREMITIES:  2+ Peripheral Pulses, No clubbing, cyanosis, or edema   NERVOUS SYSTEM:  AAOx3; CNII-XII grossly intact; no focal deficits           #Discharge: do not delete    Patient is a 63-year-old [place] ([facility]) Male, former smoker with hx HTN, HLD, severe 3 vessel CAD, pre-diabetes, gout, TIA (14 yrs ago), gastric ulcer, rectal bleed, LIZ and alcoholic liver disease c/b cirrhosis and portal hypertension c/b esophageal varices (and UGIB) and splenomegaly presents for hematemesis x2 11/12 AM. S/p EGD on 11/13 which showed duodenitis, nonbleeding varices, healing PUD, and portal hypertensive gastropathy. Switched protonix to PO. Stepped down to RMF on 11/13.       Hospital course (by problem):     #Upper GI bleed.   Pt presented with dark brown/black emesis x2 and melena. 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 S/p IV protonix.   Had RUQ pain on 11/14 with one episode of melena likely residual. XR abdomen: Nonspecific bowel gas pattern with no obstruction.  - c/w Protonix 40mg PO q12h (11/13-11/26) x2 weeks then switch to qd  - f/u with Hepatology, Dr. Lawson    #Cirrhosis.   Pt with history of decompensated cirrhosis with esophageal varices w/ banding back in 04/2023. Follows with Dr. Lawson. On home rifaximin 550mg PO BID, coreg 12.5mg BID, aldactone 25mg QD, Lasix 40mg.   - c/w home rifaximin 550mg PO q12h  - Completed five day course of CTX/Cefpodoxime on 11/16 (had CTX 11/12 - 11/14, and Cefpodoxime 11/15 - 11/16)  - c/w home meds: Lasix 40mg PO qd, aldactone 25mg PO qd, carvedilol 6.25mg PO q12h (was on carvedilol 3.125 after episode of fall d/t orthostatics 11/15, but can take home dose on discharge)  - f/u with Dr. Lawson outpatient     #CAD (coronary artery disease).   Pt with hx of CAD s/p OPCAB x 4 (CABG) on 9/14/23. On Plavix 75mg (d/t aspirin allergy) last taken 11/11 AM (was stopped for a week recently and was restarted on 11/10 by Dr. Lawson and Dr. Hinton CTS). Plavix was resumed during hospitalization on 11/14 at 4PM, however was stopped on 11/15 after orthostatic episode w/ fall. Hgb continued to range between 9-7 subsequently during hospitalization, with no acute signs of bleeding. Patient complained of intermittent chest pain at rest, and EKG did not show signs of ACS. Troponin T also not elevated.   - Holding Plavix until f/u with CTS outpatient  - f/u with Cardiologist, Dr. Box    #Orthostatics  Pt had episode of fall w/o LOC or headstrike when walking to the bathroom on 11/15 to brush his teeth. EKG was performed without acute changes, Troponin T was WNL (18) after the episode, CK and CKMB were also normal. Orthostatics were performed, both at baseline and during physical therapy. Patient received several doses albumin which led to some improvement.  - f/u with Dr. Lawson   - adequate PO intake     #Acute on chronic anemia.   Pt with baseline Hb of 10. Had Hb of 7.7 on 11/2, s/p 1U pRBCs with goal Hb >8 due to hx of CAD with stents. Hb of 8.5 on 11/13. Hb of 8.1 on 11/14 with repeat Hb of 8.7 later that day.    #Thrombocytopenia.  Plt of 95 on 11/13. Likely iso cirrhosis. Improved to 109 on 11/14.  - Continue to monitor.    #Lactic acidosis.   Pt had lactate of 7.5 on 11/12 iso decompensated cirrhosis, s/p 2L NS, improved lactate to 2.6 on 11/13. Lactate of 2.2 on 11/14.   - No need to trend further.    #Hypomagnesemia.   Mg of 1.4 on 11/13. Improved to 1.8 on 11/14.   - Repleted  - F/u AM Mg level.    #Pleural effusion, left.   Pt with recurrent pleural effusion, despite being on Lasix and aldactone. Pt was admitted to [facility] on 9/26 to 9/30, s/p pigtail drainage (4L). CTAP on 10/31 showed moderate to severe left pleural effusion with near complete atelectasis of the left lower lobe. CXR on 11/13 showed left lower lung opacification/pleural effusion has substantially improved compared to prior exam 10/23/2023. Small left pleural effusion remains.   - C/w home diuretics as above.    #Hypertension.   Pt with HTN. On home coreg 12.5mg BID, aldactone 25mg qd, Lasix 40mg qd.   - C/w home meds.    #DM (diabetes mellitus).   History of DM on metformin at home. HbA1c of 7.0 on 9/23/23.   - C/w home meds.    Patient was discharged to: home    New medications:   Protonix 40mg PO q12h for 2 weeks (11/13-11/26), then switch to qd    Changes to old medications: N/A  Medications that were stopped: Plavix 75mg     Items to follow up as outpatient: You will be called by Dr. Hinton's office (CT Surgery) to make an appointment within the next 1 - 2 weeks. It is important that you go to this appointment to follow up on your Plavix which was stopped during this hospitalization.   - f/u with Dr. Box  - f/u with Dr. Lawson     Physical exam at the time of discharge:  GENERAL: resting comfortably in bed; NAD, mild diffuse jaundice  EYES: EOMI, PERRLA, conjunctiva and sclera clear   NECK: Supple  LUNGS: CTA B/L; no W/R/R  HEART: S1/S2 normal, RRR; No murmurs   ABDOMEN: Soft, Nondistended; Bowel sounds present, hepatomegaly present, moderate RUQ tenderness, mild epigastric tenderness, no rebound tenderness, negative travis's sign.  EXTREMITIES:  2+ Peripheral Pulses, No clubbing, cyanosis, or edema   NERVOUS SYSTEM:  AAOx3; CNII-XII grossly intact; no focal deficits           #Discharge: do not delete    Patient is a 63-year-old [place] ([facility]) Male, former smoker with hx HTN, HLD, severe 3 vessel CAD, pre-diabetes, gout, TIA (14 yrs ago), gastric ulcer, rectal bleed, LIZ and alcoholic liver disease c/b cirrhosis and portal hypertension c/b esophageal varices (and UGIB) and splenomegaly presents for hematemesis x2 11/12 AM. S/p EGD on 11/13 which showed duodenitis, nonbleeding varices, healing PUD, and portal hypertensive gastropathy. Switched protonix to PO. Stepped down to RMF on 11/13.       Hospital course (by problem):     #Upper GI bleed.   Pt presented with dark brown/black emesis x2 and melena. 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 S/p IV protonix.   Had RUQ pain on 11/14 with one episode of melena likely residual. XR abdomen: Nonspecific bowel gas pattern with no obstruction.  - c/w Protonix 40mg PO q12h (11/13-11/26) x2 weeks then switch to qd  - f/u with Hepatology, Dr. Lawson    #Cirrhosis.   Pt with history of decompensated cirrhosis with esophageal varices w/ banding back in 04/2023. Follows with Dr. Lawson. On home rifaximin 550mg PO BID, coreg 12.5mg BID, aldactone 25mg QD, Lasix 40mg.   - c/w home rifaximin 550mg PO q12h  - Completed five day course of CTX/Cefpodoxime on 11/16 (had CTX 11/12 - 11/14, and Cefpodoxime 11/15 - 11/16)  - c/w home meds: Lasix 40mg PO qd, aldactone 25mg PO qd, carvedilol 6.25mg PO q12h (was on carvedilol 3.125 after episode of fall d/t orthostatics 11/15, but can take home dose on discharge)  - f/u with Dr. Lawson outpatient     #CAD (coronary artery disease).   Pt with hx of CAD s/p OPCAB x 4 (CABG) on 9/14/23. On Plavix 75mg (d/t aspirin allergy) last taken 11/11 AM (was stopped for a week recently and was restarted on 11/10 by Dr. Lawson and Dr. Hinton CTS). Plavix was resumed during hospitalization on 11/14 at 4PM, however was stopped on 11/15 after orthostatic episode w/ fall. Hgb continued to range between 9-7 subsequently during hospitalization, with no acute signs of bleeding. Patient complained of intermittent chest pain at rest, and EKG did not show signs of ACS. Troponin T also not elevated.   - Holding Plavix until f/u with CTS outpatient  - f/u with Cardiologist, Dr. Box    #Orthostatics  Pt had episode of fall w/o LOC or headstrike when walking to the bathroom on 11/15 to brush his teeth. EKG was performed without acute changes, Troponin T was WNL (18) after the episode, CK and CKMB were also normal. Orthostatics were performed, both at baseline and during physical therapy. Patient received several doses albumin which led to some improvement.  - f/u with Dr. Lawson   - adequate PO intake     #Acute on chronic anemia.   Pt with baseline Hb of 10. Had Hb of 7.7 on 11/2, s/p 1U pRBCs with goal Hb >8 due to hx of CAD with stents. Hb of 8.5 on 11/13. Hb of 8.1 on 11/14 with repeat Hb of 8.7 later that day.    #Thrombocytopenia.  Plt of 95 on 11/13. Likely iso cirrhosis. Improved to 109 on 11/14.  - Continue to monitor.    #Lactic acidosis.   Pt had lactate of 7.5 on 11/12 iso decompensated cirrhosis, s/p 2L NS, improved lactate to 2.6 on 11/13. Lactate of 2.2 on 11/14.   - No need to trend further.    #Hypomagnesemia.   Mg of 1.4 on 11/13. Improved to 1.8 on 11/14.   - Repleted  - F/u AM Mg level.    #Pleural effusion, left.   Pt with recurrent pleural effusion, despite being on Lasix and aldactone. Pt was admitted to [facility] on 9/26 to 9/30, s/p pigtail drainage (4L). CTAP on 10/31 showed moderate to severe left pleural effusion with near complete atelectasis of the left lower lobe. CXR on 11/13 showed left lower lung opacification/pleural effusion has substantially improved compared to prior exam 10/23/2023. Small left pleural effusion remains.   - C/w home diuretics as above.    #Hypertension.   Pt with HTN. On home coreg 6.25mg BID, aldactone 25mg qd, Lasix 40mg qd.   - C/w home meds.    #DM (diabetes mellitus).   History of DM on metformin at home. HbA1c of 7.0 on 9/23/23.   - C/w home meds.    Patient was discharged to: home    New medications:   Protonix 40mg PO q12h for 2 weeks (11/13-11/26), then switch to qd    Changes to old medications: N/A  Medications that were stopped: Plavix 75mg     Items to follow up as outpatient: You will be called by Dr. Hinton's office (CT Surgery) to make an appointment within the next 1 - 2 weeks. It is important that you go to this appointment to follow up on your Plavix which was stopped during this hospitalization.   - f/u with Dr. Box  - f/u with Dr. Lawson     Physical exam at the time of discharge:  GENERAL: resting comfortably in bed; NAD, mild diffuse jaundice  EYES: EOMI, PERRLA, conjunctiva and sclera clear   NECK: Supple  LUNGS: CTA B/L; no W/R/R  HEART: S1/S2 normal, RRR; No murmurs   ABDOMEN: Soft, Nondistended; Bowel sounds present, hepatomegaly present, moderate RUQ tenderness, mild epigastric tenderness, no rebound tenderness, negative travis's sign.  EXTREMITIES:  2+ Peripheral Pulses, No clubbing, cyanosis, or edema   NERVOUS SYSTEM:  AAOx3; CNII-XII grossly intact; no focal deficits             Patient is a 63-year-old [place] ([facility]) Male, former smoker with hx HTN, HLD, severe 3 vessel CAD, pre-diabetes, gout, TIA (14 yrs ago), gastric ulcer, rectal bleed, LIZ and alcoholic liver disease c/b cirrhosis and portal hypertension c/b esophageal varices (and UGIB) and splenomegaly presents for hematemesis x2 11/12 AM. S/p EGD on 11/13 which showed duodenitis, nonbleeding varices, healing PUD, and portal hypertensive gastropathy. Switched protonix to PO. Stepped down to RMF on 11/13.  Now with no episodes of hematemsis. Stable for discharge.      Hospital course (by problem):     #Upper GI bleed.   Pt presented with dark brown/black emesis x2 and melena. 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 S/p IV protonix.   Had RUQ pain on 11/14 with one episode of melena likely residual. XR abdomen: Nonspecific bowel gas pattern with no obstruction. 11/17 PM, orthostatics neg (supine 116/78 HR 85 and standing 115/74 HR 94).   - c/w Protonix 40mg PO q12h (11/13-11/26) x2 weeks then switch to qd  - f/u with Hepatology, Dr. Lawson    #Cirrhosis.   Pt with history of decompensated cirrhosis with esophageal varices w/ banding back in 04/2023. Follows with Dr. Lawson. On home rifaximin 550mg PO BID, coreg 12.5mg BID, aldactone 25mg QD, Lasix 40mg.   - c/w home rifaximin 550mg PO q12h  - Completed five day course of CTX/Cefpodoxime on 11/16 (had CTX 11/12 - 11/14, and Cefpodoxime 11/15 - 11/16)  - c/w home meds: Lasix 40mg PO qd, aldactone 25mg PO qd, carvedilol 3.125mg PO q12h (was on carvedilol 6.25 BID)  - f/u with Dr. Lawson outpatient     #CAD (coronary artery disease).   Pt with hx of CAD s/p OPCAB x 4 (CABG) on 9/14/23. On Plavix 75mg (d/t aspirin allergy) last taken 11/11 AM (was stopped for a week recently and was restarted on 11/10 by Dr. Lawson and Dr. Hinton CTS). Plavix was resumed during hospitalization on 11/14 at 4PM, however was stopped on 11/15 after orthostatic episode w/ fall. Hgb continued to range between 9-7 subsequently during hospitalization, with no acute signs of bleeding. Patient complained of intermittent chest pain at rest, and EKG did not show signs of ACS. Troponin T also not elevated.   - Holding Plavix until f/u with CTS outpatient  - f/u with Cardiologist, Dr. Box    #Orthostatics  Pt had episode of fall w/o LOC or headstrike when walking to the bathroom on 11/15 to brush his teeth. EKG was performed without acute changes, Troponin T was WNL (18) after the episode, CK and CKMB were also normal. Orthostatics were performed, both at baseline and during physical therapy. Patient received several doses albumin which led to some improvement.  - f/u with Dr. Lawson   - adequate PO intake     #Acute on chronic anemia.   Pt with baseline Hb of 10. Had Hb of 7.7 on 11/2, s/p 1U pRBCs with goal Hb >8 due to hx of CAD with stents. Hb of 8.5 on 11/13. Hb of 8.1 on 11/14 with repeat Hb of 8.7 later that day. Now, 9.5 on day of dsicharge    #Thrombocytopenia.  Plt of 95 on 11/13. Likely iso cirrhosis. Now 95 11/18      #Lactic acidosis.   Pt had lactate of 7.5 on 11/12 iso decompensated cirrhosis, s/p 2L NS, improved lactate to 3.3 on 11/15  - No need to trend further.    #Hypomagnesemia.   Mg of 1.4 on 11/13. Improved to 1.8       #Pleural effusion, left.   Pt with recurrent pleural effusion, despite being on Lasix and aldactone. Pt was admitted to [facility] on 9/26 to 9/30, s/p pigtail drainage (4L). CTAP on 10/31 showed moderate to severe left pleural effusion with near complete atelectasis of the left lower lobe. CXR on 11/13 showed left lower lung opacification/pleural effusion has substantially improved compared to prior exam 10/23/2023. Small left pleural effusion remains.   - C/w home diuretics as above.    #Hypertension.   Pt with HTN. On home coreg 6.25mg BID, aldactone 25mg qd, Lasix 40mg qd.   - C/w home meds and change coreg to 3.125 BID    #DM (diabetes mellitus).   History of DM on metformin at home. HbA1c of 7.0 on 9/23/23.   - C/w home meds.    Patient was discharged to: home    New medications:   Protonix 40mg PO q12h for 2 weeks (11/13-11/26), then switch to qd    Changes to old medications: reduced coreg to 3.125 BID  Medications that were stopped: Plavix 75mg     Items to follow up as outpatient: You will be called by Dr. Hinton's office (CT Surgery) to make an appointment within the next 1 - 2 weeks. It is important that you go to this appointment to follow up on your Plavix which was stopped during this hospitalization.   - f/u with Dr. Box  - f/u with Dr. Lawson     Physical exam at the time of discharge:  GENERAL: resting comfortably in bed; NAD  EYES: EOMI  LUNGS: CTA B/L; no W/R/R  HEART: S1/S2 normal, RRR; No murmurs   ABDOMEN: Soft, Nondistended; Bowel sounds present, no rebound tenderness, negative travis's sign.  EXTREMITIES:  2+ Peripheral Pulses, No clubbing, cyanosis, or edema   NERVOUS SYSTEM:  AAOx3; CNII-XII grossly intact; no focal deficits

## 2023-11-14 NOTE — PROGRESS NOTE ADULT - PROBLEM SELECTOR PLAN 2
Pt presented with dark brown/black emesis x2 and melena. 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 S/p IV protonix.   Had RUQ pain on 11/14 with one episode of melena likely residual. XR abdomen: Nonspecific bowel gas pattern with no obstruction.  - C/w protonix 40mg PO q12h (11/13-11/26) x2 weeks then switch to qd  - Oxycodone 5mg q6h PRN severe pain

## 2023-11-14 NOTE — DISCHARGE NOTE PROVIDER - NSDCCPCAREPLAN_GEN_ALL_CORE_FT
PRINCIPAL DISCHARGE DIAGNOSIS  Diagnosis: Hematemesis  Assessment and Plan of Treatment: Hematemesis is when you vomit blood. It is a sign of bleeding in the upper GI tract (gastrointestinal tract). The upper GI tract includes the mouth, throat, esophagus, stomach, and the upper part of the small intestine (duodenum). Hematemesis is usually caused by bleeding in the esophagus or stomach.  Esophageal varices are enlarged veins in the part of the body that moves food from the mouth to the stomach (esophagus). They develop when extra blood is forced to flow through these veins because the blood's normal flow is blocked. Without treatment, esophageal varices eventually break and bleed (hemorrhage), which can be life-threatening.  Please continue to take PPI twice a day for 2 weeks and then once a day. Given that your blood pressure was a little low, please reduce you coreg dose to 3.125mg every 12 hours. Continue to take your furosemide and spironolactone. In addition, please DO NOT take your Plavix on discharge until you can follow up with outpatient CT because it can increase your risk of bleeding. Dr. Hinton's office will call you to schedule an appointment within the next 2 weeks.  Please also follow up with Dr. Lawson.

## 2023-11-14 NOTE — DISCHARGE NOTE PROVIDER - NSDCFUADDAPPT_GEN_ALL_CORE_FT
- Please follow up with Dr. Lawson on 11/16 as scheduled. Please call [phone] if you have any questions. - Please follow up with Dr. Lawson on 11/27 as scheduled. Please call [phone] if you have any questions. - Please follow up with Dr. Lawson on 11/27 as scheduled. Please call [phone] if you have any questions.    - You will be called by Dr. Hinton's office (CT Surgery) to make an appointment within the next 1 - 2 weeks. It is important that you go to this appointment to for management and to follow up on your Plavix which was stopped during this hospitalization.

## 2023-11-14 NOTE — DISCHARGE NOTE NURSING/CASE MANAGEMENT/SOCIAL WORK - NSDCPEFALRISK_GEN_ALL_CORE
For information on Fall & Injury Prevention, visit: [email or web address] OR  [email or web address] OR  https://www.cdc.gov/steadi/patient.html

## 2023-11-14 NOTE — PROGRESS NOTE ADULT - PROBLEM SELECTOR PLAN 6
Pt with recurrent pleural effusion, despite being on lasix and aldactone. Pt was admitted to [facility] on 9/26 to 9/30, s/p pigtail drainage (4L). CTAP on 10/31 showed moderate to severe left pleural effusion with near complete atelectasis of the left lower lobe. CXR on 11/13 showed left lower lung opacification/pleural effusion has substantially improved compared to prior exam 10/23/2023. Small left pleural effusion remains.   - C/w home diuretic as above

## 2023-11-14 NOTE — PROGRESS NOTE ADULT - SUBJECTIVE AND OBJECTIVE BOX
Hepatology Consult Progress Note:       OVERNIGHT EVENTS: RAZIA.    SUBJECTIVE / INTERVAL HPI:   Patient seen and examined at bedside. States that overall he feels fine. Complaining of some abdominal pain. Had another episodes of melena overnight. Hb this AM 8.1 from 8.9. Repeat pending.       VITAL SIGNS:  Vital Signs Last 24 Hrs  T(C): 36.5 (14 Nov 2023 09:33), Max: 36.9 (13 Nov 2023 21:35)  T(F): 97.7 (14 Nov 2023 09:33), Max: 98.4 (13 Nov 2023 21:35)  HR: 75 (14 Nov 2023 09:33) (60 - 80)  BP: 149/91 (14 Nov 2023 09:33) (133/84 - 162/92)  BP(mean): --  RR: 18 (14 Nov 2023 09:33) (18 - 18)  SpO2: 97% (14 Nov 2023 09:33) (95% - 98%)    Parameters below as of 14 Nov 2023 09:33  Patient On (Oxygen Delivery Method): room air      PHYSICAL EXAM:  General: No acute distress  Lungs: Normal respiratory effort and no intercostal retractions  Cardiovascular: RRR  Abdomen: Soft, non-tender, non-distended  Neurological: Alert and oriented x3  Skin: Warm and dry. No obvious rash       MEDICATIONS:  MEDICATIONS  (STANDING):  allopurinol 100 milliGRAM(s) Oral daily  carvedilol 12.5 milliGRAM(s) Oral every 12 hours  cefTRIAXone   IVPB 1000 milliGRAM(s) IV Intermittent every 24 hours  clopidogrel Tablet 75 milliGRAM(s) Oral daily  dextrose 5%. 1000 milliLiter(s) (50 mL/Hr) IV Continuous <Continuous>  dextrose 5%. 1000 milliLiter(s) (100 mL/Hr) IV Continuous <Continuous>  dextrose 50% Injectable 25 Gram(s) IV Push once  dextrose 50% Injectable 12.5 Gram(s) IV Push once  dextrose 50% Injectable 25 Gram(s) IV Push once  gabapentin 100 milliGRAM(s) Oral at bedtime  glucagon  Injectable 1 milliGRAM(s) IntraMuscular once  influenza   Vaccine 0.5 milliLiter(s) IntraMuscular once  insulin lispro (ADMELOG) corrective regimen sliding scale   SubCutaneous Before meals and at bedtime  pantoprazole    Tablet 40 milliGRAM(s) Oral every 12 hours  polyethylene glycol 3350 17 Gram(s) Oral every 24 hours  rifAXIMin 550 milliGRAM(s) Oral every 12 hours    MEDICATIONS  (PRN):  dextrose Oral Gel 15 Gram(s) Oral once PRN Blood Glucose LESS THAN 70 milliGRAM(s)/deciliter  oxyCODONE    IR 5 milliGRAM(s) Oral every 6 hours PRN Severe Pain (7 - 10)      ALLERGIES:  Allergies    aspirin (Pruritus)    Intolerances        LABS:                        8.1    6.33  )-----------( 88       ( 14 Nov 2023 05:30 )             25.5     11-14    142  |  112<H>  |  15  ----------------------------<  152<H>  4.1   |  21<L>  |  0.86    Ca    8.6      14 Nov 2023 05:30  Phos  2.6     11-14  Mg     1.8     11-14    TPro  6.4  /  Alb  2.8<L>  /  TBili  1.8<H>  /  DBili  x   /  AST  32  /  ALT  14  /  AlkPhos  80  11-14    PT/INR - ( 14 Nov 2023 05:30 )   PT: 18.2 sec;   INR: 1.62          PTT - ( 13 Nov 2023 05:30 )  PTT:36.7 sec  Urinalysis Basic - ( 14 Nov 2023 05:30 )    Color: x / Appearance: x / SG: x / pH: x  Gluc: 152 mg/dL / Ketone: x  / Bili: x / Urobili: x   Blood: x / Protein: x / Nitrite: x   Leuk Esterase: x / RBC: x / WBC x   Sq Epi: x / Non Sq Epi: x / Bacteria: x      CAPILLARY BLOOD GLUCOSE  POCT Blood Glucose.: 131 mg/dL (14 Nov 2023 13:14)  RADIOLOGY & ADDITIONAL TESTS: Reviewed.

## 2023-11-14 NOTE — PROGRESS NOTE ADULT - PROBLEM SELECTOR PLAN 7
Pt with hx of CAD s/p OPCAB x 4 on 9/14/23. On Plavix 75mg last taken 11/11 AM (was stopped for a week recently and was restarted on 11/10 by Dr. Lawson and Dr. Hinton CTS) .   - Will resume plavix on 11/14 at 4PM  - Monitor for any signs/symptoms of bleeding  - C/w SCDs for DVT ppx

## 2023-11-14 NOTE — PROGRESS NOTE ADULT - TIME BILLING
chart review including outpatient records, patient interview/exam, review of labs/imaging, management of medical conditions, counseling/educating patient, discussion with consultants, documentation

## 2023-11-14 NOTE — DISCHARGE NOTE PROVIDER - ATTENDING DISCHARGE PHYSICAL EXAMINATION:
63 YOM with PMH of HTN, HLD, CAD s/p 4V-OPCAB, gout, LIZ, TIA, alcoholic cirrhosis c/b EV and PHG (+UGIB), PUD admitted for ?coffee ground emesis and acute on chronic anemia c/f UGIB s/p EGD (results below). Course c/b orthostatic hypotension resolved after transfusion of 1u pRBC.     Orthostatic hypotension - persistent orthostatic hypotension with symptoms. TTE with normal LVEF and no gross valvular abnormalities. Cont compression stockings, abdominal binder. Received doses of albumin without improvement. On reduced dose of carvedilol compared to home dose. Discussed with Dr. Lawson regarding risks/benefits of BB and diuretics. Received 1u pRBC for possible symptomatic anemia: Hgb 8.5 —> 9.5 with resolution of symptoms. Repeat orthostatic negative.     Acute on chronic anemia - baseline Hgb around 10, received 1u pRBC 11/12 for Hgb 7.7 and 1u pRBC 11/17 for Hgb 8.5 with symptoms of dizziness. Hgb 9.5, dizziness improved. Patient may need more lenient threshold for transfusion in future.     UGIB s/p EV banding 2/2023 and 4/2023 - EGD 11/13 with non-bleeding varices, healing PUD, PHG, duodenitis. Continue pantoprazole 40mg BID (x2w, then daily). No further melena.      CAD s/p 4V-OPCAB - s/p surgery 9/14. Reduce carvedilol to 3.125mg BID with holding parameters. CTS consulted regarding resumption of anti-PLT - will hold for now (last dose 11/14) and have close outpatient follow up. Risks vs benefits discussed with patient.    Decompensated alcoholic cirrhosis c/b EV and PHG - follows with Dr. Lawson. Cont furosemide 40mg and spironolactone 25mg.     Large left pleural effusion - developed after OPCAB, admitted to [facility] 9/26-9/30 s/p pigtail drainage (4L), CXR with small L pleural effusion.     Thrombocytopenia - chronic, likely 2/2 cirrhosis     Dispo: home with HPT

## 2023-11-14 NOTE — PROGRESS NOTE ADULT - ASSESSMENT
64 yo [place] ([facility]) M former smoker with hx HTN, HLD, severe 3 vessel CAD, pre-diabetes, gout, TIA (14 yrs ago), gastric ulcer, rectal bleed, LIZ and alcoholic liver disease c/b cirrhosis and portal hypertension c/b esophageal varices (and UGIB) and splenomegaly presents for hematemesis x2 11/12 AM. S/p EGD on 11/13 which showed duodenitis, nonbleeding varices, healing PUD, and portal hypertensive gastropathy. Switched protonix to PO. Stepped down to [facility] on 11/13.

## 2023-11-14 NOTE — DISCHARGE NOTE PROVIDER - CARE PROVIDER_API CALL
John Lawson  Internal Medicine  [address]  [place] [address]  Phone: [phone]  Fax: [phone]  Scheduled Appointment: 11/16/2023

## 2023-11-14 NOTE — DISCHARGE NOTE PROVIDER - NSDCCPTREATMENT_GEN_ALL_CORE_FT
PRINCIPAL PROCEDURE  Procedure: EGD  Findings and Treatment: Impressions:  Erythema in the duodenal bulb compatible with duodenitis.  Congestion, petechiae and mosaic mucosal pattern in the whole stomach.  Deformity in the antrum.  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  Esophageal hiatal hernia.  Varices in the lower third of the esophagus.

## 2023-11-14 NOTE — DISCHARGE NOTE NURSING/CASE MANAGEMENT/SOCIAL WORK - PATIENT PORTAL LINK FT
You can access the FollowMyHealth Patient Portal offered by [facility] [place] [facility] by registering at the following website: [email or web address]. By joining [facility]’s FollowMyHealth portal, you will also be able to view your health information using other applications (apps) compatible with our system.

## 2023-11-14 NOTE — PROGRESS NOTE ADULT - PROBLEM SELECTOR PLAN 4
Pt had lactate of 7.5 on 11/12 iso decompensated cirrhosis, s/p 2L NS, improved lactate to 2.6 on 11/13. Lactate of 2.2 on 11/14.   - No need to trend further

## 2023-11-14 NOTE — DISCHARGE NOTE PROVIDER - DETAILS OF MALNUTRITION DIAGNOSIS/DIAGNOSES
This patient has been assessed with a concern for Malnutrition and was treated during this hospitalization for the following Nutrition diagnosis/diagnoses:     -  11/13/2023: Severe protein-calorie malnutrition

## 2023-11-14 NOTE — DISCHARGE NOTE PROVIDER - NSDCFUSCHEDAPPT_GEN_ALL_CORE_FT
John Lawson  [facility] PreAdmits  Scheduled Appointment: 11/16/2023    [facility] Physician [facility]  INTERVEN  [address]  Scheduled Appointment: 11/16/2023    John Lawson  [facility] Physician [facility]  HEPATOLOGY [address]  Scheduled Appointment: 11/27/2023    [facility]  HEARTVASC [address]  Scheduled Appointment: 01/16/2024    Drew Box  [facility]  HEARTVAS [address]  Scheduled Appointment: 01/22/2024     John Lawson  [facility]  HEPATOLOGY [address]  Scheduled Appointment: 11/27/2023    [facility]  Scheduled Appointment: 01/16/2024    Drew Box  [place] [id number]  Scheduled Appointment: 01/22/2024

## 2023-11-14 NOTE — PROGRESS NOTE ADULT - SUBJECTIVE AND OBJECTIVE BOX
OVERNIGHT EVENTS: lactate went up from 2.2 to 4.8, given 1L fluid -> repeat lactate 3.7.     SUBJECTIVE/INTERVAL HPI: Patient was seen and examined at bedside. Reports some pain with swallowing liquids and solids after egd. Also reports RUQ pain that is sharp an constant. Denies any n/v/d. Had one episode of melena this morning at 2AM.     VITAL SIGNS:  T(F): 97.5 (11-14-23 @ 17:31)  HR: 59 (11-14-23 @ 17:31)  BP: 130/76 (11-14-23 @ 17:31)  RR: 18 (11-14-23 @ 17:31)  SpO2: 99% (11-14-23 @ 17:31)  Wt(kg): --        PHYSICAL EXAM:    GENERAL: resting comfortably in bed; NAD, jaundice all over  EYES: EOMI, PERRLA, conjunctiva and sclera clear   NECK: Supple  LUNGS: CTA B/L; no W/R/R  HEART: S1/S2 normal, RRR; No murmurs   ABDOMEN: Soft, Nondistended; Bowel sounds present. moderate RUQ tenderness, mild epigastric tenderness, no rebound tenderness, negative travis's sign.  EXTREMITIES:  2+ Peripheral Pulses, No clubbing, cyanosis, or edema   NERVOUS SYSTEM:  AAOx3; CNII-XII grossly intact; no focal deficits  PSYCHIATRIC: affect and characteristics of appearance, verbalizations, behaviors are appropriate    MEDICATIONS  (STANDING):  allopurinol 100 milliGRAM(s) Oral daily  carvedilol 12.5 milliGRAM(s) Oral every 12 hours  cefTRIAXone   IVPB 1000 milliGRAM(s) IV Intermittent every 24 hours  clopidogrel Tablet 75 milliGRAM(s) Oral daily  dextrose 5%. 1000 milliLiter(s) (100 mL/Hr) IV Continuous <Continuous>  dextrose 5%. 1000 milliLiter(s) (50 mL/Hr) IV Continuous <Continuous>  dextrose 50% Injectable 25 Gram(s) IV Push once  dextrose 50% Injectable 12.5 Gram(s) IV Push once  dextrose 50% Injectable 25 Gram(s) IV Push once  gabapentin 100 milliGRAM(s) Oral at bedtime  glucagon  Injectable 1 milliGRAM(s) IntraMuscular once  influenza   Vaccine 0.5 milliLiter(s) IntraMuscular once  insulin lispro (ADMELOG) corrective regimen sliding scale   SubCutaneous Before meals and at bedtime  pantoprazole    Tablet 40 milliGRAM(s) Oral every 12 hours  polyethylene glycol 3350 17 Gram(s) Oral every 24 hours  rifAXIMin 550 milliGRAM(s) Oral every 12 hours    MEDICATIONS  (PRN):  dextrose Oral Gel 15 Gram(s) Oral once PRN Blood Glucose LESS THAN 70 milliGRAM(s)/deciliter  oxyCODONE    IR 5 milliGRAM(s) Oral every 6 hours PRN Severe Pain (7 - 10)      Allergies    aspirin (Pruritus)    Intolerances        LABS:                        8.7    7.90  )-----------( 109      ( 14 Nov 2023 14:00 )             27.1     11-14    142  |  112<H>  |  15  ----------------------------<  152<H>  4.1   |  21<L>  |  0.86    Ca    8.6      14 Nov 2023 05:30  Phos  2.6     11-14  Mg     1.8     11-14    TPro  6.4  /  Alb  2.8<L>  /  TBili  1.8<H>  /  DBili  x   /  AST  32  /  ALT  14  /  AlkPhos  80  11-14    PT/INR - ( 14 Nov 2023 05:30 )   PT: 18.2 sec;   INR: 1.62          PTT - ( 13 Nov 2023 05:30 )  PTT:36.7 sec  Urinalysis Basic - ( 14 Nov 2023 05:30 )    Color: x / Appearance: x / SG: x / pH: x  Gluc: 152 mg/dL / Ketone: x  / Bili: x / Urobili: x   Blood: x / Protein: x / Nitrite: x   Leuk Esterase: x / RBC: x / WBC x   Sq Epi: x / Non Sq Epi: x / Bacteria: x        RADIOLOGY & ADDITIONAL TESTS:  Reviewed

## 2023-11-14 NOTE — PROGRESS NOTE ADULT - PROBLEM SELECTOR PLAN 1
Pt with baseline Hb of 10. Had Hb of 7.7 on 11/2, s/p 1U pRBCs with goal Hb >8 due to hx of CAD with stents. Hb of 8.5 on 11/13. Hb of 8.1 on 11/14 with repeat Hb of 8.7 later that day.  - Continue to monitor Hb   - Maintain active T&S  - Transfuse if Hb <8    #Thrombocytopenia.  Plt of 95 on 11/13. Likely iso cirrhosis. Improved to 109 on 11/14.  - Continue to monitor

## 2023-11-14 NOTE — PROGRESS NOTE ADULT - ASSESSMENT
63M h/o alcoholic cirrhosis (+varices on EGD 4/2023), CAD (s/p CABG 9/2023), TIA, gastric ulcer, rectal bleed, LIZ p/w coffee-ground emesis and melena x1d.    Underwent EGD on 11/13, which showed:   - A single patch of gastric ectopic mucosa was seen in the lower third of the esophagus. Inlet patch was located 43 cm from the incisors.  - A sliding small size hiatal hernia was seen, the esophagogastric junction (EGJ) was noted at 44 cm, with hiatal narrowing at 45 cm from the incisors. Retroflexion view in the stomach confirmed the size and morphology of the hernia.  - 1 cord of grade I varices were seen in the lower third of the esophagus. The varices were not bleeding.  - A post-ulcer gastric deformity was noted in the antrum. Small erosions were seen as well. Endoscopic findings were suggestive of healing from prior peptic ulcer disease.  - Diffuse continuous congestion, petechiae and mosaic mucosal pattern of the mucosa with no bleeding was noted in the whole stomach. These findings were suggestive of portal hypertensive gastropathy.  - Localized discontinuous erythema of the mucosa with no bleeding was noted in the duodenal bulb. These findings are compatible with duodenitis.    Had another episode of melena overnight, but remains hemodynamically stable. Hb this AM 8.1 from 8.9. Also complaining of some abdominal pain. Abdominal x-ray showing nonspecific gas pattern with no evidence of obstruction. Lipase checked and WNL.     Recommendations:   - Continue ceftriaxone 1g IV q24 for 5 days total (can be discharged on cefpodoxime)   - Continue Coreg 12.5 mg PO BID   - Pantoprazole 40 mg daily for 2 weeks, followed by 40 mg PO daily    - Continue Lasix 40 mg PO daily   - Continue Aldactone 25 mg PO daily   - F/u repeat CBC this afternoon.     Case discussed with Dr. Hernandez. GI Team will continue to follow.     Arina Murray D.O.   Gastroenterology Fellow  Weekday 7am-5pm Pager: [phone]  Weeknights/Weekend/Holiday Coverage: Please call the  for contact info. 63M h/o alcoholic cirrhosis (+varices on EGD 4/2023), CAD (s/p CABG 9/2023), TIA, gastric ulcer, rectal bleed, LIZ p/w coffee-ground emesis and melena x1d.    Underwent EGD on 11/13, which showed:   - A single patch of gastric ectopic mucosa was seen in the lower third of the esophagus. Inlet patch was located 43 cm from the incisors.  - A sliding small size hiatal hernia was seen, the esophagogastric junction (EGJ) was noted at 44 cm, with hiatal narrowing at 45 cm from the incisors. Retroflexion view in the stomach confirmed the size and morphology of the hernia.  - 1 cord of grade I varices were seen in the lower third of the esophagus. The varices were not bleeding.  - A post-ulcer gastric deformity was noted in the antrum. Small erosions were seen as well. Endoscopic findings were suggestive of healing from prior peptic ulcer disease.  - Diffuse continuous congestion, petechiae and mosaic mucosal pattern of the mucosa with no bleeding was noted in the whole stomach. These findings were suggestive of portal hypertensive gastropathy.  - Localized discontinuous erythema of the mucosa with no bleeding was noted in the duodenal bulb. These findings are compatible with duodenitis.    Had another episode of melena overnight, but remains hemodynamically stable. Hb this AM 8.1 from 8.9. Also complaining of some abdominal pain. Abdominal x-ray showing nonspecific gas pattern with no evidence of obstruction. Lipase checked and WNL.     Recommendations:   - Continue ceftriaxone 1g IV q24 for 5 days total (can be discharged on cefpodoxime)   - Continue Coreg 12.5 mg PO BID   - Pantoprazole 40 mg BID for 2 weeks, followed by 40 mg PO daily    - Continue Lasix 40 mg PO daily   - Continue Aldactone 25 mg PO daily   - F/u repeat CBC this afternoon.     Case discussed with Dr. Hernandez. GI Team will continue to follow.     Arina Murray D.O.   Gastroenterology Fellow  Weekday 7am-5pm Pager: [phone]  Weeknights/Weekend/Holiday Coverage: Please call the  for contact info.

## 2023-11-14 NOTE — DISCHARGE NOTE PROVIDER - NSDCMRMEDTOKEN_GEN_ALL_CORE_FT
allopurinol 100 mg oral tablet: 1 tab(s) orally once a day  carvedilol 6.25 mg oral tablet: 1 tab(s) orally every 12 hours  clopidogrel 75 mg oral tablet: 1 tab(s) orally once a day  furosemide 40 mg oral tablet: 1 tab(s) orally once a day  gabapentin 100 mg oral capsule: 1 cap(s) orally every 8 hours  HumaLOG Mix 50/50 subcutaneous suspension: 100 unit(s) subcutaneous once a day  Lidoderm 5% topical film: Apply topically to affected area once a day  metFORMIN 500 mg oral tablet: 1 tab(s) orally 2 times a day  pantoprazole 40 mg oral delayed release tablet: 1 tab(s) orally once a day  polyethylene glycol 3350 oral powder for reconstitution: 17 gram(s) orally once a day  rifAXIMin 550 mg oral tablet: 1 tab(s) orally 2 times a day  spironolactone 25 mg oral tablet: 1 tab(s) orally once a day   carvedilol 6.25 mg oral tablet: 1 tab(s) orally every 12 hours  furosemide 40 mg oral tablet: 1 tab(s) orally once a day  gabapentin 100 mg oral capsule: 1 cap(s) orally every 8 hours  HumaLOG Mix 50/50 subcutaneous suspension: 100 unit(s) subcutaneous once a day  Lidoderm 5% topical film: Apply topically to affected area once a day  metFORMIN 500 mg oral tablet: 1 tab(s) orally 2 times a day  pantoprazole 40 mg oral delayed release tablet: 1 tab(s) orally once a day  polyethylene glycol 3350 oral powder for reconstitution: 17 gram(s) orally 2 times a day  rifAXIMin 550 mg oral tablet: 1 tab(s) orally 2 times a day  spironolactone 25 mg oral tablet: 1 tab(s) orally once a day   carvedilol 3.125 mg oral tablet: 1 tab(s) orally every 12 hours Please take one tablet every 12 hours.  furosemide 40 mg oral tablet: 1 tab(s) orally once a day  gabapentin 100 mg oral capsule: 1 cap(s) orally every 8 hours  HumaLOG Mix 50/50 subcutaneous suspension: 100 unit(s) subcutaneous once a day  Lidoderm 5% topical film: Apply topically to affected area once a day  metFORMIN 500 mg oral tablet: 1 tab(s) orally 2 times a day  pantoprazole 40 mg oral delayed release tablet: 1 tab(s) orally once a day Please one tablet every 12 hours for 2 weeks. After 2 weeks, please take one tablet every day.  polyethylene glycol 3350 oral powder for reconstitution: 17 gram(s) orally 2 times a day  rifAXIMin 550 mg oral tablet: 1 tab(s) orally 2 times a day  spironolactone 25 mg oral tablet: 1 tab(s) orally once a day

## 2023-11-15 LAB
ALBUMIN SERPL ELPH-MCNC: 2.6 G/DL — LOW (ref 3.3–5)
ALP SERPL-CCNC: 102 U/L — SIGNIFICANT CHANGE UP (ref 40–120)
ALP SERPL-CCNC: 102 U/L — SIGNIFICANT CHANGE UP (ref 40–120)
ALP SERPL-CCNC: 94 U/L — SIGNIFICANT CHANGE UP (ref 40–120)
ALP SERPL-CCNC: 94 U/L — SIGNIFICANT CHANGE UP (ref 40–120)
ALT FLD-CCNC: 13 U/L — SIGNIFICANT CHANGE UP (ref 10–45)
ALT FLD-CCNC: 13 U/L — SIGNIFICANT CHANGE UP (ref 10–45)
ALT FLD-CCNC: 15 U/L — SIGNIFICANT CHANGE UP (ref 10–45)
ALT FLD-CCNC: 15 U/L — SIGNIFICANT CHANGE UP (ref 10–45)
ANION GAP SERPL CALC-SCNC: 12 MMOL/L — SIGNIFICANT CHANGE UP (ref 5–17)
ANION GAP SERPL CALC-SCNC: 12 MMOL/L — SIGNIFICANT CHANGE UP (ref 5–17)
ANION GAP SERPL CALC-SCNC: 8 MMOL/L — SIGNIFICANT CHANGE UP (ref 5–17)
ANION GAP SERPL CALC-SCNC: 8 MMOL/L — SIGNIFICANT CHANGE UP (ref 5–17)
AST SERPL-CCNC: 28 U/L — SIGNIFICANT CHANGE UP (ref 10–40)
AST SERPL-CCNC: 28 U/L — SIGNIFICANT CHANGE UP (ref 10–40)
AST SERPL-CCNC: 30 U/L — SIGNIFICANT CHANGE UP (ref 10–40)
AST SERPL-CCNC: 30 U/L — SIGNIFICANT CHANGE UP (ref 10–40)
BILIRUB SERPL-MCNC: 1.1 MG/DL — SIGNIFICANT CHANGE UP (ref 0.2–1.2)
BILIRUB SERPL-MCNC: 1.1 MG/DL — SIGNIFICANT CHANGE UP (ref 0.2–1.2)
BILIRUB SERPL-MCNC: 1.2 MG/DL — SIGNIFICANT CHANGE UP (ref 0.2–1.2)
BILIRUB SERPL-MCNC: 1.2 MG/DL — SIGNIFICANT CHANGE UP (ref 0.2–1.2)
BLD GP AB SCN SERPL QL: NEGATIVE — SIGNIFICANT CHANGE UP
BLD GP AB SCN SERPL QL: NEGATIVE — SIGNIFICANT CHANGE UP
BUN SERPL-MCNC: 12 MG/DL — SIGNIFICANT CHANGE UP (ref 7–23)
BUN SERPL-MCNC: 12 MG/DL — SIGNIFICANT CHANGE UP (ref 7–23)
BUN SERPL-MCNC: 14 MG/DL — SIGNIFICANT CHANGE UP (ref 7–23)
BUN SERPL-MCNC: 14 MG/DL — SIGNIFICANT CHANGE UP (ref 7–23)
CALCIUM SERPL-MCNC: 8.1 MG/DL — LOW (ref 8.4–10.5)
CALCIUM SERPL-MCNC: 8.1 MG/DL — LOW (ref 8.4–10.5)
CALCIUM SERPL-MCNC: 8.4 MG/DL — SIGNIFICANT CHANGE UP (ref 8.4–10.5)
CALCIUM SERPL-MCNC: 8.4 MG/DL — SIGNIFICANT CHANGE UP (ref 8.4–10.5)
CHLORIDE SERPL-SCNC: 108 MMOL/L — SIGNIFICANT CHANGE UP (ref 96–108)
CHLORIDE SERPL-SCNC: 108 MMOL/L — SIGNIFICANT CHANGE UP (ref 96–108)
CHLORIDE SERPL-SCNC: 109 MMOL/L — HIGH (ref 96–108)
CHLORIDE SERPL-SCNC: 109 MMOL/L — HIGH (ref 96–108)
CK MB CFR SERPL CALC: 1.2 NG/ML — SIGNIFICANT CHANGE UP (ref 0–6.7)
CK MB CFR SERPL CALC: 1.2 NG/ML — SIGNIFICANT CHANGE UP (ref 0–6.7)
CK SERPL-CCNC: 43 U/L — SIGNIFICANT CHANGE UP (ref 30–200)
CK SERPL-CCNC: 43 U/L — SIGNIFICANT CHANGE UP (ref 30–200)
CK SERPL-CCNC: 45 U/L — SIGNIFICANT CHANGE UP (ref 30–200)
CK SERPL-CCNC: 45 U/L — SIGNIFICANT CHANGE UP (ref 30–200)
CO2 SERPL-SCNC: 23 MMOL/L — SIGNIFICANT CHANGE UP (ref 22–31)
CO2 SERPL-SCNC: 23 MMOL/L — SIGNIFICANT CHANGE UP (ref 22–31)
CO2 SERPL-SCNC: 24 MMOL/L — SIGNIFICANT CHANGE UP (ref 22–31)
CO2 SERPL-SCNC: 24 MMOL/L — SIGNIFICANT CHANGE UP (ref 22–31)
CREAT SERPL-MCNC: 0.96 MG/DL — SIGNIFICANT CHANGE UP (ref 0.5–1.3)
CREAT SERPL-MCNC: 0.96 MG/DL — SIGNIFICANT CHANGE UP (ref 0.5–1.3)
CREAT SERPL-MCNC: 0.98 MG/DL — SIGNIFICANT CHANGE UP (ref 0.5–1.3)
CREAT SERPL-MCNC: 0.98 MG/DL — SIGNIFICANT CHANGE UP (ref 0.5–1.3)
EGFR: 86 ML/MIN/1.73M2 — SIGNIFICANT CHANGE UP
EGFR: 86 ML/MIN/1.73M2 — SIGNIFICANT CHANGE UP
EGFR: 88 ML/MIN/1.73M2 — SIGNIFICANT CHANGE UP
EGFR: 88 ML/MIN/1.73M2 — SIGNIFICANT CHANGE UP
GLUCOSE BLDC GLUCOMTR-MCNC: 102 MG/DL — HIGH (ref 70–99)
GLUCOSE BLDC GLUCOMTR-MCNC: 102 MG/DL — HIGH (ref 70–99)
GLUCOSE BLDC GLUCOMTR-MCNC: 112 MG/DL — HIGH (ref 70–99)
GLUCOSE BLDC GLUCOMTR-MCNC: 112 MG/DL — HIGH (ref 70–99)
GLUCOSE BLDC GLUCOMTR-MCNC: 128 MG/DL — HIGH (ref 70–99)
GLUCOSE BLDC GLUCOMTR-MCNC: 128 MG/DL — HIGH (ref 70–99)
GLUCOSE BLDC GLUCOMTR-MCNC: 255 MG/DL — HIGH (ref 70–99)
GLUCOSE BLDC GLUCOMTR-MCNC: 255 MG/DL — HIGH (ref 70–99)
GLUCOSE SERPL-MCNC: 144 MG/DL — HIGH (ref 70–99)
GLUCOSE SERPL-MCNC: 144 MG/DL — HIGH (ref 70–99)
GLUCOSE SERPL-MCNC: 191 MG/DL — HIGH (ref 70–99)
GLUCOSE SERPL-MCNC: 191 MG/DL — HIGH (ref 70–99)
HCT VFR BLD CALC: 24.3 % — LOW (ref 39–50)
HCT VFR BLD CALC: 24.3 % — LOW (ref 39–50)
HCT VFR BLD CALC: 25.5 % — LOW (ref 39–50)
HCT VFR BLD CALC: 25.5 % — LOW (ref 39–50)
HGB BLD-MCNC: 7.9 G/DL — LOW (ref 13–17)
HGB BLD-MCNC: 7.9 G/DL — LOW (ref 13–17)
HGB BLD-MCNC: 8.8 G/DL — LOW (ref 13–17)
HGB BLD-MCNC: 8.8 G/DL — LOW (ref 13–17)
LACTATE SERPL-SCNC: 3.3 MMOL/L — HIGH (ref 0.5–2)
LACTATE SERPL-SCNC: 3.3 MMOL/L — HIGH (ref 0.5–2)
MAGNESIUM SERPL-MCNC: 1.7 MG/DL — SIGNIFICANT CHANGE UP (ref 1.6–2.6)
MCHC RBC-ENTMCNC: 28.2 PG — SIGNIFICANT CHANGE UP (ref 27–34)
MCHC RBC-ENTMCNC: 28.2 PG — SIGNIFICANT CHANGE UP (ref 27–34)
MCHC RBC-ENTMCNC: 30.4 PG — SIGNIFICANT CHANGE UP (ref 27–34)
MCHC RBC-ENTMCNC: 30.4 PG — SIGNIFICANT CHANGE UP (ref 27–34)
MCHC RBC-ENTMCNC: 32.5 GM/DL — SIGNIFICANT CHANGE UP (ref 32–36)
MCHC RBC-ENTMCNC: 32.5 GM/DL — SIGNIFICANT CHANGE UP (ref 32–36)
MCHC RBC-ENTMCNC: 34.5 GM/DL — SIGNIFICANT CHANGE UP (ref 32–36)
MCHC RBC-ENTMCNC: 34.5 GM/DL — SIGNIFICANT CHANGE UP (ref 32–36)
MCV RBC AUTO: 86.8 FL — SIGNIFICANT CHANGE UP (ref 80–100)
MCV RBC AUTO: 86.8 FL — SIGNIFICANT CHANGE UP (ref 80–100)
MCV RBC AUTO: 88.2 FL — SIGNIFICANT CHANGE UP (ref 80–100)
MCV RBC AUTO: 88.2 FL — SIGNIFICANT CHANGE UP (ref 80–100)
NRBC # BLD: 0 /100 WBCS — SIGNIFICANT CHANGE UP (ref 0–0)
PHOSPHATE SERPL-MCNC: 3.9 MG/DL — SIGNIFICANT CHANGE UP (ref 2.5–4.5)
PLATELET # BLD AUTO: 85 K/UL — LOW (ref 150–400)
PLATELET # BLD AUTO: 85 K/UL — LOW (ref 150–400)
PLATELET # BLD AUTO: 87 K/UL — LOW (ref 150–400)
PLATELET # BLD AUTO: 87 K/UL — LOW (ref 150–400)
POTASSIUM SERPL-MCNC: 3.3 MMOL/L — LOW (ref 3.5–5.3)
POTASSIUM SERPL-MCNC: 3.3 MMOL/L — LOW (ref 3.5–5.3)
POTASSIUM SERPL-MCNC: 3.5 MMOL/L — SIGNIFICANT CHANGE UP (ref 3.5–5.3)
POTASSIUM SERPL-MCNC: 3.5 MMOL/L — SIGNIFICANT CHANGE UP (ref 3.5–5.3)
POTASSIUM SERPL-SCNC: 3.3 MMOL/L — LOW (ref 3.5–5.3)
POTASSIUM SERPL-SCNC: 3.3 MMOL/L — LOW (ref 3.5–5.3)
POTASSIUM SERPL-SCNC: 3.5 MMOL/L — SIGNIFICANT CHANGE UP (ref 3.5–5.3)
POTASSIUM SERPL-SCNC: 3.5 MMOL/L — SIGNIFICANT CHANGE UP (ref 3.5–5.3)
PROT SERPL-MCNC: 5.8 G/DL — LOW (ref 6–8.3)
PROT SERPL-MCNC: 5.8 G/DL — LOW (ref 6–8.3)
PROT SERPL-MCNC: 6.4 G/DL — SIGNIFICANT CHANGE UP (ref 6–8.3)
PROT SERPL-MCNC: 6.4 G/DL — SIGNIFICANT CHANGE UP (ref 6–8.3)
RBC # BLD: 2.8 M/UL — LOW (ref 4.2–5.8)
RBC # BLD: 2.8 M/UL — LOW (ref 4.2–5.8)
RBC # BLD: 2.89 M/UL — LOW (ref 4.2–5.8)
RBC # BLD: 2.89 M/UL — LOW (ref 4.2–5.8)
RBC # FLD: 14.9 % — HIGH (ref 10.3–14.5)
RBC # FLD: 14.9 % — HIGH (ref 10.3–14.5)
RBC # FLD: 15.3 % — HIGH (ref 10.3–14.5)
RBC # FLD: 15.3 % — HIGH (ref 10.3–14.5)
RH IG SCN BLD-IMP: POSITIVE — SIGNIFICANT CHANGE UP
RH IG SCN BLD-IMP: POSITIVE — SIGNIFICANT CHANGE UP
SODIUM SERPL-SCNC: 141 MMOL/L — SIGNIFICANT CHANGE UP (ref 135–145)
SODIUM SERPL-SCNC: 141 MMOL/L — SIGNIFICANT CHANGE UP (ref 135–145)
SODIUM SERPL-SCNC: 143 MMOL/L — SIGNIFICANT CHANGE UP (ref 135–145)
SODIUM SERPL-SCNC: 143 MMOL/L — SIGNIFICANT CHANGE UP (ref 135–145)
TROPONIN T, HIGH SENSITIVITY RESULT: 18 NG/L — SIGNIFICANT CHANGE UP (ref 0–51)
TROPONIN T, HIGH SENSITIVITY RESULT: 18 NG/L — SIGNIFICANT CHANGE UP (ref 0–51)
WBC # BLD: 4.23 K/UL — SIGNIFICANT CHANGE UP (ref 3.8–10.5)
WBC # BLD: 4.23 K/UL — SIGNIFICANT CHANGE UP (ref 3.8–10.5)
WBC # BLD: 4.24 K/UL — SIGNIFICANT CHANGE UP (ref 3.8–10.5)
WBC # BLD: 4.24 K/UL — SIGNIFICANT CHANGE UP (ref 3.8–10.5)
WBC # FLD AUTO: 4.23 K/UL — SIGNIFICANT CHANGE UP (ref 3.8–10.5)
WBC # FLD AUTO: 4.23 K/UL — SIGNIFICANT CHANGE UP (ref 3.8–10.5)
WBC # FLD AUTO: 4.24 K/UL — SIGNIFICANT CHANGE UP (ref 3.8–10.5)
WBC # FLD AUTO: 4.24 K/UL — SIGNIFICANT CHANGE UP (ref 3.8–10.5)

## 2023-11-15 PROCEDURE — 71045 X-RAY EXAM CHEST 1 VIEW: CPT | Mod: 26

## 2023-11-15 PROCEDURE — 99233 SBSQ HOSP IP/OBS HIGH 50: CPT | Mod: GC

## 2023-11-15 RX ORDER — CARVEDILOL PHOSPHATE 80 MG/1
3.12 CAPSULE, EXTENDED RELEASE ORAL EVERY 12 HOURS
Refills: 0 | Status: DISCONTINUED | OUTPATIENT
Start: 2023-11-15 | End: 2023-11-18

## 2023-11-15 RX ORDER — PANTOPRAZOLE SODIUM 20 MG/1
40 TABLET, DELAYED RELEASE ORAL EVERY 12 HOURS
Refills: 0 | Status: DISCONTINUED | OUTPATIENT
Start: 2023-11-15 | End: 2023-11-18

## 2023-11-15 RX ORDER — POLYETHYLENE GLYCOL 3350 17 G/17G
17 POWDER, FOR SOLUTION ORAL
Refills: 0 | Status: DISCONTINUED | OUTPATIENT
Start: 2023-11-15 | End: 2023-11-18

## 2023-11-15 RX ORDER — ALBUMIN HUMAN 25 %
100 VIAL (ML) INTRAVENOUS ONCE
Refills: 0 | Status: COMPLETED | OUTPATIENT
Start: 2023-11-15 | End: 2023-11-15

## 2023-11-15 RX ORDER — CARVEDILOL PHOSPHATE 80 MG/1
3.12 CAPSULE, EXTENDED RELEASE ORAL EVERY 12 HOURS
Refills: 0 | Status: DISCONTINUED | OUTPATIENT
Start: 2023-11-15 | End: 2023-11-15

## 2023-11-15 RX ORDER — DIPHENHYDRAMINE HCL 50 MG
12.5 CAPSULE ORAL ONCE
Refills: 0 | Status: DISCONTINUED | OUTPATIENT
Start: 2023-11-15 | End: 2023-11-15

## 2023-11-15 RX ORDER — ALBUMIN HUMAN 25 %
250 VIAL (ML) INTRAVENOUS ONCE
Refills: 0 | Status: DISCONTINUED | OUTPATIENT
Start: 2023-11-15 | End: 2023-11-15

## 2023-11-15 RX ORDER — MAGNESIUM SULFATE 500 MG/ML
2 VIAL (ML) INJECTION ONCE
Refills: 0 | Status: COMPLETED | OUTPATIENT
Start: 2023-11-15 | End: 2023-11-15

## 2023-11-15 RX ORDER — DIPHENHYDRAMINE HCL 50 MG
12.5 CAPSULE ORAL ONCE
Refills: 0 | Status: COMPLETED | OUTPATIENT
Start: 2023-11-15 | End: 2023-11-15

## 2023-11-15 RX ORDER — LIDOCAINE 4 G/100G
2 CREAM TOPICAL DAILY
Refills: 0 | Status: DISCONTINUED | OUTPATIENT
Start: 2023-11-15 | End: 2023-11-18

## 2023-11-15 RX ADMIN — Medication 6: at 12:55

## 2023-11-15 RX ADMIN — PANTOPRAZOLE SODIUM 40 MILLIGRAM(S): 20 TABLET, DELAYED RELEASE ORAL at 06:43

## 2023-11-15 RX ADMIN — Medication 25 GRAM(S): at 10:13

## 2023-11-15 RX ADMIN — POLYETHYLENE GLYCOL 3350 17 GRAM(S): 17 POWDER, FOR SOLUTION ORAL at 12:56

## 2023-11-15 RX ADMIN — Medication 50 MILLILITER(S): at 18:26

## 2023-11-15 RX ADMIN — PANTOPRAZOLE SODIUM 40 MILLIGRAM(S): 20 TABLET, DELAYED RELEASE ORAL at 18:28

## 2023-11-15 RX ADMIN — GABAPENTIN 100 MILLIGRAM(S): 400 CAPSULE ORAL at 22:44

## 2023-11-15 RX ADMIN — CARVEDILOL PHOSPHATE 12.5 MILLIGRAM(S): 80 CAPSULE, EXTENDED RELEASE ORAL at 06:43

## 2023-11-15 RX ADMIN — CEFTRIAXONE 100 MILLIGRAM(S): 500 INJECTION, POWDER, FOR SOLUTION INTRAMUSCULAR; INTRAVENOUS at 07:45

## 2023-11-15 RX ADMIN — Medication 100 MILLIGRAM(S): at 12:57

## 2023-11-15 NOTE — PROGRESS NOTE ADULT - PROBLEM SELECTOR PLAN 6
Pt with recurrent pleural effusion, despite being on lasix and aldactone. Pt was admitted to [facility] on 9/26 to 9/30, s/p pigtail drainage (4L). CTAP on 10/31 showed moderate to severe left pleural effusion with near complete atelectasis of the left lower lobe. CXR on 11/13 showed left lower lung opacification/pleural effusion has substantially improved compared to prior exam 10/23/2023. Small left pleural effusion remains.   - C/w home diuretic as above

## 2023-11-15 NOTE — PROGRESS NOTE ADULT - PROBLEM SELECTOR PLAN 3
Pt with history of decompensated cirrhosis with esophageal varices w/ banding back in 04/2023. Follows with Dr. Lawson. On home rifaximin 550mg PO BID, coreg 12.5mg BID, aldactone 25mg QD, Lasix 40mg.   - C/w home rifaximin 550mg PO q12h  - C/w CTX 1g IV qd x5 days (11/12-11/16), can discharge on cefpodoxime if does not complete course prior to discharge  - Restarted home meds: Lasix 40mg PO qd, aldactone 25mg PO qd, carvedilol 12.5mg PO q12h (with holding parameters)   - F/u with Dr. Lawson Pt with history of decompensated cirrhosis with esophageal varices w/ banding back in 04/2023. Follows with Dr. Lawson. On home rifaximin 550mg PO BID, coreg 12.5mg BID, aldactone 25mg QD, Lasix 40mg.   - C/w home rifaximin 550mg PO q12h  - received CTX 1g IV qd x4 days (11/12-11/15),   - will have final dose as cefpodoxime 100mg PO on 11/16 (5 day course total)   - Restarted home meds: Lasix 40mg PO qd, aldactone 25mg PO qd, carvedilol 12.5mg PO q12h (with holding parameters)   - F/u with Dr. Lawson

## 2023-11-15 NOTE — PROGRESS NOTE ADULT - SUBJECTIVE AND OBJECTIVE BOX
Patient is a 63y old  Male who presents with a chief complaint of UGIB (14 Nov 2023 17:16)      INTERVAL HPI/OVERNIGHT EVENTS:   No overnight events   Afebrile, hemodynamically stable     ICU Vital Signs Last 24 Hrs  T(C): 36.7 (15 Nov 2023 06:07), Max: 36.7 (15 Nov 2023 06:07)  T(F): 98 (15 Nov 2023 06:07), Max: 98 (15 Nov 2023 06:07)  HR: 61 (15 Nov 2023 06:07) (51 - 75)  BP: 107/69 (15 Nov 2023 06:07) (107/69 - 149/91)  BP(mean): 96 (14 Nov 2023 17:14) (96 - 96)  RR: 18 (15 Nov 2023 06:07) (18 - 18)  SpO2: 97% (15 Nov 2023 06:07) (96% - 99%)    O2 Parameters below as of 14 Nov 2023 21:29  Patient On (Oxygen Delivery Method): room air    PHYSICAL EXAM:  GENERAL: resting comfortably in bed; NAD, jaundice all over  EYES: EOMI, PERRLA, conjunctiva and sclera clear   NECK: Supple  LUNGS: CTA B/L; no W/R/R  HEART: S1/S2 normal, RRR; No murmurs   ABDOMEN: Soft, Nondistended; Bowel sounds present. moderate RUQ tenderness, mild epigastric tenderness, no rebound tenderness, negative travis's sign.  EXTREMITIES:  2+ Peripheral Pulses, No clubbing, cyanosis, or edema   NERVOUS SYSTEM:  AAOx3; CNII-XII grossly intact; no focal deficits  PSYCHIATRIC: affect and characteristics of appearance, verbalizations, behaviors are appropriate        LABS:                        8.7    7.90  )-----------( 109      ( 14 Nov 2023 14:00 )             27.1     11-14    142  |  112<H>  |  15  ----------------------------<  152<H>  4.1   |  21<L>  |  0.86    Ca    8.6      14 Nov 2023 05:30  Phos  2.6     11-14  Mg     1.8     11-14    TPro  6.4  /  Alb  2.8<L>  /  TBili  1.8<H>  /  DBili  x   /  AST  32  /  ALT  14  /  AlkPhos  80  11-14    PT/INR - ( 14 Nov 2023 05:30 )   PT: 18.2 sec;   INR: 1.62            Urinalysis Basic - ( 14 Nov 2023 05:30 )    Color: x / Appearance: x / SG: x / pH: x  Gluc: 152 mg/dL / Ketone: x  / Bili: x / Urobili: x   Blood: x / Protein: x / Nitrite: x   Leuk Esterase: x / RBC: x / WBC x   Sq Epi: x / Non Sq Epi: x / Bacteria: x      CAPILLARY BLOOD GLUCOSE      POCT Blood Glucose.: 143 mg/dL (14 Nov 2023 22:14)  POCT Blood Glucose.: 152 mg/dL (14 Nov 2023 18:10)  POCT Blood Glucose.: 131 mg/dL (14 Nov 2023 13:14)  POCT Blood Glucose.: 146 mg/dL (14 Nov 2023 09:19)        RADIOLOGY & ADDITIONAL TESTS:    Consultant(s) Notes Reviewed:  [x ] YES  [ ] NO    MEDICATIONS  (STANDING):  allopurinol 100 milliGRAM(s) Oral daily  carvedilol 12.5 milliGRAM(s) Oral every 12 hours  cefTRIAXone   IVPB 1000 milliGRAM(s) IV Intermittent every 24 hours  clopidogrel Tablet 75 milliGRAM(s) Oral daily  dextrose 5%. 1000 milliLiter(s) (100 mL/Hr) IV Continuous <Continuous>  dextrose 5%. 1000 milliLiter(s) (50 mL/Hr) IV Continuous <Continuous>  dextrose 50% Injectable 25 Gram(s) IV Push once  dextrose 50% Injectable 25 Gram(s) IV Push once  dextrose 50% Injectable 12.5 Gram(s) IV Push once  furosemide    Tablet 40 milliGRAM(s) Oral daily  gabapentin 100 milliGRAM(s) Oral at bedtime  glucagon  Injectable 1 milliGRAM(s) IntraMuscular once  influenza   Vaccine 0.5 milliLiter(s) IntraMuscular once  insulin lispro (ADMELOG) corrective regimen sliding scale   SubCutaneous Before meals and at bedtime  pantoprazole    Tablet 40 milliGRAM(s) Oral before breakfast  polyethylene glycol 3350 17 Gram(s) Oral every 24 hours  rifAXIMin 550 milliGRAM(s) Oral every 12 hours  spironolactone 25 milliGRAM(s) Oral every 24 hours    MEDICATIONS  (PRN):  dextrose Oral Gel 15 Gram(s) Oral once PRN Blood Glucose LESS THAN 70 milliGRAM(s)/deciliter  oxyCODONE    IR 5 milliGRAM(s) Oral every 6 hours PRN Severe Pain (7 - 10)      Care Discussed with Consultants/Other Providers [ x] YES  [ ] NO Patient is a 63y old  Male who presents with a chief complaint of UGIB (14 Nov 2023 17:16)    INTERVAL HPI/OVERNIGHT EVENTS:   No overnight events     AT BEDSIDE:  This AM, pt complained of itchy hands. Pt concerned that this is d/t plavix, informed pt this is more likely 2/2 cirrhosis. Ordered benadryl, however pt did not get bc he went to the bathroom to brush his teeth, and fell (no headstrike or LOC). Benadryl was thus held.     Vital Signs Last 24 Hrs  T(C): 36.7 (15 Nov 2023 06:07), Max: 36.7 (15 Nov 2023 06:07)  T(F): 98 (15 Nov 2023 06:07), Max: 98 (15 Nov 2023 06:07)  HR: 61 (15 Nov 2023 06:07) (51 - 75)  BP: 107/69 (15 Nov 2023 06:07) (107/69 - 149/91)  BP(mean): 96 (14 Nov 2023 17:14) (96 - 96)  RR: 18 (15 Nov 2023 06:07) (18 - 18)  SpO2: 97% (15 Nov 2023 06:07) (96% - 99%)    O2 Parameters below as of 14 Nov 2023 21:29  Patient On (Oxygen Delivery Method): room air    PHYSICAL EXAM:  GENERAL: resting comfortably in bed; NAD, jaundice all over  EYES: EOMI, PERRLA, conjunctiva and sclera clear   NECK: Supple  LUNGS: CTA B/L; no W/R/R  HEART: S1/S2 normal, RRR; No murmurs   ABDOMEN: Soft, Nondistended; Bowel sounds present. moderate RUQ tenderness, mild epigastric tenderness, no rebound tenderness, negative travis's sign.  EXTREMITIES:  2+ Peripheral Pulses, No clubbing, cyanosis, or edema   NERVOUS SYSTEM:  AAOx3; CNII-XII grossly intact; no focal deficits  PSYCHIATRIC: affect and characteristics of appearance, verbalizations, behaviors are appropriate        LABS:                        8.7    7.90  )-----------( 109      ( 14 Nov 2023 14:00 )             27.1     11-14    142  |  112<H>  |  15  ----------------------------<  152<H>  4.1   |  21<L>  |  0.86    Ca    8.6      14 Nov 2023 05:30  Phos  2.6     11-14  Mg     1.8     11-14    TPro  6.4  /  Alb  2.8<L>  /  TBili  1.8<H>  /  DBili  x   /  AST  32  /  ALT  14  /  AlkPhos  80  11-14    PT/INR - ( 14 Nov 2023 05:30 )   PT: 18.2 sec;   INR: 1.62            Urinalysis Basic - ( 14 Nov 2023 05:30 )  Color: x / Appearance: x / SG: x / pH: x  Gluc: 152 mg/dL / Ketone: x  / Bili: x / Urobili: x   Blood: x / Protein: x / Nitrite: x   Leuk Esterase: x / RBC: x / WBC x   Sq Epi: x / Non Sq Epi: x / Bacteria: x      CAPILLARY BLOOD GLUCOSE  POCT Blood Glucose.: 143 mg/dL (14 Nov 2023 22:14)  POCT Blood Glucose.: 152 mg/dL (14 Nov 2023 18:10)  POCT Blood Glucose.: 131 mg/dL (14 Nov 2023 13:14)  POCT Blood Glucose.: 146 mg/dL (14 Nov 2023 09:19)        RADIOLOGY & ADDITIONAL TESTS:  Consultant(s) Notes Reviewed:  [x ] YES  [ ] NO    MEDICATIONS  (STANDING):  allopurinol 100 milliGRAM(s) Oral daily  carvedilol 12.5 milliGRAM(s) Oral every 12 hours  cefTRIAXone   IVPB 1000 milliGRAM(s) IV Intermittent every 24 hours  clopidogrel Tablet 75 milliGRAM(s) Oral daily  dextrose 5%. 1000 milliLiter(s) (100 mL/Hr) IV Continuous <Continuous>  dextrose 5%. 1000 milliLiter(s) (50 mL/Hr) IV Continuous <Continuous>  dextrose 50% Injectable 25 Gram(s) IV Push once  dextrose 50% Injectable 25 Gram(s) IV Push once  dextrose 50% Injectable 12.5 Gram(s) IV Push once  furosemide    Tablet 40 milliGRAM(s) Oral daily  gabapentin 100 milliGRAM(s) Oral at bedtime  glucagon  Injectable 1 milliGRAM(s) IntraMuscular once  influenza   Vaccine 0.5 milliLiter(s) IntraMuscular once  insulin lispro (ADMELOG) corrective regimen sliding scale   SubCutaneous Before meals and at bedtime  pantoprazole    Tablet 40 milliGRAM(s) Oral before breakfast  polyethylene glycol 3350 17 Gram(s) Oral every 24 hours  rifAXIMin 550 milliGRAM(s) Oral every 12 hours  spironolactone 25 milliGRAM(s) Oral every 24 hours    MEDICATIONS  (PRN):  dextrose Oral Gel 15 Gram(s) Oral once PRN Blood Glucose LESS THAN 70 milliGRAM(s)/deciliter  oxyCODONE    IR 5 milliGRAM(s) Oral every 6 hours PRN Severe Pain (7 - 10)      Care Discussed with Consultants/Other Providers [ x] YES  [ ] NO Patient is a 63y old  Male who presents with a chief complaint of UGIB (14 Nov 2023 17:16)    INTERVAL HPI/OVERNIGHT EVENTS:   NAEO.     AT BEDSIDE:  This AM, pt complained of itchy hands. Pt concerned that this is d/t plavix, informed pt this is more likely 2/2 cirrhosis. Ordered benadryl, however pt did not get bc he went to the bathroom to brush his teeth, and fell (no headstrike or LOC). Benadryl was thus held.     Vital Signs Last 24 Hrs  T(C): 36.7 (15 Nov 2023 06:07), Max: 36.7 (15 Nov 2023 06:07)  T(F): 98 (15 Nov 2023 06:07), Max: 98 (15 Nov 2023 06:07)  HR: 61 (15 Nov 2023 06:07) (51 - 75)  BP: 107/69 (15 Nov 2023 06:07) (107/69 - 149/91)  BP(mean): 96 (14 Nov 2023 17:14) (96 - 96)  RR: 18 (15 Nov 2023 06:07) (18 - 18)  SpO2: 97% (15 Nov 2023 06:07) (96% - 99%)    O2 Parameters below as of 14 Nov 2023 21:29  Patient On (Oxygen Delivery Method): room air    PHYSICAL EXAM:  GENERAL: resting comfortably in bed; NAD, jaundice all over  EYES: EOMI, PERRLA, conjunctiva and sclera clear   NECK: Supple  LUNGS: CTA B/L; no W/R/R  HEART: S1/S2 normal, RRR; No murmurs   ABDOMEN: Soft, Nondistended; Bowel sounds present. moderate RUQ tenderness, mild epigastric tenderness, no rebound tenderness, negative travis's sign.  EXTREMITIES:  2+ Peripheral Pulses, No clubbing, cyanosis, or edema   NERVOUS SYSTEM:  AAOx3; CNII-XII grossly intact; no focal deficits  PSYCHIATRIC: affect and characteristics of appearance, verbalizations, behaviors are appropriate        LABS:                        8.7    7.90  )-----------( 109      ( 14 Nov 2023 14:00 )             27.1     11-14    142  |  112<H>  |  15  ----------------------------<  152<H>  4.1   |  21<L>  |  0.86    Ca    8.6      14 Nov 2023 05:30  Phos  2.6     11-14  Mg     1.8     11-14    TPro  6.4  /  Alb  2.8<L>  /  TBili  1.8<H>  /  DBili  x   /  AST  32  /  ALT  14  /  AlkPhos  80  11-14    PT/INR - ( 14 Nov 2023 05:30 )   PT: 18.2 sec;   INR: 1.62            Urinalysis Basic - ( 14 Nov 2023 05:30 )  Color: x / Appearance: x / SG: x / pH: x  Gluc: 152 mg/dL / Ketone: x  / Bili: x / Urobili: x   Blood: x / Protein: x / Nitrite: x   Leuk Esterase: x / RBC: x / WBC x   Sq Epi: x / Non Sq Epi: x / Bacteria: x      CAPILLARY BLOOD GLUCOSE  POCT Blood Glucose.: 143 mg/dL (14 Nov 2023 22:14)  POCT Blood Glucose.: 152 mg/dL (14 Nov 2023 18:10)  POCT Blood Glucose.: 131 mg/dL (14 Nov 2023 13:14)  POCT Blood Glucose.: 146 mg/dL (14 Nov 2023 09:19)        RADIOLOGY & ADDITIONAL TESTS:  Consultant(s) Notes Reviewed:  [x ] YES  [ ] NO    MEDICATIONS  (STANDING):  allopurinol 100 milliGRAM(s) Oral daily  carvedilol 12.5 milliGRAM(s) Oral every 12 hours  cefTRIAXone   IVPB 1000 milliGRAM(s) IV Intermittent every 24 hours  clopidogrel Tablet 75 milliGRAM(s) Oral daily  dextrose 5%. 1000 milliLiter(s) (100 mL/Hr) IV Continuous <Continuous>  dextrose 5%. 1000 milliLiter(s) (50 mL/Hr) IV Continuous <Continuous>  dextrose 50% Injectable 25 Gram(s) IV Push once  dextrose 50% Injectable 25 Gram(s) IV Push once  dextrose 50% Injectable 12.5 Gram(s) IV Push once  furosemide    Tablet 40 milliGRAM(s) Oral daily  gabapentin 100 milliGRAM(s) Oral at bedtime  glucagon  Injectable 1 milliGRAM(s) IntraMuscular once  influenza   Vaccine 0.5 milliLiter(s) IntraMuscular once  insulin lispro (ADMELOG) corrective regimen sliding scale   SubCutaneous Before meals and at bedtime  pantoprazole    Tablet 40 milliGRAM(s) Oral before breakfast  polyethylene glycol 3350 17 Gram(s) Oral every 24 hours  rifAXIMin 550 milliGRAM(s) Oral every 12 hours  spironolactone 25 milliGRAM(s) Oral every 24 hours    MEDICATIONS  (PRN):  dextrose Oral Gel 15 Gram(s) Oral once PRN Blood Glucose LESS THAN 70 milliGRAM(s)/deciliter  oxyCODONE    IR 5 milliGRAM(s) Oral every 6 hours PRN Severe Pain (7 - 10)      Care Discussed with Consultants/Other Providers [ x] YES  [ ] NO

## 2023-11-15 NOTE — PROGRESS NOTE ADULT - SUBJECTIVE AND OBJECTIVE BOX
Hepatology Consult Progress Note:       OVERNIGHT EVENTS: RAZIA.    SUBJECTIVE / INTERVAL HPI:   Patient seen and examined at bedside. States that overall he feels okay. No further melena. Has not had a bowel movement. Hb fluctuating but now 8.8 with no further transfusions. Complains of some GERD symptoms.         VITAL SIGNS:  Vital Signs Last 24 Hrs  T(C): 36.4 (15 Nov 2023 09:51), Max: 36.7 (15 Nov 2023 06:07)  T(F): 97.5 (15 Nov 2023 09:51), Max: 98 (15 Nov 2023 06:07)  HR: 54 (15 Nov 2023 10:15) (51 - 67)  BP: 118/68 (15 Nov 2023 10:15) (107/69 - 131/80)  BP(mean): 96 (14 Nov 2023 17:14) (96 - 96)  RR: 20 (15 Nov 2023 10:15) (17 - 20)  SpO2: 99% (15 Nov 2023 10:15) (96% - 99%)    Parameters below as of 15 Nov 2023 09:51  Patient On (Oxygen Delivery Method): room air        PHYSICAL EXAM:  General: No acute distress  Lungs: Normal respiratory effort and no intercostal retractions  Cardiovascular: RRR  Abdomen: Soft, non-tender, non-distended  Neurological: Alert and oriented x3  Skin: Warm and dry. No obvious rash       MEDICATIONS:  MEDICATIONS  (STANDING):  allopurinol 100 milliGRAM(s) Oral daily  dextrose 5%. 1000 milliLiter(s) (50 mL/Hr) IV Continuous <Continuous>  dextrose 5%. 1000 milliLiter(s) (100 mL/Hr) IV Continuous <Continuous>  dextrose 50% Injectable 25 Gram(s) IV Push once  dextrose 50% Injectable 12.5 Gram(s) IV Push once  dextrose 50% Injectable 25 Gram(s) IV Push once  furosemide    Tablet 40 milliGRAM(s) Oral daily  gabapentin 100 milliGRAM(s) Oral at bedtime  glucagon  Injectable 1 milliGRAM(s) IntraMuscular once  influenza   Vaccine 0.5 milliLiter(s) IntraMuscular once  insulin lispro (ADMELOG) corrective regimen sliding scale   SubCutaneous Before meals and at bedtime  pantoprazole    Tablet 40 milliGRAM(s) Oral every 12 hours  polyethylene glycol 3350 17 Gram(s) Oral every 24 hours  rifAXIMin 550 milliGRAM(s) Oral every 12 hours  spironolactone 25 milliGRAM(s) Oral every 24 hours    MEDICATIONS  (PRN):  dextrose Oral Gel 15 Gram(s) Oral once PRN Blood Glucose LESS THAN 70 milliGRAM(s)/deciliter      ALLERGIES:  Allergies    aspirin (Pruritus)    Intolerances        LABS:                        8.8    4.24  )-----------( 85       ( 15 Nov 2023 11:27 )             25.5     11-15    143  |  108  |  14  ----------------------------<  191<H>  3.5   |  23  |  0.96    Ca    8.4      15 Nov 2023 11:27  Phos  3.9     11-15  Mg     1.7     11-15    TPro  6.4  /  Alb  2.6<L>  /  TBili  1.2  /  DBili  x   /  AST  30  /  ALT  15  /  AlkPhos  102  11-15    PT/INR - ( 14 Nov 2023 05:30 )   PT: 18.2 sec;   INR: 1.62            Urinalysis Basic - ( 15 Nov 2023 11:27 )    Color: x / Appearance: x / SG: x / pH: x  Gluc: 191 mg/dL / Ketone: x  / Bili: x / Urobili: x   Blood: x / Protein: x / Nitrite: x   Leuk Esterase: x / RBC: x / WBC x   Sq Epi: x / Non Sq Epi: x / Bacteria: x      CAPILLARY BLOOD GLUCOSE      POCT Blood Glucose.: 255 mg/dL (15 Nov 2023 12:01)  RADIOLOGY & ADDITIONAL TESTS: Reviewed.         Hepatology Consult Progress Note:       OVERNIGHT EVENTS: RAZIA.    SUBJECTIVE / INTERVAL HPI:   Patient seen and examined at bedside. States that overall he feels okay. No further melena. Has not had a bowel movement. Hb fluctuating but now 8.8 with no further transfusions. Complains of some GERD symptoms. Had a near syncopal episode this AM while brushing his teeth in the bathroom and nearly fell.       VITAL SIGNS:  Vital Signs Last 24 Hrs  T(C): 36.4 (15 Nov 2023 09:51), Max: 36.7 (15 Nov 2023 06:07)  T(F): 97.5 (15 Nov 2023 09:51), Max: 98 (15 Nov 2023 06:07)  HR: 54 (15 Nov 2023 10:15) (51 - 67)  BP: 118/68 (15 Nov 2023 10:15) (107/69 - 131/80)  BP(mean): 96 (14 Nov 2023 17:14) (96 - 96)  RR: 20 (15 Nov 2023 10:15) (17 - 20)  SpO2: 99% (15 Nov 2023 10:15) (96% - 99%)    Parameters below as of 15 Nov 2023 09:51  Patient On (Oxygen Delivery Method): room air        PHYSICAL EXAM:  General: No acute distress  Lungs: Normal respiratory effort and no intercostal retractions  Cardiovascular: RRR  Abdomen: Soft, non-tender, non-distended  Neurological: Alert and oriented x3  Skin: Warm and dry. No obvious rash       MEDICATIONS:  MEDICATIONS  (STANDING):  allopurinol 100 milliGRAM(s) Oral daily  dextrose 5%. 1000 milliLiter(s) (50 mL/Hr) IV Continuous <Continuous>  dextrose 5%. 1000 milliLiter(s) (100 mL/Hr) IV Continuous <Continuous>  dextrose 50% Injectable 25 Gram(s) IV Push once  dextrose 50% Injectable 12.5 Gram(s) IV Push once  dextrose 50% Injectable 25 Gram(s) IV Push once  furosemide    Tablet 40 milliGRAM(s) Oral daily  gabapentin 100 milliGRAM(s) Oral at bedtime  glucagon  Injectable 1 milliGRAM(s) IntraMuscular once  influenza   Vaccine 0.5 milliLiter(s) IntraMuscular once  insulin lispro (ADMELOG) corrective regimen sliding scale   SubCutaneous Before meals and at bedtime  pantoprazole    Tablet 40 milliGRAM(s) Oral every 12 hours  polyethylene glycol 3350 17 Gram(s) Oral every 24 hours  rifAXIMin 550 milliGRAM(s) Oral every 12 hours  spironolactone 25 milliGRAM(s) Oral every 24 hours    MEDICATIONS  (PRN):  dextrose Oral Gel 15 Gram(s) Oral once PRN Blood Glucose LESS THAN 70 milliGRAM(s)/deciliter      ALLERGIES:  Allergies    aspirin (Pruritus)    Intolerances        LABS:                        8.8    4.24  )-----------( 85       ( 15 Nov 2023 11:27 )             25.5     11-15    143  |  108  |  14  ----------------------------<  191<H>  3.5   |  23  |  0.96    Ca    8.4      15 Nov 2023 11:27  Phos  3.9     11-15  Mg     1.7     11-15    TPro  6.4  /  Alb  2.6<L>  /  TBili  1.2  /  DBili  x   /  AST  30  /  ALT  15  /  AlkPhos  102  11-15    PT/INR - ( 14 Nov 2023 05:30 )   PT: 18.2 sec;   INR: 1.62            Urinalysis Basic - ( 15 Nov 2023 11:27 )    Color: x / Appearance: x / SG: x / pH: x  Gluc: 191 mg/dL / Ketone: x  / Bili: x / Urobili: x   Blood: x / Protein: x / Nitrite: x   Leuk Esterase: x / RBC: x / WBC x   Sq Epi: x / Non Sq Epi: x / Bacteria: x      CAPILLARY BLOOD GLUCOSE      POCT Blood Glucose.: 255 mg/dL (15 Nov 2023 12:01)  RADIOLOGY & ADDITIONAL TESTS: Reviewed.

## 2023-11-15 NOTE — PROGRESS NOTE ADULT - ASSESSMENT
64 yo [place] ([facility]) M former smoker with hx HTN, HLD, severe 3 vessel CAD, pre-diabetes, gout, TIA (14 yrs ago), gastric ulcer, rectal bleed, LIZ and alcoholic liver disease c/b cirrhosis and portal hypertension c/b esophageal varices (and UGIB) and splenomegaly presents for hematemesis x2 11/12 AM. S/p EGD on 11/13 which showed duodenitis, nonbleeding varices, healing PUD, and portal hypertensive gastropathy. Switched protonix to PO. Stepped down to RMF on 11/13.  64 yo [place] ([facility]) M former smoker with hx HTN, HLD, severe 3 vessel CAD, pre-diabetes, gout, TIA (14 yrs ago), gastric ulcer, rectal bleed, LIZ and alcoholic liver disease c/b cirrhosis and portal hypertension c/b esophageal varices (and UGIB) and splenomegaly presents for hematemesis x2 11/12 AM. S/p EGD on 11/13 which showed duodenitis, nonbleeding varices, healing PUD, and portal hypertensive gastropathy. Protonix was switched to PO. Stepped down to RMF on 11/13.

## 2023-11-15 NOTE — CHART NOTE - NSCHARTNOTEFT_GEN_A_CORE
Came to see patient, he was out of the room .  Will try again later.      Called re: advice on resuming patient's plavix in light of his recent GIB.  Had GI scope this admission which revealed duodenitis and old healing ulcers w/o active bleed.  Pt reportedly has an ASA allergy, symptoms of pruritis.  Patient's CABG was 2 months ago, and he does not have any fresh stents.  Usually we would keep at least one anti-platelet agent on for his coronary grafts, but in light of his recent symptoms of hematemais and melena, would recommend HOLDING plavix for now.  If patient gets discharged in the next day or so, would have him follow up with Hepatologist Dr. Lawson.  He can also call our office for a telehealth visit with Dr. Hinton's NP Ledy Chanel to discuss further anti-platelet therapy within 1-2 weeks after he gets discharged.  [facility] office [phone].      Discussed case with Dr. Mobley (covering for Dr. Hinton).

## 2023-11-15 NOTE — PROGRESS NOTE ADULT - PROBLEM SELECTOR PLAN 2
Pt presented with dark brown/black emesis x2 and melena. 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 S/p IV protonix.   Had RUQ pain on 11/14 with one episode of melena likely residual. XR abdomen: Nonspecific bowel gas pattern with no obstruction.  - switched protonix 40mg PO to qd per GI   - Oxycodone 5mg q6h PRN severe pain  - bowel regimen (miralax 17g qd) while on opioid Pt presented with dark brown/black emesis x2 and melena. 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 S/p IV protonix.   Had RUQ pain on 11/14 with one episode of melena likely residual. XR abdomen: Nonspecific bowel gas pattern with no obstruction.  - on protonix 40mg PO BID per GI   - Oxycodone 5mg q6h PRN severe pain  - bowel regimen (miralax 17g BID per GI) while on opioid

## 2023-11-15 NOTE — PROGRESS NOTE ADULT - PROBLEM SELECTOR PLAN 8
Pt with HTN. On home coreg 12.5mg BID, aldactone 25mg qd, Lasix 40mg qd.   - C/w home meds Pt with HTN. On home coreg 12.5mg BID, aldactone 25mg qd, Lasix 40mg qd.   - c/w home meds except coreg  - after fall on 11/15 in AM and as BPs soft, reducing coreg to 3.125mg BID for now

## 2023-11-15 NOTE — PROGRESS NOTE ADULT - ASSESSMENT
63M h/o alcoholic cirrhosis (+varices on EGD 4/2023), CAD (s/p CABG 9/2023), TIA, gastric ulcer, rectal bleed, LIZ, who first p/w coffee-ground emesis and melena x1d.    Underwent EGD on 11/13, which showed:   - A single patch of gastric ectopic mucosa was seen in the lower third of the esophagus. Inlet patch was located 43 cm from the incisors.  - A sliding small size hiatal hernia was seen, the esophagogastric junction (EGJ) was noted at 44 cm, with hiatal narrowing at 45 cm from the incisors. Retroflexion view in the stomach confirmed the size and morphology of the hernia.  - 1 cord of grade I varices were seen in the lower third of the esophagus. The varices were not bleeding.  - A post-ulcer gastric deformity was noted in the antrum. Small erosions were seen as well. Endoscopic findings were suggestive of healing from prior peptic ulcer disease.  - Diffuse continuous congestion, petechiae and mosaic mucosal pattern of the mucosa with no bleeding was noted in the whole stomach. These findings were suggestive of portal hypertensive gastropathy.  - Localized discontinuous erythema of the mucosa with no bleeding was noted in the duodenal bulb. These findings are compatible with duodenitis.    Hemodynamically stable with no further episodes of melena. Hb today 8.8.     Recommendations:   - Continue ceftriaxone 1g IV q24 for 5 days total (can be discharged on cefpodoxime)   - Continue Coreg 12.5 mg PO BID   - Pantoprazole 40 mg BID for 2 weeks, followed by 40 mg PO daily    - Continue Lasix 40 mg PO daily   - Continue Aldactone 25 mg PO daily   - Increase Miralax to BID from daily     Case discussed with Dr. Lawson. GI Team will continue to follow.     Arina Murray D.O.   Gastroenterology Fellow  Weekday 7am-5pm Pager: [phone]  Weeknights/Weekend/Holiday Coverage: Please call the  for contact info.   63M h/o alcoholic cirrhosis (+varices on EGD 4/2023), CAD (s/p CABG 9/2023), TIA, gastric ulcer, rectal bleed, LIZ, who first p/w coffee-ground emesis and melena x1d.    Underwent EGD on 11/13, which showed:   - A single patch of gastric ectopic mucosa was seen in the lower third of the esophagus. Inlet patch was located 43 cm from the incisors.  - A sliding small size hiatal hernia was seen, the esophagogastric junction (EGJ) was noted at 44 cm, with hiatal narrowing at 45 cm from the incisors. Retroflexion view in the stomach confirmed the size and morphology of the hernia.  - 1 cord of grade I varices were seen in the lower third of the esophagus. The varices were not bleeding.  - A post-ulcer gastric deformity was noted in the antrum. Small erosions were seen as well. Endoscopic findings were suggestive of healing from prior peptic ulcer disease.  - Diffuse continuous congestion, petechiae and mosaic mucosal pattern of the mucosa with no bleeding was noted in the whole stomach. These findings were suggestive of portal hypertensive gastropathy.  - Localized discontinuous erythema of the mucosa with no bleeding was noted in the duodenal bulb. These findings are compatible with duodenitis.    Hemodynamically stable with no further episodes of melena. Hb today 8.8. Did have an episode of near syncope in the bathroom while brushing his teeth, possibly 2/2 to bradycardia.     Recommendations:   - Discontinue ceftriaxone as bleeding has resolved and to avoid further fluid accumulation   - Decrease Coreg to 6.25 mg PO BID as bradycardia (HR often 50s) may have contributed to near syncopal episode   - Continue Pantoprazole 40 mg BID for 2 weeks, followed by 40 mg PO daily    - Continue Lasix 40 mg PO daily   - Continue Aldactone 25 mg PO daily   - Increase Miralax to BID from daily   - Repeat CXR and send CK     Case discussed with Dr. Lawson. Hepatology Team will continue to follow.     Arina Murray D.O.   Gastroenterology Fellow  Weekday 7am-5pm Pager: [phone]  Weeknights/Weekend/Holiday Coverage: Please call the  for contact info.

## 2023-11-16 ENCOUNTER — APPOINTMENT (OUTPATIENT)
Dept: INTERVENTIONAL RADIOLOGY/VASCULAR | Facility: HOSPITAL | Age: 63
End: 2023-11-16

## 2023-11-16 LAB
ALBUMIN SERPL ELPH-MCNC: 2.6 G/DL — LOW (ref 3.3–5)
ALBUMIN SERPL ELPH-MCNC: 2.6 G/DL — LOW (ref 3.3–5)
ALP SERPL-CCNC: 171 U/L — HIGH (ref 40–120)
ALP SERPL-CCNC: 171 U/L — HIGH (ref 40–120)
ALT FLD-CCNC: 17 U/L — SIGNIFICANT CHANGE UP (ref 10–45)
ALT FLD-CCNC: 17 U/L — SIGNIFICANT CHANGE UP (ref 10–45)
ANION GAP SERPL CALC-SCNC: 8 MMOL/L — SIGNIFICANT CHANGE UP (ref 5–17)
ANION GAP SERPL CALC-SCNC: 8 MMOL/L — SIGNIFICANT CHANGE UP (ref 5–17)
AST SERPL-CCNC: 35 U/L — SIGNIFICANT CHANGE UP (ref 10–40)
AST SERPL-CCNC: 35 U/L — SIGNIFICANT CHANGE UP (ref 10–40)
BILIRUB SERPL-MCNC: 1 MG/DL — SIGNIFICANT CHANGE UP (ref 0.2–1.2)
BILIRUB SERPL-MCNC: 1 MG/DL — SIGNIFICANT CHANGE UP (ref 0.2–1.2)
BUN SERPL-MCNC: 14 MG/DL — SIGNIFICANT CHANGE UP (ref 7–23)
BUN SERPL-MCNC: 14 MG/DL — SIGNIFICANT CHANGE UP (ref 7–23)
CALCIUM SERPL-MCNC: 8.4 MG/DL — SIGNIFICANT CHANGE UP (ref 8.4–10.5)
CALCIUM SERPL-MCNC: 8.4 MG/DL — SIGNIFICANT CHANGE UP (ref 8.4–10.5)
CHLORIDE SERPL-SCNC: 110 MMOL/L — HIGH (ref 96–108)
CHLORIDE SERPL-SCNC: 110 MMOL/L — HIGH (ref 96–108)
CK MB CFR SERPL CALC: <1 NG/ML — SIGNIFICANT CHANGE UP (ref 0–6.7)
CK MB CFR SERPL CALC: <1 NG/ML — SIGNIFICANT CHANGE UP (ref 0–6.7)
CK SERPL-CCNC: 33 U/L — SIGNIFICANT CHANGE UP (ref 30–200)
CK SERPL-CCNC: 33 U/L — SIGNIFICANT CHANGE UP (ref 30–200)
CO2 SERPL-SCNC: 24 MMOL/L — SIGNIFICANT CHANGE UP (ref 22–31)
CO2 SERPL-SCNC: 24 MMOL/L — SIGNIFICANT CHANGE UP (ref 22–31)
CREAT SERPL-MCNC: 0.92 MG/DL — SIGNIFICANT CHANGE UP (ref 0.5–1.3)
CREAT SERPL-MCNC: 0.92 MG/DL — SIGNIFICANT CHANGE UP (ref 0.5–1.3)
EGFR: 93 ML/MIN/1.73M2 — SIGNIFICANT CHANGE UP
EGFR: 93 ML/MIN/1.73M2 — SIGNIFICANT CHANGE UP
GLUCOSE BLDC GLUCOMTR-MCNC: 119 MG/DL — HIGH (ref 70–99)
GLUCOSE BLDC GLUCOMTR-MCNC: 119 MG/DL — HIGH (ref 70–99)
GLUCOSE BLDC GLUCOMTR-MCNC: 130 MG/DL — HIGH (ref 70–99)
GLUCOSE BLDC GLUCOMTR-MCNC: 130 MG/DL — HIGH (ref 70–99)
GLUCOSE BLDC GLUCOMTR-MCNC: 151 MG/DL — HIGH (ref 70–99)
GLUCOSE BLDC GLUCOMTR-MCNC: 151 MG/DL — HIGH (ref 70–99)
GLUCOSE BLDC GLUCOMTR-MCNC: 92 MG/DL — SIGNIFICANT CHANGE UP (ref 70–99)
GLUCOSE BLDC GLUCOMTR-MCNC: 92 MG/DL — SIGNIFICANT CHANGE UP (ref 70–99)
GLUCOSE SERPL-MCNC: 136 MG/DL — HIGH (ref 70–99)
GLUCOSE SERPL-MCNC: 136 MG/DL — HIGH (ref 70–99)
HCT VFR BLD CALC: 24.4 % — LOW (ref 39–50)
HCT VFR BLD CALC: 24.4 % — LOW (ref 39–50)
HGB BLD-MCNC: 7.8 G/DL — LOW (ref 13–17)
HGB BLD-MCNC: 7.8 G/DL — LOW (ref 13–17)
MAGNESIUM SERPL-MCNC: 2 MG/DL — SIGNIFICANT CHANGE UP (ref 1.6–2.6)
MAGNESIUM SERPL-MCNC: 2 MG/DL — SIGNIFICANT CHANGE UP (ref 1.6–2.6)
MCHC RBC-ENTMCNC: 28.2 PG — SIGNIFICANT CHANGE UP (ref 27–34)
MCHC RBC-ENTMCNC: 28.2 PG — SIGNIFICANT CHANGE UP (ref 27–34)
MCHC RBC-ENTMCNC: 32 GM/DL — SIGNIFICANT CHANGE UP (ref 32–36)
MCHC RBC-ENTMCNC: 32 GM/DL — SIGNIFICANT CHANGE UP (ref 32–36)
MCV RBC AUTO: 88.1 FL — SIGNIFICANT CHANGE UP (ref 80–100)
MCV RBC AUTO: 88.1 FL — SIGNIFICANT CHANGE UP (ref 80–100)
NRBC # BLD: 0 /100 WBCS — SIGNIFICANT CHANGE UP (ref 0–0)
NRBC # BLD: 0 /100 WBCS — SIGNIFICANT CHANGE UP (ref 0–0)
PHOSPHATE SERPL-MCNC: 4.3 MG/DL — SIGNIFICANT CHANGE UP (ref 2.5–4.5)
PHOSPHATE SERPL-MCNC: 4.3 MG/DL — SIGNIFICANT CHANGE UP (ref 2.5–4.5)
PLATELET # BLD AUTO: 86 K/UL — LOW (ref 150–400)
PLATELET # BLD AUTO: 86 K/UL — LOW (ref 150–400)
POTASSIUM SERPL-MCNC: 3.5 MMOL/L — SIGNIFICANT CHANGE UP (ref 3.5–5.3)
POTASSIUM SERPL-MCNC: 3.5 MMOL/L — SIGNIFICANT CHANGE UP (ref 3.5–5.3)
POTASSIUM SERPL-SCNC: 3.5 MMOL/L — SIGNIFICANT CHANGE UP (ref 3.5–5.3)
POTASSIUM SERPL-SCNC: 3.5 MMOL/L — SIGNIFICANT CHANGE UP (ref 3.5–5.3)
PROT SERPL-MCNC: 5.8 G/DL — LOW (ref 6–8.3)
PROT SERPL-MCNC: 5.8 G/DL — LOW (ref 6–8.3)
RBC # BLD: 2.77 M/UL — LOW (ref 4.2–5.8)
RBC # BLD: 2.77 M/UL — LOW (ref 4.2–5.8)
RBC # FLD: 15.2 % — HIGH (ref 10.3–14.5)
RBC # FLD: 15.2 % — HIGH (ref 10.3–14.5)
SODIUM SERPL-SCNC: 142 MMOL/L — SIGNIFICANT CHANGE UP (ref 135–145)
SODIUM SERPL-SCNC: 142 MMOL/L — SIGNIFICANT CHANGE UP (ref 135–145)
TROPONIN T, HIGH SENSITIVITY RESULT: 20 NG/L — SIGNIFICANT CHANGE UP (ref 0–51)
TROPONIN T, HIGH SENSITIVITY RESULT: 20 NG/L — SIGNIFICANT CHANGE UP (ref 0–51)
WBC # BLD: 3.98 K/UL — SIGNIFICANT CHANGE UP (ref 3.8–10.5)
WBC # BLD: 3.98 K/UL — SIGNIFICANT CHANGE UP (ref 3.8–10.5)
WBC # FLD AUTO: 3.98 K/UL — SIGNIFICANT CHANGE UP (ref 3.8–10.5)
WBC # FLD AUTO: 3.98 K/UL — SIGNIFICANT CHANGE UP (ref 3.8–10.5)

## 2023-11-16 PROCEDURE — 93306 TTE W/DOPPLER COMPLETE: CPT | Mod: 26

## 2023-11-16 PROCEDURE — 99232 SBSQ HOSP IP/OBS MODERATE 35: CPT | Mod: GC

## 2023-11-16 RX ORDER — POTASSIUM CHLORIDE 20 MEQ
40 PACKET (EA) ORAL ONCE
Refills: 0 | Status: COMPLETED | OUTPATIENT
Start: 2023-11-16 | End: 2023-11-16

## 2023-11-16 RX ORDER — ALBUMIN HUMAN 25 %
50 VIAL (ML) INTRAVENOUS ONCE
Refills: 0 | Status: COMPLETED | OUTPATIENT
Start: 2023-11-16 | End: 2023-11-16

## 2023-11-16 RX ORDER — POLYETHYLENE GLYCOL 3350 17 G/17G
17 POWDER, FOR SOLUTION ORAL
Qty: 0 | Refills: 0 | DISCHARGE
Start: 2023-11-16

## 2023-11-16 RX ORDER — FUROSEMIDE 40 MG
40 TABLET ORAL DAILY
Refills: 0 | Status: DISCONTINUED | OUTPATIENT
Start: 2023-11-16 | End: 2023-11-18

## 2023-11-16 RX ORDER — SPIRONOLACTONE 25 MG/1
25 TABLET, FILM COATED ORAL EVERY 24 HOURS
Refills: 0 | Status: DISCONTINUED | OUTPATIENT
Start: 2023-11-16 | End: 2023-11-18

## 2023-11-16 RX ORDER — ALLOPURINOL 300 MG
1 TABLET ORAL
Refills: 0 | DISCHARGE

## 2023-11-16 RX ADMIN — PANTOPRAZOLE SODIUM 40 MILLIGRAM(S): 20 TABLET, DELAYED RELEASE ORAL at 19:06

## 2023-11-16 RX ADMIN — PANTOPRAZOLE SODIUM 40 MILLIGRAM(S): 20 TABLET, DELAYED RELEASE ORAL at 06:25

## 2023-11-16 RX ADMIN — CARVEDILOL PHOSPHATE 3.12 MILLIGRAM(S): 80 CAPSULE, EXTENDED RELEASE ORAL at 06:25

## 2023-11-16 RX ADMIN — Medication 50 MILLILITER(S): at 12:58

## 2023-11-16 RX ADMIN — Medication 2: at 19:14

## 2023-11-16 RX ADMIN — SPIRONOLACTONE 25 MILLIGRAM(S): 25 TABLET, FILM COATED ORAL at 21:39

## 2023-11-16 RX ADMIN — GABAPENTIN 100 MILLIGRAM(S): 400 CAPSULE ORAL at 21:40

## 2023-11-16 RX ADMIN — POLYETHYLENE GLYCOL 3350 17 GRAM(S): 17 POWDER, FOR SOLUTION ORAL at 19:06

## 2023-11-16 RX ADMIN — POLYETHYLENE GLYCOL 3350 17 GRAM(S): 17 POWDER, FOR SOLUTION ORAL at 06:25

## 2023-11-16 RX ADMIN — Medication 40 MILLIEQUIVALENT(S): at 10:38

## 2023-11-16 RX ADMIN — LIDOCAINE 2 PATCH: 4 CREAM TOPICAL at 19:07

## 2023-11-16 NOTE — PROGRESS NOTE ADULT - PROBLEM SELECTOR PLAN 6
Pt with recurrent pleural effusion, despite being on lasix and aldactone. Pt was admitted to [facility] on 9/26 to 9/30, s/p pigtail drainage (4L). CTAP on 10/31 showed moderate to severe left pleural effusion with near complete atelectasis of the left lower lobe. CXR on 11/13 showed left lower lung opacification/pleural effusion has substantially improved compared to prior exam 10/23/2023. Small left pleural effusion remains.   - C/w home diuretic as above

## 2023-11-16 NOTE — PROGRESS NOTE ADULT - PROBLEM SELECTOR PLAN 2
Pt presented with dark brown/black emesis x2 and melena. 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 S/p IV protonix.   Had RUQ pain on 11/14 with one episode of melena likely residual. XR abdomen: Nonspecific bowel gas pattern with no obstruction.  - on protonix 40mg PO BID per GI   - Oxycodone 5mg q6h PRN severe pain  - bowel regimen (miralax 17g BID per GI) while on opioid Pt presented with dark brown/black emesis x2 and melena. 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 S/p IV protonix.   Had RUQ pain on 11/14 with one episode of melena likely residual. XR abdomen: Nonspecific bowel gas pattern with no obstruction.  - on protonix 40mg PO BID per GI   - Oxycodone 5mg q6h PRN severe pain  - bowel regimen (miralax 17g BID per GI) while on opioid  - SCDs & ace wraps, holding plavix d/t concern for bleed

## 2023-11-16 NOTE — PROGRESS NOTE ADULT - PROBLEM SELECTOR PLAN 8
Pt with HTN. On home coreg 12.5mg BID, aldactone 25mg qd, Lasix 40mg qd.   - c/w home meds except coreg  - after fall on 11/15 in AM and as BPs soft, reducing coreg to 3.125mg BID for now Pt with HTN. On home coreg 6.25 mg BID, aldactone 25mg qd, Lasix 40mg qd.   - c/w home meds except coreg  - after fall on 11/15 in AM and as BPs soft, reducing coreg to 3.125mg BID for now

## 2023-11-16 NOTE — PHYSICAL THERAPY INITIAL EVALUATION ADULT - PHYSICAL ASSIST/NONPHYSICAL ASSIST: STAND/SIT, REHAB EVAL
Telephone Encounter by Batsheva Kay at 09/29/17 11:45 AM     Author:  Batsheva Kay Service:  (none) Author Type:       Filed:  09/29/17 11:46 AM Encounter Date:  9/28/2017 Status:  Signed     :  Batsheva Kay ()            Patient was called and she states she cannot make it to her ascan on 10/2, patient was scheduled for 10/9/17 at 1:00pm , routed to Carol Byrd to confirm this is a good date and time?[RR1.1M]        Revision History        User Key Date/Time User Provider Type Action    > RR1.1 09/29/17 11:46 AM Batsheva Kay  Sign    M - Manual             Safety

## 2023-11-16 NOTE — PHYSICAL THERAPY INITIAL EVALUATION ADULT - ADDITIONAL COMMENTS
Pt is [place] speaking with conversational English, living with his spouse in an elevator apartment, no VINCE. Pt owns a RW and cane.

## 2023-11-16 NOTE — PROGRESS NOTE ADULT - ASSESSMENT
64 yo [place] ([facility]) M former smoker with hx HTN, HLD, severe 3 vessel CAD, pre-diabetes, gout, TIA (14 yrs ago), gastric ulcer, rectal bleed, LIZ and alcoholic liver disease c/b cirrhosis and portal hypertension c/b esophageal varices (and UGIB) and splenomegaly presents for hematemesis x2 11/12 AM. S/p EGD on 11/13 which showed duodenitis, nonbleeding varices, healing PUD, and portal hypertensive gastropathy. Protonix was switched to PO. Stepped down to RMF on 11/13.

## 2023-11-16 NOTE — PHYSICAL THERAPY INITIAL EVALUATION ADULT - PERTINENT HX OF CURRENT PROBLEM, REHAB EVAL
63M with PMHx of liver cirrhosis and anemia admitted for hematemesis x2 and abdominal pain. Pt with recent fall due to orthostatic hypotension, no further injury.

## 2023-11-16 NOTE — PROGRESS NOTE ADULT - SUBJECTIVE AND OBJECTIVE BOX
Patient is a 63y old  Male who presents with a chief complaint of UGIB (14 Nov 2023 17:16)      INTERVAL HPI/OVERNIGHT EVENTS:   NAEO.    AT BEDSIDE:      Vital Signs Last 24 Hrs  T(C): 36.6 (16 Nov 2023 05:29), Max: 36.8 (15 Nov 2023 21:36)  T(F): 97.9 (16 Nov 2023 05:29), Max: 98.3 (15 Nov 2023 21:36)  HR: 63 (16 Nov 2023 05:29) (54 - 66)  BP: 115/71 (16 Nov 2023 05:29) (99/63 - 131/80)    RR: 18 (16 Nov 2023 05:29) (17 - 20)  SpO2: 97% (16 Nov 2023 05:29) (97% - 99%)  O2 Parameters below as of 15 Nov 2023 21:36  Patient On (Oxygen Delivery Method): room air    PHYSICAL EXAM:  GENERAL: resting comfortably in bed; NAD, mild diffuse jaundice  EYES: EOMI, PERRLA, conjunctiva and sclera clear   NECK: Supple  LUNGS: CTA B/L; no W/R/R  HEART: S1/S2 normal, RRR; No murmurs   ABDOMEN: Soft, Nondistended; Bowel sounds present, hepatomegaly present, moderate RUQ tenderness, mild epigastric tenderness, no rebound tenderness, negative travis's sign.  EXTREMITIES:  2+ Peripheral Pulses, No clubbing, cyanosis, or edema   NERVOUS SYSTEM:  AAOx3; CNII-XII grossly intact; no focal deficits  PSYCHIATRIC: affect and characteristics of appearance, verbalizations, behaviors are appropriate    LABS:                        7.8    3.98  )-----------( 86       ( 16 Nov 2023 05:30 )             24.4     11-15    143  |  108  |  14  ----------------------------<  191<H>  3.5   |  23  |  0.96    Ca    8.4      15 Nov 2023 11:27  Phos  3.9     11-15  Mg     1.7     11-15    TPro  6.4  /  Alb  2.6<L>  /  TBili  1.2  /  DBili  x   /  AST  30  /  ALT  15  /  AlkPhos  102  11-15      Urinalysis Basic - ( 15 Nov 2023 11:27 )  Color: x / Appearance: x / SG: x / pH: x  Gluc: 191 mg/dL / Ketone: x  / Bili: x / Urobili: x   Blood: x / Protein: x / Nitrite: x   Leuk Esterase: x / RBC: x / WBC x   Sq Epi: x / Non Sq Epi: x / Bacteria: x      CAPILLARY BLOOD GLUCOSE  POCT Blood Glucose.: 128 mg/dL (15 Nov 2023 22:13)  POCT Blood Glucose.: 102 mg/dL (15 Nov 2023 18:46)  POCT Blood Glucose.: 255 mg/dL (15 Nov 2023 12:01)  POCT Blood Glucose.: 112 mg/dL (15 Nov 2023 09:04)        RADIOLOGY & ADDITIONAL TESTS:  Consultant(s) Notes Reviewed:  [x ] YES  [ ] NO    MEDICATIONS  (STANDING):  allopurinol 100 milliGRAM(s) Oral daily  carvedilol 3.125 milliGRAM(s) Oral every 12 hours  dextrose 5%. 1000 milliLiter(s) (50 mL/Hr) IV Continuous <Continuous>  dextrose 5%. 1000 milliLiter(s) (100 mL/Hr) IV Continuous <Continuous>  dextrose 50% Injectable 25 Gram(s) IV Push once  dextrose 50% Injectable 12.5 Gram(s) IV Push once  dextrose 50% Injectable 25 Gram(s) IV Push once  gabapentin 100 milliGRAM(s) Oral at bedtime  glucagon  Injectable 1 milliGRAM(s) IntraMuscular once  influenza   Vaccine 0.5 milliLiter(s) IntraMuscular once  insulin lispro (ADMELOG) corrective regimen sliding scale   SubCutaneous Before meals and at bedtime  lidocaine   4% Patch 2 Patch Transdermal daily  pantoprazole    Tablet 40 milliGRAM(s) Oral every 12 hours  polyethylene glycol 3350 17 Gram(s) Oral two times a day    MEDICATIONS  (PRN):  dextrose Oral Gel 15 Gram(s) Oral once PRN Blood Glucose LESS THAN 70 milliGRAM(s)/deciliter          Care Discussed with Consultants/Other Providers [ x] YES  [ ] NO Patient is a 63y old  Male who presents with a chief complaint of UGIB (14 Nov 2023 17:16)      INTERVAL HPI/OVERNIGHT EVENTS:   NAEO.    AT BEDSIDE:  Pt reports anxiety following his fall yesterday. No other complaints at this time.     Vital Signs Last 24 Hrs  T(C): 36.6 (16 Nov 2023 05:29), Max: 36.8 (15 Nov 2023 21:36)  T(F): 97.9 (16 Nov 2023 05:29), Max: 98.3 (15 Nov 2023 21:36)  HR: 63 (16 Nov 2023 05:29) (54 - 66)  BP: 115/71 (16 Nov 2023 05:29) (99/63 - 131/80)    RR: 18 (16 Nov 2023 05:29) (17 - 20)  SpO2: 97% (16 Nov 2023 05:29) (97% - 99%)  O2 Parameters below as of 15 Nov 2023 21:36  Patient On (Oxygen Delivery Method): room air    PHYSICAL EXAM:  GENERAL: resting comfortably in bed; NAD, mild diffuse jaundice  EYES: EOMI, PERRLA, conjunctiva and sclera clear   NECK: Supple  LUNGS: CTA B/L; no W/R/R  HEART: S1/S2 normal, RRR; No murmurs   ABDOMEN: Soft, Nondistended; Bowel sounds present, hepatomegaly present, moderate RUQ tenderness, mild epigastric tenderness, no rebound tenderness, negative travis's sign.  EXTREMITIES:  2+ Peripheral Pulses, No clubbing, cyanosis, or edema   NERVOUS SYSTEM:  AAOx3; CNII-XII grossly intact; no focal deficits  PSYCHIATRIC: affect and characteristics of appearance, verbalizations, behaviors are appropriate    LABS:                        7.8    3.98  )-----------( 86       ( 16 Nov 2023 05:30 )             24.4     11-15    143  |  108  |  14  ----------------------------<  191<H>  3.5   |  23  |  0.96    Ca    8.4      15 Nov 2023 11:27  Phos  3.9     11-15  Mg     1.7     11-15    TPro  6.4  /  Alb  2.6<L>  /  TBili  1.2  /  DBili  x   /  AST  30  /  ALT  15  /  AlkPhos  102  11-15      Urinalysis Basic - ( 15 Nov 2023 11:27 )  Color: x / Appearance: x / SG: x / pH: x  Gluc: 191 mg/dL / Ketone: x  / Bili: x / Urobili: x   Blood: x / Protein: x / Nitrite: x   Leuk Esterase: x / RBC: x / WBC x   Sq Epi: x / Non Sq Epi: x / Bacteria: x      CAPILLARY BLOOD GLUCOSE  POCT Blood Glucose.: 128 mg/dL (15 Nov 2023 22:13)  POCT Blood Glucose.: 102 mg/dL (15 Nov 2023 18:46)  POCT Blood Glucose.: 255 mg/dL (15 Nov 2023 12:01)  POCT Blood Glucose.: 112 mg/dL (15 Nov 2023 09:04)        RADIOLOGY & ADDITIONAL TESTS:  Consultant(s) Notes Reviewed:  [x ] YES  [ ] NO    MEDICATIONS  (STANDING):  allopurinol 100 milliGRAM(s) Oral daily  carvedilol 3.125 milliGRAM(s) Oral every 12 hours  dextrose 5%. 1000 milliLiter(s) (50 mL/Hr) IV Continuous <Continuous>  dextrose 5%. 1000 milliLiter(s) (100 mL/Hr) IV Continuous <Continuous>  dextrose 50% Injectable 25 Gram(s) IV Push once  dextrose 50% Injectable 12.5 Gram(s) IV Push once  dextrose 50% Injectable 25 Gram(s) IV Push once  gabapentin 100 milliGRAM(s) Oral at bedtime  glucagon  Injectable 1 milliGRAM(s) IntraMuscular once  influenza   Vaccine 0.5 milliLiter(s) IntraMuscular once  insulin lispro (ADMELOG) corrective regimen sliding scale   SubCutaneous Before meals and at bedtime  lidocaine   4% Patch 2 Patch Transdermal daily  pantoprazole    Tablet 40 milliGRAM(s) Oral every 12 hours  polyethylene glycol 3350 17 Gram(s) Oral two times a day    MEDICATIONS  (PRN):  dextrose Oral Gel 15 Gram(s) Oral once PRN Blood Glucose LESS THAN 70 milliGRAM(s)/deciliter          Care Discussed with Consultants/Other Providers [ x] YES  [ ] NO

## 2023-11-16 NOTE — PHYSICAL THERAPY INITIAL EVALUATION ADULT - NSPTDISCHREC_GEN_A_CORE
Pending progress of orthostatics/Home PT Libtayo Pregnancy And Lactation Text: This medication is contraindicated in pregnancy and when breast feeding.

## 2023-11-16 NOTE — PROGRESS NOTE ADULT - PROBLEM SELECTOR PLAN 7
Pt with hx of CAD s/p OPCAB x 4 on 9/14/23. On Plavix 75mg last taken 11/11 AM (was stopped for a week recently and was restarted on 11/10 by Dr. Lawson and Dr. Hinton CTS) .   - Will resume plavix on 11/14 at 4PM  - Monitor for any signs/symptoms of bleeding  - C/w SCDs for DVT ppx Pt with hx of CAD s/p OPCAB x 4 on 9/14/23. On Plavix 75mg last taken 11/11 AM (was stopped for a week recently and was restarted on 11/10 by Dr. Lawson and Dr. Hinton CTS) . Patient allergic to aspirin so was given plavix chronically instead.   - resumed plavix on 11/14 at 4PM (got 1 dose, it was discontinued by 11/15 d/t orthostatics/concern for fall/risk of bleeding).  - Monitor for any signs/symptoms of bleeding  - C/w SCDs for DVT ppx

## 2023-11-17 DIAGNOSIS — I95.1 ORTHOSTATIC HYPOTENSION: ICD-10-CM

## 2023-11-17 LAB
ALBUMIN SERPL ELPH-MCNC: 2.7 G/DL — LOW (ref 3.3–5)
ALBUMIN SERPL ELPH-MCNC: 2.7 G/DL — LOW (ref 3.3–5)
ALP SERPL-CCNC: 135 U/L — HIGH (ref 40–120)
ALP SERPL-CCNC: 135 U/L — HIGH (ref 40–120)
ALT FLD-CCNC: 17 U/L — SIGNIFICANT CHANGE UP (ref 10–45)
ALT FLD-CCNC: 17 U/L — SIGNIFICANT CHANGE UP (ref 10–45)
ANION GAP SERPL CALC-SCNC: 8 MMOL/L — SIGNIFICANT CHANGE UP (ref 5–17)
ANION GAP SERPL CALC-SCNC: 8 MMOL/L — SIGNIFICANT CHANGE UP (ref 5–17)
ANISOCYTOSIS BLD QL: SLIGHT — SIGNIFICANT CHANGE UP
ANISOCYTOSIS BLD QL: SLIGHT — SIGNIFICANT CHANGE UP
AST SERPL-CCNC: 33 U/L — SIGNIFICANT CHANGE UP (ref 10–40)
AST SERPL-CCNC: 33 U/L — SIGNIFICANT CHANGE UP (ref 10–40)
BASOPHILS # BLD AUTO: 0 K/UL — SIGNIFICANT CHANGE UP (ref 0–0.2)
BASOPHILS # BLD AUTO: 0 K/UL — SIGNIFICANT CHANGE UP (ref 0–0.2)
BASOPHILS NFR BLD AUTO: 0 % — SIGNIFICANT CHANGE UP (ref 0–2)
BASOPHILS NFR BLD AUTO: 0 % — SIGNIFICANT CHANGE UP (ref 0–2)
BILIRUB SERPL-MCNC: 1.4 MG/DL — HIGH (ref 0.2–1.2)
BILIRUB SERPL-MCNC: 1.4 MG/DL — HIGH (ref 0.2–1.2)
BUN SERPL-MCNC: 14 MG/DL — SIGNIFICANT CHANGE UP (ref 7–23)
BUN SERPL-MCNC: 14 MG/DL — SIGNIFICANT CHANGE UP (ref 7–23)
BURR CELLS BLD QL SMEAR: PRESENT — SIGNIFICANT CHANGE UP
BURR CELLS BLD QL SMEAR: PRESENT — SIGNIFICANT CHANGE UP
CALCIUM SERPL-MCNC: 8.7 MG/DL — SIGNIFICANT CHANGE UP (ref 8.4–10.5)
CALCIUM SERPL-MCNC: 8.7 MG/DL — SIGNIFICANT CHANGE UP (ref 8.4–10.5)
CHLORIDE SERPL-SCNC: 108 MMOL/L — SIGNIFICANT CHANGE UP (ref 96–108)
CHLORIDE SERPL-SCNC: 108 MMOL/L — SIGNIFICANT CHANGE UP (ref 96–108)
CO2 SERPL-SCNC: 23 MMOL/L — SIGNIFICANT CHANGE UP (ref 22–31)
CO2 SERPL-SCNC: 23 MMOL/L — SIGNIFICANT CHANGE UP (ref 22–31)
CREAT SERPL-MCNC: 0.82 MG/DL — SIGNIFICANT CHANGE UP (ref 0.5–1.3)
CREAT SERPL-MCNC: 0.82 MG/DL — SIGNIFICANT CHANGE UP (ref 0.5–1.3)
EGFR: 98 ML/MIN/1.73M2 — SIGNIFICANT CHANGE UP
EGFR: 98 ML/MIN/1.73M2 — SIGNIFICANT CHANGE UP
ELLIPTOCYTES BLD QL SMEAR: SLIGHT — SIGNIFICANT CHANGE UP
ELLIPTOCYTES BLD QL SMEAR: SLIGHT — SIGNIFICANT CHANGE UP
EOSINOPHIL # BLD AUTO: 0.33 K/UL — SIGNIFICANT CHANGE UP (ref 0–0.5)
EOSINOPHIL # BLD AUTO: 0.33 K/UL — SIGNIFICANT CHANGE UP (ref 0–0.5)
EOSINOPHIL NFR BLD AUTO: 8.5 % — HIGH (ref 0–6)
EOSINOPHIL NFR BLD AUTO: 8.5 % — HIGH (ref 0–6)
GIANT PLATELETS BLD QL SMEAR: PRESENT — SIGNIFICANT CHANGE UP
GIANT PLATELETS BLD QL SMEAR: PRESENT — SIGNIFICANT CHANGE UP
GLUCOSE BLDC GLUCOMTR-MCNC: 118 MG/DL — HIGH (ref 70–99)
GLUCOSE BLDC GLUCOMTR-MCNC: 118 MG/DL — HIGH (ref 70–99)
GLUCOSE BLDC GLUCOMTR-MCNC: 125 MG/DL — HIGH (ref 70–99)
GLUCOSE BLDC GLUCOMTR-MCNC: 125 MG/DL — HIGH (ref 70–99)
GLUCOSE BLDC GLUCOMTR-MCNC: 140 MG/DL — HIGH (ref 70–99)
GLUCOSE BLDC GLUCOMTR-MCNC: 140 MG/DL — HIGH (ref 70–99)
GLUCOSE BLDC GLUCOMTR-MCNC: 146 MG/DL — HIGH (ref 70–99)
GLUCOSE BLDC GLUCOMTR-MCNC: 146 MG/DL — HIGH (ref 70–99)
GLUCOSE SERPL-MCNC: 125 MG/DL — HIGH (ref 70–99)
GLUCOSE SERPL-MCNC: 125 MG/DL — HIGH (ref 70–99)
HCT VFR BLD CALC: 25.2 % — LOW (ref 39–50)
HCT VFR BLD CALC: 25.2 % — LOW (ref 39–50)
HCT VFR BLD CALC: 26.6 % — LOW (ref 39–50)
HCT VFR BLD CALC: 26.6 % — LOW (ref 39–50)
HGB BLD-MCNC: 8.1 G/DL — LOW (ref 13–17)
HGB BLD-MCNC: 8.1 G/DL — LOW (ref 13–17)
HGB BLD-MCNC: 8.5 G/DL — LOW (ref 13–17)
HGB BLD-MCNC: 8.5 G/DL — LOW (ref 13–17)
LYMPHOCYTES # BLD AUTO: 1 K/UL — SIGNIFICANT CHANGE UP (ref 1–3.3)
LYMPHOCYTES # BLD AUTO: 1 K/UL — SIGNIFICANT CHANGE UP (ref 1–3.3)
LYMPHOCYTES # BLD AUTO: 25.5 % — SIGNIFICANT CHANGE UP (ref 13–44)
LYMPHOCYTES # BLD AUTO: 25.5 % — SIGNIFICANT CHANGE UP (ref 13–44)
MACROCYTES BLD QL: SLIGHT — SIGNIFICANT CHANGE UP
MACROCYTES BLD QL: SLIGHT — SIGNIFICANT CHANGE UP
MAGNESIUM SERPL-MCNC: 1.8 MG/DL — SIGNIFICANT CHANGE UP (ref 1.6–2.6)
MAGNESIUM SERPL-MCNC: 1.8 MG/DL — SIGNIFICANT CHANGE UP (ref 1.6–2.6)
MANUAL SMEAR VERIFICATION: SIGNIFICANT CHANGE UP
MANUAL SMEAR VERIFICATION: SIGNIFICANT CHANGE UP
MCHC RBC-ENTMCNC: 28 PG — SIGNIFICANT CHANGE UP (ref 27–34)
MCHC RBC-ENTMCNC: 28 PG — SIGNIFICANT CHANGE UP (ref 27–34)
MCHC RBC-ENTMCNC: 28.4 PG — SIGNIFICANT CHANGE UP (ref 27–34)
MCHC RBC-ENTMCNC: 28.4 PG — SIGNIFICANT CHANGE UP (ref 27–34)
MCHC RBC-ENTMCNC: 32 GM/DL — SIGNIFICANT CHANGE UP (ref 32–36)
MCHC RBC-ENTMCNC: 32 GM/DL — SIGNIFICANT CHANGE UP (ref 32–36)
MCHC RBC-ENTMCNC: 32.1 GM/DL — SIGNIFICANT CHANGE UP (ref 32–36)
MCHC RBC-ENTMCNC: 32.1 GM/DL — SIGNIFICANT CHANGE UP (ref 32–36)
MCV RBC AUTO: 87.5 FL — SIGNIFICANT CHANGE UP (ref 80–100)
MCV RBC AUTO: 87.5 FL — SIGNIFICANT CHANGE UP (ref 80–100)
MCV RBC AUTO: 88.4 FL — SIGNIFICANT CHANGE UP (ref 80–100)
MCV RBC AUTO: 88.4 FL — SIGNIFICANT CHANGE UP (ref 80–100)
MICROCYTES BLD QL: SLIGHT — SIGNIFICANT CHANGE UP
MICROCYTES BLD QL: SLIGHT — SIGNIFICANT CHANGE UP
MONOCYTES # BLD AUTO: 0.33 K/UL — SIGNIFICANT CHANGE UP (ref 0–0.9)
MONOCYTES # BLD AUTO: 0.33 K/UL — SIGNIFICANT CHANGE UP (ref 0–0.9)
MONOCYTES NFR BLD AUTO: 8.5 % — SIGNIFICANT CHANGE UP (ref 2–14)
MONOCYTES NFR BLD AUTO: 8.5 % — SIGNIFICANT CHANGE UP (ref 2–14)
NEUTROPHILS # BLD AUTO: 2.26 K/UL — SIGNIFICANT CHANGE UP (ref 1.8–7.4)
NEUTROPHILS # BLD AUTO: 2.26 K/UL — SIGNIFICANT CHANGE UP (ref 1.8–7.4)
NEUTROPHILS NFR BLD AUTO: 57.5 % — SIGNIFICANT CHANGE UP (ref 43–77)
NEUTROPHILS NFR BLD AUTO: 57.5 % — SIGNIFICANT CHANGE UP (ref 43–77)
NRBC # BLD: 0 /100 WBCS — SIGNIFICANT CHANGE UP (ref 0–0)
NRBC # BLD: 0 /100 WBCS — SIGNIFICANT CHANGE UP (ref 0–0)
OVALOCYTES BLD QL SMEAR: SLIGHT — SIGNIFICANT CHANGE UP
OVALOCYTES BLD QL SMEAR: SLIGHT — SIGNIFICANT CHANGE UP
PHOSPHATE SERPL-MCNC: 4 MG/DL — SIGNIFICANT CHANGE UP (ref 2.5–4.5)
PHOSPHATE SERPL-MCNC: 4 MG/DL — SIGNIFICANT CHANGE UP (ref 2.5–4.5)
PLAT MORPH BLD: ABNORMAL
PLAT MORPH BLD: ABNORMAL
PLATELET # BLD AUTO: 85 K/UL — LOW (ref 150–400)
PLATELET # BLD AUTO: 85 K/UL — LOW (ref 150–400)
PLATELET # BLD AUTO: 90 K/UL — LOW (ref 150–400)
PLATELET # BLD AUTO: 90 K/UL — LOW (ref 150–400)
POIKILOCYTOSIS BLD QL AUTO: SLIGHT — SIGNIFICANT CHANGE UP
POIKILOCYTOSIS BLD QL AUTO: SLIGHT — SIGNIFICANT CHANGE UP
POLYCHROMASIA BLD QL SMEAR: SLIGHT — SIGNIFICANT CHANGE UP
POLYCHROMASIA BLD QL SMEAR: SLIGHT — SIGNIFICANT CHANGE UP
POTASSIUM SERPL-MCNC: 4 MMOL/L — SIGNIFICANT CHANGE UP (ref 3.5–5.3)
POTASSIUM SERPL-MCNC: 4 MMOL/L — SIGNIFICANT CHANGE UP (ref 3.5–5.3)
POTASSIUM SERPL-SCNC: 4 MMOL/L — SIGNIFICANT CHANGE UP (ref 3.5–5.3)
POTASSIUM SERPL-SCNC: 4 MMOL/L — SIGNIFICANT CHANGE UP (ref 3.5–5.3)
PROT SERPL-MCNC: 6.1 G/DL — SIGNIFICANT CHANGE UP (ref 6–8.3)
PROT SERPL-MCNC: 6.1 G/DL — SIGNIFICANT CHANGE UP (ref 6–8.3)
RBC # BLD: 2.85 M/UL — LOW (ref 4.2–5.8)
RBC # BLD: 2.85 M/UL — LOW (ref 4.2–5.8)
RBC # BLD: 3.04 M/UL — LOW (ref 4.2–5.8)
RBC # BLD: 3.04 M/UL — LOW (ref 4.2–5.8)
RBC # FLD: 15.9 % — HIGH (ref 10.3–14.5)
RBC BLD AUTO: ABNORMAL
RBC BLD AUTO: ABNORMAL
SMUDGE CELLS # BLD: PRESENT — SIGNIFICANT CHANGE UP
SMUDGE CELLS # BLD: PRESENT — SIGNIFICANT CHANGE UP
SODIUM SERPL-SCNC: 139 MMOL/L — SIGNIFICANT CHANGE UP (ref 135–145)
SODIUM SERPL-SCNC: 139 MMOL/L — SIGNIFICANT CHANGE UP (ref 135–145)
WBC # BLD: 3.93 K/UL — SIGNIFICANT CHANGE UP (ref 3.8–10.5)
WBC # BLD: 3.93 K/UL — SIGNIFICANT CHANGE UP (ref 3.8–10.5)
WBC # BLD: 4.21 K/UL — SIGNIFICANT CHANGE UP (ref 3.8–10.5)
WBC # BLD: 4.21 K/UL — SIGNIFICANT CHANGE UP (ref 3.8–10.5)
WBC # FLD AUTO: 3.93 K/UL — SIGNIFICANT CHANGE UP (ref 3.8–10.5)
WBC # FLD AUTO: 3.93 K/UL — SIGNIFICANT CHANGE UP (ref 3.8–10.5)
WBC # FLD AUTO: 4.21 K/UL — SIGNIFICANT CHANGE UP (ref 3.8–10.5)
WBC # FLD AUTO: 4.21 K/UL — SIGNIFICANT CHANGE UP (ref 3.8–10.5)

## 2023-11-17 PROCEDURE — 99233 SBSQ HOSP IP/OBS HIGH 50: CPT | Mod: GC

## 2023-11-17 RX ADMIN — POLYETHYLENE GLYCOL 3350 17 GRAM(S): 17 POWDER, FOR SOLUTION ORAL at 06:23

## 2023-11-17 RX ADMIN — POLYETHYLENE GLYCOL 3350 17 GRAM(S): 17 POWDER, FOR SOLUTION ORAL at 17:36

## 2023-11-17 RX ADMIN — LIDOCAINE 2 PATCH: 4 CREAM TOPICAL at 07:28

## 2023-11-17 RX ADMIN — Medication 40 MILLIGRAM(S): at 06:23

## 2023-11-17 RX ADMIN — PANTOPRAZOLE SODIUM 40 MILLIGRAM(S): 20 TABLET, DELAYED RELEASE ORAL at 06:23

## 2023-11-17 RX ADMIN — SPIRONOLACTONE 25 MILLIGRAM(S): 25 TABLET, FILM COATED ORAL at 22:31

## 2023-11-17 RX ADMIN — GABAPENTIN 100 MILLIGRAM(S): 400 CAPSULE ORAL at 22:31

## 2023-11-17 RX ADMIN — CARVEDILOL PHOSPHATE 3.12 MILLIGRAM(S): 80 CAPSULE, EXTENDED RELEASE ORAL at 06:23

## 2023-11-17 RX ADMIN — LIDOCAINE 2 PATCH: 4 CREAM TOPICAL at 07:27

## 2023-11-17 RX ADMIN — LIDOCAINE 2 PATCH: 4 CREAM TOPICAL at 17:36

## 2023-11-17 RX ADMIN — PANTOPRAZOLE SODIUM 40 MILLIGRAM(S): 20 TABLET, DELAYED RELEASE ORAL at 17:36

## 2023-11-17 RX ADMIN — LIDOCAINE 2 PATCH: 4 CREAM TOPICAL at 19:02

## 2023-11-17 NOTE — PROGRESS NOTE ADULT - ASSESSMENT
Please let her know that 1 of her labs is weakly positive for rheumatoid arthritis. The remainder of her labs are unremarkable.   Please fax these results to her new Rheumatologist.     Dr. Hidalgo List  [facility] [address], [address] [place] [address] Str.  Phone: [phone]  Fax: [phone] 64 yo [place] ([facility]) M former smoker with hx HTN, HLD, severe 3 vessel CAD, pre-diabetes, gout, TIA (14 yrs ago), gastric ulcer, rectal bleed, LIZ and alcoholic liver disease c/b cirrhosis and portal hypertension c/b esophageal varices (and UGIB) and splenomegaly presents for hematemesis x2 11/12 AM. S/p EGD on 11/13 which showed duodenitis, nonbleeding varices, healing PUD, and portal hypertensive gastropathy. Protonix was switched to PO. Stepped down to RMF on 11/13.

## 2023-11-17 NOTE — PROGRESS NOTE ADULT - TIME BILLING
time chart review including outpatient records, patient interview/exam, review of labs/imaging, management of medical conditions, counseling/educating patient/family, discussion with consultants, documentation

## 2023-11-17 NOTE — PROGRESS NOTE ADULT - PROBLEM SELECTOR PLAN 2
Pt presented with dark brown/black emesis x2 and melena. 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 S/p IV protonix.   Had RUQ pain on 11/14 with one episode of melena likely residual. XR abdomen: Nonspecific bowel gas pattern with no obstruction.  - on protonix 40mg PO BID per GI   - Oxycodone 5mg q6h PRN severe pain  - bowel regimen (miralax 17g BID per GI) while on opioid  - SCDs & ace wraps, holding plavix d/t concern for bleed Pt with history of decompensated cirrhosis with esophageal varices w/ banding back in 04/2023. Follows with Dr. Lawson. On home rifaximin 550mg PO BID, coreg 6.125 BID, aldactone 25mg QD, Lasix 40mg.   - C/w home rifaximin 550mg PO q12h  - received CTX 1g IV qd x4 days (11/12-11/15),   - final dose as cefpodoxime 100mg PO on 11/16 (5 day course total)   - Restarted home meds: Lasix 40mg PO qd, aldactone 25mg PO qd, (except lower dose carvedilol) carvedilol 3.125mg PO q12h (with holding parameters)   - F/u with Dr. Lawson

## 2023-11-17 NOTE — PROGRESS NOTE ADULT - ATTENDING COMMENTS
63 YOM with PMH of HTN, HLD, CAD s/p 4V-OPCAB, gout, LIZ, TIA, alcoholic cirrhosis c/b EV and PHG (+UGIB), PUD admitted for ?coffee ground emesis and acute on chronic anemia c/f possible UGIB, EGD without active bleeding. Downgraded from 7L to RMF today.     Acute on chronic anemia - baseline Hgb around 10, Hgb 8.5 today. Received 1u pRBC 11/12, transfuse to maintain Hgb >8, HDS.    UGIB s/p EV banding 2/2023 and 4/2023 - EGD 11/13 with non-bleeding varices, healing PUD, PHG, duodenitis. Octreotide discontinued, start pantoprazole 40mg BID x2w (then daily), advance diet. Discuss with GI if okay to resume clopidogrel.     Decompensated alcoholic cirrhosis c/b EV and PHG - follows with Dr. Lawson    Thrombocytopenia - chronic, likely 2/2 cirrhosis     Hypomagnesemia - replete     Large left pleural effusion - admitted to [facility] 9/26-9/30 s/p pigtail drainage (4L), recurrence of pleural effusion while on diuretics (furosemide 40mg, spironolactone 25mg)    CAD s/p 4V-OPCAB - s/p surgery 9/14 c/b re-admission for L pleural effusion as above, discuss plan for effusion with CTS/hepatology, resume clopidogrel once okay per GI. Resume carvedilol 6.25mg BID with holding parameters.
63 YOM with PMH of HTN, HLD, CAD s/p 4V-OPCAB, gout, LIZ, TIA, alcoholic cirrhosis c/b EV and PHG (+UGIB), PUD admitted for ?coffee ground emesis and acute on chronic anemia c/f possible UGIB.     Abdominal pain - reports RUQ abdominal pain that started overnight. No N/V/D. No radiation of pain, did not worsen with eating. Tolerated breakfast. Has some RUQ TTP on exam without rebound or guarding, neg [place]. KUB non-obstructive, no free air. RUQ US with small gallstones vs polyps but LFT not c/w cholestatic pattern. Lipase WNL. Unclear etiology ?liver related, cont with serial abdominal exams, pain control.     Acute on chronic anemia - baseline Hgb around 10, received 1u pRBC 11/12 for Hgb 7.7 --> 8.9 --> 8.7. Reports melena this morning, repeat Hgb stable. Transfuse to maintain Hgb >8. HDS without tachycardia (NOTE: on BB which may mask) or hypotension.     UGIB s/p EV banding 2/2023 and 4/2023 - EGD 11/13 with non-bleeding varices, healing PUD, PHG, duodenitis. Continue pantoprazole 40mg BID (x2w, then daily). Reports one episode of melena this morning - afternoon Hgb stable, possible residual melena.     CAD s/p 4V-OPCAB - s/p surgery 9/14. Carvedilol resumed at home dose 12.5mg BID. Extensive risks vs benefits discussion with patient regarding resumption of clopidogrel. Discussed risks of bleeding exists with continuation of anti-PLT but risks of MI or death may be greater if he does not take anti-PLT given extent of CAD and recent 4V bypass. Will resume clopidogrel today (last dose 2 days ago, likely still in system).     Decompensated alcoholic cirrhosis c/b EV and PHG - follows with Dr. Lawson, cont home furosemide 40mg, spironolactone 25mg    Large left pleural effusion - developed after OPCAB, admitted to [facility] 9/26-9/30 s/p pigtail drainage (4L), CXR with small L pleural effusion, continue home diuretic as above    Thrombocytopenia - chronic, likely 2/2 cirrhosis     Dispo: home once medically optimized
Patient seen at bedside.  He feels okay.  He had an episode of near syncope.  No melena.  He was in the bathroom when he fell very dizzy and lightheaded.  His stool is brown  No nausea or vomiting  He complains of occasional epigastric abdominal discomfort  No chest pain or shortness of breath  No palpitation  Vital signs are stable  Clear lungs with mildly decreased breath sounds in the left base  Soft abdomen with mild ascites  No edema of extremities  Awake alert and oriented  Labs and x-rays were reviewed    Impression/suggestions  Near syncope  This could be related to the dose of carvedilol.  This patient was on 6.25 mg twice a day as outpatient.  Some patients with cirrhosis have difficulty tolerating this drug due to alpha-blocker activity of this medication.  However, cardiology is currently the beta-blocker of  choice for patients with cirrhosis especially those with history of variceal bleed  Please lower the Coreg to 6.25 mg twice a day  Continue current dose of diuretics  Chest x-ray to assess for left-sided pleural effusion  No need for repeat EGD at this time  PPIs for gastric erosions    .
No bleeding. Hb stable. GI EGD results noted. Continue PPI as outlined.
63 YOM with PMH of HTN, HLD, CAD s/p 4V-OPCAB, gout, LIZ, TIA, alcoholic cirrhosis c/b EV and PHG (+UGIB), PUD admitted for ?coffee ground emesis and acute on chronic anemia c/f possible UGIB s/p EGD.     Patient had fall in the bathroom this morning. States he went to the bathroom and had finished brushing his teeth. He was trying to change his clothes when he felt light headed dizzy and fell backwards. States he hit his left foot on sink but did not hit his head, no LOC. /68, HR 54, SpO2 99%, afebrile. CBC with HGb 8.8, labs otherwise unremarkable. Orthostatic positive.    Fall and orthostatic hypotension - given cirrhosis and need for cautious fluids will given 100cc 25% albumin and repeat orthostatics. Compression stockings. Decrease carvedilol dose to 3.125mg with holding parameters (patient was given 12.5mg this AM but home dose appears to be 6.25mg - may have contributed).     Acute on chronic anemia - baseline Hgb around 10, received 1u pRBC 11/12 for Hgb 7.7, has remained around 7.9-8.8 since then w/o intervention. Transfuse to maintain Hgb >8.    UGIB s/p EV banding 2/2023 and 4/2023 - EGD 11/13 with non-bleeding varices, healing PUD, PHG, duodenitis. Continue pantoprazole 40mg BID (x2w, then daily). Had BM this morning that was brown. No further melena.      CAD s/p 4V-OPCAB - s/p surgery 9/14. Reduce carvedilol to 3.125mg BID with holding parameters. CTS consulted regarding resumption of anti-PLT - will hold for now (last dose 11/14) and have close outpatient follow up. Risks vs benefits discussed with patient.     Decompensated alcoholic cirrhosis c/b EV and PHG - follows with Dr. Lawson. Will hold home furosemide 40mg in AM due to +orthostatic today and fall. Spironolactone 25mg.     Large left pleural effusion - developed after OPCAB, admitted to [facility] 9/26-9/30 s/p pigtail drainage (4L), CXR with small L pleural effusion.     Thrombocytopenia - chronic, likely 2/2 cirrhosis     Dispo: home once medically optimized
63 YOM with PMH of HTN, HLD, CAD s/p 4V-OPCAB, gout, LIZ, TIA, alcoholic cirrhosis c/b EV and PHG (+UGIB), PUD admitted for ?coffee ground emesis and acute on chronic anemia c/f possible UGIB s/p EGD. Course c/b orthostatic hypotension    Orthostatic hypotension - persistent orthostatic hypotension this morning, possible residual effect of carvedilol 12.5mg from yesterday morning. TTE with normal LVEF and no gross valvular abnormalities. Cont compression stockings, abdominal binder. Give 50cc 25% albumin, repeat orthostatics. PT eval.     Acute on chronic anemia - baseline Hgb around 10, received 1u pRBC 11/12 for Hgb 7.7, has remained around 7.9-8.8 since then w/o intervention. Transfuse to maintain Hgb close to 8 (but weight risk over volume given severe portal HTN).     UGIB s/p EV banding 2/2023 and 4/2023 - EGD 11/13 with non-bleeding varices, healing PUD, PHG, duodenitis. Continue pantoprazole 40mg BID (x2w, then daily). No further melena.      CAD s/p 4V-OPCAB - s/p surgery 9/14. Reduce carvedilol to 3.125mg BID with holding parameters. CTS consulted regarding resumption of anti-PLT - will hold for now (last dose 11/14) and have close outpatient follow up. Risks vs benefits discussed with patient.     Decompensated alcoholic cirrhosis c/b EV and PHG - follows with Dr. Lawson. Furosemide 40mg and spironolactone 25mg resumed per hepatology recs.     Large left pleural effusion - developed after OPCAB, admitted to [facility] 9/26-9/30 s/p pigtail drainage (4L), CXR with small L pleural effusion.     Thrombocytopenia - chronic, likely 2/2 cirrhosis     Dispo: home once orthostatic hypotension resolved
63 YOM with PMH of HTN, HLD, CAD s/p 4V-OPCAB, gout, LIZ, TIA, alcoholic cirrhosis c/b EV and PHG (+UGIB), PUD admitted for ?coffee ground emesis and acute on chronic anemia c/f possible UGIB s/p EGD. Course c/b orthostatic hypotension    Orthostatic hypotension - persistent orthostatic hypotension with symptoms. TTE with normal LVEF and no gross valvular abnormalities. Cont compression stockings, abdominal binder. Received doses of albumin without improvement. On reduced dose of carvedilol compared to home dose. Discussed with Dr. Lawson regarding risks/benefits of BB and diuretics. Will give 1u pRBC to see if symptoms responsive.     Acute on chronic anemia - baseline Hgb around 10, received 1u pRBC 11/12 for Hgb 7.7, has remained around 7.9-8.8 since then w/o intervention. Transfuse to maintain Hgb close to 8 (but weigh risk over volume given severe portal HTN). Transfuse 1u pRBC today to see if improves dizziness and orthostasis.     UGIB s/p EV banding 2/2023 and 4/2023 - EGD 11/13 with non-bleeding varices, healing PUD, PHG, duodenitis. Continue pantoprazole 40mg BID (x2w, then daily). No further melena.      CAD s/p 4V-OPCAB - s/p surgery 9/14. Reduce carvedilol to 3.125mg BID with holding parameters. CTS consulted regarding resumption of anti-PLT - will hold for now (last dose 11/14) and have close outpatient follow up. Risks vs benefits discussed with patient.     Decompensated alcoholic cirrhosis c/b EV and PHG - follows with Dr. Lawson. Cont furosemide 40mg and spironolactone 25mg.     Large left pleural effusion - developed after OPCAB, admitted to [facility] 9/26-9/30 s/p pigtail drainage (4L), CXR with small L pleural effusion.     Thrombocytopenia - chronic, likely 2/2 cirrhosis     Dispo: home once orthostatic hypotension resolved

## 2023-11-17 NOTE — PROGRESS NOTE ADULT - SUBJECTIVE AND OBJECTIVE BOX
Patient is a 63y old  Male who presents with a chief complaint of hematemesis (16 Nov 2023 06:48)      INTERVAL HPI/OVERNIGHT EVENTS:   No overnight events   Afebrile, hemodynamically stable     ICU Vital Signs Last 24 Hrs  T(C): 36.3 (17 Nov 2023 05:49), Max: 36.7 (16 Nov 2023 21:03)  T(F): 97.3 (17 Nov 2023 05:49), Max: 98 (16 Nov 2023 21:03)  HR: 64 (17 Nov 2023 05:49) (64 - 112)  BP: 119/68 (17 Nov 2023 05:49) (81/52 - 124/77)  BP(mean): 93 (16 Nov 2023 10:56) (62 - 93)  ABP: --  ABP(mean): --  RR: 18 (17 Nov 2023 05:49) (17 - 18)  SpO2: 95% (17 Nov 2023 05:49) (95% - 99%)    O2 Parameters below as of 17 Nov 2023 05:49  Patient On (Oxygen Delivery Method): room air      PHYSICAL EXAM:  GENERAL: resting comfortably in bed; NAD, mild diffuse jaundice  EYES: EOMI, PERRLA, conjunctiva and sclera clear   NECK: Supple  LUNGS: CTA B/L; no W/R/R  HEART: S1/S2 normal, RRR; No murmurs   ABDOMEN: Soft, Nondistended; Bowel sounds present, hepatomegaly present, moderate RUQ tenderness, mild epigastric tenderness, no rebound tenderness, negative travis's sign.  EXTREMITIES:  2+ Peripheral Pulses, No clubbing, cyanosis, or edema   NERVOUS SYSTEM:  AAOx3; CNII-XII grossly intact; no focal deficits  PSYCHIATRIC: affect and characteristics of appearance, verbalizations, behaviors are appropriate      LABS:                        7.8    3.98  )-----------( 86       ( 16 Nov 2023 05:30 )             24.4     11-16    142  |  110<H>  |  14  ----------------------------<  136<H>  3.5   |  24  |  0.92    Ca    8.4      16 Nov 2023 05:30  Phos  4.3     11-16  Mg     2.0     11-16    TPro  5.8<L>  /  Alb  2.6<L>  /  TBili  1.0  /  DBili  x   /  AST  35  /  ALT  17  /  AlkPhos  171<H>  11-16      Urinalysis Basic - ( 16 Nov 2023 05:30 )    Color: x / Appearance: x / SG: x / pH: x  Gluc: 136 mg/dL / Ketone: x  / Bili: x / Urobili: x   Blood: x / Protein: x / Nitrite: x   Leuk Esterase: x / RBC: x / WBC x   Sq Epi: x / Non Sq Epi: x / Bacteria: x      CAPILLARY BLOOD GLUCOSE  POCT Blood Glucose.: 130 mg/dL (16 Nov 2023 22:11)  POCT Blood Glucose.: 151 mg/dL (16 Nov 2023 18:28)  POCT Blood Glucose.: 92 mg/dL (16 Nov 2023 15:12)  POCT Blood Glucose.: 119 mg/dL (16 Nov 2023 09:31)        RADIOLOGY & ADDITIONAL TESTS:  Consultant(s) Notes Reviewed:  [x ] YES  [ ] NO    MEDICATIONS  (STANDING):  carvedilol 3.125 milliGRAM(s) Oral every 12 hours  dextrose 5%. 1000 milliLiter(s) (50 mL/Hr) IV Continuous <Continuous>  dextrose 5%. 1000 milliLiter(s) (100 mL/Hr) IV Continuous <Continuous>  dextrose 50% Injectable 25 Gram(s) IV Push once  dextrose 50% Injectable 12.5 Gram(s) IV Push once  dextrose 50% Injectable 25 Gram(s) IV Push once  furosemide    Tablet 40 milliGRAM(s) Oral daily  gabapentin 100 milliGRAM(s) Oral at bedtime  glucagon  Injectable 1 milliGRAM(s) IntraMuscular once  influenza   Vaccine 0.5 milliLiter(s) IntraMuscular once  insulin lispro (ADMELOG) corrective regimen sliding scale   SubCutaneous Before meals and at bedtime  lidocaine   4% Patch 2 Patch Transdermal daily  pantoprazole    Tablet 40 milliGRAM(s) Oral every 12 hours  polyethylene glycol 3350 17 Gram(s) Oral two times a day  spironolactone 25 milliGRAM(s) Oral every 24 hours    MEDICATIONS  (PRN):  dextrose Oral Gel 15 Gram(s) Oral once PRN Blood Glucose LESS THAN 70 milliGRAM(s)/deciliter       Patient is a 63y old  Male who presents with a chief complaint of hematemesis (16 Nov 2023 06:48)      INTERVAL HPI/OVERNIGHT EVENTS:   NAEO.    Vital Signs Last 24 Hrs  T(C): 36.3 (17 Nov 2023 05:49), Max: 36.7 (16 Nov 2023 21:03)  T(F): 97.3 (17 Nov 2023 05:49), Max: 98 (16 Nov 2023 21:03)  HR: 64 (17 Nov 2023 05:49) (64 - 112)  BP: 119/68 (17 Nov 2023 05:49) (81/52 - 124/77)  BP(mean): 93 (16 Nov 2023 10:56) (62 - 93)    RR: 18 (17 Nov 2023 05:49) (17 - 18)  SpO2: 95% (17 Nov 2023 05:49) (95% - 99%)  O2 Parameters below as of 17 Nov 2023 05:49  Patient On (Oxygen Delivery Method): room air      PHYSICAL EXAM:  GENERAL: resting comfortably in bed; NAD, mild diffuse jaundice  EYES: EOMI, PERRLA, conjunctiva and sclera clear   NECK: Supple  LUNGS: CTA B/L; no W/R/R  HEART: S1/S2 normal, RRR; No murmurs   ABDOMEN: Soft, Nondistended; Bowel sounds present, hepatomegaly present, moderate RUQ tenderness, mild epigastric tenderness, no rebound tenderness, negative travis's sign.  EXTREMITIES:  2+ Peripheral Pulses, No clubbing, cyanosis, or edema   NERVOUS SYSTEM:  AAOx3; CNII-XII grossly intact; no focal deficits  PSYCHIATRIC: affect and characteristics of appearance, verbalizations, behaviors are appropriate      LABS:                        7.8    3.98  )-----------( 86       ( 16 Nov 2023 05:30 )             24.4     11-16    142  |  110<H>  |  14  ----------------------------<  136<H>  3.5   |  24  |  0.92    Ca    8.4      16 Nov 2023 05:30  Phos  4.3     11-16  Mg     2.0     11-16    TPro  5.8<L>  /  Alb  2.6<L>  /  TBili  1.0  /  DBili  x   /  AST  35  /  ALT  17  /  AlkPhos  171<H>  11-16      Urinalysis Basic - ( 16 Nov 2023 05:30 )    Color: x / Appearance: x / SG: x / pH: x  Gluc: 136 mg/dL / Ketone: x  / Bili: x / Urobili: x   Blood: x / Protein: x / Nitrite: x   Leuk Esterase: x / RBC: x / WBC x   Sq Epi: x / Non Sq Epi: x / Bacteria: x      CAPILLARY BLOOD GLUCOSE  POCT Blood Glucose.: 130 mg/dL (16 Nov 2023 22:11)  POCT Blood Glucose.: 151 mg/dL (16 Nov 2023 18:28)  POCT Blood Glucose.: 92 mg/dL (16 Nov 2023 15:12)  POCT Blood Glucose.: 119 mg/dL (16 Nov 2023 09:31)        RADIOLOGY & ADDITIONAL TESTS:  Consultant(s) Notes Reviewed:  [x ] YES  [ ] NO    MEDICATIONS  (STANDING):  carvedilol 3.125 milliGRAM(s) Oral every 12 hours  dextrose 5%. 1000 milliLiter(s) (50 mL/Hr) IV Continuous <Continuous>  dextrose 5%. 1000 milliLiter(s) (100 mL/Hr) IV Continuous <Continuous>  dextrose 50% Injectable 25 Gram(s) IV Push once  dextrose 50% Injectable 12.5 Gram(s) IV Push once  dextrose 50% Injectable 25 Gram(s) IV Push once  furosemide    Tablet 40 milliGRAM(s) Oral daily  gabapentin 100 milliGRAM(s) Oral at bedtime  glucagon  Injectable 1 milliGRAM(s) IntraMuscular once  influenza   Vaccine 0.5 milliLiter(s) IntraMuscular once  insulin lispro (ADMELOG) corrective regimen sliding scale   SubCutaneous Before meals and at bedtime  lidocaine   4% Patch 2 Patch Transdermal daily  pantoprazole    Tablet 40 milliGRAM(s) Oral every 12 hours  polyethylene glycol 3350 17 Gram(s) Oral two times a day  spironolactone 25 milliGRAM(s) Oral every 24 hours    MEDICATIONS  (PRN):  dextrose Oral Gel 15 Gram(s) Oral once PRN Blood Glucose LESS THAN 70 milliGRAM(s)/deciliter       Patient is a 63y old  Male who presents with a chief complaint of hematemesis (16 Nov 2023 06:48)    INTERVAL HPI/OVERNIGHT EVENTS:   NAEO.    AT BEDSIDE:  Pt concerned about fluid in abdomen, also still very anxious about orthostasis     Vital Signs Last 24 Hrs  T(C): 36.3 (17 Nov 2023 05:49), Max: 36.7 (16 Nov 2023 21:03)  T(F): 97.3 (17 Nov 2023 05:49), Max: 98 (16 Nov 2023 21:03)  HR: 64 (17 Nov 2023 05:49) (64 - 112)  BP: 119/68 (17 Nov 2023 05:49) (81/52 - 124/77)  BP(mean): 93 (16 Nov 2023 10:56) (62 - 93)    RR: 18 (17 Nov 2023 05:49) (17 - 18)  SpO2: 95% (17 Nov 2023 05:49) (95% - 99%)  O2 Parameters below as of 17 Nov 2023 05:49  Patient On (Oxygen Delivery Method): room air      PHYSICAL EXAM:  GENERAL: resting comfortably in bed; NAD, mild diffuse jaundice  EYES: EOMI, PERRLA, conjunctiva and sclera clear   NECK: Supple  LUNGS: CTA B/L; no W/R/R  HEART: S1/S2 normal, RRR; No murmurs   ABDOMEN: Soft, Nondistended; Bowel sounds present, hepatomegaly present, moderate RUQ tenderness, mild epigastric tenderness, no rebound tenderness, negative travis's sign.  EXTREMITIES:  2+ Peripheral Pulses, No clubbing, cyanosis, or edema   NERVOUS SYSTEM:  AAOx3; CNII-XII grossly intact; no focal deficits  PSYCHIATRIC: affect and characteristics of appearance, verbalizations, behaviors are appropriate      LABS:                        7.8    3.98  )-----------( 86       ( 16 Nov 2023 05:30 )             24.4     11-16    142  |  110<H>  |  14  ----------------------------<  136<H>  3.5   |  24  |  0.92    Ca    8.4      16 Nov 2023 05:30  Phos  4.3     11-16  Mg     2.0     11-16    TPro  5.8<L>  /  Alb  2.6<L>  /  TBili  1.0  /  DBili  x   /  AST  35  /  ALT  17  /  AlkPhos  171<H>  11-16      Urinalysis Basic - ( 16 Nov 2023 05:30 )    Color: x / Appearance: x / SG: x / pH: x  Gluc: 136 mg/dL / Ketone: x  / Bili: x / Urobili: x   Blood: x / Protein: x / Nitrite: x   Leuk Esterase: x / RBC: x / WBC x   Sq Epi: x / Non Sq Epi: x / Bacteria: x      CAPILLARY BLOOD GLUCOSE  POCT Blood Glucose.: 130 mg/dL (16 Nov 2023 22:11)  POCT Blood Glucose.: 151 mg/dL (16 Nov 2023 18:28)  POCT Blood Glucose.: 92 mg/dL (16 Nov 2023 15:12)  POCT Blood Glucose.: 119 mg/dL (16 Nov 2023 09:31)        RADIOLOGY & ADDITIONAL TESTS:  Consultant(s) Notes Reviewed:  [x ] YES  [ ] NO    MEDICATIONS  (STANDING):  carvedilol 3.125 milliGRAM(s) Oral every 12 hours  dextrose 5%. 1000 milliLiter(s) (50 mL/Hr) IV Continuous <Continuous>  dextrose 5%. 1000 milliLiter(s) (100 mL/Hr) IV Continuous <Continuous>  dextrose 50% Injectable 25 Gram(s) IV Push once  dextrose 50% Injectable 12.5 Gram(s) IV Push once  dextrose 50% Injectable 25 Gram(s) IV Push once  furosemide    Tablet 40 milliGRAM(s) Oral daily  gabapentin 100 milliGRAM(s) Oral at bedtime  glucagon  Injectable 1 milliGRAM(s) IntraMuscular once  influenza   Vaccine 0.5 milliLiter(s) IntraMuscular once  insulin lispro (ADMELOG) corrective regimen sliding scale   SubCutaneous Before meals and at bedtime  lidocaine   4% Patch 2 Patch Transdermal daily  pantoprazole    Tablet 40 milliGRAM(s) Oral every 12 hours  polyethylene glycol 3350 17 Gram(s) Oral two times a day  spironolactone 25 milliGRAM(s) Oral every 24 hours    MEDICATIONS  (PRN):  dextrose Oral Gel 15 Gram(s) Oral once PRN Blood Glucose LESS THAN 70 milliGRAM(s)/deciliter

## 2023-11-17 NOTE — PROGRESS NOTE ADULT - PROBLEM SELECTOR PLAN 7
Pt with hx of CAD s/p OPCAB x 4 on 9/14/23. On Plavix 75mg last taken 11/11 AM (was stopped for a week recently and was restarted on 11/10 by Dr. Lawson and Dr. Hinton CTS) . Patient allergic to aspirin so was given plavix chronically instead.   - resumed plavix on 11/14 at 4PM (got 1 dose, it was discontinued by 11/15 d/t orthostatics/concern for fall/risk of bleeding).  - Monitor for any signs/symptoms of bleeding  - C/w SCDs for DVT ppx Pt with recurrent pleural effusion, despite being on lasix and aldactone. Pt was admitted to [facility] on 9/26 to 9/30, s/p pigtail drainage (4L). CTAP on 10/31 showed moderate to severe left pleural effusion with near complete atelectasis of the left lower lobe. CXR on 11/13 showed left lower lung opacification/pleural effusion has substantially improved compared to prior exam 10/23/2023. Small left pleural effusion remains.   - C/w home diuretic as above

## 2023-11-17 NOTE — PROGRESS NOTE ADULT - PROBLEM SELECTOR PLAN 5
Mg of 1.4 on 11/13. Improved to 1.8 on 11/14.   - Repleted  - F/u AM Mg level Pt had lactate of 7.5 on 11/12 iso decompensated cirrhosis, s/p 2L NS, improved lactate to 2.6 on 11/13. Lactate of 2.2 on 11/14.   - No need to trend further

## 2023-11-17 NOTE — PROGRESS NOTE ADULT - PROBLEM SELECTOR PLAN 8
Pt with HTN. On home coreg 6.25 mg BID, aldactone 25mg qd, Lasix 40mg qd.   - c/w home meds except coreg  - after fall on 11/15 in AM and as BPs soft, reducing coreg to 3.125mg BID for now Pt with hx of CAD s/p OPCAB x 4 on 9/14/23. On Plavix 75mg last taken 11/11 AM (was stopped for a week recently and was restarted on 11/10 by Dr. Lawson and Dr. Hinton CTS) . Patient allergic to aspirin so was given plavix chronically instead.   - resumed plavix on 11/14 at 4PM (got 1 dose, it was discontinued by 11/15 d/t orthostatics/concern for fall/risk of bleeding).  - Monitor for any signs/symptoms of bleeding  - C/w SCDs for DVT ppx

## 2023-11-17 NOTE — PROGRESS NOTE ADULT - PROBLEM SELECTOR PLAN 3
Pt with history of decompensated cirrhosis with esophageal varices w/ banding back in 04/2023. Follows with Dr. Lawson. On home rifaximin 550mg PO BID, coreg 6.125 BID, aldactone 25mg QD, Lasix 40mg.   - C/w home rifaximin 550mg PO q12h  - received CTX 1g IV qd x4 days (11/12-11/15),   - final dose as cefpodoxime 100mg PO on 11/16 (5 day course total)   - Restarted home meds: Lasix 40mg PO qd, aldactone 25mg PO qd, (except lower dose carvedilol) carvedilol 3.125mg PO q12h (with holding parameters)   - F/u with Dr. Lawson Patient w/ symptomatic apparently new onset orthostatic hypotension form 11/15. Managed w/ decreasing coreg first from 12.5, to 6.25, to 3.125mg. Feeling dizziness and lightheaded (with anxiety) associated w/ standing.  - seen by PT, recommended home PT   - performed orthostatics x4, continues to be positive  - consider midodrine (although pt taking coreg which has alpha-antagonistic effects)  - consider anxiolytic

## 2023-11-17 NOTE — PROGRESS NOTE ADULT - PROBLEM SELECTOR PLAN 10
F: s/p 2L NS  GI: PPI PO BID  D: DASH diet  DVT: SCDs  Dispo: [facility] History of DM on metformin at home.   - hold home metformin   - c/w mISS

## 2023-11-17 NOTE — PROGRESS NOTE ADULT - PROBLEM SELECTOR PLAN 4
Pt had lactate of 7.5 on 11/12 iso decompensated cirrhosis, s/p 2L NS, improved lactate to 2.6 on 11/13. Lactate of 2.2 on 11/14.   - No need to trend further Pt presented with dark brown/black emesis x2 and melena. 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 S/p IV protonix.   Had RUQ pain on 11/14 with one episode of melena likely residual. XR abdomen: Nonspecific bowel gas pattern with no obstruction.  - on Protonix 40mg PO BID per GI   - Oxycodone 5mg q6h PRN severe pain  - bowel regimen (miralax 17g BID per GI) while on opioid  - SCDs & ace wraps, holding plavix d/t concern for bleed

## 2023-11-17 NOTE — PROGRESS NOTE ADULT - PROBLEM SELECTOR PLAN 9
History of DM on metformin at home.   - hold home metformin   - c/w mISS Pt with HTN. On home coreg 6.25 mg BID, aldactone 25mg qd, Lasix 40mg qd.   - c/w home meds except coreg  - after fall on 11/15 in AM and as BPs soft, reducing coreg to 3.125mg BID for now

## 2023-11-18 VITALS
RESPIRATION RATE: 16 BRPM | HEART RATE: 74 BPM | TEMPERATURE: 98 F | DIASTOLIC BLOOD PRESSURE: 72 MMHG | OXYGEN SATURATION: 98 % | SYSTOLIC BLOOD PRESSURE: 118 MMHG

## 2023-11-18 LAB
ALBUMIN SERPL ELPH-MCNC: 3.1 G/DL — LOW (ref 3.3–5)
ALBUMIN SERPL ELPH-MCNC: 3.1 G/DL — LOW (ref 3.3–5)
ALP SERPL-CCNC: 189 U/L — HIGH (ref 40–120)
ALP SERPL-CCNC: 189 U/L — HIGH (ref 40–120)
ALT FLD-CCNC: 23 U/L — SIGNIFICANT CHANGE UP (ref 10–45)
ALT FLD-CCNC: 23 U/L — SIGNIFICANT CHANGE UP (ref 10–45)
ANION GAP SERPL CALC-SCNC: 10 MMOL/L — SIGNIFICANT CHANGE UP (ref 5–17)
ANION GAP SERPL CALC-SCNC: 10 MMOL/L — SIGNIFICANT CHANGE UP (ref 5–17)
AST SERPL-CCNC: 41 U/L — HIGH (ref 10–40)
AST SERPL-CCNC: 41 U/L — HIGH (ref 10–40)
BASOPHILS # BLD AUTO: 0.03 K/UL — SIGNIFICANT CHANGE UP (ref 0–0.2)
BASOPHILS # BLD AUTO: 0.03 K/UL — SIGNIFICANT CHANGE UP (ref 0–0.2)
BASOPHILS NFR BLD AUTO: 0.7 % — SIGNIFICANT CHANGE UP (ref 0–2)
BASOPHILS NFR BLD AUTO: 0.7 % — SIGNIFICANT CHANGE UP (ref 0–2)
BILIRUB SERPL-MCNC: 1.8 MG/DL — HIGH (ref 0.2–1.2)
BILIRUB SERPL-MCNC: 1.8 MG/DL — HIGH (ref 0.2–1.2)
BUN SERPL-MCNC: 10 MG/DL — SIGNIFICANT CHANGE UP (ref 7–23)
BUN SERPL-MCNC: 10 MG/DL — SIGNIFICANT CHANGE UP (ref 7–23)
CALCIUM SERPL-MCNC: 9 MG/DL — SIGNIFICANT CHANGE UP (ref 8.4–10.5)
CALCIUM SERPL-MCNC: 9 MG/DL — SIGNIFICANT CHANGE UP (ref 8.4–10.5)
CHLORIDE SERPL-SCNC: 107 MMOL/L — SIGNIFICANT CHANGE UP (ref 96–108)
CHLORIDE SERPL-SCNC: 107 MMOL/L — SIGNIFICANT CHANGE UP (ref 96–108)
CO2 SERPL-SCNC: 21 MMOL/L — LOW (ref 22–31)
CO2 SERPL-SCNC: 21 MMOL/L — LOW (ref 22–31)
CREAT SERPL-MCNC: 0.88 MG/DL — SIGNIFICANT CHANGE UP (ref 0.5–1.3)
CREAT SERPL-MCNC: 0.88 MG/DL — SIGNIFICANT CHANGE UP (ref 0.5–1.3)
EGFR: 96 ML/MIN/1.73M2 — SIGNIFICANT CHANGE UP
EGFR: 96 ML/MIN/1.73M2 — SIGNIFICANT CHANGE UP
EOSINOPHIL # BLD AUTO: 0.32 K/UL — SIGNIFICANT CHANGE UP (ref 0–0.5)
EOSINOPHIL # BLD AUTO: 0.32 K/UL — SIGNIFICANT CHANGE UP (ref 0–0.5)
EOSINOPHIL NFR BLD AUTO: 7.8 % — HIGH (ref 0–6)
EOSINOPHIL NFR BLD AUTO: 7.8 % — HIGH (ref 0–6)
GLUCOSE BLDC GLUCOMTR-MCNC: 128 MG/DL — HIGH (ref 70–99)
GLUCOSE BLDC GLUCOMTR-MCNC: 128 MG/DL — HIGH (ref 70–99)
GLUCOSE BLDC GLUCOMTR-MCNC: 157 MG/DL — HIGH (ref 70–99)
GLUCOSE BLDC GLUCOMTR-MCNC: 157 MG/DL — HIGH (ref 70–99)
GLUCOSE SERPL-MCNC: 137 MG/DL — HIGH (ref 70–99)
GLUCOSE SERPL-MCNC: 137 MG/DL — HIGH (ref 70–99)
HCT VFR BLD CALC: 29 % — LOW (ref 39–50)
HCT VFR BLD CALC: 29 % — LOW (ref 39–50)
HGB BLD-MCNC: 9.5 G/DL — LOW (ref 13–17)
HGB BLD-MCNC: 9.5 G/DL — LOW (ref 13–17)
IMM GRANULOCYTES NFR BLD AUTO: 0.2 % — SIGNIFICANT CHANGE UP (ref 0–0.9)
IMM GRANULOCYTES NFR BLD AUTO: 0.2 % — SIGNIFICANT CHANGE UP (ref 0–0.9)
LYMPHOCYTES # BLD AUTO: 1.07 K/UL — SIGNIFICANT CHANGE UP (ref 1–3.3)
LYMPHOCYTES # BLD AUTO: 1.07 K/UL — SIGNIFICANT CHANGE UP (ref 1–3.3)
LYMPHOCYTES # BLD AUTO: 26.2 % — SIGNIFICANT CHANGE UP (ref 13–44)
LYMPHOCYTES # BLD AUTO: 26.2 % — SIGNIFICANT CHANGE UP (ref 13–44)
MAGNESIUM SERPL-MCNC: 1.8 MG/DL — SIGNIFICANT CHANGE UP (ref 1.6–2.6)
MAGNESIUM SERPL-MCNC: 1.8 MG/DL — SIGNIFICANT CHANGE UP (ref 1.6–2.6)
MCHC RBC-ENTMCNC: 28.4 PG — SIGNIFICANT CHANGE UP (ref 27–34)
MCHC RBC-ENTMCNC: 28.4 PG — SIGNIFICANT CHANGE UP (ref 27–34)
MCHC RBC-ENTMCNC: 32.8 GM/DL — SIGNIFICANT CHANGE UP (ref 32–36)
MCHC RBC-ENTMCNC: 32.8 GM/DL — SIGNIFICANT CHANGE UP (ref 32–36)
MCV RBC AUTO: 86.8 FL — SIGNIFICANT CHANGE UP (ref 80–100)
MCV RBC AUTO: 86.8 FL — SIGNIFICANT CHANGE UP (ref 80–100)
MONOCYTES # BLD AUTO: 0.83 K/UL — SIGNIFICANT CHANGE UP (ref 0–0.9)
MONOCYTES # BLD AUTO: 0.83 K/UL — SIGNIFICANT CHANGE UP (ref 0–0.9)
MONOCYTES NFR BLD AUTO: 20.3 % — HIGH (ref 2–14)
MONOCYTES NFR BLD AUTO: 20.3 % — HIGH (ref 2–14)
NEUTROPHILS # BLD AUTO: 1.83 K/UL — SIGNIFICANT CHANGE UP (ref 1.8–7.4)
NEUTROPHILS # BLD AUTO: 1.83 K/UL — SIGNIFICANT CHANGE UP (ref 1.8–7.4)
NEUTROPHILS NFR BLD AUTO: 44.8 % — SIGNIFICANT CHANGE UP (ref 43–77)
NEUTROPHILS NFR BLD AUTO: 44.8 % — SIGNIFICANT CHANGE UP (ref 43–77)
NRBC # BLD: 0 /100 WBCS — SIGNIFICANT CHANGE UP (ref 0–0)
NRBC # BLD: 0 /100 WBCS — SIGNIFICANT CHANGE UP (ref 0–0)
PHOSPHATE SERPL-MCNC: 3.9 MG/DL — SIGNIFICANT CHANGE UP (ref 2.5–4.5)
PHOSPHATE SERPL-MCNC: 3.9 MG/DL — SIGNIFICANT CHANGE UP (ref 2.5–4.5)
PLATELET # BLD AUTO: 95 K/UL — LOW (ref 150–400)
PLATELET # BLD AUTO: 95 K/UL — LOW (ref 150–400)
POTASSIUM SERPL-MCNC: 4 MMOL/L — SIGNIFICANT CHANGE UP (ref 3.5–5.3)
POTASSIUM SERPL-MCNC: 4 MMOL/L — SIGNIFICANT CHANGE UP (ref 3.5–5.3)
POTASSIUM SERPL-SCNC: 4 MMOL/L — SIGNIFICANT CHANGE UP (ref 3.5–5.3)
POTASSIUM SERPL-SCNC: 4 MMOL/L — SIGNIFICANT CHANGE UP (ref 3.5–5.3)
PROT SERPL-MCNC: 6.8 G/DL — SIGNIFICANT CHANGE UP (ref 6–8.3)
PROT SERPL-MCNC: 6.8 G/DL — SIGNIFICANT CHANGE UP (ref 6–8.3)
RBC # BLD: 3.34 M/UL — LOW (ref 4.2–5.8)
RBC # BLD: 3.34 M/UL — LOW (ref 4.2–5.8)
RBC # FLD: 15.8 % — HIGH (ref 10.3–14.5)
RBC # FLD: 15.8 % — HIGH (ref 10.3–14.5)
SODIUM SERPL-SCNC: 138 MMOL/L — SIGNIFICANT CHANGE UP (ref 135–145)
SODIUM SERPL-SCNC: 138 MMOL/L — SIGNIFICANT CHANGE UP (ref 135–145)
WBC # BLD: 4.09 K/UL — SIGNIFICANT CHANGE UP (ref 3.8–10.5)
WBC # BLD: 4.09 K/UL — SIGNIFICANT CHANGE UP (ref 3.8–10.5)
WBC # FLD AUTO: 4.09 K/UL — SIGNIFICANT CHANGE UP (ref 3.8–10.5)
WBC # FLD AUTO: 4.09 K/UL — SIGNIFICANT CHANGE UP (ref 3.8–10.5)

## 2023-11-18 PROCEDURE — 82553 CREATINE MB FRACTION: CPT

## 2023-11-18 PROCEDURE — 85025 COMPLETE CBC W/AUTO DIFF WBC: CPT

## 2023-11-18 PROCEDURE — 86901 BLOOD TYPING SEROLOGIC RH(D): CPT

## 2023-11-18 PROCEDURE — 96375 TX/PRO/DX INJ NEW DRUG ADDON: CPT

## 2023-11-18 PROCEDURE — 76705 ECHO EXAM OF ABDOMEN: CPT

## 2023-11-18 PROCEDURE — 85610 PROTHROMBIN TIME: CPT

## 2023-11-18 PROCEDURE — 74019 RADEX ABDOMEN 2 VIEWS: CPT

## 2023-11-18 PROCEDURE — 99239 HOSP IP/OBS DSCHRG MGMT >30: CPT | Mod: GC

## 2023-11-18 PROCEDURE — 80048 BASIC METABOLIC PNL TOTAL CA: CPT

## 2023-11-18 PROCEDURE — 85027 COMPLETE CBC AUTOMATED: CPT

## 2023-11-18 PROCEDURE — P9047: CPT

## 2023-11-18 PROCEDURE — 82140 ASSAY OF AMMONIA: CPT

## 2023-11-18 PROCEDURE — 99291 CRITICAL CARE FIRST HOUR: CPT | Mod: 25

## 2023-11-18 PROCEDURE — C8929: CPT

## 2023-11-18 PROCEDURE — 36415 COLL VENOUS BLD VENIPUNCTURE: CPT

## 2023-11-18 PROCEDURE — 36430 TRANSFUSION BLD/BLD COMPNT: CPT

## 2023-11-18 PROCEDURE — 83735 ASSAY OF MAGNESIUM: CPT

## 2023-11-18 PROCEDURE — P9016: CPT

## 2023-11-18 PROCEDURE — 97161 PT EVAL LOW COMPLEX 20 MIN: CPT

## 2023-11-18 PROCEDURE — 82962 GLUCOSE BLOOD TEST: CPT

## 2023-11-18 PROCEDURE — 80053 COMPREHEN METABOLIC PANEL: CPT

## 2023-11-18 PROCEDURE — 86923 COMPATIBILITY TEST ELECTRIC: CPT

## 2023-11-18 PROCEDURE — 84484 ASSAY OF TROPONIN QUANT: CPT

## 2023-11-18 PROCEDURE — 83690 ASSAY OF LIPASE: CPT

## 2023-11-18 PROCEDURE — 96365 THER/PROPH/DIAG IV INF INIT: CPT

## 2023-11-18 PROCEDURE — 82550 ASSAY OF CK (CPK): CPT

## 2023-11-18 PROCEDURE — 86850 RBC ANTIBODY SCREEN: CPT

## 2023-11-18 PROCEDURE — 84100 ASSAY OF PHOSPHORUS: CPT

## 2023-11-18 PROCEDURE — 85730 THROMBOPLASTIN TIME PARTIAL: CPT

## 2023-11-18 PROCEDURE — 83605 ASSAY OF LACTIC ACID: CPT

## 2023-11-18 PROCEDURE — 71045 X-RAY EXAM CHEST 1 VIEW: CPT

## 2023-11-18 PROCEDURE — 86900 BLOOD TYPING SEROLOGIC ABO: CPT

## 2023-11-18 RX ORDER — PANTOPRAZOLE SODIUM 20 MG/1
1 TABLET, DELAYED RELEASE ORAL
Qty: 30 | Refills: 0
Start: 2023-11-18 | End: 2023-12-17

## 2023-11-18 RX ORDER — CARVEDILOL PHOSPHATE 80 MG/1
1 CAPSULE, EXTENDED RELEASE ORAL
Qty: 60 | Refills: 0
Start: 2023-11-18 | End: 2023-12-17

## 2023-11-18 RX ADMIN — Medication 40 MILLIGRAM(S): at 05:41

## 2023-11-18 RX ADMIN — CARVEDILOL PHOSPHATE 3.12 MILLIGRAM(S): 80 CAPSULE, EXTENDED RELEASE ORAL at 05:41

## 2023-11-18 RX ADMIN — PANTOPRAZOLE SODIUM 40 MILLIGRAM(S): 20 TABLET, DELAYED RELEASE ORAL at 05:40

## 2023-11-18 RX ADMIN — POLYETHYLENE GLYCOL 3350 17 GRAM(S): 17 POWDER, FOR SOLUTION ORAL at 05:40

## 2023-11-18 RX ADMIN — Medication 2: at 12:54

## 2023-11-18 RX ADMIN — LIDOCAINE 2 PATCH: 4 CREAM TOPICAL at 07:16

## 2023-11-18 NOTE — PROGRESS NOTE ADULT - NUTRITIONAL ASSESSMENT
This patient has been assessed with a concern for Malnutrition and has been determined to have a diagnosis/diagnoses of Severe protein-calorie malnutrition.    This patient is being managed with:   Diet DASH/TLC-  Sodium & Cholesterol Restricted  Entered: Nov 13 2023  2:05PM  

## 2023-11-18 NOTE — PROGRESS NOTE ADULT - PROVIDER SPECIALTY LIST ADULT
Hepatology
Internal Medicine
Internal Medicine
Hepatology
Internal Medicine

## 2023-11-22 DIAGNOSIS — E78.5 HYPERLIPIDEMIA, UNSPECIFIED: ICD-10-CM

## 2023-11-22 DIAGNOSIS — K70.30 ALCOHOLIC CIRRHOSIS OF LIVER WITHOUT ASCITES: ICD-10-CM

## 2023-11-22 DIAGNOSIS — K31.89 OTHER DISEASES OF STOMACH AND DUODENUM: ICD-10-CM

## 2023-11-22 DIAGNOSIS — I10 ESSENTIAL (PRIMARY) HYPERTENSION: ICD-10-CM

## 2023-11-22 DIAGNOSIS — Z79.02 LONG TERM (CURRENT) USE OF ANTITHROMBOTICS/ANTIPLATELETS: ICD-10-CM

## 2023-11-22 DIAGNOSIS — Z87.891 PERSONAL HISTORY OF NICOTINE DEPENDENCE: ICD-10-CM

## 2023-11-22 DIAGNOSIS — Z88.6 ALLERGY STATUS TO ANALGESIC AGENT: ICD-10-CM

## 2023-11-22 DIAGNOSIS — J90 PLEURAL EFFUSION, NOT ELSEWHERE CLASSIFIED: ICD-10-CM

## 2023-11-22 DIAGNOSIS — E87.20 ACIDOSIS, UNSPECIFIED: ICD-10-CM

## 2023-11-22 DIAGNOSIS — E83.42 HYPOMAGNESEMIA: ICD-10-CM

## 2023-11-22 DIAGNOSIS — K92.2 GASTROINTESTINAL HEMORRHAGE, UNSPECIFIED: ICD-10-CM

## 2023-11-22 DIAGNOSIS — R73.03 PREDIABETES: ICD-10-CM

## 2023-11-22 DIAGNOSIS — D69.6 THROMBOCYTOPENIA, UNSPECIFIED: ICD-10-CM

## 2023-11-22 DIAGNOSIS — K76.6 PORTAL HYPERTENSION: ICD-10-CM

## 2023-11-22 DIAGNOSIS — K27.4 CHRONIC OR UNSPECIFIED PEPTIC ULCER, SITE UNSPECIFIED, WITH HEMORRHAGE: ICD-10-CM

## 2023-11-22 DIAGNOSIS — I95.1 ORTHOSTATIC HYPOTENSION: ICD-10-CM

## 2023-11-22 DIAGNOSIS — M10.9 GOUT, UNSPECIFIED: ICD-10-CM

## 2023-11-22 DIAGNOSIS — I85.11 SECONDARY ESOPHAGEAL VARICES WITH BLEEDING: ICD-10-CM

## 2023-11-22 DIAGNOSIS — Z79.4 LONG TERM (CURRENT) USE OF INSULIN: ICD-10-CM

## 2023-11-22 DIAGNOSIS — Z86.73 PERSONAL HISTORY OF TRANSIENT ISCHEMIC ATTACK (TIA), AND CEREBRAL INFARCTION WITHOUT RESIDUAL DEFICITS: ICD-10-CM

## 2023-11-22 DIAGNOSIS — K29.81 DUODENITIS WITH BLEEDING: ICD-10-CM

## 2023-11-22 DIAGNOSIS — K44.9 DIAPHRAGMATIC HERNIA WITHOUT OBSTRUCTION OR GANGRENE: ICD-10-CM

## 2023-11-22 DIAGNOSIS — Z79.84 LONG TERM (CURRENT) USE OF ORAL HYPOGLYCEMIC DRUGS: ICD-10-CM

## 2023-11-22 DIAGNOSIS — D50.9 IRON DEFICIENCY ANEMIA, UNSPECIFIED: ICD-10-CM

## 2023-11-22 DIAGNOSIS — E43 UNSPECIFIED SEVERE PROTEIN-CALORIE MALNUTRITION: ICD-10-CM

## 2023-11-22 DIAGNOSIS — I25.10 ATHEROSCLEROTIC HEART DISEASE OF NATIVE CORONARY ARTERY WITHOUT ANGINA PECTORIS: ICD-10-CM

## 2023-11-27 ENCOUNTER — APPOINTMENT (OUTPATIENT)
Dept: HEPATOLOGY | Facility: CLINIC | Age: 63
End: 2023-11-27
Payer: COMMERCIAL

## 2023-11-27 VITALS
OXYGEN SATURATION: 100 % | TEMPERATURE: 97.3 F | HEIGHT: 68 IN | RESPIRATION RATE: 17 BRPM | WEIGHT: 194 LBS | HEART RATE: 69 BPM | DIASTOLIC BLOOD PRESSURE: 77 MMHG | BODY MASS INDEX: 29.4 KG/M2 | SYSTOLIC BLOOD PRESSURE: 126 MMHG

## 2023-11-27 DIAGNOSIS — Z87.19 PERSONAL HISTORY OF OTHER DISEASES OF THE DIGESTIVE SYSTEM: ICD-10-CM

## 2023-11-27 PROCEDURE — 99214 OFFICE O/P EST MOD 30 MIN: CPT

## 2023-11-27 RX ORDER — SPIRONOLACTONE 50 MG/1
50 TABLET ORAL DAILY
Qty: 30 | Refills: 0 | Status: DISCONTINUED | COMMUNITY
End: 2023-11-27

## 2023-11-27 RX ORDER — SPIRONOLACTONE 50 MG/1
50 TABLET ORAL DAILY
Qty: 30 | Refills: 2 | Status: DISCONTINUED | COMMUNITY
Start: 2023-11-06 | End: 2023-11-27

## 2023-11-27 RX ORDER — CARVEDILOL 6.25 MG/1
6.25 TABLET, FILM COATED ORAL TWICE DAILY
Qty: 60 | Refills: 3 | Status: DISCONTINUED | COMMUNITY
End: 2023-11-27

## 2023-11-27 RX ORDER — GABAPENTIN 100 MG/1
100 CAPSULE ORAL 3 TIMES DAILY
Qty: 90 | Refills: 0 | Status: DISCONTINUED | COMMUNITY
End: 2023-11-27

## 2023-11-27 RX ORDER — FUROSEMIDE 80 MG/1
80 TABLET ORAL DAILY
Qty: 30 | Refills: 3 | Status: DISCONTINUED | COMMUNITY
End: 2023-11-27

## 2023-12-01 ENCOUNTER — NON-APPOINTMENT (OUTPATIENT)
Age: 63
End: 2023-12-01

## 2023-12-01 LAB
AFP-TM SERPL-MCNC: 2.2 NG/ML
ALBUMIN SERPL ELPH-MCNC: 3.7 G/DL
ALP BLD-CCNC: 171 U/L
ALT SERPL-CCNC: 18 U/L
ANION GAP SERPL CALC-SCNC: 13 MMOL/L
AST SERPL-CCNC: 39 U/L
BASOPHILS # BLD AUTO: 0.06 K/UL
BASOPHILS NFR BLD AUTO: 0.9 %
BILIRUB SERPL-MCNC: 2 MG/DL
BUN SERPL-MCNC: 14 MG/DL
CALCIUM SERPL-MCNC: 9.6 MG/DL
CHLORIDE SERPL-SCNC: 107 MMOL/L
CO2 SERPL-SCNC: 21 MMOL/L
CREAT SERPL-MCNC: 0.94 MG/DL
EGFR: 91 ML/MIN/1.73M2
EOSINOPHIL # BLD AUTO: 0.3 K/UL
EOSINOPHIL NFR BLD AUTO: 4.7 %
FERRITIN SERPL-MCNC: 65 NG/ML
GLUCOSE SERPL-MCNC: 124 MG/DL
HCT VFR BLD CALC: 29.4 %
HGB BLD-MCNC: 9.3 G/DL
IMM GRANULOCYTES NFR BLD AUTO: 0.2 %
INR PPP: 1.35 RATIO
IRON SATN MFR SERPL: 16 %
IRON SERPL-MCNC: 55 UG/DL
LYMPHOCYTES # BLD AUTO: 1.17 K/UL
LYMPHOCYTES NFR BLD AUTO: 18.3 %
MAN DIFF?: NORMAL
MCHC RBC-ENTMCNC: 27.7 PG
MCHC RBC-ENTMCNC: 31.6 GM/DL
MCV RBC AUTO: 87.5 FL
MONOCYTES # BLD AUTO: 0.85 K/UL
MONOCYTES NFR BLD AUTO: 13.3 %
NEUTROPHILS # BLD AUTO: 3.99 K/UL
NEUTROPHILS NFR BLD AUTO: 62.6 %
PLATELET # BLD AUTO: 140 K/UL
POTASSIUM SERPL-SCNC: 4.3 MMOL/L
PROT SERPL-MCNC: 7.4 G/DL
PT BLD: 15.1 SEC
RBC # BLD: 3.36 M/UL
RBC # FLD: 16 %
SODIUM SERPL-SCNC: 141 MMOL/L
TIBC SERPL-MCNC: 344 UG/DL
UIBC SERPL-MCNC: 289 UG/DL
WBC # FLD AUTO: 6.38 K/UL

## 2023-12-13 ENCOUNTER — APPOINTMENT (OUTPATIENT)
Dept: HEPATOLOGY | Facility: HOSPITAL | Age: 63
End: 2023-12-13

## 2023-12-13 ENCOUNTER — RESULT REVIEW (OUTPATIENT)
Age: 63
End: 2023-12-13

## 2023-12-13 ENCOUNTER — OUTPATIENT (OUTPATIENT)
Dept: OUTPATIENT SERVICES | Facility: HOSPITAL | Age: 63
LOS: 1 days | Discharge: HOME | End: 2023-12-13
Payer: COMMERCIAL

## 2023-12-13 VITALS
HEIGHT: 68 IN | HEART RATE: 76 BPM | DIASTOLIC BLOOD PRESSURE: 91 MMHG | RESPIRATION RATE: 18 BRPM | OXYGEN SATURATION: 96 % | TEMPERATURE: 98 F | SYSTOLIC BLOOD PRESSURE: 148 MMHG | WEIGHT: 194.01 LBS

## 2023-12-13 LAB
GLUCOSE BLDC GLUCOMTR-MCNC: 125 MG/DL — HIGH (ref 70–99)
GLUCOSE BLDC GLUCOMTR-MCNC: 125 MG/DL — HIGH (ref 70–99)

## 2023-12-13 PROCEDURE — 43239 EGD BIOPSY SINGLE/MULTIPLE: CPT

## 2023-12-13 PROCEDURE — 88342 IMHCHEM/IMCYTCHM 1ST ANTB: CPT | Mod: 26

## 2023-12-13 PROCEDURE — 88305 TISSUE EXAM BY PATHOLOGIST: CPT | Mod: 26

## 2023-12-13 PROCEDURE — 82962 GLUCOSE BLOOD TEST: CPT

## 2023-12-13 PROCEDURE — 88305 TISSUE EXAM BY PATHOLOGIST: CPT

## 2023-12-13 PROCEDURE — 88341 IMHCHEM/IMCYTCHM EA ADD ANTB: CPT | Mod: 26

## 2023-12-13 PROCEDURE — 88341 IMHCHEM/IMCYTCHM EA ADD ANTB: CPT

## 2023-12-13 NOTE — PRE-ANESTHESIA EVALUATION ADULT - NSANTHPMHFT_GEN_ALL_CORE
Cardiac: Positive for HTN, Portal Hypertension, HLD, CAD s/p CABG Three Vessel 9/14/2023.   Pulmonary: Denies Asthma, COPD, TULIO  Renal: Denies kidney dysfunction  Hepatic: Positive for cirrhosis, esophageal varices  Gastrointestinal: Positive for history of gastrointestinal bleeding. Denies GERD/IBS  Endocrine: Positive for DM. Denies thyroid dysfunction  Neurologic: Positive for TIA. Denies seizure disorder  Hematologic: Positive for anemia. Anticoagulation being held currently for concern for GI bleeding/anemia. Denies blood clotting disorder.    PSH: CABG 9/14/2023 Cardiac: Positive for HTN, Portal Hypertension, HLD, CAD s/p CABG Four Vessel 9/14/2023.   Pulmonary: Positive for recurrent pleural effusions requiring thoracentesis last performed 10/23 and pulmonary right lobe 11 mm and left lobe 12 mm lesions. Denies Asthma, COPD, TULIO  Renal: Positive for gout.  Denies kidney dysfunction  Hepatic: Positive for cirrhosis, esophageal varices, alcohol induced liver disease, splenomegaly.  Gastrointestinal: Positive for history of gastrointestinal bleeding and GERD, colon polyps, recdtal bleeding. Denies IBS  Endocrine: Positive for DM. Denies thyroid dysfunction  Neurologic: Positive for TIA. Denies seizure disorder  Hematologic: Positive for anemia, iron deficiency. Anticoagulation being held currently for concern for GI bleeding/anemia. Denies blood clotting disorder.    PSH: CABG 4 vessel 9/14/2023, Thoracentesis, Cardiac Catheterization 2/2023.

## 2023-12-13 NOTE — PRE-ANESTHESIA EVALUATION ADULT - NSDENTALSD_ENT_ALL_CORE
Missing rear molars, poor dentition with several chipped teeth, ground/worn teeth, cavities./missing teeth Missing majority of rear molars, poor dentition with several chipped teeth, ground/worn teeth, cavities./missing teeth

## 2023-12-13 NOTE — PRE-ANESTHESIA EVALUATION ADULT - NSRADCARDRESULTSFT_GEN_ALL_CORE
TTE 11/16/2023  "CONCLUSIONS:     1. Normal left and right ventricular size and systolic function.   2. Aortic sclerosis without significant stenosis.   3. No evidence of pulmonary hypertension.   4. No pericardial effusion.   5. The proximal ascending aorta measures 4.00 cm (normal 2.6-3.4 cm for   men, 2.3-3.1 cm for women)."

## 2023-12-14 LAB
SURGICAL PATHOLOGY STUDY: SIGNIFICANT CHANGE UP
SURGICAL PATHOLOGY STUDY: SIGNIFICANT CHANGE UP

## 2023-12-20 ENCOUNTER — APPOINTMENT (OUTPATIENT)
Dept: HEPATOLOGY | Facility: CLINIC | Age: 63
End: 2023-12-20

## 2024-01-16 ENCOUNTER — APPOINTMENT (OUTPATIENT)
Dept: HEART AND VASCULAR | Facility: CLINIC | Age: 64
End: 2024-01-16

## 2024-01-18 ENCOUNTER — RESULT REVIEW (OUTPATIENT)
Age: 64
End: 2024-01-18

## 2024-01-18 ENCOUNTER — OUTPATIENT (OUTPATIENT)
Dept: OUTPATIENT SERVICES | Facility: HOSPITAL | Age: 64
LOS: 1 days | Discharge: HOME | End: 2024-01-18
Payer: MEDICARE

## 2024-01-18 ENCOUNTER — APPOINTMENT (OUTPATIENT)
Dept: HEPATOLOGY | Facility: HOSPITAL | Age: 64
End: 2024-01-18

## 2024-01-18 VITALS
OXYGEN SATURATION: 100 % | SYSTOLIC BLOOD PRESSURE: 146 MMHG | DIASTOLIC BLOOD PRESSURE: 86 MMHG | HEIGHT: 68 IN | TEMPERATURE: 97 F | HEART RATE: 87 BPM | RESPIRATION RATE: 15 BRPM | WEIGHT: 191.8 LBS

## 2024-01-18 LAB — GLUCOSE BLDC GLUCOMTR-MCNC: 134 MG/DL — HIGH (ref 70–99)

## 2024-01-18 PROCEDURE — 88305 TISSUE EXAM BY PATHOLOGIST: CPT | Mod: 26

## 2024-01-18 PROCEDURE — 43244 EGD VARICES LIGATION: CPT

## 2024-01-18 PROCEDURE — 88341 IMHCHEM/IMCYTCHM EA ADD ANTB: CPT

## 2024-01-18 PROCEDURE — 88341 IMHCHEM/IMCYTCHM EA ADD ANTB: CPT | Mod: 26

## 2024-01-18 PROCEDURE — 88342 IMHCHEM/IMCYTCHM 1ST ANTB: CPT | Mod: 26

## 2024-01-18 PROCEDURE — 43239 EGD BIOPSY SINGLE/MULTIPLE: CPT

## 2024-01-18 PROCEDURE — C1889: CPT

## 2024-01-18 PROCEDURE — 82962 GLUCOSE BLOOD TEST: CPT

## 2024-01-18 PROCEDURE — 88305 TISSUE EXAM BY PATHOLOGIST: CPT

## 2024-01-18 DEVICE — SPEEDBAND: Type: IMPLANTABLE DEVICE | Status: FUNCTIONAL

## 2024-01-18 RX ORDER — SODIUM CHLORIDE 9 MG/ML
1000 INJECTION, SOLUTION INTRAVENOUS
Refills: 0 | Status: DISCONTINUED | OUTPATIENT
Start: 2024-01-18 | End: 2024-01-18

## 2024-01-18 NOTE — PRE-ANESTHESIA EVALUATION ADULT - NSANTHSUBSTSD_GEN_ALL_CORE
[FreeTextEntry1] : he patient is a 67-year-old right-handed woman with a history of diverticulosis and obesity, presenting for evaluation of low back pain.  She states that the pain began in about 2019.  It crosses over the entire low back and occasionally radiates to the right buttocks.  She has had a few transient episodes of left leg cramping and of paresthesias of the left leg.  She has chronic problems with urination following bladder surgery, but no acute changes.  There has been no change in her bowel habits.  She denies any focal weakness.  The pain is currently 0/10 while sitting.  Upon exertion, it can go to 6/10.  It is worse with prolonged standing and lifting.  It is somewhat relieved by nonsteroidal anti-inflammatory drugs and by physical therapy.  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  She brings with her the results of an MRI scan performed on January 18, 2023.  It showed some mild to moderate degenerative changes, but no clear neural impingement.\par At present, the patient is pain-free.  There is no spine tenderness.  Straight leg raising is negative, bilaterally.  Her neurological examination is entirely normal.  Gait and station are normal.  Her MRI scan showed only some mild to moderate degenerative changes.  This is almost certainly some mild degenerative change due to her prior occupation and her weight.  I have advised her on weight control and exercise.  She should continue in physical therapy and I have given her some instructions for back stretching and aerobic exercise. No

## 2024-01-22 ENCOUNTER — LABORATORY RESULT (OUTPATIENT)
Age: 64
End: 2024-01-22

## 2024-01-22 ENCOUNTER — APPOINTMENT (OUTPATIENT)
Dept: HEART AND VASCULAR | Facility: CLINIC | Age: 64
End: 2024-01-22
Payer: MEDICARE

## 2024-01-22 ENCOUNTER — NON-APPOINTMENT (OUTPATIENT)
Age: 64
End: 2024-01-22

## 2024-01-22 VITALS
HEART RATE: 78 BPM | WEIGHT: 199 LBS | DIASTOLIC BLOOD PRESSURE: 90 MMHG | BODY MASS INDEX: 30.16 KG/M2 | SYSTOLIC BLOOD PRESSURE: 128 MMHG | HEIGHT: 68 IN

## 2024-01-22 PROCEDURE — G2211 COMPLEX E/M VISIT ADD ON: CPT

## 2024-01-22 PROCEDURE — 93000 ELECTROCARDIOGRAM COMPLETE: CPT

## 2024-01-22 PROCEDURE — 99214 OFFICE O/P EST MOD 30 MIN: CPT

## 2024-01-22 RX ORDER — CLOPIDOGREL BISULFATE 75 MG/1
75 TABLET, FILM COATED ORAL DAILY
Qty: 90 | Refills: 3 | Status: DISCONTINUED | COMMUNITY
Start: 1900-01-01 | End: 2024-01-22

## 2024-01-22 RX ORDER — METFORMIN HYDROCHLORIDE 500 MG/1
500 TABLET, COATED ORAL
Qty: 180 | Refills: 3 | Status: ACTIVE | COMMUNITY
Start: 1900-01-01 | End: 1900-01-01

## 2024-01-22 NOTE — PHYSICAL EXAM
[Well Developed] : well developed [Well Nourished] : well nourished [No Acute Distress] : no acute distress [Normal Conjunctiva] : normal conjunctiva [Normal Venous Pressure] : normal venous pressure [No Carotid Bruit] : no carotid bruit [Normal S1, S2] : normal S1, S2 [No Rub] : no rub [No Gallop] : no gallop [Clear Lung Fields] : clear lung fields [Murmur] : murmur [Good Air Entry] : good air entry [No Respiratory Distress] : no respiratory distress  [Soft] : abdomen soft [Non Tender] : non-tender [No Masses/organomegaly] : no masses/organomegaly [Normal Bowel Sounds] : normal bowel sounds [Normal Gait] : normal gait [No Edema] : no edema [No Cyanosis] : no cyanosis [No Clubbing] : no clubbing [No Varicosities] : no varicosities [No Rash] : no rash [No Skin Lesions] : no skin lesions [Moves all extremities] : moves all extremities [No Focal Deficits] : no focal deficits [Normal Speech] : normal speech [Alert and Oriented] : alert and oriented [Normal memory] : normal memory [de-identified] : 2/6 JT --> Axilla

## 2024-01-22 NOTE — END OF VISIT
[FreeTextEntry3] : All medical record entries made by the Scribe were at my, Dr. Drew Box, direction and personally dictated by me on -01/22/2024 - I have reviewed the chart and agree that the record accurately reflects my personal performance of the history, physical exam, assessment and plan. I have also personally directed, reviewed and agreed with the chart. [Time Spent: ___ minutes] : I have spent [unfilled] minutes of time on the encounter.

## 2024-01-22 NOTE — DISCUSSION/SUMMARY
[Stable] : stable [Coronary Artery Disease] : coronary artery disease [None] : There are no changes in medication management [Dietary Modification] : dietary modification [Patient] : discussed with the patient [de-identified] : s/p CABG x4 [FreeTextEntry1] : Diabetes - Blood work drawn. Maintain a low caloric, low sodium, low cholesterol diet, Dietary counseling given, diet and exercise discussed in detail.

## 2024-01-22 NOTE — HISTORY OF PRESENT ILLNESS
[FreeTextEntry1] : Denies Chest Pain, SOB, Dizziness, Leg edema, Orthopnea, PND, Palpitations, Syncope, WARD, Diaphoresis. Patient denies changes in appetite, fatigue, heat or cold intolerance, myalgias, polydipsia, polyphagia, polyuria, tingling, numbness.

## 2024-01-23 LAB
25(OH)D3 SERPL-MCNC: 22.9 NG/ML
ALBUMIN SERPL ELPH-MCNC: 3.7 G/DL
ALP BLD-CCNC: 162 U/L
ALT SERPL-CCNC: 15 U/L
ANION GAP SERPL CALC-SCNC: 11 MMOL/L
AST SERPL-CCNC: 32 U/L
BASOPHILS # BLD AUTO: 0.06 K/UL
BASOPHILS NFR BLD AUTO: 1.2 %
BILIRUB SERPL-MCNC: 1.1 MG/DL
BUN SERPL-MCNC: 15 MG/DL
CALCIUM SERPL-MCNC: 9.1 MG/DL
CHLORIDE SERPL-SCNC: 103 MMOL/L
CHOLEST SERPL-MCNC: 211 MG/DL
CO2 SERPL-SCNC: 24 MMOL/L
CREAT SERPL-MCNC: 0.91 MG/DL
EGFR: 94 ML/MIN/1.73M2
EOSINOPHIL # BLD AUTO: 0.16 K/UL
EOSINOPHIL NFR BLD AUTO: 3.3 %
ESTIMATED AVERAGE GLUCOSE: 126 MG/DL
FOLATE SERPL-MCNC: 4.8 NG/ML
GLUCOSE SERPL-MCNC: 110 MG/DL
HBA1C MFR BLD HPLC: 6 %
HCT VFR BLD CALC: 28.4 %
HDLC SERPL-MCNC: 53 MG/DL
HGB BLD-MCNC: 8.8 G/DL
IMM GRANULOCYTES NFR BLD AUTO: 0.2 %
INR PPP: 1.34 RATIO
LDLC SERPL CALC-MCNC: 136 MG/DL
LYMPHOCYTES # BLD AUTO: 1.05 K/UL
LYMPHOCYTES NFR BLD AUTO: 21.3 %
MAN DIFF?: NORMAL
MCHC RBC-ENTMCNC: 26.6 PG
MCHC RBC-ENTMCNC: 31 GM/DL
MCV RBC AUTO: 85.8 FL
MONOCYTES # BLD AUTO: 0.7 K/UL
MONOCYTES NFR BLD AUTO: 14.2 %
NEUTROPHILS # BLD AUTO: 2.94 K/UL
NEUTROPHILS NFR BLD AUTO: 59.8 %
NONHDLC SERPL-MCNC: 157 MG/DL
PLATELET # BLD AUTO: 102 K/UL
POTASSIUM SERPL-SCNC: 4.3 MMOL/L
PROT SERPL-MCNC: 7.3 G/DL
PSA FREE FLD-MCNC: 40 %
PSA FREE SERPL-MCNC: 0.05 NG/ML
PSA SERPL-MCNC: 0.14 NG/ML
PT BLD: 15.1 SEC
RBC # BLD: 3.31 M/UL
RBC # FLD: 16.5 %
SODIUM SERPL-SCNC: 137 MMOL/L
SURGICAL PATHOLOGY STUDY: SIGNIFICANT CHANGE UP
T3 SERPL-MCNC: 95 NG/DL
T3FREE SERPL-MCNC: 2.56 PG/ML
T3RU NFR SERPL: 1 TBI
T4 FREE SERPL-MCNC: 1.2 NG/DL
T4 SERPL-MCNC: 7.1 UG/DL
TRIGL SERPL-MCNC: 121 MG/DL
TSH SERPL-ACNC: 2.29 UIU/ML
VIT B12 SERPL-MCNC: 818 PG/ML
WBC # FLD AUTO: 4.92 K/UL

## 2024-01-26 NOTE — ED ADULT NURSE NOTE - SUICIDE SCREENING QUESTION 2
HEMATOLOGY-ONCOLOGY PROGRESS NOTE    Subjective:  Leticia seen resting in bed. She states she \"still isn't feeling good\", citing poor appetite and weakness. Notes her right flank pain and burning with urination has improved since yesterday. Cannot tell if she still is having hematuria as she is taking Pyridium.  Continues to report moderate abdominal distention, feels she needs another paracentesis. 2U platelets transfused today, but didn't cause significant improvement in plt count, 7-->12, thus paracentesis could not be performed. She is still bothered by cough which is nonproductive. Her cough is chronic cough though worsened over past few days. CXR with small left pleural effusion with adjacent consolidation, chronic or recurrent. Primary team ordered Tessalon perles for her today, she can't tell if helping yet.     Review of Systems  See HPI     PHYSICAL EXAM:   Vitals:    01/26/24 1436   BP: (!) 169/93   Pulse: (!) 106   Resp: 18   Temp: 98 °F (36.7 °C)     Oncology Encounter Vitals   ONC OP Encounter Vitals Group      BP 01/24/24 1112 (!) 167/92      Heart Rate 01/24/24 1112 92      Resp 01/24/24 1112 17      Temp 01/24/24 1112 98.1 °F (36.7 °C)      Temp src 01/24/24 1112 Oral      SpO2 01/24/24 1112 97 %      Weight 01/24/24 1112 213 lb 3 oz (96.7 kg)      Height 01/24/24 1619 5' 8\" (1.727 m)      Pain Score --       Pain Location --       Pain Education? --       BSA (Calculated - m2) - [employer] 01/24/24 1619 2.1      BSA (Calculated - sq m) 01/24/24 1619 2.15      BMI (Calculated) 01/24/24 1619 32.41     ECOG Performance Status   ECOG   ECOG Performance Status         CONSTITUTIONAL:  Alert, cooperative, oriented.  Mood and affect appropriate. Appears uncomfortable, but less so than yesterday   HEAD:  Normocephalic  EYES:  Conjunctivae and sclerae are clear and without icterus.   RESPIRATORY:  No respiratory distress. + nonproductive cough  CARDIOVASCULAR:  Acyanotic, RRR   ABDOMEN:  Soft  non-tender, moderately distended .     Labs     Lab Results   Component Value Date/Time    WBC 0.6 (LL) 01/26/2024 04:02 PM    WBC 2.3 (L) 12/01/2023 09:17 AM    HGB 7.9 (L) 01/26/2024 04:02 PM    HGB 7.8 (L) 12/01/2023 09:17 AM    HCT 24.4 (L) 01/26/2024 04:02 PM    HCT 26.1 (L) 12/01/2023 09:17 AM    PLT 12 (LL) 01/26/2024 04:02 PM    PLT 38 (L) 12/01/2023 09:17 AM     Lab Results   Component Value Date/Time    SODIUM 143 01/26/2024 05:13 AM    SODIUM 145 11/09/2022 02:18 PM    POTASSIUM 3.6 01/26/2024 05:13 AM    POTASSIUM 4.2 11/09/2022 02:18 PM    CHLORIDE 107 01/26/2024 05:13 AM    CHLORIDE 108.00 (H) 11/09/2022 02:18 PM    CO2 26 01/26/2024 05:13 AM    CO2 23.50 11/09/2022 02:18 PM    BUN 15 01/26/2024 05:13 AM    BUN 16.00 11/09/2022 02:18 PM    CREATININE 1.09 (H) 01/26/2024 05:13 AM    CREATININE 1.10 11/09/2022 02:18 PM    GLUCOSE 83 01/26/2024 05:13 AM    GLUCOSE 108 (H) 11/09/2022 02:18 PM    MG 1.1 (L) 11/24/2023 02:24 PM    MG 1.7 11/09/2019 09:41 AM    CALCIUM 7.5 (L) 01/26/2024 05:13 AM    CALCIUM 8.70 11/09/2022 02:18 PM    TOTPROTEIN 5.7 (L) 01/26/2024 05:13 AM    TOTPROTEIN 6.30 (L) 11/09/2022 02:18 PM    ALBUMIN 1.6 (L) 01/26/2024 05:13 AM    ALBUMIN 3.60 11/09/2022 02:18 PM    AST 32 01/26/2024 05:13 AM    AST 15.00 11/09/2022 02:18 PM    GPT 36 01/26/2024 05:13 AM    GPT 27.00 11/09/2022 02:18 PM    BILIRUBIN 0.8 01/26/2024 05:13 AM    BILIRUBIN 0.20 11/09/2022 02:18 PM    ALKPT 233 (H) 01/26/2024 05:13 AM    ALKPT 115.00 11/09/2022 02:18 PM    FERR 1,846 (H) 09/25/2023 05:40 PM    PHOS 4.8 (H) 11/03/2023 05:52 AM    C125 334 (H) 01/25/2024 06:07 AM    C125 76.20 (H) 10/26/2022 12:04 PM         Assessment and Plan  1.) Recurrent, platinum-resistant high grade ovarian serous carcinoma. Diagnosed despite prophylactic BSO in 2016. Completed 4C neoadjuvant carbo/taxol, followed by debulking surgery on 11/04/21, followed by 3C adjuvant carbo/taxol, and maintenance olaparib. Given relapse 8/19/22,  started carbo/doxil + bevacizumab. Given progression 5/2023 received Gemzar with poor tolerance. Started Bevacizumab and topotecan 08/01/23. Given further progression, re-started carbo/doxil + bevacizumab on 11/13/23 by team in [place], is responding. C3 chemo 1/15/24, planning 6C    2.) Complicated UTI vs pyelonephritis   3.) Malignant hydronephrosis: Underwent nephrostomy tube placement 9/22/23, then nephrostomy tube removal and bilateral stent placement 1/10/24  4.) Pancytopenia with neutropenia: Due to recent chemo  5.) Malignant ascites: last paracentesis 1/12/24, reportedly 1600cc removed, now again with worsening abdominal distension  6.) Nonproductive cough: possibly related to ascites      PLAN:   - Daily CBC, transfuse as needed for hgb <7, plt count <10K (or <20K if febrile, <50K if bleeding)  - Will attempt paracentesis on Monday if remains inpatient, though will need platelet count of 40 for this.   - Given lack of significant improvement in platelet count with platelet transfusion today, recommend ABO-matched platelets in case further platelet transfusion needed. Discussed with Blood Bank--they state that type-specific platelets can be provided on-demand over weekend if needed (should take 2 hours to obtain), the Blood Bank just needs to be called and notified that this is requested. They are ordering 2U of ABO-matched platelets on hold for Monday, tentatively to be given prior to paracentesis  - Agree with empiric Rocephin while awaiting urine cultures    Thank you for this consult. Please message on [employer] with any questions.     Ellen Hilliard, DO   [facility]    No

## 2024-02-09 RX ORDER — SUCRALFATE 1 G/1
1 TABLET ORAL 4 TIMES DAILY
Qty: 120 | Refills: 0 | Status: ACTIVE | COMMUNITY
Start: 2024-02-09 | End: 1900-01-01

## 2024-02-14 ENCOUNTER — OUTPATIENT (OUTPATIENT)
Dept: OUTPATIENT SERVICES | Facility: HOSPITAL | Age: 64
LOS: 1 days | End: 2024-02-14
Payer: MEDICARE

## 2024-02-14 ENCOUNTER — APPOINTMENT (OUTPATIENT)
Dept: MRI IMAGING | Facility: HOSPITAL | Age: 64
End: 2024-02-14

## 2024-02-14 PROCEDURE — 74183 MRI ABD W/O CNTR FLWD CNTR: CPT

## 2024-02-14 PROCEDURE — 74183 MRI ABD W/O CNTR FLWD CNTR: CPT | Mod: 26,MH

## 2024-02-14 PROCEDURE — A9585: CPT

## 2024-02-21 ENCOUNTER — NON-APPOINTMENT (OUTPATIENT)
Age: 64
End: 2024-02-21

## 2024-02-27 ENCOUNTER — NON-APPOINTMENT (OUTPATIENT)
Age: 64
End: 2024-02-27

## 2024-03-04 ENCOUNTER — INPATIENT (INPATIENT)
Facility: HOSPITAL | Age: 64
LOS: 4 days | Discharge: HOME | DRG: 312 | End: 2024-03-09
Attending: STUDENT IN AN ORGANIZED HEALTH CARE EDUCATION/TRAINING PROGRAM | Admitting: GENERAL ACUTE CARE HOSPITAL
Payer: MEDICARE

## 2024-03-04 ENCOUNTER — APPOINTMENT (OUTPATIENT)
Dept: HEPATOLOGY | Facility: CLINIC | Age: 64
End: 2024-03-04
Payer: MEDICARE

## 2024-03-04 ENCOUNTER — EMERGENCY (EMERGENCY)
Facility: HOSPITAL | Age: 64
LOS: 1 days | Discharge: HOME | End: 2024-03-04
Attending: EMERGENCY MEDICINE | Admitting: EMERGENCY MEDICINE
Payer: MEDICARE

## 2024-03-04 VITALS
TEMPERATURE: 98 F | HEART RATE: 70 BPM | RESPIRATION RATE: 18 BRPM | OXYGEN SATURATION: 100 % | DIASTOLIC BLOOD PRESSURE: 80 MMHG | SYSTOLIC BLOOD PRESSURE: 127 MMHG

## 2024-03-04 VITALS
TEMPERATURE: 97.1 F | SYSTOLIC BLOOD PRESSURE: 110 MMHG | RESPIRATION RATE: 16 BRPM | WEIGHT: 207 LBS | BODY MASS INDEX: 31.37 KG/M2 | HEART RATE: 91 BPM | OXYGEN SATURATION: 98 % | HEIGHT: 68 IN | DIASTOLIC BLOOD PRESSURE: 71 MMHG

## 2024-03-04 VITALS
RESPIRATION RATE: 18 BRPM | HEART RATE: 68 BPM | TEMPERATURE: 99 F | WEIGHT: 203.93 LBS | SYSTOLIC BLOOD PRESSURE: 133 MMHG | HEIGHT: 68 IN | DIASTOLIC BLOOD PRESSURE: 86 MMHG | OXYGEN SATURATION: 100 %

## 2024-03-04 VITALS
OXYGEN SATURATION: 99 % | DIASTOLIC BLOOD PRESSURE: 90 MMHG | RESPIRATION RATE: 20 BRPM | SYSTOLIC BLOOD PRESSURE: 134 MMHG | HEART RATE: 93 BPM | TEMPERATURE: 97 F | HEIGHT: 68 IN | WEIGHT: 205.91 LBS

## 2024-03-04 LAB
ALBUMIN SERPL ELPH-MCNC: 3.6 G/DL — SIGNIFICANT CHANGE UP (ref 3.3–5)
ALP SERPL-CCNC: 140 U/L — HIGH (ref 40–120)
ALT FLD-CCNC: 22 U/L — SIGNIFICANT CHANGE UP (ref 10–45)
ANION GAP SERPL CALC-SCNC: 12 MMOL/L — SIGNIFICANT CHANGE UP (ref 5–17)
ANION GAP SERPL CALC-SCNC: 14 MMOL/L — SIGNIFICANT CHANGE UP (ref 5–17)
ANISOCYTOSIS BLD QL: SLIGHT — SIGNIFICANT CHANGE UP
APPEARANCE UR: CLEAR — SIGNIFICANT CHANGE UP
APTT BLD: 31.6 SEC — SIGNIFICANT CHANGE UP (ref 24.5–35.6)
AST SERPL-CCNC: 52 U/L — HIGH (ref 10–40)
BASOPHILS # BLD AUTO: 0.02 K/UL — SIGNIFICANT CHANGE UP (ref 0–0.2)
BASOPHILS NFR BLD AUTO: 0.9 % — SIGNIFICANT CHANGE UP (ref 0–2)
BILIRUB SERPL-MCNC: 3.1 MG/DL — HIGH (ref 0.2–1.2)
BILIRUB UR-MCNC: NEGATIVE — SIGNIFICANT CHANGE UP
BUN SERPL-MCNC: 10 MG/DL — SIGNIFICANT CHANGE UP (ref 7–23)
BUN SERPL-MCNC: 10 MG/DL — SIGNIFICANT CHANGE UP (ref 7–23)
CALCIUM SERPL-MCNC: 9 MG/DL — SIGNIFICANT CHANGE UP (ref 8.4–10.5)
CALCIUM SERPL-MCNC: 9.1 MG/DL — SIGNIFICANT CHANGE UP (ref 8.4–10.5)
CHLORIDE SERPL-SCNC: 100 MMOL/L — SIGNIFICANT CHANGE UP (ref 96–108)
CHLORIDE SERPL-SCNC: 99 MMOL/L — SIGNIFICANT CHANGE UP (ref 96–108)
CO2 SERPL-SCNC: 25 MMOL/L — SIGNIFICANT CHANGE UP (ref 22–31)
CO2 SERPL-SCNC: 26 MMOL/L — SIGNIFICANT CHANGE UP (ref 22–31)
COLOR SPEC: YELLOW — SIGNIFICANT CHANGE UP
CREAT SERPL-MCNC: 0.92 MG/DL — SIGNIFICANT CHANGE UP (ref 0.5–1.3)
CREAT SERPL-MCNC: 0.97 MG/DL — SIGNIFICANT CHANGE UP (ref 0.5–1.3)
DIFF PNL FLD: NEGATIVE — SIGNIFICANT CHANGE UP
EGFR: 87 ML/MIN/1.73M2 — SIGNIFICANT CHANGE UP
EGFR: 92 ML/MIN/1.73M2 — SIGNIFICANT CHANGE UP
EOSINOPHIL # BLD AUTO: 0 K/UL — SIGNIFICANT CHANGE UP (ref 0–0.5)
EOSINOPHIL NFR BLD AUTO: 0 % — SIGNIFICANT CHANGE UP (ref 0–6)
GIANT PLATELETS BLD QL SMEAR: PRESENT — SIGNIFICANT CHANGE UP
GLUCOSE SERPL-MCNC: 136 MG/DL — HIGH (ref 70–99)
GLUCOSE SERPL-MCNC: 164 MG/DL — HIGH (ref 70–99)
GLUCOSE UR QL: NEGATIVE MG/DL — SIGNIFICANT CHANGE UP
HCT VFR BLD CALC: 26.7 % — LOW (ref 39–50)
HCT VFR BLD CALC: 27.5 % — LOW (ref 39–50)
HGB BLD-MCNC: 8.1 G/DL — LOW (ref 13–17)
HGB BLD-MCNC: 8.4 G/DL — LOW (ref 13–17)
HYPOCHROMIA BLD QL: SIGNIFICANT CHANGE UP
INR BLD: 1.43 — HIGH (ref 0.85–1.18)
KETONES UR-MCNC: NEGATIVE MG/DL — SIGNIFICANT CHANGE UP
LEUKOCYTE ESTERASE UR-ACNC: NEGATIVE — SIGNIFICANT CHANGE UP
LIDOCAIN IGE QN: 26 U/L — SIGNIFICANT CHANGE UP (ref 7–60)
LYMPHOCYTES # BLD AUTO: 0.55 K/UL — LOW (ref 1–3.3)
LYMPHOCYTES # BLD AUTO: 20 % — SIGNIFICANT CHANGE UP (ref 13–44)
MANUAL SMEAR VERIFICATION: SIGNIFICANT CHANGE UP
MCHC RBC-ENTMCNC: 23.5 PG — LOW (ref 27–34)
MCHC RBC-ENTMCNC: 23.8 PG — LOW (ref 27–34)
MCHC RBC-ENTMCNC: 30.3 GM/DL — LOW (ref 32–36)
MCHC RBC-ENTMCNC: 30.5 GM/DL — LOW (ref 32–36)
MCV RBC AUTO: 77.4 FL — LOW (ref 80–100)
MCV RBC AUTO: 77.9 FL — LOW (ref 80–100)
MICROCYTES BLD QL: SLIGHT — SIGNIFICANT CHANGE UP
MONOCYTES # BLD AUTO: 0.14 K/UL — SIGNIFICANT CHANGE UP (ref 0–0.9)
MONOCYTES NFR BLD AUTO: 5.2 % — SIGNIFICANT CHANGE UP (ref 2–14)
NEUTROPHILS # BLD AUTO: 2.05 K/UL — SIGNIFICANT CHANGE UP (ref 1.8–7.4)
NEUTROPHILS NFR BLD AUTO: 73.9 % — SIGNIFICANT CHANGE UP (ref 43–77)
NITRITE UR-MCNC: NEGATIVE — SIGNIFICANT CHANGE UP
NRBC # BLD: 0 /100 WBCS — SIGNIFICANT CHANGE UP (ref 0–0)
OVALOCYTES BLD QL SMEAR: SLIGHT — SIGNIFICANT CHANGE UP
PH UR: 6 — SIGNIFICANT CHANGE UP (ref 5–8)
PLAT MORPH BLD: NORMAL — SIGNIFICANT CHANGE UP
PLATELET # BLD AUTO: 93 K/UL — LOW (ref 150–400)
PLATELET # BLD AUTO: 99 K/UL — LOW (ref 150–400)
POIKILOCYTOSIS BLD QL AUTO: SLIGHT — SIGNIFICANT CHANGE UP
POLYCHROMASIA BLD QL SMEAR: SLIGHT — SIGNIFICANT CHANGE UP
POTASSIUM SERPL-MCNC: 3.5 MMOL/L — SIGNIFICANT CHANGE UP (ref 3.5–5.3)
POTASSIUM SERPL-MCNC: 3.7 MMOL/L — SIGNIFICANT CHANGE UP (ref 3.5–5.3)
POTASSIUM SERPL-SCNC: 3.5 MMOL/L — SIGNIFICANT CHANGE UP (ref 3.5–5.3)
POTASSIUM SERPL-SCNC: 3.7 MMOL/L — SIGNIFICANT CHANGE UP (ref 3.5–5.3)
PROT SERPL-MCNC: 7.1 G/DL — SIGNIFICANT CHANGE UP (ref 6–8.3)
PROT UR-MCNC: NEGATIVE MG/DL — SIGNIFICANT CHANGE UP
PROTHROM AB SERPL-ACNC: 16.1 SEC — HIGH (ref 9.5–13)
RBC # BLD: 3.45 M/UL — LOW (ref 4.2–5.8)
RBC # BLD: 3.53 M/UL — LOW (ref 4.2–5.8)
RBC # FLD: 15.9 % — HIGH (ref 10.3–14.5)
RBC # FLD: 15.9 % — HIGH (ref 10.3–14.5)
RBC BLD AUTO: ABNORMAL
SODIUM SERPL-SCNC: 137 MMOL/L — SIGNIFICANT CHANGE UP (ref 135–145)
SODIUM SERPL-SCNC: 139 MMOL/L — SIGNIFICANT CHANGE UP (ref 135–145)
SP GR SPEC: 1.01 — SIGNIFICANT CHANGE UP (ref 1–1.03)
TROPONIN T, HIGH SENSITIVITY RESULT: 18 NG/L — SIGNIFICANT CHANGE UP (ref 0–51)
UROBILINOGEN FLD QL: 0.2 MG/DL — SIGNIFICANT CHANGE UP (ref 0.2–1)
WBC # BLD: 2.77 K/UL — LOW (ref 3.8–10.5)
WBC # BLD: 3.19 K/UL — LOW (ref 3.8–10.5)
WBC # FLD AUTO: 2.77 K/UL — LOW (ref 3.8–10.5)
WBC # FLD AUTO: 3.19 K/UL — LOW (ref 3.8–10.5)

## 2024-03-04 PROCEDURE — 93010 ELECTROCARDIOGRAM REPORT: CPT

## 2024-03-04 PROCEDURE — 74177 CT ABD & PELVIS W/CONTRAST: CPT | Mod: 26,MC

## 2024-03-04 PROCEDURE — 99285 EMERGENCY DEPT VISIT HI MDM: CPT | Mod: FS

## 2024-03-04 PROCEDURE — 99285 EMERGENCY DEPT VISIT HI MDM: CPT

## 2024-03-04 PROCEDURE — 99214 OFFICE O/P EST MOD 30 MIN: CPT

## 2024-03-04 PROCEDURE — 99223 1ST HOSP IP/OBS HIGH 75: CPT

## 2024-03-04 RX ORDER — SODIUM CHLORIDE 9 MG/ML
1000 INJECTION INTRAMUSCULAR; INTRAVENOUS; SUBCUTANEOUS ONCE
Refills: 0 | Status: COMPLETED | OUTPATIENT
Start: 2024-03-04 | End: 2024-03-04

## 2024-03-04 RX ORDER — INFLUENZA VIRUS VACCINE 15; 15; 15; 15 UG/.5ML; UG/.5ML; UG/.5ML; UG/.5ML
0.7 SUSPENSION INTRAMUSCULAR ONCE
Refills: 0 | Status: DISCONTINUED | OUTPATIENT
Start: 2024-03-04 | End: 2024-03-09

## 2024-03-04 RX ORDER — DEXTROSE 50 % IN WATER 50 %
25 SYRINGE (ML) INTRAVENOUS ONCE
Refills: 0 | Status: DISCONTINUED | OUTPATIENT
Start: 2024-03-04 | End: 2024-03-09

## 2024-03-04 RX ORDER — GLUCAGON INJECTION, SOLUTION 0.5 MG/.1ML
1 INJECTION, SOLUTION SUBCUTANEOUS ONCE
Refills: 0 | Status: DISCONTINUED | OUTPATIENT
Start: 2024-03-04 | End: 2024-03-09

## 2024-03-04 RX ORDER — KETOROLAC TROMETHAMINE 30 MG/ML
15 SYRINGE (ML) INJECTION ONCE
Refills: 0 | Status: DISCONTINUED | OUTPATIENT
Start: 2024-03-04 | End: 2024-03-04

## 2024-03-04 RX ORDER — IOHEXOL 300 MG/ML
30 INJECTION, SOLUTION INTRAVENOUS ONCE
Refills: 0 | Status: COMPLETED | OUTPATIENT
Start: 2024-03-04 | End: 2024-03-04

## 2024-03-04 RX ORDER — DEXTROSE 50 % IN WATER 50 %
15 SYRINGE (ML) INTRAVENOUS ONCE
Refills: 0 | Status: DISCONTINUED | OUTPATIENT
Start: 2024-03-04 | End: 2024-03-09

## 2024-03-04 RX ORDER — SODIUM CHLORIDE 9 MG/ML
1000 INJECTION, SOLUTION INTRAVENOUS
Refills: 0 | Status: DISCONTINUED | OUTPATIENT
Start: 2024-03-04 | End: 2024-03-09

## 2024-03-04 RX ORDER — ENOXAPARIN SODIUM 100 MG/ML
40 INJECTION SUBCUTANEOUS EVERY 24 HOURS
Refills: 0 | Status: DISCONTINUED | OUTPATIENT
Start: 2024-03-04 | End: 2024-03-05

## 2024-03-04 RX ORDER — INSULIN LISPRO 100 [IU]/ML
100 INJECTION, SUSPENSION SUBCUTANEOUS
Refills: 0 | DISCHARGE

## 2024-03-04 RX ORDER — METOCLOPRAMIDE HCL 10 MG
10 TABLET ORAL ONCE
Refills: 0 | Status: COMPLETED | OUTPATIENT
Start: 2024-03-04 | End: 2024-03-04

## 2024-03-04 RX ORDER — INSULIN LISPRO 100/ML
VIAL (ML) SUBCUTANEOUS AT BEDTIME
Refills: 0 | Status: DISCONTINUED | OUTPATIENT
Start: 2024-03-04 | End: 2024-03-09

## 2024-03-04 RX ORDER — MAGNESIUM SULFATE 500 MG/ML
1 VIAL (ML) INJECTION ONCE
Refills: 0 | Status: COMPLETED | OUTPATIENT
Start: 2024-03-04 | End: 2024-03-04

## 2024-03-04 RX ORDER — MORPHINE SULFATE 50 MG/1
4 CAPSULE, EXTENDED RELEASE ORAL ONCE
Refills: 0 | Status: DISCONTINUED | OUTPATIENT
Start: 2024-03-04 | End: 2024-03-04

## 2024-03-04 RX ORDER — INSULIN LISPRO 100/ML
VIAL (ML) SUBCUTANEOUS
Refills: 0 | Status: DISCONTINUED | OUTPATIENT
Start: 2024-03-04 | End: 2024-03-09

## 2024-03-04 RX ORDER — DEXTROSE 50 % IN WATER 50 %
12.5 SYRINGE (ML) INTRAVENOUS ONCE
Refills: 0 | Status: DISCONTINUED | OUTPATIENT
Start: 2024-03-04 | End: 2024-03-09

## 2024-03-04 RX ADMIN — SODIUM CHLORIDE 1000 MILLILITER(S): 9 INJECTION INTRAMUSCULAR; INTRAVENOUS; SUBCUTANEOUS at 18:59

## 2024-03-04 RX ADMIN — Medication 150 GRAM(S): at 19:33

## 2024-03-04 RX ADMIN — SODIUM CHLORIDE 1000 MILLILITER(S): 9 INJECTION INTRAMUSCULAR; INTRAVENOUS; SUBCUTANEOUS at 12:04

## 2024-03-04 RX ADMIN — MORPHINE SULFATE 4 MILLIGRAM(S): 50 CAPSULE, EXTENDED RELEASE ORAL at 13:04

## 2024-03-04 RX ADMIN — Medication 15 MILLIGRAM(S): at 20:32

## 2024-03-04 RX ADMIN — IOHEXOL 30 MILLILITER(S): 300 INJECTION, SOLUTION INTRAVENOUS at 13:04

## 2024-03-04 RX ADMIN — MORPHINE SULFATE 4 MILLIGRAM(S): 50 CAPSULE, EXTENDED RELEASE ORAL at 13:35

## 2024-03-04 RX ADMIN — Medication 10 MILLIGRAM(S): at 19:33

## 2024-03-04 RX ADMIN — SODIUM CHLORIDE 1000 MILLILITER(S): 9 INJECTION INTRAMUSCULAR; INTRAVENOUS; SUBCUTANEOUS at 21:23

## 2024-03-04 NOTE — ED PROVIDER NOTE - CARE PROVIDER_API CALL
Stalin Rivera  Vascular Surgery  [address] [address]  Phone: [phone]  Fax: [phone]  Follow Up Time:     John Lawson  Internal Medicine  [address]  [place] [address]  Phone: [phone]  Fax: [phone]  Follow Up Time:

## 2024-03-04 NOTE — ED ADULT TRIAGE NOTE - OTHER COMPLAINTS
pt arrived to ER w/ wife c/o dizziness and whole body weakness since 7:30am today- pt states being seen in [facility] today and being d/c. Pt states coming back due to symptoms not going away. Pt reports no other complaints. Pt is noted to be in no acute distress, able to maintain airway, having nonlabored breathing, no retractions noted, non diaphoretic and able to talk in clear full sentences. Pt has hx of open heart surgery, TIA, HTN, HLD and pleural effusion.

## 2024-03-04 NOTE — H&P ADULT - ATTENDING COMMENTS
63 yo M with PMHx TIA, HTN, HLD, CAD (s/p CABG), pre-DM, esophageal varices (s/p EGD x2 12/2023, 1/2024), gout, etOH cirrhosis, portal HTN, L-sided pleural effusion px from home with 1d hx of dizziness with ambulation found to have +orthostatic hypotension admitted for further management.      #Orthostatic hypotension – 1d hx of dizziness. DC’d from ED earlier but returned due to persistent sx and inability ambulating.  Resting VSS. Orthostatics positive in ED. BMP largely within normal limits. Troponin T negative x1. CBC with stable Hb since discharge (baseline 8-10). No signs/sx of acute or active bleed despite known hx of esophageal varices, last EGD in 1/2024. s/p 2L NS IVF. Repeat orthostatics. CBC Q12 to ensure no bleed as etiology of +orthostatics. PPI IV Q12.     #Pancytopenia - WBC 3.19. Hb 8.1 with MCV 77.4. Plt 99. Hb stable from prior admissions given hx of UGIB with known esophageal varices. Pancytopenia likely i/s/o known etOH cirrhosis. Low suspicion for sx anemia contributing to current presentation. Maintain active T/S. No chemical DVT ppx or AC.     Agree with remainder of resident plan as above.

## 2024-03-04 NOTE — H&P ADULT - PROBLEM SELECTOR PLAN 7
Home meds: metformin 500mg BID. A1C was 7.0 in 09/2023.  - f/u A1C  - mISS  - monitor FS  - consistent carb/DASH diet

## 2024-03-04 NOTE — H&P ADULT - PROBLEM SELECTOR PLAN 8
F: None   E: Replete as necessary K>4 Mg>2  N: consistent carb/DASH diet  DVT Prophylaxis: Lovenox 40mg SQ qd  GI prophylaxis: None   CODE STATUS: FULL CODE  Dispo: [facility]

## 2024-03-04 NOTE — ED PROVIDER NOTE - CARE PROVIDERS DIRECT ADDRESSES
[email or web address]

## 2024-03-04 NOTE — ED PROVIDER NOTE - PHYSICAL EXAMINATION
CONSTITUTIONAL: Well-appearing;  in no apparent distress.   HEAD: Normocephalic; atraumatic.   EYES: PERRL; EOM intact; conjunctiva and sclera clear  ENT: normal nose; no rhinorrhea; normal pharynx with no erythema or lesions.   NECK: Supple; non-tender; no LAD  CARDIOVASCULAR: Normal S1, S2; No audible murmurs. Regular rate and rhythm.   RESPIRATORY: Breathing easily; breath sounds clear and equal bilaterally; no wheezes, rhonchi, or rales.  GI: Soft; non-distended; +LLQ ttp   MSK: FROM at all extremities, normal tone   EXT: No cyanosis or edema; N/V intact  SKIN: Normal for age and race; warm; dry; good turgor; no apparent lesions or rash.   NEURO: A & O x 3; face symmetric; grossly unremarkable.   PSYCHOLOGICAL: The patient’s mood and manner are appropriate.

## 2024-03-04 NOTE — END OF VISIT
[FreeTextEntry3] : Attending note  I have seen and examined patient at bedside. I agree with hx, ROS, physical examination, imp/suggestions as written by Ms. Kayce Hong NP. Please see my note.  Patient with cirrhosis and hx of GI bleeding from esophageal varices and also possibly, from antral ulcer seen on two EGDs.  Presented to office for follow up, had an episode of dizziness, sweating and abdominal pain. Abdomen was not tender. patient is sent to ER for further evaluation. Rule out GI bleeding (he appeared pale) rule out cardiac events

## 2024-03-04 NOTE — ED ADULT NURSE NOTE - OBJECTIVE STATEMENT
64yM pmhx cardiac bypass presents to the ER complaining of a near syncopal episode today. PT was at MD office when pt had a near syncopal episode at the doctors office, became diaphoretic and pale. Pt states symptoms resolved prior to arrival. Pt endorses feeling generally weak this morning. Additionally reports abdominal pain that started last night, took a stool softener and had a bowel movement. Denies SOB/Cp, N/V/D, F/C, lightheadness/dizziness.

## 2024-03-04 NOTE — ED PROVIDER NOTE - PATIENT PORTAL LINK FT
You can access the FollowMyHealth Patient Portal offered by [facility] by registering at the following website: [email or web address]. By joining [email or web address]’s FollowMyHealth portal, you will also be able to view your health information using other applications (apps) compatible with our system.

## 2024-03-04 NOTE — ED PROVIDER NOTE - NSFOLLOWUPINSTRUCTIONS_ED_ALL_ED_FT
Follow up with your gastroenterologist   Follow up with a vascular specialist  Return to ED for worsening pain, fever, chills, chest pain, shortness of breath or any other concerning symptoms       Abdominal Pain    Many things can cause abdominal pain. Many times, abdominal pain is not caused by a disease and will improve without treatment. Your health care provider will do a physical exam to determine if there is a dangerous cause of your pain; blood tests and imaging may help determine the cause of your pain. However, in many cases, no cause may be found and you may need further testing as an outpatient. Monitor your abdominal pain for any changes.     SEEK IMMEDIATE MEDICAL CARE IF YOU HAVE ANY OF THE FOLLOWING SYMPTOMS: worsening abdominal pain, uncontrollable vomiting, profuse diarrhea, inability to have bowel movements or pass gas, black or bloody stools, fever accompanying chest pain or back pain, or fainting. These symptoms may represent a serious problem that is an emergency. Do not wait to see if the symptoms will go away. Get medical help right away. Call 911 and do not drive yourself to the hospital.

## 2024-03-04 NOTE — ED PROVIDER NOTE - CLINICAL SUMMARY MEDICAL DECISION MAKING FREE TEXT BOX
here w/ dizziness, appears orthostatic  headache only started after morphine, likely side effect, do NOT think this is vertigo/stroke equivalent  recent echo in novemeber without acute findings  pt in ED on telemetry with no arrythmias while pt is symptomatic  will admit for further workup, observation and treatment here w/ dizziness, appears orthostatic  labs reviewed and stable from prior  headache only started after morphine, likely side effect, do NOT think this is vertigo/stroke equivalent  recent echo in [facility] without acute findings  pt in ED on telemetry with no arrythmias while pt is symptomatic  will admit for further workup, observation and treatment

## 2024-03-04 NOTE — ED PROVIDER NOTE - PROGRESS NOTE DETAILS
h/h at baseline, bili at baseline. CT no acute pathology,  Nonspecific mild stranding noted in the fat surrounding the proximal inferior mesenteric artery; cannot exclude focal mild vasculitis. No occlusion. Possible moderate ostial stenosis.   Pt feeling better, tolerating po. will f/u with GI and vascular

## 2024-03-04 NOTE — ED ADULT NURSE REASSESSMENT NOTE - NS ED NURSE REASSESS COMMENT FT1
pt with positive orthostatics, Dr. James made aware, pt admitted with clean bed on 7URIS, verbal report given, pt being transported to unit. Pt is alert and oriented x4, breathing at ease on room air, complaining of feeling dizzy when standing up otherwise no other complaints, denies chest pain and or shortness of breath.

## 2024-03-04 NOTE — H&P ADULT - NSHPLABSRESULTS_GEN_ALL_CORE
.  LABS:                         8.1    3.19  )-----------( 99       ( 04 Mar 2024 18:25 )             26.7     03-04    137  |  100  |  10  ----------------------------<  164<H>  3.5   |  25  |  0.97    Ca    9.0      04 Mar 2024 18:25    TPro  7.1  /  Alb  3.6  /  TBili  3.1<H>  /  DBili  x   /  AST  52<H>  /  ALT  22  /  AlkPhos  140<H>  03-04    PT/INR - ( 04 Mar 2024 11:44 )   PT: 16.1 sec;   INR: 1.43          PTT - ( 04 Mar 2024 11:44 )  PTT:31.6 sec  Urinalysis Basic - ( 04 Mar 2024 18:25 )    Color: x / Appearance: x / SG: x / pH: x  Gluc: 164 mg/dL / Ketone: x  / Bili: x / Urobili: x   Blood: x / Protein: x / Nitrite: x   Leuk Esterase: x / RBC: x / WBC x   Sq Epi: x / Non Sq Epi: x / Bacteria: x                RADIOLOGY, EKG & ADDITIONAL TESTS: Reviewed.

## 2024-03-04 NOTE — ED ADULT NURSE NOTE - OBJECTIVE STATEMENT
64yM presents to the ER complaining of generalized weakness since this morning. Pt describes his symptoms as dizziness but when questioned further patient describe s/s as "my entire body" is weak. PT was seen at [facility] ER this morning and d/c home. PT states that his symptoms have not improved and that he now has a headache. Pt denies any other new symptoms from early. Denies SOB/CP, N/V/D, F/C.

## 2024-03-04 NOTE — ED PROVIDER NOTE - OBJECTIVE STATEMENT
64M PMhx DM and HTN, PSHx open heart surgery presents for weakness, sweating, and dizziness at gastroenterologist office this morning while taking blood pressure. Pt states that at the ED, his initial sxs resolved but he now has diffuse abdominal pain. Last time he drank alcohol was this past Friday; drinks a few shots 1-2x weekly. Last BM this morning and normal, last meal tea and soft cheese.   Denies constipation, blood in stools, blood in urine, flank pain, LOC, head trauma, trauma to abdomen, f/c/ bodyaches. No hx of kidney stones/ gallstones, recent abdominal surgeries. 64M PMhx DM and HTN, PSHx open heart surgery presents for weakness, sweating, dizziness, and abdominal pain at gastroenterologist office this morning after he urinated. Weakness, sweating, and dizziness lasted approx 10 minutes in GI office. Was told to go to the ED by the gastroenterologist for a possible "stomach bleed". Pt states that at the ED, his weakness, sweating and dizziness resolved but still has abd pain. Last time he drank alcohol was this past Friday; drinks a few shots 1-2x weekly. 2 BM this morning, both normal, last meal tea and soft cheese this morning.  Denies constipation, blood in stools, black tarry stools, blood in urine, flank pain, LOC, head trauma, trauma to abdomen, f/c/ bodyaches. No hx of kidney stones/ gallstones, recent abdominal surgeries. 64 yo  M former smoker with hx HTN, HLD, severe 3 vessel CAD s/p CAB x4 9/2024, pre-diabetes, gout, TIA (14 yrs ago), gastric ulcer, rectal bleed, LIZ and alcoholic liver disease, cirrhosis, portal hypertension c/b esophageal varices (and UGIB, last GIB in nov 2023, EGD 11/13 duodenitis, nonbleeding varices, healing PUD), L pleural effusion s/o thorocentesis in oct, c/o weakness, dizziness and sweating that happened this morning at his gastroenterologist this morning. Pt states he urinated then went to the exam room and felt lightheaded, sweaty, weakness and lower abd pain. They took his VS which were normal. Pt states symptoms lasted about 10 min and then resolved. States he still has some lower abd pain but not as severe as before. Denies fever, chills, cp, sob, n/v, ha, focal weakness, dysuria, hematuria, black/bloody stools. Last BM was this morning  and was normal.  Last drank alcohol last fri. Pt recently had an abd MRI which showed cirrohotic changes.

## 2024-03-04 NOTE — PATIENT PROFILE ADULT - FALL HARM RISK - HARM RISK INTERVENTIONS
Assistance with ambulation/Assistance OOB with selected safe patient handling equipment/Communicate Risk of Fall with Harm to all staff/Monitor gait and stability/Reinforce activity limits and safety measures with patient and family/Sit up slowly, dangle for a short time, stand at bedside before walking/Tailored Fall Risk Interventions/Visual Cue: Yellow wristband and red socks/Bed in lowest position, wheels locked, appropriate side rails in place/Call bell, personal items and telephone in reach/Instruct patient to call for assistance before getting out of bed or chair/Non-slip footwear when patient is out of bed/[place] to call system/Physically safe environment - no spills, clutter or unnecessary equipment/Purposeful Proactive Rounding/Room/bathroom lighting operational, light cord in reach

## 2024-03-04 NOTE — H&P ADULT - PROBLEM SELECTOR PROBLEM 7

## 2024-03-04 NOTE — ED ADULT NURSE NOTE - SUICIDE SCREENING QUESTION 2
Influenza (flu) is a virus. Antibiotics do not work for viruses.   Drink lots of fluids  Activity as tolerated. You may need more rest than usual. Fatigue is usually the last symptom to improve.   Acetaminophen (tylenol) or ibuprofen (motrin/advil) can be taken for body aches/pain/fever.    Sometimes the flu can cause a secondary bacterial illness. Monitor for return of fever after resolves, pain/pressure in the face that returns after resolving, ear pain, or new/worsening cough or chest pain.    
No

## 2024-03-04 NOTE — H&P ADULT - ASSESSMENT
64M with PMH of former smoker with hx HTN, HLD, severe 3 vessel CAD s/p CABG x4 9/2023, DM, gout, TIA (14 yrs ago), gastric ulcer, rectal bleed, LIZ, alcoholic liver cirrhosis, portal hypertension c/b esophageal varices (and UGIB, last GIB in 11/2023, EGD 11/13 duodenitis, nonbleeding varices, healing PUD), L pleural effusion s/p thoracentesis in 10/2023, presenting w/ 2 days of dizziness, headaches, generalized malaise, decreased PO intake, nausea, and lower abd pain, BIBEMS from Dr. Lawson's office for episode of diaphoresis/pallor, found to have orthostatic hypotension, admitted for further evaluation and management.      ETOH cirrhosis and portal htn (EV and UGIB, ascites, and recurrent L pleural effusions, pericardial effusions and 2 LR 3 lesions.). New LR 3 lesion per recent MRI, continued surveillance.  Recent hosp for hematemesis   64M with PMH of former smoker with hx HTN, HLD, severe 3 vessel CAD s/p CABG x4 9/2023, DM, gout, TIA (14 yrs ago), gastric ulcer, rectal bleed, LIZ, alcoholic liver cirrhosis, portal hypertension c/b esophageal varices (and UGIB, last GIB in 11/2023, EGD 11/13 duodenitis, nonbleeding varices, healing PUD), L pleural effusion s/p thoracentesis in 10/2023, presenting w/ 2 days of dizziness, headaches, generalized malaise, decreased PO intake, nausea, and lower abd pain, BIBEMS from Dr. Lawson's office for episode of diaphoresis/pallor, found to have orthostatic hypotension, admitted for further evaluation and management.

## 2024-03-04 NOTE — H&P ADULT - PROBLEM SELECTOR PLAN 4
S/p CABG in 09/2023 by Dr. Hinton. Was on plavix after surgery, was held after the GIB in 11/2023. Per wife pt has hx of allergy to ASA many years ago had itching and rash, no anaphylaxis, she is unsure if it was a true allergy as it was so long ago. Pt is not on a statin at this time.  - f/u lipid panel  - collateral from cardiologist in AM regarding ASA, plavix, statin  - consistent carb/DASH diet Home meds: sucralfate 1g QID and protonix 40mg BID. Of note pt was discharged 11/2023 w/ prescription for protonix 40mg PO BID w/ plan to switch to qd after 2 weeks, however per wife is still taking it BID. No signs or symptoms of GIB at this time.  - c/w sucralfate 1g PO q6h  - protonix 40mg IV BID  - monitor for signs/symptoms of GIB

## 2024-03-04 NOTE — H&P ADULT - HISTORY OF PRESENT ILLNESS
64M with PMH of former smoker with hx HTN, HLD, severe 3 vessel CAD s/p CAB x4 9/2024, pre-diabetes, gout, TIA (14 yrs ago), gastric ulcer, rectal bleed, LIZ and alcoholic liver disease, cirrhosis, portal hypertension c/b esophageal varices (and UGIB, last GIB in nov 2023, EGD 11/13 duodenitis, nonbleeding varices, healing PUD), L pleural effusion s/p thoracentesis in Oct,    ED course:  Vitals: afebrile, HR 60s-90s, BP 130s-160s/70s-90s, RR 18-20, SpO2 98-99% on RA.  Labs: WBC 2.77 --> 3.19, H&H 8.4/27.5 (baseline Hgb 8-9.5), MCV 77.9, plt 93 (baseline 80s-100s), BMP wnl, bilirubin 3.1, alk phos 140, AST 52, ALT 22, lipase wnl,  EKG:   Imaging:   Interventions:   Consults:  64M with PMH of former smoker with hx HTN, HLD, severe 3 vessel CAD s/p CAB x4 9/2024, pre-diabetes, gout, TIA (14 yrs ago), gastric ulcer, rectal bleed, LIZ and alcoholic liver disease, cirrhosis, portal hypertension c/b esophageal varices (and UGIB, last GIB in nov 2023, EGD 11/13 duodenitis, nonbleeding varices, healing PUD), L pleural effusion s/p thoracentesis in Oct, presenting for     ED course:  Vitals: afebrile, HR 60s-90s, BP 130s-160s/70s-90s, RR 18-20, SpO2 98-99% on RA.  Labs: WBC 2.77 --> 3.19, H&H 8.4/27.5 (baseline Hgb 8-9.5), MCV 77.9, plt 93 (baseline 80s-100s), BMP wnl, bilirubin 3.1, alk phos 140, AST 52, ALT 22, lipase wnl, UA negative.  EKG:   Imaging:   *CTAP w/ PO & IV contrast: redundant sigmoid colon; prominent rectal wall w/ circumferential thickening, possibly 2/2 incomplete distention vs pathologic etiology; resolution of L pleural effusion (since 2/14 and 10/31); near complete resolution of ascites; mild nodularity of hepatic contour compatible w/ cirrhosis; splenomegaly; nonspecific mild stranding in fat surrounding proximal inferior mesenteric artery (ddx focal mild vasculitis), no occlusion, possible moderate ostial stenosis.  Interventions: toradol 15mg IV x1, MG 1g IV x1, reglan 10mg PO x1, NS 2L 64M with PMH of former smoker with hx HTN, HLD, severe 3 vessel CAD s/p CAB x4 9/2024, pre-diabetes, gout, TIA (14 yrs ago), gastric ulcer, rectal bleed, LIZ and alcoholic liver disease, cirrhosis, portal hypertension c/b esophageal varices (and UGIB, last GIB in nov 2023, EGD 11/13 duodenitis, nonbleeding varices, healing PUD), L pleural effusion s/p thoracentesis in Oct, presenting for     ED course:  Vitals: afebrile, HR 60s-90s, BP 130s-160s/70s-90s, RR 18-20, SpO2 98-99% on RA.  Labs: WBC 2.77 --> 3.19, H&H 8.4/27.5 (baseline Hgb 8-9.5), MCV 77.9, plt 93 (baseline 80s-100s), BMP wnl, bilirubin 3.1, alk phos 140, AST 52, ALT 22, lipase wnl, UA negative.  EKG: NSR w/ no acute ischemic changes  Imaging:   *CTAP w/ PO & IV contrast: redundant sigmoid colon; prominent rectal wall w/ circumferential thickening, possibly 2/2 incomplete distention vs pathologic etiology; resolution of L pleural effusion (since 2/14 and 10/31); near complete resolution of ascites; mild nodularity of hepatic contour compatible w/ cirrhosis; splenomegaly; nonspecific mild stranding in fat surrounding proximal inferior mesenteric artery (ddx focal mild vasculitis), no occlusion, possible moderate ostial stenosis.  Interventions: toradol 15mg IV x1, MG 1g IV x1, reglan 10mg PO x1, NS 2L 64M with PMH of former smoker with hx HTN, HLD, severe 3 vessel CAD s/p CAB x4 9/2024, pre-diabetes, gout, TIA (14 yrs ago), gastric ulcer, rectal bleed, LIZ and alcoholic liver disease, cirrhosis, portal hypertension c/b esophageal varices (and UGIB, last GIB in nov 2023, EGD 11/13 duodenitis, nonbleeding varices, healing PUD), L pleural effusion s/p thoracentesis in 10/2023, presenting for 2 days of dizziness. Was seen by his hepatologist Dr. Lawson this morning and had an episode of profuse diaphoresis, abd pain, and pallor, wanted to have a BM however was advised to lie down given c/f GIB, EMS was called and pt was brought to ED from Dr. Lawson's office. Upon evaluation pt reports 2 days of dizziness, headaches, generalized malaise, decreased PO intake, and diffuse lower abdominal pain. No other complaints at this time. Denies fevers, chills, CP, SOB, cough, vomiting, diarrhea, hematochezia, melena.    ED course:  Vitals: afebrile, HR 60s-90s, BP 130s-160s/70s-90s, RR 18-20, SpO2 98-99% on RA.  Labs: WBC 2.77 --> 3.19, H&H 8.4/27.5 (baseline Hgb 8-9.5), MCV 77.9, plt 93 (baseline 80s-100s), BMP wnl, bilirubin 3.1, alk phos 140, AST 52, ALT 22, lipase wnl, UA negative.  EKG: NSR w/ no acute ischemic changes  Imaging:   *CTAP w/ PO & IV contrast: redundant sigmoid colon; prominent rectal wall w/ circumferential thickening, possibly 2/2 incomplete distention vs pathologic etiology; resolution of L pleural effusion (since 2/14 and 10/31); near complete resolution of ascites; mild nodularity of hepatic contour compatible w/ cirrhosis; splenomegaly; nonspecific mild stranding in fat surrounding proximal inferior mesenteric artery (ddx focal mild vasculitis), no occlusion, possible moderate ostial stenosis.  Interventions: toradol 15mg IV x1, MG 1g IV x1, reglan 10mg PO x1, NS 2L

## 2024-03-04 NOTE — ED PROVIDER NOTE - CLINICAL SUMMARY MEDICAL DECISION MAKING FREE TEXT BOX
64 yo  M former smoker with hx HTN, HLD, severe 3 vessel CAD s/p CAB x4 9/2024, pre-diabetes, gout, TIA (14 yrs ago), gastric ulcer, rectal bleed, LIZ and alcoholic liver disease, cirrhosis, portal hypertension c/b esophageal varices (and UGIB, last GIB in nov 2023, EGD 11/13 duodenitis, nonbleeding varices, healing PUD), L pleural effusion s/o thorocentesis in oct, c/o weakness, dizziness and sweating that happened this morning at his gastroenterologist this morning. Pt states he urinated then went to the exam room and felt lightheaded, sweaty, weakness and lower abd pain. No n/v, f/c, black/bloody stools. Dizziness now improved,still with abd pain. VSS. Well appearing. Lung/heart exam normal. Neuro intact. abd soft with LLQ ttp. 62 yo  M former smoker with hx HTN, HLD, severe 3 vessel CAD s/p CAB x4 9/2024, pre-diabetes, gout, TIA (14 yrs ago), gastric ulcer, rectal bleed, LIZ and alcoholic liver disease, cirrhosis, portal hypertension c/b esophageal varices (and UGIB, last GIB in nov 2023, EGD 11/13 duodenitis, nonbleeding varices, healing PUD), L pleural effusion s/o thorocentesis in oct, c/o weakness, dizziness and sweating that happened this morning at his gastroenterologist this morning. Pt states he urinated then went to the exam room and felt lightheaded, sweaty, weakness and lower abd pain. No n/v, f/c, black/bloody stools. Dizziness now improved,still with abd pain. VSS. Well appearing. Lung/heart exam normal. Neuro intact. abd soft with LLQ ttp. ?vasovagal near syncope in the setting of urinating. EKG NSR. will check labs, CT r/o diverticulitis

## 2024-03-04 NOTE — H&P ADULT - PROBLEM SELECTOR PLAN 1
Presenting w/ generalized malaise, decreased PO intake, dizziness, headaches, abd pain, nausea x 2 days. 2/4 SIRS criteria on admission (WBC 2.77, HR 93). Cr and LFTs at baseline. CTAP w/ PO & IV contrast w/o obvious signs of infection - no diverticulitis; redundant sigmoid colon, LLQ grossly unremarkable; prominent rectal wall w/ circumferential thickening, possibly 2/2 incomplete distention vs pathologic etiology; resolution of L pleural effusion; near complete resolution of previously noted ascites; mild nodularity of hepatic contour, compatible w/ cirrhotic change; splenomegaly; nonspecific mild stranding in fat surrounding proximal inferior mesenteric artery (ddx focal mild vasculitis), no occlusion, possible moderate ostial stenosis. No other signs or symptoms of infection. BP 160s/70s-80s while supine --> 100s-110s/70s standing, x2. S/p 2L NS in ED.  - LR @ 150cc/hr x 6 hrs  - holding coreg, lasix, spironolactone as below  - repeat orthostatics in AM  - encourage PO intake

## 2024-03-04 NOTE — H&P ADULT - PROBLEM SELECTOR PLAN 2
Hx of alcoholic liver cirrhosis, portal hypertension c/b esophageal varices (and UGIB, last GIB in 11/2023, EGD 11/13 duodenitis, nonbleeding varices, healing PUD), L pleural effusion s/p thoracentesis in 10/2023. Home meds: rifaximin 550mg BID, lasix 40mg qd, spironolactone 25mg (everyday except MWF), coreg 3.125mg BID  - c/w rifaximin 550mg PO BID  - holding coreg, lasix, spironolactone i/s/o orthostatic hypotension -- restart as indicated Presenting w/ generalized malaise, decreased PO intake, dizziness, headaches, abd pain, nausea x 2 days. 2/4 SIRS criteria on admission (WBC 2.77, HR 93). Cr and LFTs at baseline. CTAP w/ PO & IV contrast w/o obvious signs of infection - no diverticulitis; redundant sigmoid colon, LLQ grossly unremarkable; prominent rectal wall w/ circumferential thickening, possibly 2/2 incomplete distention vs pathologic etiology; resolution of L pleural effusion; near complete resolution of previously noted ascites; mild nodularity of hepatic contour, compatible w/ cirrhotic change; splenomegaly; nonspecific mild stranding in fat surrounding proximal inferior mesenteric artery (ddx focal mild vasculitis), no occlusion, possible moderate ostial stenosis. No other signs or symptoms of infection.  - monitor off abx

## 2024-03-04 NOTE — ASSESSMENT
[FreeTextEntry1] : 64 yo M with ETOH cirrhosis and portal htn (EV and UGIB, ascites, and recurrent L pleural effusions, pericardial effusions and 2 LR 3 lesions.).  New LR 3 lesion per recent MRI, continued surveillance. Recent hosp for hematemesis  # LIZ PLTS 102   INR 1.34   Hg Hg 8.8 (9.3) EGD 6/2023 - 1 column esoph varix, banded, on coreg Hx lack and blue under L eyelid 10/26, resolved? Plavix restarted Jan? denies active bleeding  # HCC screening/lesion surveillance: MRI 2/14/24 - reviewed with pt - previously hypervascular lesions (2 LR 3  -11mm and 12mm) seen on CT 10/31/2023 are Not visualized as discrete lesions. They may be focal vascular malformations/flash hemangiomas. They do not appear to be foci of neoplasm. - New 3. 5 mm arterially enhancing focus in the superior aspect of the right hepatic lobe LR-3 Lesion surveillance - MRI 3 mos DUE MAY  # Portal HTN -recurrent L pleural effusion s/p L sided thoracentesis (2L) 10/23 -Ascites/Pericardial effusion Last  LVP 10.23 (1.5L)  s/p Thoracentesis 10/23 (2L) continue Lasix 40mgdaily/ and Aldactone 25 Tu, Th, Sat and Sun (hx gynecomastia) - EV - on Coreg - HE, 1 BM daily, cont Rifax, no rectal blood this AM with BM  # ETOH denies drinking, encouraged continue not to drink PEth Neg Oct 16  # CAD S/p CAPG (OP-CAB x 4) 9/14/23 ac therapy cont f/u with cardiologist Dr. Box and surgeon, Dr. Hinton. plavix has been held since August?  Pt with profuse sweating and report of abdl pain, pale and diaphoretic. He endorses abdl pain and wanting to have a BM (advised continue to lay down as c/o GIB if pt goes to [facility])..Denies CP, NV. Has taken his AM routine med - diuretics, coreg, lactulose..   911 was called.  Continuous VSS stble bp 115/78  SA02 99 HR 72  RTC next week

## 2024-03-04 NOTE — ED ADULT TRIAGE NOTE - GLASGOW COMA SCALE: BEST MOTOR RESPONSE, MLM
DISPLAY PLAN FREE TEXT
(M6) obeys commands

## 2024-03-04 NOTE — ED PROVIDER NOTE - OBJECTIVE STATEMENT
64M with PMH of former smoker with hx HTN, HLD, severe 3 vessel CAD s/p CAB x4 9/2024, pre-diabetes, gout, TIA (14 yrs ago), gastric ulcer, rectal bleed, LIZ and alcoholic liver disease, cirrhosis, portal hypertension c/b esophageal varices (and UGIB, last GIB in nov 2023, EGD 11/13 duodenitis, nonbleeding varices, healing PUD), L pleural effusion s/p thoracentesis in 10/2023, presenting for 2 days of dizziness. Was seen by his hepatologist Dr. Lawson this morning and had an episode of profuse diaphoresis, abd pain, and pallor, wanted to have a BM however was advised to lie down given c/f GIB, EMS was called and pt was brought to ED from Dr. Lawson's office. Upon evaluation pt reports 2 days of dizziness, headaches, generalized malaise, decreased PO intake, and diffuse lower abdominal pain. No other complaints at this time. Denies fevers, chills, CP, SOB, cough, vomiting, diarrhea, hematochezia, melena.

## 2024-03-04 NOTE — ED ADULT NURSE REASSESSMENT NOTE - NS ED NURSE REASSESS COMMENT FT1
pt received alert and oriented x4, breathing at ease on room air, reports dizziness has improved but complaining of headache 8/10, denies nausea/vomiting, no distress noted, awaiting dispo, ongoing monitoring. yesterday

## 2024-03-04 NOTE — ASSESSMENT
[FreeTextEntry1] : 62 yo M with ETOH cirrhosis and portal htn (EV and UGIB, ascites, and recurrent L pleural effusions, pericardial effusions and 2 LR 3 lesions.).  New LR 3 lesion per recent MRI, continued surveillance. Recent hosp for hematemesis  # LIZ PLTS 102   INR 1.34   Hg Hg 8.8 (9.3) EGD 6/2023 - 1 column esoph varix, banded, on coreg Hx lack and blue under L eyelid 10/26, resolved? Plavix restarted Jan? denies active bleeding  # HCC screening/lesion surveillance: MRI 2/14/24 - reviewed with pt - previously hypervascular lesions (2 LR 3  -11mm and 12mm) seen on CT 10/31/2023 are Not visualized as discrete lesions. They may be focal vascular malformations/flash hemangiomas. They do not appear to be foci of neoplasm. - New 3. 5 mm arterially enhancing focus in the superior aspect of the right hepatic lobe LR-3 Lesion surveillance - MRI 3 mos DUE MAY  # Portal HTN -recurrent L pleural effusion s/p L sided thoracentesis (2L) 10/23 -Ascites/Pericardial effusion Last  LVP 10.23 (1.5L)  s/p Thoracentesis 10/23 (2L) continue Lasix 40mgdaily/ and Aldactone 25 Tu, Th, Sat and Sun (hx gynecomastia) - EV - on Coreg - HE, 1 BM daily, cont Rifax, no rectal blood this AM with BM  # ETOH denies drinking, encouraged continue not to drink PEth Neg Oct 16  # CAD S/p CAPG (OP-CAB x 4) 9/14/23 ac therapy cont f/u with cardiologist Dr. Box and surgeon, Dr. Hinton. plavix has been held since August?  Pt with profuse sweating and report of abdl pain, pale and diaphoretic. He endorses abdl pain and wanting to have a BM (advised continue to lay down as c/o GIB if pt goes to [facility])..Denies CP, NV. Has taken his AM routine med - diuretics, coreg, lactulose..   911 was called.  Continuous VSS stble bp 115/78  SA02 99 HR 72  RTC next week

## 2024-03-04 NOTE — H&P ADULT - NSHPSOCIALHISTORY_GEN_ALL_CORE
Former heavy alcohol user, quit drinking alcohol 6-8 months ago per pt (6 months ago per wife). Former smoker, previously smoked 2 ppd x 40 years (80 pack-years), quit 16 years ago. Denies recreational drug use. Lives with his wife and daughter. Wife helps him with all medications.

## 2024-03-04 NOTE — H&P ADULT - PROBLEM SELECTOR PLAN 6
F: None   E: Replete as necessary K>4 Mg>2  N: consistent carb/DASH diet  DVT Prophylaxis: Lovenox 40mg SQ qd  GI prophylaxis: None   CODE STATUS: FULL CODE  Dispo: [facility] S/p CABG in 09/2023 by Dr. Hinton. Was on plavix after surgery, was held after the GIB in 11/2023. Per wife pt has hx of allergy to ASA many years ago had itching and rash, no anaphylaxis, she is unsure if it was a true allergy as it was so long ago. Pt is not on a statin at this time.  - f/u lipid panel  - collateral from cardiologist in AM regarding ASA, plavix, statin  - consistent carb/DASH diet

## 2024-03-04 NOTE — H&P ADULT - PROBLEM SELECTOR PLAN 5
Home meds: metformin 500mg BID. A1C was 7.0 in 09/2023.  - f/u A1C  - mISS  - monitor FS  - consistent carb/DASH diet WBC 2.77 on admission (baseline ~4), H&H 8.4/27.5 (baseline Hgb 8-9.5), MCV 77.9, plt 93 (baseline 80s-100s). Likely i/s/o liver cirrhosis.  - f/u iron studies, retic count, LDH, haptoglobin, fractionated bilirubin  - trend CBC w/ diff

## 2024-03-04 NOTE — H&P ADULT - NSHPPHYSICALEXAM_GEN_ALL_CORE
.  VITAL SIGNS:  T(F): 98 (03-04-24 @ 21:25), Max: 98.7 (03-04-24 @ 10:11)  HR: 68 (03-04-24 @ 20:37) (68 - 93)  BP: 161/90 (03-04-24 @ 20:37) (127/80 - 166/78)  BP(mean): --  RR: 18 (03-04-24 @ 20:37) (18 - 20)  SpO2: 98% (03-04-24 @ 20:37) (98% - 100%)    PHYSICAL EXAM:    Constitutional: resting comfortably in bed; NAD  HEENT: NC/AT, PERRL, EOMI, anicteric sclera, no nasal discharge; uvula midline, no oropharyngeal erythema or exudates; MMM  Neck: supple; no JVD or thyromegaly  Respiratory: unlabored breathing, CTA B/L; no W/Rhonchi/Crackles, no retractions or use of accessory muscles   Cardiac: +S1/S2; RRR; no M/R/G; No ventricular heaves, PMI non-displaced  Gastrointestinal: soft, NT/ND; no rebound or guarding; +BSx4  Genitourinary: normal external genitalia  Back: spine midline, no bony tenderness or step-offs; no CVAT B/L  Extremities: WWP, no clubbing or cyanosis; no peripheral edema  Musculoskeletal: NROM x4; no joint swelling, tenderness or erythema  Vascular: 2+ radial, DP/PT pulses B/L  Dermatologic: skin warm, dry and intact; no rashes, wounds, or scars  Lymphatic: no submandibular or cervical LAD  Neurologic: AAOx3; CNII-XII grossly intact; no focal deficits  Psychiatric: affect and characteristics of appearance, verbalizations, behaviors are appropriate, denies SI/HI/AH/VH .  VITAL SIGNS:  T(F): 98 (03-04-24 @ 21:25), Max: 98.7 (03-04-24 @ 10:11)  HR: 68 (03-04-24 @ 20:37) (68 - 93)  BP: 161/90 (03-04-24 @ 20:37) (127/80 - 166/78)  BP(mean): --  RR: 18 (03-04-24 @ 20:37) (18 - 20)  SpO2: 98% (03-04-24 @ 20:37) (98% - 100%)    PHYSICAL EXAM:    Constitutional: resting comfortably in bed; NAD  HEENT: NC/AT, EOMI, anicteric sclera, no nasal discharge; MMM  Neck: supple  Respiratory: unlabored breathing, CTA B/L; no W/Rhonchi/Crackles  Cardiac: +S1/S2; RRR; no M/R/G  Gastrointestinal: soft, NT/ND; no rebound or guarding  Extremities: WWP, no clubbing or cyanosis; no peripheral edema  Musculoskeletal: NROM x4; no joint swelling, tenderness or erythema  Vascular: intact distal pulses  Neurologic: AAOx3; no focal deficits

## 2024-03-04 NOTE — ED ADULT NURSE NOTE - NSFALLUNIVINTERV_ED_ALL_ED
Bed/Stretcher in lowest position, wheels locked, appropriate side rails in place/Call bell, personal items and telephone in reach/Instruct patient to call for assistance before getting out of bed/chair/stretcher/Non-slip footwear applied when patient is off stretcher/[place] to call system/Physically safe environment - no spills, clutter or unnecessary equipment/Purposeful proactive rounding/Room/bathroom lighting operational, light cord in reach

## 2024-03-04 NOTE — H&P ADULT - PROBLEM SELECTOR PLAN 3
Home meds: sucralfate 1g QID and protonix 40mg BID. Of note pt was discharged 11/2023 w/ presription for protonix 40mg PO BID w/ plan to switch to qd after 2 weeks, however per wife is still taking it BID.  - c/w sucralfate 1g PO q6h  - c/w protonix 40mg PO qd Hx of alcoholic liver cirrhosis, portal hypertension c/b esophageal varices (and UGIB, last GIB in 11/2023, EGD 11/13 duodenitis, nonbleeding varices, healing PUD), L pleural effusion s/p thoracentesis in 10/2023. Home meds: rifaximin 550mg BID, lasix 40mg qd, spironolactone 25mg (everyday except MWF), coreg 3.125mg BID  - c/w rifaximin 550mg PO BID  - holding coreg, lasix, spironolactone i/s/o orthostatic hypotension -- restart as indicated  - f/u fractionated bilirubin  - trend CMP, coags

## 2024-03-05 ENCOUNTER — TRANSCRIPTION ENCOUNTER (OUTPATIENT)
Age: 64
End: 2024-03-05

## 2024-03-05 DIAGNOSIS — R65.10 SYSTEMIC INFLAMMATORY RESPONSE SYNDROME (SIRS) OF NON-INFECTIOUS ORIGIN WITHOUT ACUTE ORGAN DYSFUNCTION: ICD-10-CM

## 2024-03-05 DIAGNOSIS — E11.9 TYPE 2 DIABETES MELLITUS WITHOUT COMPLICATIONS: ICD-10-CM

## 2024-03-05 DIAGNOSIS — I10 ESSENTIAL (PRIMARY) HYPERTENSION: ICD-10-CM

## 2024-03-05 DIAGNOSIS — Z29.9 ENCOUNTER FOR PROPHYLACTIC MEASURES, UNSPECIFIED: ICD-10-CM

## 2024-03-05 DIAGNOSIS — Z87.19 PERSONAL HISTORY OF OTHER DISEASES OF THE DIGESTIVE SYSTEM: ICD-10-CM

## 2024-03-05 DIAGNOSIS — D61.818 OTHER PANCYTOPENIA: ICD-10-CM

## 2024-03-05 DIAGNOSIS — K76.9 LIVER DISEASE, UNSPECIFIED: ICD-10-CM

## 2024-03-05 DIAGNOSIS — K74.60 UNSPECIFIED CIRRHOSIS OF LIVER: ICD-10-CM

## 2024-03-05 DIAGNOSIS — D64.9 ANEMIA, UNSPECIFIED: ICD-10-CM

## 2024-03-05 DIAGNOSIS — I25.10 ATHEROSCLEROTIC HEART DISEASE OF NATIVE CORONARY ARTERY WITHOUT ANGINA PECTORIS: ICD-10-CM

## 2024-03-05 DIAGNOSIS — I95.1 ORTHOSTATIC HYPOTENSION: ICD-10-CM

## 2024-03-05 LAB
A1C WITH ESTIMATED AVERAGE GLUCOSE RESULT: 5.9 % — HIGH (ref 4–5.6)
ALBUMIN SERPL ELPH-MCNC: 3.3 G/DL — SIGNIFICANT CHANGE UP (ref 3.3–5)
ALP SERPL-CCNC: 125 U/L — HIGH (ref 40–120)
ALT FLD-CCNC: 18 U/L — SIGNIFICANT CHANGE UP (ref 10–45)
ANION GAP SERPL CALC-SCNC: 11 MMOL/L — SIGNIFICANT CHANGE UP (ref 5–17)
ANISOCYTOSIS BLD QL: SLIGHT — SIGNIFICANT CHANGE UP
AST SERPL-CCNC: 39 U/L — SIGNIFICANT CHANGE UP (ref 10–40)
BASOPHILS # BLD AUTO: 0.02 K/UL — SIGNIFICANT CHANGE UP (ref 0–0.2)
BASOPHILS NFR BLD AUTO: 0.9 % — SIGNIFICANT CHANGE UP (ref 0–2)
BILIRUB DIRECT SERPL-MCNC: 0.6 MG/DL — HIGH (ref 0–0.3)
BILIRUB DIRECT SERPL-MCNC: 0.8 MG/DL — HIGH (ref 0–0.3)
BILIRUB INDIRECT FLD-MCNC: 1.4 MG/DL — HIGH (ref 0.2–1)
BILIRUB INDIRECT FLD-MCNC: 2 MG/DL — HIGH (ref 0.2–1)
BILIRUB SERPL-MCNC: 2 MG/DL — HIGH (ref 0.2–1.2)
BILIRUB SERPL-MCNC: 2.1 MG/DL — HIGH (ref 0.2–1.2)
BILIRUB SERPL-MCNC: 2.8 MG/DL — HIGH (ref 0.2–1.2)
BLD GP AB SCN SERPL QL: NEGATIVE — SIGNIFICANT CHANGE UP
BUN SERPL-MCNC: 9 MG/DL — SIGNIFICANT CHANGE UP (ref 7–23)
CALCIUM SERPL-MCNC: 8.5 MG/DL — SIGNIFICANT CHANGE UP (ref 8.4–10.5)
CHLORIDE SERPL-SCNC: 106 MMOL/L — SIGNIFICANT CHANGE UP (ref 96–108)
CHOLEST SERPL-MCNC: 194 MG/DL — SIGNIFICANT CHANGE UP
CO2 SERPL-SCNC: 25 MMOL/L — SIGNIFICANT CHANGE UP (ref 22–31)
CREAT SERPL-MCNC: 0.9 MG/DL — SIGNIFICANT CHANGE UP (ref 0.5–1.3)
EGFR: 95 ML/MIN/1.73M2 — SIGNIFICANT CHANGE UP
EOSINOPHIL # BLD AUTO: 0.12 K/UL — SIGNIFICANT CHANGE UP (ref 0–0.5)
EOSINOPHIL NFR BLD AUTO: 6 % — SIGNIFICANT CHANGE UP (ref 0–6)
ESTIMATED AVERAGE GLUCOSE: 123 MG/DL — HIGH (ref 68–114)
FERRITIN SERPL-MCNC: 22 NG/ML — LOW (ref 30–400)
GIANT PLATELETS BLD QL SMEAR: PRESENT — SIGNIFICANT CHANGE UP
GLUCOSE BLDC GLUCOMTR-MCNC: 138 MG/DL — HIGH (ref 70–99)
GLUCOSE BLDC GLUCOMTR-MCNC: 142 MG/DL — HIGH (ref 70–99)
GLUCOSE BLDC GLUCOMTR-MCNC: 145 MG/DL — HIGH (ref 70–99)
GLUCOSE BLDC GLUCOMTR-MCNC: 164 MG/DL — HIGH (ref 70–99)
GLUCOSE SERPL-MCNC: 119 MG/DL — HIGH (ref 70–99)
HAPTOGLOB SERPL-MCNC: 31 MG/DL — LOW (ref 34–200)
HAPTOGLOB SERPL-MCNC: 32 MG/DL — LOW (ref 34–200)
HCT VFR BLD CALC: 24.9 % — LOW (ref 39–50)
HCT VFR BLD CALC: 26.8 % — LOW (ref 39–50)
HDLC SERPL-MCNC: 52 MG/DL — SIGNIFICANT CHANGE UP
HGB BLD-MCNC: 7.5 G/DL — LOW (ref 13–17)
HGB BLD-MCNC: 8.2 G/DL — LOW (ref 13–17)
HYPOCHROMIA BLD QL: SIGNIFICANT CHANGE UP
IRON SATN MFR SERPL: 20 UG/DL — LOW (ref 45–165)
IRON SATN MFR SERPL: 6 % — LOW (ref 16–55)
LDH SERPL L TO P-CCNC: 188 U/L — SIGNIFICANT CHANGE UP (ref 50–242)
LDH SERPL L TO P-CCNC: 228 U/L — SIGNIFICANT CHANGE UP (ref 50–242)
LIPID PNL WITH DIRECT LDL SERPL: 127 MG/DL — HIGH
LYMPHOCYTES # BLD AUTO: 0.5 K/UL — LOW (ref 1–3.3)
LYMPHOCYTES # BLD AUTO: 25 % — SIGNIFICANT CHANGE UP (ref 13–44)
MAGNESIUM SERPL-MCNC: 1.6 MG/DL — SIGNIFICANT CHANGE UP (ref 1.6–2.6)
MANUAL SMEAR VERIFICATION: SIGNIFICANT CHANGE UP
MCHC RBC-ENTMCNC: 23.6 PG — LOW (ref 27–34)
MCHC RBC-ENTMCNC: 23.8 PG — LOW (ref 27–34)
MCHC RBC-ENTMCNC: 30.1 GM/DL — LOW (ref 32–36)
MCHC RBC-ENTMCNC: 30.6 GM/DL — LOW (ref 32–36)
MCV RBC AUTO: 77.9 FL — LOW (ref 80–100)
MCV RBC AUTO: 78.3 FL — LOW (ref 80–100)
MONOCYTES # BLD AUTO: 0.19 K/UL — SIGNIFICANT CHANGE UP (ref 0–0.9)
MONOCYTES NFR BLD AUTO: 9.5 % — SIGNIFICANT CHANGE UP (ref 2–14)
NEUTROPHILS # BLD AUTO: 1.15 K/UL — LOW (ref 1.8–7.4)
NEUTROPHILS NFR BLD AUTO: 57.7 % — SIGNIFICANT CHANGE UP (ref 43–77)
NON HDL CHOLESTEROL: 142 MG/DL — HIGH
NRBC # BLD: 0 /100 WBCS — SIGNIFICANT CHANGE UP (ref 0–0)
NRBC # BLD: 1 /100 WBCS — HIGH (ref 0–0)
NRBC # BLD: SIGNIFICANT CHANGE UP /100 WBCS (ref 0–0)
OVALOCYTES BLD QL SMEAR: SLIGHT — SIGNIFICANT CHANGE UP
PHOSPHATE SERPL-MCNC: 4.1 MG/DL — SIGNIFICANT CHANGE UP (ref 2.5–4.5)
PLAT MORPH BLD: NORMAL — SIGNIFICANT CHANGE UP
PLATELET # BLD AUTO: 79 K/UL — LOW (ref 150–400)
PLATELET # BLD AUTO: 87 K/UL — LOW (ref 150–400)
POIKILOCYTOSIS BLD QL AUTO: SLIGHT — SIGNIFICANT CHANGE UP
POLYCHROMASIA BLD QL SMEAR: SLIGHT — SIGNIFICANT CHANGE UP
POTASSIUM SERPL-MCNC: 3.6 MMOL/L — SIGNIFICANT CHANGE UP (ref 3.5–5.3)
POTASSIUM SERPL-SCNC: 3.6 MMOL/L — SIGNIFICANT CHANGE UP (ref 3.5–5.3)
PROT SERPL-MCNC: 6.2 G/DL — SIGNIFICANT CHANGE UP (ref 6–8.3)
RBC # BLD: 3.18 M/UL — LOW (ref 4.2–5.8)
RBC # BLD: 3.25 M/UL — LOW (ref 4.2–5.8)
RBC # BLD: 3.44 M/UL — LOW (ref 4.2–5.8)
RBC # FLD: 15.9 % — HIGH (ref 10.3–14.5)
RBC # FLD: 16.4 % — HIGH (ref 10.3–14.5)
RBC BLD AUTO: ABNORMAL
RETICS #: 42.8 K/UL — SIGNIFICANT CHANGE UP (ref 25–125)
RETICS #: 52.7 K/UL — SIGNIFICANT CHANGE UP (ref 25–125)
RETICS/RBC NFR: 1.4 % — SIGNIFICANT CHANGE UP (ref 0.5–2.5)
RETICS/RBC NFR: 1.6 % — SIGNIFICANT CHANGE UP (ref 0.5–2.5)
RH IG SCN BLD-IMP: POSITIVE — SIGNIFICANT CHANGE UP
SMUDGE CELLS # BLD: PRESENT — SIGNIFICANT CHANGE UP
SODIUM SERPL-SCNC: 142 MMOL/L — SIGNIFICANT CHANGE UP (ref 135–145)
TIBC SERPL-MCNC: 327 UG/DL — SIGNIFICANT CHANGE UP (ref 220–430)
TRANSFERRIN SERPL-MCNC: 258 MG/DL — SIGNIFICANT CHANGE UP (ref 200–360)
TRIGL SERPL-MCNC: 74 MG/DL — SIGNIFICANT CHANGE UP
UIBC SERPL-MCNC: 307 UG/DL — SIGNIFICANT CHANGE UP (ref 110–370)
VARIANT LYMPHS # BLD: 0.9 % — SIGNIFICANT CHANGE UP (ref 0–6)
VIT B12 SERPL-MCNC: 699 PG/ML — SIGNIFICANT CHANGE UP (ref 232–1245)
WBC # BLD: 1.99 K/UL — LOW (ref 3.8–10.5)
WBC # BLD: 2.42 K/UL — LOW (ref 3.8–10.5)
WBC # FLD AUTO: 1.99 K/UL — LOW (ref 3.8–10.5)
WBC # FLD AUTO: 2.42 K/UL — LOW (ref 3.8–10.5)

## 2024-03-05 PROCEDURE — 99233 SBSQ HOSP IP/OBS HIGH 50: CPT | Mod: GC

## 2024-03-05 RX ORDER — POTASSIUM CHLORIDE 20 MEQ
40 PACKET (EA) ORAL ONCE
Refills: 0 | Status: COMPLETED | OUTPATIENT
Start: 2024-03-05 | End: 2024-03-05

## 2024-03-05 RX ORDER — PANTOPRAZOLE SODIUM 20 MG/1
40 TABLET, DELAYED RELEASE ORAL
Refills: 0 | Status: DISCONTINUED | OUTPATIENT
Start: 2024-03-05 | End: 2024-03-05

## 2024-03-05 RX ORDER — CARVEDILOL PHOSPHATE 80 MG/1
3.12 CAPSULE, EXTENDED RELEASE ORAL EVERY 12 HOURS
Refills: 0 | Status: DISCONTINUED | OUTPATIENT
Start: 2024-03-05 | End: 2024-03-05

## 2024-03-05 RX ORDER — SUCRALFATE 1 G
1 TABLET ORAL EVERY 6 HOURS
Refills: 0 | Status: DISCONTINUED | OUTPATIENT
Start: 2024-03-05 | End: 2024-03-09

## 2024-03-05 RX ORDER — FUROSEMIDE 40 MG
40 TABLET ORAL EVERY 24 HOURS
Refills: 0 | Status: DISCONTINUED | OUTPATIENT
Start: 2024-03-05 | End: 2024-03-05

## 2024-03-05 RX ORDER — SODIUM CHLORIDE 9 MG/ML
1000 INJECTION, SOLUTION INTRAVENOUS
Refills: 0 | Status: DISCONTINUED | OUTPATIENT
Start: 2024-03-05 | End: 2024-03-06

## 2024-03-05 RX ORDER — MAGNESIUM SULFATE 500 MG/ML
2 VIAL (ML) INJECTION ONCE
Refills: 0 | Status: COMPLETED | OUTPATIENT
Start: 2024-03-05 | End: 2024-03-05

## 2024-03-05 RX ORDER — PANTOPRAZOLE SODIUM 20 MG/1
40 TABLET, DELAYED RELEASE ORAL EVERY 12 HOURS
Refills: 0 | Status: DISCONTINUED | OUTPATIENT
Start: 2024-03-05 | End: 2024-03-06

## 2024-03-05 RX ORDER — SPIRONOLACTONE 25 MG/1
25 TABLET, FILM COATED ORAL
Refills: 0 | Status: DISCONTINUED | OUTPATIENT
Start: 2024-03-05 | End: 2024-03-05

## 2024-03-05 RX ADMIN — Medication 1 GRAM(S): at 06:05

## 2024-03-05 RX ADMIN — Medication 25 GRAM(S): at 10:23

## 2024-03-05 RX ADMIN — SODIUM CHLORIDE 150 MILLILITER(S): 9 INJECTION, SOLUTION INTRAVENOUS at 06:08

## 2024-03-05 RX ADMIN — Medication 40 MILLIEQUIVALENT(S): at 10:23

## 2024-03-05 RX ADMIN — PANTOPRAZOLE SODIUM 40 MILLIGRAM(S): 20 TABLET, DELAYED RELEASE ORAL at 06:06

## 2024-03-05 RX ADMIN — PANTOPRAZOLE SODIUM 40 MILLIGRAM(S): 20 TABLET, DELAYED RELEASE ORAL at 17:23

## 2024-03-05 RX ADMIN — ENOXAPARIN SODIUM 40 MILLIGRAM(S): 100 INJECTION SUBCUTANEOUS at 06:06

## 2024-03-05 RX ADMIN — Medication 1 GRAM(S): at 17:24

## 2024-03-05 RX ADMIN — Medication 1 GRAM(S): at 11:26

## 2024-03-05 RX ADMIN — Medication 1 GRAM(S): at 23:26

## 2024-03-05 NOTE — PROGRESS NOTE ADULT - ASSESSMENT
64M with PMH of former smoker with hx HTN, HLD, severe 3 vessel CAD s/p CABG x4 9/2023, DM, gout, TIA (14 yrs ago), gastric ulcer, rectal bleed, LIZ, alcoholic liver cirrhosis, portal hypertension c/b esophageal varices (and UGIB, last GIB in 11/2023, EGD 11/13 duodenitis, nonbleeding varices, healing PUD), L pleural effusion s/p thoracentesis in 10/2023, presenting w/ 2 days of dizziness, headaches, generalized malaise, decreased PO intake, nausea, and lower abd pain, BIBEMS from Dr. Lawson's office for episode of diaphoresis/pallor, found to have orthostatic hypotension, admitted for further evaluation and management. 64M with PMH of former smoker with hx HTN, HLD, severe 3 vessel CAD s/p CABG x4 9/2023, DM, gout, TIA (14 yrs ago), gastric ulcer, rectal bleed, LIZ, alcoholic liver cirrhosis, portal hypertension c/b esophageal varices (and UGIB, last GIB in 11/2023, EGD 11/13 duodenitis, nonbleeding varices, healing PUD), L pleural effusion s/p thoracentesis in 10/2023, presenting w/ 2 days of dizziness, headaches, generalized malaise, decreased PO intake, nausea, and lower abd pain, BIBEMS from Dr. Lawson's office for episode of diaphoresis/pallor, found to have orthostatic hypotension, admitted for further evaluation and management. Currently monitoring anemia and pancytopenia. 64M with PMH of former smoker with hx HTN, HLD, severe 3 vessel CAD s/p CABG x4 9/2023 (not on ASA due to allergy and was on Plavix until UGIB in 11/23), DM, gout, TIA (14 yrs ago), gastric ulcer, rectal bleed, LIZ, alcoholic liver cirrhosis (Abstinent since Feb 2023), portal hypertension c/b esophageal varices (and UGIB, last GIB in 11/2023, EGD 11/13 duodenitis, nonbleeding varices, healing PUD), pericardial effusion, L pleural effusion s/p thoracentesis in 10/2023, presenting w/ 2 days of dizziness, headaches, generalized malaise, decreased PO intake, nausea, and lower abd pain, BIBEMS from Dr. Lawson's office for episode of diaphoresis/pallor, found to have orthostatic hypotension, pancytopenia admitted for further evaluation and management.

## 2024-03-05 NOTE — PROGRESS NOTE ADULT - PROBLEM SELECTOR PLAN 2
Presenting w/ generalized malaise, decreased PO intake, dizziness, headaches, abd pain, nausea x 2 days. 2/4 SIRS criteria on admission (WBC 2.77, HR 93). Cr and LFTs at baseline. CTAP w/ PO & IV contrast w/o obvious signs of infection - no diverticulitis; redundant sigmoid colon, LLQ grossly unremarkable; prominent rectal wall w/ circumferential thickening, possibly 2/2 incomplete distention vs pathologic etiology; resolution of L pleural effusion; near complete resolution of previously noted ascites; mild nodularity of hepatic contour, compatible w/ cirrhotic change; splenomegaly; nonspecific mild stranding in fat surrounding proximal inferior mesenteric artery (ddx focal mild vasculitis), no occlusion, possible moderate ostial stenosis. No other signs or symptoms of infection.  - monitor off abx Hgb drop to 7.5 from 8.1 at admission; drop in all cell lines so may have dilutional component  Pt has baseline Hgb of 8-10, Hgb today is lowest its been since he was admitted last Nov for GIB  - Trend CBC; repeat in afternoon  - Consult GI for suspicion of GIB Likely i/s/o Liver disease   Noted with Hgb ~8-9 on admission which has been baseline for the past couple of months. Hgb drop to 7.5 (lowest in past year) from 8.1 at admission (Hypoproliferative). There was concern for possible GI bleed at Dr Lawson's office,. , however there is no signs of bleeding at this time. Pt with diarrhea at this time and not black or blood in stool. drop in all cell lines so may have dilutional component  Plan:  - Trend CBC; repeat in afternoon  - Maintain active T&S   - Transfuse if Hgb <7 Likely i/s/o Liver disease   Noted with Hgb ~8-9 on admission which has been baseline for the past couple of months. Hgb drop to 7.5 (lowest in past year) from 8.1 at admission (Hypoproliferative). There was concern for possible GI bleed at Dr Lawson's office,. , however there is no signs of bleeding at this time. Pt with diarrhea at this time and not black or blood in stool. drop in all cell lines so may have dilutional component  Plan:  - f/u GI recs  - Trend CBC; repeat in afternoon  - Maintain active T&S   - Transfuse if Hgb <7

## 2024-03-05 NOTE — PROGRESS NOTE ADULT - PROBLEM SELECTOR PLAN 1
BP 160s/70s-80s while supine --> 100s-110s/70s standing, x2. S/p 2L NS in ED.  - LR @ 150cc/hr x 6 hrs  - holding coreg, lasix, spironolactone as below  - repeat orthostatics in AM  - encourage PO intake BP 160s/70s-80s while supine --> 100s-110s/70s standing, x2. S/p 2L NS in ED.  Patient continues to be orthostatic, this morning BP of 164/97 supine and 138/67 standing.  - Continue volume repletion with LR  - holding coreg, lasix, spironolactone as below  - repeat orthostatics in PM  - encourage PO intake Likely 2/2 hypovolemia i/s/o poor PO intake for the past coupe of days, multiple medications affecting his BP and Liver disease  BP 160s/70s-80s while supine --> 100s-110s/70s standing, x2. S/p 2L NS in ED.  Patient continues to be orthostatic, this morning BP of 164/97 supine and 138/67 standing.  Plan:  - Continue volume repletion with LR standing @150cc/hr for 6 hours, will encourage PO intake after, if unable will c/w IV fluids and advance diet as tolerated  - holding coreg, lasix, spironolactone as below  - repeat orthostatics in PM  - encourage PO intake  - PT Likely 2/2 hypovolemia i/s/o poor PO intake for the past coupe of days, multiple medications affecting his BP and Liver disease  BP 160s/70s-80s while supine --> 100s-110s/70s standing, x2. S/p 2L NS in ED.  Patient continues to be orthostatic, this morning BP of 164/97 supine and 138/67 standing.  Plan:  - Continue volume repletion with LR standing @150cc/hr for 6 hours, will encourage PO intake after, if unable will c/w IV fluids and advance diet as tolerated  - holding coreg, lasix, spironolactone as below  - encourage PO intake

## 2024-03-05 NOTE — DISCHARGE NOTE PROVIDER - CARE PROVIDERS DIRECT ADDRESSES
,DirectAddress_Unknown ,DirectAddress_Unknown,[email or web address]

## 2024-03-05 NOTE — DISCHARGE NOTE PROVIDER - NSDCMRMEDTOKEN_GEN_ALL_CORE_FT
carvedilol 3.125 mg oral tablet: 1 tab(s) orally every 12 hours Please take one tablet every 12 hours.  furosemide 40 mg oral tablet: 1 tab(s) orally once a day  metFORMIN 500 mg oral tablet: 1 tab(s) orally 2 times a day  pantoprazole 40 mg oral delayed release tablet: 1 tab(s) orally once a day Please one tablet every 12 hours for 2 weeks. After 2 weeks, please take one tablet every day.  rifAXIMin 550 mg oral tablet: 1 tab(s) orally 2 times a day  spironolactone 25 mg oral tablet: 1 tab(s) orally once a day everyday except MWF  sucralfate 1 g oral tablet: 1 tab(s) orally 4 times a day   lactulose 10 g/15 mL oral syrup: 30 milliliter(s) orally every 8 hours  metFORMIN 500 mg oral tablet: 1 tab(s) orally 2 times a day  pantoprazole 40 mg oral delayed release tablet: 1 tab(s) orally once a day Please one tablet every 12 hours for 2 weeks. After 2 weeks, please take one tablet every day.  polyethylene glycol 3350 oral powder for reconstitution: 17 gram(s) orally every 24 hours  rifAXIMin 550 mg oral tablet: 1 tab(s) orally 2 times a day  senna leaf extract oral tablet: 2 tab(s) orally once a day (at bedtime)  sucralfate 1 g oral tablet: 1 tab(s) orally 4 times a day   atorvastatin 40 mg oral tablet: 1 tab(s) orally once a day (at bedtime)  lactulose 10 g/15 mL oral syrup: 30 milliliter(s) orally every 8 hours  metFORMIN 500 mg oral tablet: 1 tab(s) orally 2 times a day  metoprolol succinate 25 mg oral tablet, extended release: 1 tab(s) orally every 24 hours  pantoprazole 40 mg oral delayed release tablet: 1 tab(s) orally once a day Please one tablet every 12 hours for 2 weeks. After 2 weeks, please take one tablet every day.  polyethylene glycol 3350 oral powder for reconstitution: 17 gram(s) orally every 24 hours  rifAXIMin 550 mg oral tablet: 1 tab(s) orally 2 times a day  senna leaf extract oral tablet: 2 tab(s) orally once a day (at bedtime)

## 2024-03-05 NOTE — PROGRESS NOTE ADULT - PROBLEM SELECTOR PLAN 9
Home meds: metformin 500mg BID. A1C was 7.0 in 09/2023.  - f/u A1C  - mISS  - monitor FS  - consistent carb/DASH diet Home meds: metformin 500mg BID. A1C was 7.0 in 09/2023, A1c normal at 5.9 at admission  - f/u A1C  - mISS  - monitor FS  - consistent carb/DASH diet Home meds: metformin 500mg BID. A1C was 7.0 in 09/2023, A1c normal at 5.9 at admission  - 5.9 A1C  - mISS  - monitor FS  - consistent carb/DASH diet F: None   E: Replete as necessary K>4 Mg>2  N: consistent carb/DASH diet  DVT Prophylaxis: NONE  GI prophylaxis: None   CODE STATUS: FULL CODE   Dispo: [facility]

## 2024-03-05 NOTE — CONSULT NOTE ADULT - ASSESSMENT
64M with PMH of former smoker with hx HTN, HLD, severe 3 vessel CAD s/p CABG x4 9/2023, DM, gout, TIA (14 yrs ago), gastric ulcer, rectal bleed, LIZ, alcoholic liver cirrhosis, portal hypertension c/b esophageal varices (and UGIB, last GIB in 11/2023, last EGD 01/2024 showed normal duodenum, distal esophagealvarix, hyperplastic lesion, L pleural effusion s/p thoracentesis in 10/2023, presenting w/ 2 days of dizziness, headaches, generalized malaise, admitted for orthostatic hypotension. GI was consulted for anemia.     #Symptomatic anemia  #Orthostatic hypotension  p/w Hb 8.1 dropped to 7.5 (likely due to hemoconcentration)  baseline hb ~8  EGD 01/2024 showed normal duodenum, distal esophagealvarix, hyperplastic lesion  Orthostatic vitals positive  Orthostatic hypotension likely in the setting of symptomatic anemia and dehydration due to diarrhea  No signs of bleeding at this time  transfuse if hb <7  active type and screen  2 large IV lines  hold BP meds  may start FLD  advance diet as tolerated  c/w IVF  No acute intervention at this time    #Diarrhea  p/w abdominal pain, diarrhea  CT AP shows: No diverticulitis, Prominent rectal wall with circumferential thickening  f/u gi pcr, stool culture, cdiff, O&P  c diff isolation  FLD, advance diet as tolerated  hold off antibiotics for now, no signs of infection  replace electrolytes as needed    Thank you for the opportunity to participate in the care of the patient  These are preliminary recommendations until further discussion with the attending. Final recommendations awaiting attending' s signature.  64M with PMH of former smoker with hx HTN, HLD, severe 3 vessel CAD s/p CABG x4 9/2023, DM, gout, TIA (14 yrs ago), gastric ulcer, rectal bleed, LIZ, alcoholic liver cirrhosis, portal hypertension c/b esophageal varices (and UGIB, last GIB in 11/2023, last EGD 01/2024 showed normal duodenum, distal esophagealvarix, hyperplastic lesion, L pleural effusion s/p thoracentesis in 10/2023, presenting w/ 2 days of dizziness, headaches, generalized malaise, admitted for orthostatic hypotension. GI was consulted for anemia.     #Symptomatic anemia  #Orthostatic hypotension  #Liver Cirrhosis  p/w Hb 8.1 dropped to 7.5 (likely due to hemoconcentration)  baseline hb ~8  EGD 01/2024 showed normal duodenum, distal esophagealvarix, hyperplastic lesion  Orthostatic vitals positive  Orthostatic hypotension likely in the setting of symptomatic anemia and dehydration due to diarrhea  No signs of bleeding at this time  transfuse if hb <7  active type and screen  2 large IV lines  hold BP meds  may start FLD  advance diet as tolerated  c/w IVF  Albumin 25% 50cc q8h x 24H  No acute intervention at this time    #Diarrhea  p/w abdominal pain, diarrhea  CT AP shows: No diverticulitis, Prominent rectal wall with circumferential thickening  f/u gi pcr, stool culture, cdiff, O&P  c diff isolation  FLD, advance diet as tolerated  hold off antibiotics for now, no signs of infection  replace electrolytes as needed    Thank you for the opportunity to participate in the care of the patient  These are preliminary recommendations until further discussion with the attending. Final recommendations awaiting attending' s signature.  64M PMH of former smoker with hx HTN, HLD, severe 3 vessel CAD s/p CABG x4 9/2023, DM, gout, TIA (14 yrs ago), gastric ulcer, rectal bleed, LIZ, alcoholic liver cirrhosis, portal hypertension c/b esophageal varices (and UGIB, last GIB in 11/2023, last EGD 01/2024 showed normal duodenum, distal esophageal varix, hyperplastic lesion, L pleural effusion s/p thoracentesis in 10/2023, presenting w/ 2 days of dizziness, headaches, generalized malaise, admitted for orthostatic hypotension. GI was consulted for anemia.     #Symptomatic anemia  #Orthostatic hypotension  #Liver Cirrhosis  p/w Hb 8.1 dropped to 7.5 (likely due to hemoconcentration)  baseline hb ~8  EGD 01/2024 showed normal duodenum, distal esophagealvarix, hyperplastic lesion  Orthostatic vitals positive  Orthostatic hypotension likely in the setting of symptomatic anemia and dehydration due to diarrhea  No signs of bleeding at this time  transfuse if hb <7  active type and screen  2 large IV lines  hold BP meds  may start FLD  advance diet as tolerated  c/w IVF  Albumin 25% 50cc q8h x 24H  No acute intervention at this time    #Diarrhea  p/w abdominal pain, diarrhea  CT AP shows: No diverticulitis, Prominent rectal wall with circumferential thickening  f/u gi pcr, stool culture, cdiff, O&P  c diff isolation  FLD, advance diet as tolerated  hold off antibiotics for now, no signs of infection  replace electrolytes as needed    Thank you for the opportunity to participate in the care of the patient  These are preliminary recommendations until further discussion with the attending. Final recommendations awaiting attending' s signature.  64M PMH of former smoker with hx HTN, HLD, severe 3 vessel CAD s/p CABG x4 9/2023, DM, gout, TIA (14 yrs ago), gastric ulcer, rectal bleed, LIZ, alcoholic liver cirrhosis, portal hypertension c/b esophageal varices (and UGIB, last GIB in 11/2023, last EGD 01/2024 showed normal duodenum, distal esophageal varix, hyperplastic lesion, L pleural effusion s/p thoracentesis in 10/2023, presenting w/ 2 days of dizziness, headaches, generalized malaise, admitted for orthostatic hypotension. GI was consulted for anemia.     #Symptomatic anemia  #Orthostatic hypotension  #Liver Cirrhosis  p/w Hb 8.1 dropped to 7.5 (likely due to hemoconcentration)  baseline hb ~8  EGD 01/2024 showed normal duodenum, distal esophageal varix, hyperplastic lesion  Orthostatic vitals positive  Orthostatic hypotension likely in the setting of symptomatic anemia and dehydration due to diarrhea  No signs of bleeding at this time  transfuse if hb <7  active type and screen  2 large IV lines  hold BP meds  may start full liquid diet  advance diet as tolerated  c/w IVF  Albumin 25% 50cc q8h x 24H  No acute intervention at this time    #Diarrhea  p/w lower abdominal pain, diarrhea  CT AP shows: No diverticulitis, Prominent rectal wall with circumferential thickening  f/u gi pcr, stool culture, cdiff, O&P  c diff isolation  FLD, advance diet as tolerated  hold off antibiotics for now, no signs of infection  replace electrolytes as needed    Thank you for the opportunity to participate in the care of the patient  These are preliminary recommendations until further discussion with the attending. Final recommendations awaiting attending' s signature.

## 2024-03-05 NOTE — DISCHARGE NOTE PROVIDER - ATTENDING DISCHARGE PHYSICAL EXAMINATION:
I have read and agree with the resident Discharge Note above.  Patient seen and discussed with resident team on the day of discharge.     Briefly, 63yo M with PMH of former smoker with hx HTN, HLD, severe 3 vessel CAD s/p CABG x4 9/2023 (not on ASA due to allergy and was on Plavix until UGIB in 11/23), DM, gout, TIA (14 yrs ago), gastric ulcer, rectal bleed, LIZ, alcoholic liver cirrhosis (Abstinent since Feb 2023), portal hypertension c/b esophageal varices (and UGIB, last GIB in 11/2023, EGD 11/13 duodenitis, nonbleeding varices, healing PUD), trivial pericardial effusion, who presented with orthostatic hypotension.  Unclear etiology possibly due to diarrhea, diuretics and beta blocker.  Has now improved s/p volume expansion, dc of diuretics and transition of beta blocker to Metoprolol from coreg to be less BP selective.  Of note Metoprolol does not have the same effect in portal HTN and not recommended for prevention of variceal hemorrhage but can possibly be transitioned as outpatient with hepatology.  No signs of bleeding, Echo with normal systolic function and no significant valvular disease.  Orthostatics improved, interestingly has more of a drop when lying to sitting but improvement with standing.  Neuro and Cards consulted, no signs of POTS or autonomic dysfunction.  Compression stockings and abdominal binder if symptomatic.    Attending exam on day of discharge:   Gen: NAD, sitting upright in bed   HEENT: NCAT, MMM  Neck: supple, trachea at midline  CV: RRR, no m/g/r appreciated  Pulm: CTA B, no w/r/r, normal work of breathing  Abd: +BS, soft, NTND  : deferred  Skin: no rashes, ulcers, jaundice; intact, warm and dry  Ext: WWP, no c/c/e  Neuro: AOx3, no change from baseline  Psych: pleasant, conversant and appropriate    I was physically present for the evaluation and management services provided. I agree with the included history, physical, and plan which I reviewed and edited where appropriate. I spent > 30 minutes with the patient and the patient's family on direct patient care and discharge planning with more than 50% of the visit spent on counseling and/or coordination of care.     Jose Reed  [phone] I have read and agree with the resident Discharge Note above.  Patient seen and discussed with resident team on the day of discharge.     Briefly, 63yo M with PMH of former smoker with hx HTN, HLD, severe 3 vessel CAD s/p CABG x4 9/2023 (not on ASA due to allergy and was on Plavix until UGIB in 11/23), DM, gout, TIA (14 yrs ago), gastric ulcer, rectal bleed, LIZ, alcoholic liver cirrhosis (Abstinent since Feb 2023), portal hypertension c/b esophageal varices (and UGIB, last GIB in 11/2023, EGD 11/13 duodenitis, nonbleeding varices, healing PUD), trivial pericardial effusion, who presented with orthostatic hypotension.  Unclear etiology possibly due to diarrhea, diuretics and beta blocker.  Has now improved s/p volume expansion, dc of diuretics and transition of beta blocker to Metoprolol from coreg to be less BP selective.  Of note Metoprolol does not have the same effect in portal HTN and not recommended for prevention of variceal hemorrhage but can possibly be transitioned as outpatient with hepatology.  No signs of bleeding, Echo with normal systolic function and no significant valvular disease.  Orthostatics improved, interestingly has more of a drop when lying to sitting but improvement with standing.  Neuro and Cards consulted, no signs of POTS or autonomic dysfunction.  Compression stockings and abdominal binder if symptomatic. Pancytopenia likely due to bone marrow suppression from EtOH but if not improving with cessation should consider heme follow up.    Attending exam on day of discharge:   Gen: NAD, sitting upright in bed   HEENT: NCAT, MMM  Neck: supple, trachea at midline  CV: RRR, no m/g/r appreciated  Pulm: CTA B, no w/r/r, normal work of breathing  Abd: +BS, soft, NTND  : deferred  Skin: no rashes, ulcers, jaundice; intact, warm and dry  Ext: WWP, no c/c/e  Neuro: AOx3, no change from baseline  Psych: pleasant, conversant and appropriate    I was physically present for the evaluation and management services provided. I agree with the included history, physical, and plan which I reviewed and edited where appropriate. I spent > 30 minutes with the patient and the patient's family on direct patient care and discharge planning with more than 50% of the visit spent on counseling and/or coordination of care.     Jose Reed  [phone]

## 2024-03-05 NOTE — DISCHARGE NOTE PROVIDER - HOSPITAL COURSE
#Discharge: do not delete    64M with PMH of former smoker with hx HTN, HLD, severe 3 vessel CAD s/p CABG x4 9/2023 (not on ASA due to allergy and was on Plavix until UGIB in 11/23), DM, gout, TIA (14 yrs ago), gastric ulcer, rectal bleed, LIZ, alcoholic liver cirrhosis (Abstinent since Feb 2023), portal hypertension c/b esophageal varices (and UGIB, last GIB in 11/2023, EGD 11/13 duodenitis, nonbleeding varices, healing PUD), pericardial effusion, L pleural effusion s/p thoracentesis in 10/2023, presenting w/ 2 days of dizziness, headaches, generalized malaise, decreased PO intake, nausea, and lower abd pain, BIBEMS from Dr. Lawson's office for episode of diaphoresis/pallor, found to have orthostatic hypotension, pancytopenia admitted for further evaluation and management.       Problem List/Main Diagnoses:     #Orthostatic hypotension.   Likely 2/2 hypovolemia i/s/o poor PO intake for the past coupe of days, multiple medications affecting his BP and Liver disease  BP 160s/70s-80s while supine --> 100s-110s/70s standing, x2. S/p 2L NS in ED.  Patient continues to be orthostatic, this morning BP of 164/97 supine and 138/67 standing.  Plan:  - Continue volume repletion with LR standing @150cc/hr for 6 hours, will encourage PO intake after, if unable will c/w IV fluids and advance diet as tolerated  - holding coreg, lasix, spironolactone as below  - encourage PO intake.    #Anemia.   Likely i/s/o Liver disease   Noted with Hgb ~8-9 on admission which has been baseline for the past couple of months. Hgb drop to 7.5 (lowest in past year) from 8.1 at admission (Hypoproliferative). There was concern for possible GI bleed at Dr Lawson's office,. , however there is no signs of bleeding at this time. Pt with diarrhea at this time and not black or blood in stool. drop in all cell lines so may have dilutional component  Plan:  - f/u GI recs  - Trend CBC; repeat in afternoon  - Maintain active T&S   - Transfuse if Hgb <7.    #Pancytopenia.   Likely multifactorial i/s/o Liver disease vs Malignancy vs less likely Bone marrow involvement  WBC 2.77 on admission (baseline ~4), H&H 8.4/27.5 (baseline Hgb 8-9.5), MCV 77.9, plt 93 (baseline 80s-100s).   WBC dropped to 1.99, Hgb to 7.5, and platelets to 79. No signs of infection at this time.   Iron studies low total iron and ferritin, haptoglobin of 32, normal LDH.  - Daily CBC.    #Hepatic lesion.   MR abdomen from 2/14/2024 showed 3.  5 mm arterially enhancing focus in the superior aspect of the right   hepatic lobe, as described above; not visualized on the previous CT.   LR-3.. Recommend one-year interval follow-up with MRI.  - Outpatient f/u.    #Liver cirrhosis.  Hx of alcoholic liver cirrhosis, portal hypertension c/b esophageal varices (and UGIB, last GIB in 11/2023, EGD 11/13 duodenitis, nonbleeding varices, healing PUD), L pleural effusion s/p thoracentesis in 10/2023. Home meds: rifaximin 550mg BID, lasix 40mg qd, spironolactone 25mg (everyday except MWF), coreg 3.125mg BID  Coags worsening, PT of 16.1 and INR of 14.3  - c/w rifaximin 550mg PO BID  - holding coreg, lasix, spironolactone i/s/o orthostatic hypotension -- restart as indicated  - Daily CBC, CMP, Coags.    #History of GI bleed.  Home meds: sucralfate 1g QID and protonix 40mg BID. Of note pt was discharged 11/2023 w/ prescription for protonix 40mg PO BID w/ plan to switch to qd after 2 weeks, however per wife is still taking it BID. No signs or symptoms of GIB at this time.  Hgb drop to 7.5 from 8.1 at admit is concerning  - c/w sucralfate 1g PO q6h  - protonix 40mg IV BID  - monitor for signs/symptoms of GIB  - f/u GI recs.    #CAD (coronary artery disease).  S/p CABG in 09/2023 by Dr. Hinton. Was on plavix after surgery, was held after the GIB in 11/2023. Per wife pt has hx of allergy to ASA many years ago had itching and rash, no anaphylaxis, she is unsure if it was a true allergy as it was so long ago. Pt is not on a statin at this time.  - f/u lipid panel  - collateral from cardiologist in AM regarding ASA, plavix, statin  - consistent carb/DASH diet.    #DM (diabetes mellitus).  Home meds: metformin 500mg BID. A1C was 7.0 in 09/2023, A1c normal at 5.9 at admission  5.9 A1C  Plan:  - mISS  - monitor FS  - consistent carb/DASH diet.    New medications/therapies:   New lines/hardware:  Labs to be followed outpatient:   Exam to be followed outpatient:     Discharge plan: discharge to ______    Physical Exam Upon Discharge:     #Discharge: do not delete    64M with PMH of former smoker with hx HTN, HLD, severe 3 vessel CAD s/p CABG x4 9/2023 (not on ASA due to allergy and was on Plavix until UGIB in 11/23), DM, gout, TIA (14 yrs ago), gastric ulcer, rectal bleed, LIZ, alcoholic liver cirrhosis (Abstinent since Feb 2023), portal hypertension c/b esophageal varices (and UGIB, last GIB in 11/2023, EGD 11/13 duodenitis, nonbleeding varices, healing PUD), pericardial effusion, L pleural effusion s/p thoracentesis in 10/2023, presenting w/ 2 days of dizziness, headaches, generalized malaise, decreased PO intake, nausea, and lower abd pain, BIBEMS from Dr. Lawson's office for episode of diaphoresis/pallor, found to have orthostatic hypotension, pancytopenia admitted for further evaluation and management.       Problem List/Main Diagnoses:     #Orthostatic hypotension.   Likely 2/2 hypovolemia i/s/o poor PO intake for the past coupe of days, multiple medications affecting his BP and Liver disease  BP 160s/70s-80s while supine --> 100s-110s/70s standing, x2. S/p 2L NS in ED. Antihypertensives discontinued this admission.   Plan:  - holding coreg, lasix, spironolactone as below  - encourage PO intake.    #Anemia.   Likely i/s/o Liver disease   Noted with Hgb ~8-9 on admission which has been baseline for the past couple of months. Hgb drop to 7.5 from 8.1 on admission. There was concern for possible GI bleed at Dr Lawson's office. No signs of bleeding this admission, hgb stable. Labs s/f iron deficiency, given IV iron. s/p inpatient flex sigmoidoscopy, found to have internal hemorrhoids  - f/u outpatient    #Pancytopenia.   Likely multifactorial i/s/o Liver disease vs Malignancy vs less likely Bone marrow involvement  WBC 2.77 on admission (baseline ~4), H&H 8.4/27.5 (baseline Hgb 8-9.5), MCV 77.9, plt 93 (baseline 80s-100s).   WBC dropped to 1.99, Hgb to 7.5, and platelets to 79. No signs of infection at this time.   Iron studies low total iron and ferritin, haptoglobin of 32, normal LDH.  - Daily CBC.    #Hepatic lesion.   MR abdomen from 2/14/2024 showed 3.5 mm arterially enhancing focus in the superior aspect of the right hepatic lobe, as described above; not visualized on the previous CT.   LR-3. Recommend one-year interval follow-up with MRI.  - Outpatient f/u.    #Liver cirrhosis.  Hx of alcoholic liver cirrhosis, portal hypertension c/b esophageal varices (and UGIB, last GIB in 11/2023, EGD 11/13 duodenitis, nonbleeding varices, healing PUD), L pleural effusion s/p thoracentesis in 10/2023. Home meds: rifaximin 550mg BID, lasix 40mg qd, spironolactone 25mg (everyday except MWF), coreg 3.125mg BID  Coags worsening, PT of 16.1 and INR of 14.3  - c/w rifaximin 550mg PO BID  - holding coreg, lasix, spironolactone i/s/o orthostatic hypotension -- restart as indicated  - Daily CBC, CMP, Coags.    #History of GI bleed.  Home meds: sucralfate 1g QID and protonix 40mg BID. Of note pt was discharged 11/2023 w/ prescription for protonix 40mg PO BID w/ plan to switch to qd after 2 weeks, however per wife is still taking it BID. No signs or symptoms of GIB at this time.  Hgb drop to 7.5 from 8.1 at admit is concerning  - c/w sucralfate 1g PO q6h  - protonix 40mg PO BID      #CAD (coronary artery disease).  S/p CABG in 09/2023 by Dr. Hinton. Was on plavix after surgery, was held after the GIB in 11/2023. Per wife pt has hx of allergy to ASA many years ago had itching and rash, no anaphylaxis, she is unsure if it was a true allergy as it was so long ago. Pt is not on a statin at this time.  - Continue home meds.    #DM (diabetes mellitus).  Home meds: metformin 500mg BID. A1C was 7.0 in 09/2023, A1c normal at 5.9 at admission  5.9 A1C  - Continue home meds    New medications/therapies:   New lines/hardware:  Labs to be followed outpatient:   Exam to be followed outpatient:     Discharge plan: discharge to ______    Physical Exam Upon Discharge:     #Discharge: do not delete    64M with PMH of former smoker with hx HTN, HLD, severe 3 vessel CAD s/p CABG x4 9/2023 (not on ASA due to allergy and was on Plavix until UGIB in 11/23), DM, gout, TIA (14 yrs ago), gastric ulcer, rectal bleed, LIZ, alcoholic liver cirrhosis (Abstinent since Feb 2023), portal hypertension c/b esophageal varices (and UGIB, last GIB in 11/2023, EGD 11/13 duodenitis, nonbleeding varices, healing PUD), pericardial effusion, L pleural effusion s/p thoracentesis in 10/2023, presenting w/ 2 days of dizziness, headaches, generalized malaise, decreased PO intake, nausea, and lower abd pain, BIBEMS from Dr. Lawson's office for episode of diaphoresis/pallor, found to have orthostatic hypotension, pancytopenia admitted for further evaluation and management.       Problem List/Main Diagnoses:     #Orthostatic hypotension.   Likely 2/2 hypovolemia i/s/o poor PO intake for the past coupe of days, multiple medications affecting his BP and Liver disease  BP 160s/70s-80s while supine --> 100s-110s/70s standing, x2. S/p 2L NS in ED. Antihypertensives discontinued this admission. On day of discharge, pt no longer orthostatic and overall feeling improved.  Plan:  - holding coreg, lasix, spironolactone as below  - f/u outpatient regarding resuming antihypertensives  - encourage PO intake.    #Anemia.   Likely i/s/o Liver disease   Noted with Hgb ~8-9 on admission which has been baseline for the past couple of months. Hgb drop to 7.5 from 8.1 on admission. There was concern for possible GI bleed at Dr Lawson's office. No signs of bleeding this admission, hgb stable. Labs s/f iron deficiency, given IV iron. s/p inpatient flex sigmoidoscopy, found to have internal hemorrhoids  - f/u outpatient    #Hepatic lesion.   MR abdomen from 2/14/2024 showed 3.5 mm arterially enhancing focus in the superior aspect of the right hepatic lobe, as described above; not visualized on the previous CT.   LR-3. Recommend one-year interval follow-up with MRI.  - Outpatient f/u.    #Liver cirrhosis.  Hx of alcoholic liver cirrhosis, portal hypertension c/b esophageal varices (and UGIB, last GIB in 11/2023, EGD 11/13 duodenitis, nonbleeding varices, healing PUD), L pleural effusion s/p thoracentesis in 10/2023. Home meds: rifaximin 550mg BID, lasix 40mg qd, spironolactone 25mg (everyday except MWF), coreg 3.125mg BID  Coags worsening, PT of 16.1 and INR of 14.3  - c/w rifaximin 550mg PO BID  - holding coreg, lasix, spironolactone i/s/o orthostatic hypotension -- restart as indicated outpatient    #History of GI bleed.  Home meds: sucralfate 1g QID and protonix 40mg BID. Of note pt was discharged 11/2023 w/ prescription for protonix 40mg PO BID w/ plan to switch to qd after 2 weeks, however per wife is still taking it BID. No signs or symptoms of GIB at this time.  Hgb drop to 7.5 from 8.1 at admit is concerning  - c/w sucralfate 1g PO q6h  - protonix 40mg PO BID      #CAD (coronary artery disease).  S/p CABG in 09/2023 by Dr. Hinton. Was on plavix after surgery, was held after the GIB in 11/2023. Per wife pt has hx of allergy to ASA many years ago had itching and rash, no anaphylaxis, she is unsure if it was a true allergy as it was so long ago. Pt is not on a statin at this time.  - Continue home meds.    #DM (diabetes mellitus).  Home meds: metformin 500mg BID. A1C was 7.0 in 09/2023, A1c normal at 5.9 at admission  5.9 A1C  - Continue home meds    New medications/therapies:   New lines/hardware:  Labs to be followed outpatient:   Exam to be followed outpatient:     Discharge plan: discharge to Home    Physical Exam Upon Discharge:  General: NAD  Head: NC/AT; MMM; PERRL; EOMI;  Neck: Supple; no JVD  Respiratory: CTAB; no wheezes/rales/rhonchi  Cardiovascular: Regular rhythm/rate; S1/S2+, no murmurs, rubs gallops   Gastrointestinal: Soft, bowel sounds normal and present; mild guarding of LUQ; suprapubic tenderness to palpation  Extremities: WWP; no edema/cyanosis  Neurological: A&Ox3, no obvious focal deficits     #Discharge: do not delete    64M with PMH of former smoker with hx HTN, HLD, severe 3 vessel CAD s/p CABG x4 9/2023 (not on ASA due to allergy and was on Plavix until UGIB in 11/23), DM, gout, TIA (14 yrs ago), gastric ulcer, rectal bleed, LIZ, alcoholic liver cirrhosis (Abstinent since Feb 2023), portal hypertension c/b esophageal varices (and UGIB, last GIB in 11/2023, EGD 11/13 duodenitis, nonbleeding varices, healing PUD), pericardial effusion, L pleural effusion s/p thoracentesis in 10/2023, presenting w/ 2 days of dizziness, headaches, generalized malaise, decreased PO intake, nausea, and lower abd pain, BIBEMS from Dr. Lawson's office for episode of diaphoresis/pallor, found to have orthostatic hypotension, pancytopenia admitted for further evaluation and management.       Problem List/Main Diagnoses:     #Orthostatic hypotension.   Likely 2/2 hypovolemia i/s/o poor PO intake for the past coupe of days, multiple medications affecting his BP and Liver disease  BP 160s/70s-80s while supine --> 100s-110s/70s standing, x2. S/p 2L NS in ED. Antihypertensives discontinued this admission. On day of discharge, pt no longer orthostatic and overall feeling improved.  Plan:  - holding coreg, lasix, spironolactone as below  - c/w toprol XL 25 qd  - f/u outpatient regarding resuming antihypertensives  - encourage PO intake.    #Anemia.   Likely i/s/o Liver disease   Noted with Hgb ~8-9 on admission which has been baseline for the past couple of months. Hgb drop to 7.5 from 8.1 on admission. There was concern for possible GI bleed at Dr Lawson's office. No signs of bleeding this admission, hgb stable. Labs s/f iron deficiency, given IV iron. s/p inpatient flex sigmoidoscopy, found to have internal hemorrhoids  - f/u outpatient    #Hepatic lesion.   MR abdomen from 2/14/2024 showed 3.5 mm arterially enhancing focus in the superior aspect of the right hepatic lobe, as described above; not visualized on the previous CT.   LR-3. Recommend one-year interval follow-up with MRI.  - Outpatient f/u.    #Liver cirrhosis.  Hx of alcoholic liver cirrhosis, portal hypertension c/b esophageal varices (and UGIB, last GIB in 11/2023, EGD 11/13 duodenitis, nonbleeding varices, healing PUD), L pleural effusion s/p thoracentesis in 10/2023. Home meds: rifaximin 550mg BID, lasix 40mg qd, spironolactone 25mg (everyday except MWF), coreg 3.125mg BID  Coags worsening, PT of 16.1 and INR of 14.3  - c/w rifaximin 550mg PO BID  - c/w lactulose 30ml q8, titrate to 2-3 BM daily  - holding coreg, lasix, spironolactone i/s/o orthostatic hypotension -- restart as indicated outpatient    #History of GI bleed.  Discharged w/ sucralfate 1g QID and protonix 40mg BID after last admission. Of note pt was discharged 11/2023 w/ prescription for protonix 40mg PO BID w/ plan to switch to qd after 2 weeks, however per wife is still taking it BID. No signs or symptoms of GIB at this time. Low c/f for GIB this admission.  - protonix 40mg PO qd  - f/u GI outpatient    #CAD (coronary artery disease).  S/p CABG in 09/2023 by Dr. Hinton. Was on plavix after surgery, was held after the GIB in 11/2023. Per wife pt has hx of allergy to ASA many years ago had itching and rash, no anaphylaxis, she is unsure if it was a true allergy as it was so long ago. Pt is not on a statin at this time. Cardiology consulted this admission.  - f/u Dr Delaney and Dr Box outpatient  - c/w toprol XL 25 qd  - c/w lipitor 40 qd    #DM (diabetes mellitus).  Home meds: metformin 500mg BID. A1C was 7.0 in 09/2023, A1c normal at 5.9 at admission  5.9 A1C  - Continue home meds    New medications/therapies: toprol XL 25, lactulose 30, lipitor 40    Discharge plan: discharge to Home  Items to follow up outpatient: GI, PCP, Cardiology    Physical Exam Upon Discharge:  General: NAD  Head: NC/AT; MMM; PERRL; EOMI;  Neck: Supple; no JVD  Respiratory: CTAB; no wheezes/rales/rhonchi  Cardiovascular: Regular rhythm/rate; S1/S2+, no murmurs, rubs gallops   Gastrointestinal: Soft, bowel sounds normal and present; mild guarding of LUQ; suprapubic tenderness to palpation  Extremities: WWP; no edema/cyanosis  Neurological: A&Ox3, no obvious focal deficits

## 2024-03-05 NOTE — DISCHARGE NOTE PROVIDER - NSDCQMSTAIRS_GEN_ALL_CORE
MRN/Chart #:  [id number]    [facility]     AUTHORIZATION FOR DISCLOSURE OF HEALTH INFORMATION   FORMS COMPLETION     Jerad FLYNN Kristi     Previous Names:   Phone Number:  [phone]   YOB: 1969     AUTHORIZE RELEASE FROM:       [facility]       [facility]  [address] [place] [address]          DISCLOSE PROTECTED HEALTH INFORMATION TO:  Employer/Disability Insurance Carrier/Other [employer]   Street Address:    City State Zip:           ALL INFORMATION BELOW MUST BE COMPLETED BY PATIENT IN ORDER TO PROCESS FORM.   This Authorization may include the release of protected health information related to this forms request.     Doctor’s Name:________________________________________________________________________________    Type of Illness/Injury:__________________________________________________________________________   Date of Illness/Injury (if pregnancy-estimated due date): _____________________________________     First Date Off Work (if applicable): ____________________________________     Return to Work Date (if applicable):  ___________________________________     Intermittent Leave (patient or caretaker):     YES: ____  Date Range: ____________________________     NO: _____  Date Range:  ____________________________     PLEASE NOTE: Provider has fourteen (14) working days to complete the FMLA/Disability form.     Please indicate a delivery option below. All requests will be mailed to the patient unless otherwise indicated.   ? Pickup: Location: _______________________________________________________________  Call when ready phone #___________________________________________________________   ? Mail: (List address if other than above) ______________________________________________   ? Fax: Fax Number: ______________________________________________________________  Attention:  _______________________________________________________________________   PURPOSE FOR NEED OF DISCLOSURE: Forms Completion- [facility]’s policy is that the minimum necessary information to accomplish the purpose of the request will be disclosed.   YOUR RIGHTS WITH RESPECT TO THIS AUTHORIZATION:   I am aware that I have the right to inspect and receive a copy of the health information I have authorized to be used and/or disclosed by this Authorization. I understand that I may be charged a fee for record copies. In addition, I understand that I do not need to sign this Authorization in order to receive treatment. I also am aware that I may revoke this Authorization by notifying the Health Information department Corporate Office Building at @DBLINK(DEP,150,,,). However, I understand that my revocation will not be effective as to uses and/or disclosures: (1) already made in reliance upon this Authorization; or (2) needed for an insurer to contest a claim/policy as authorized by law if signing the Authorization was a condition to obtaining insurance coverage. I realize that the information used and/or disclosed pursuant to this Authorization may be subject to re-disclosure and no longer protected by federal privacy law.     Expiration Date: This authorization shall  on the following date or event that relates to the individual or the purpose of the use or disclosure:_______________________________________________.    If no date or event is specified, this authorization will  one (1) year from the date signed.     SIGNATURE OF PATIENT / LEGAL REP: ____________________________________________    DATE: _____________________     If signed by person other than patient, state relationship and authority to do so.   Patient is:   ? Minor   ? Incompetent   ? Disabled   ?      Legal Authority (Optional):   ? Parent   ? Legal Guardian   ? Authorized Legal Representative   ? Power of    ?  Power of  for Healthcare   ? Executor of Estate of    ? Spouse/Next of kin of  (Specify relationship) __________________________________     This release is executed in conformity with [place] Statues and Federal Privacy Regulations. FORM NAME: Authorization for Disclosure of Health Information -Forms Completion  CHT Number [facility]  Date Rev: 3/4/03,10/15/03; 06; , ; 1/10, 9/10, , , 10//14, 6/15, , 17         No

## 2024-03-05 NOTE — PROGRESS NOTE ADULT - SUBJECTIVE AND OBJECTIVE BOX
** INCOMPLETE **    OVERNIGHT EVENTS:     SUBJECTIVE:    VITAL SIGNS:  Vital Signs Last 24 Hrs  T(C): 36.6 (05 Mar 2024 07:13), Max: 36.7 (04 Mar 2024 15:29)  T(F): 97.9 (05 Mar 2024 07:13), Max: 98 (04 Mar 2024 15:29)  HR: 68 (05 Mar 2024 07:13) (68 - 93)  BP: 154/85 (05 Mar 2024 07:13) (127/80 - 166/78)  BP(mean): 108 (05 Mar 2024 07:13) (108 - 108)  RR: 18 (05 Mar 2024 07:13) (18 - 20)  SpO2: 99% (05 Mar 2024 07:13) (98% - 100%)    Parameters below as of 05 Mar 2024 07:13  Patient On (Oxygen Delivery Method): room air        PHYSICAL EXAM:  General: NAD; speaking in full sentences  HEENT: NC/AT; PERRL; EOMI; MMM  Neck: supple; no JVD  Cardiac: RRR; +S1/S2  Pulm: CTA B/L; no W/R/R  GI: soft, NT/ND, +BS  Extremities: WWP; no edema, clubbing or cyanosis  Vasc: 2+ radial, DP pulses B/L  Neuro: AAOx3; no focal deficits    MEDICATIONS:  MEDICATIONS  (STANDING):  dextrose 5%. 1000 milliLiter(s) (50 mL/Hr) IV Continuous <Continuous>  dextrose 5%. 1000 milliLiter(s) (100 mL/Hr) IV Continuous <Continuous>  dextrose 50% Injectable 12.5 Gram(s) IV Push once  dextrose 50% Injectable 25 Gram(s) IV Push once  dextrose 50% Injectable 25 Gram(s) IV Push once  glucagon  Injectable 1 milliGRAM(s) IntraMuscular once  influenza  Vaccine (HIGH DOSE) 0.7 milliLiter(s) IntraMuscular once  insulin lispro (ADMELOG) corrective regimen sliding scale   SubCutaneous three times a day before meals  insulin lispro (ADMELOG) corrective regimen sliding scale   SubCutaneous at bedtime  lactated ringers. 1000 milliLiter(s) (150 mL/Hr) IV Continuous <Continuous>  pantoprazole  Injectable 40 milliGRAM(s) IV Push every 12 hours  rifAXIMin 550 milliGRAM(s) Oral every 12 hours  sucralfate 1 Gram(s) Oral every 6 hours    MEDICATIONS  (PRN):  dextrose Oral Gel 15 Gram(s) Oral once PRN Blood Glucose LESS THAN 70 milliGRAM(s)/deciliter      ALLERGIES:  Allergies    aspirin (Pruritus)    Intolerances        LABS:                        7.5    1.99  )-----------( 79       ( 05 Mar 2024 05:30 )             24.9     03-05    142  |  106  |  9   ----------------------------<  119<H>  3.6   |  25  |  0.90    Ca    8.5      05 Mar 2024 05:30  Phos  4.1     03-05  Mg     1.6     03-05    TPro  6.2  /  Alb  3.3  /  TBili  2.1<H>  /  DBili  0.6<H>  /  AST  39  /  ALT  18  /  AlkPhos  125<H>  03-05    PT/INR - ( 04 Mar 2024 11:44 )   PT: 16.1 sec;   INR: 1.43          PTT - ( 04 Mar 2024 11:44 )  PTT:31.6 sec    RADIOLOGY & ADDITIONAL TESTS: Reviewed. OVERNIGHT EVENTS: no complaints overnight    SUBJECTIVE: Patient complains of blurry vision this morning while watching the television as he cannot read some of the smaller text even though his vision is normally good. Denies room spinning sensation. He also complains of mild headache in the occipital region. Notes diarrhea this morning, but did not look at his stool and does not know if there was blood present. He denies any history of constipation in the preceding days leading to admission and does not recall any episodes of hematochezia or melena. The pt does report ~70 pounds of weight loss since the fall, and reports reduced appetite; there are some days he will not eat at all.    VITAL SIGNS:  Vital Signs Last 24 Hrs  T(C): 36.6 (05 Mar 2024 07:13), Max: 36.7 (04 Mar 2024 15:29)  T(F): 97.9 (05 Mar 2024 07:13), Max: 98 (04 Mar 2024 15:29)  HR: 68 (05 Mar 2024 07:13) (68 - 93)  BP: 154/85 (05 Mar 2024 07:13) (127/80 - 166/78)  BP(mean): 108 (05 Mar 2024 07:13) (108 - 108)  RR: 18 (05 Mar 2024 07:13) (18 - 20)  SpO2: 99% (05 Mar 2024 07:13) (98% - 100%)    Parameters below as of 05 Mar 2024 07:13  Patient On (Oxygen Delivery Method): room air    PHYSICAL EXAM:  General: NAD; speaking in full sentences  HEENT: NC/AT; PERRL; EOMI; MMM  Neck: supple; no JVD  Cardiac: RRR; +S1/S2  Pulm: CTA B/L; no W/R/R  GI: soft and no ascites, hyperactive bowel sounds, tenderness to palpation of left lower quadrant and right upper quadrant; voluntary guarding  Extremities: WWP; no edema, clubbing or cyanosis  Vasc: 2+ radial, DP pulses B/L  Neuro: AAOx3; no focal deficits    MEDICATIONS:  MEDICATIONS  (STANDING):  dextrose 5%. 1000 milliLiter(s) (50 mL/Hr) IV Continuous <Continuous>  dextrose 5%. 1000 milliLiter(s) (100 mL/Hr) IV Continuous <Continuous>  dextrose 50% Injectable 12.5 Gram(s) IV Push once  dextrose 50% Injectable 25 Gram(s) IV Push once  dextrose 50% Injectable 25 Gram(s) IV Push once  glucagon  Injectable 1 milliGRAM(s) IntraMuscular once  influenza  Vaccine (HIGH DOSE) 0.7 milliLiter(s) IntraMuscular once  insulin lispro (ADMELOG) corrective regimen sliding scale   SubCutaneous three times a day before meals  insulin lispro (ADMELOG) corrective regimen sliding scale   SubCutaneous at bedtime  lactated ringers. 1000 milliLiter(s) (150 mL/Hr) IV Continuous <Continuous>  pantoprazole  Injectable 40 milliGRAM(s) IV Push every 12 hours  rifAXIMin 550 milliGRAM(s) Oral every 12 hours  sucralfate 1 Gram(s) Oral every 6 hours    MEDICATIONS  (PRN):  dextrose Oral Gel 15 Gram(s) Oral once PRN Blood Glucose LESS THAN 70 milliGRAM(s)/deciliter      ALLERGIES:  Allergies    aspirin (Pruritus)    Intolerances        LABS:                        7.5    1.99  )-----------( 79       ( 05 Mar 2024 05:30 )             24.9     03-05    142  |  106  |  9   ----------------------------<  119<H>  3.6   |  25  |  0.90    Ca    8.5      05 Mar 2024 05:30  Phos  4.1     03-05  Mg     1.6     03-05    TPro  6.2  /  Alb  3.3  /  TBili  2.1<H>  /  DBili  0.6<H>  /  AST  39  /  ALT  18  /  AlkPhos  125<H>  03-05    PT/INR - ( 04 Mar 2024 11:44 )   PT: 16.1 sec;   INR: 1.43          PTT - ( 04 Mar 2024 11:44 )  PTT:31.6 sec    RADIOLOGY & ADDITIONAL TESTS: Reviewed. OVERNIGHT EVENTS: no complaints overnight    SUBJECTIVE: Patient complains of blurry vision this morning while watching the television as he cannot read some of the smaller text, he states that at baseline, his vision is normally good. Denies room spinning sensation. He also complains of mild headache in the occipital region. Notes diarrhea this morning which was not bloody or dark. He denies any history of constipation in the preceding days leading to admission and does not recall any episodes of hematochezia or melena. The pt does report ~70 pounds of weight loss since the fall, and reports reduced appetite; there are some days he will not eat at all.    Per patient, at Dr Lawson's office, he felt like he had to urinate suddenly. He went to the bathroom and felt like he urinated a large volume. He then returned to his seat when he had the episode of diaphoresis, warmth and urge to have a BM, however, was advised not to out of fear that he was having a GI bleed given his Hx. The patient was sent to the ED after this episode.     VITAL SIGNS:  Vital Signs Last 24 Hrs  T(C): 36.6 (05 Mar 2024 07:13), Max: 36.7 (04 Mar 2024 15:29)  T(F): 97.9 (05 Mar 2024 07:13), Max: 98 (04 Mar 2024 15:29)  HR: 68 (05 Mar 2024 07:13) (68 - 93)  BP: 154/85 (05 Mar 2024 07:13) (127/80 - 166/78)  BP(mean): 108 (05 Mar 2024 07:13) (108 - 108)  RR: 18 (05 Mar 2024 07:13) (18 - 20)  SpO2: 99% (05 Mar 2024 07:13) (98% - 100%)    Parameters below as of 05 Mar 2024 07:13  Patient On (Oxygen Delivery Method): room air    PHYSICAL EXAM:  General: NAD; speaking in full sentences, cooperating to exam  HEENT: PERRL; EOMI; MMM  Neck: supple; no JVD  Cardiac: RRR; +S1/S2, no murmur  Pulm: CTA B/L; no W/R/R  GI: soft and no ascites, hyperactive bowel sounds, tenderness to palpation of left lower quadrant and right upper quadrant; voluntary guarding  Extremities: WWP; no edema, clubbing or cyanosis  Vasc: 2+ radial, DP pulses B/L  Neuro: AAOx3; no focal deficits    MEDICATIONS:  MEDICATIONS  (STANDING):  dextrose 5%. 1000 milliLiter(s) (50 mL/Hr) IV Continuous <Continuous>  dextrose 5%. 1000 milliLiter(s) (100 mL/Hr) IV Continuous <Continuous>  dextrose 50% Injectable 12.5 Gram(s) IV Push once  dextrose 50% Injectable 25 Gram(s) IV Push once  dextrose 50% Injectable 25 Gram(s) IV Push once  glucagon  Injectable 1 milliGRAM(s) IntraMuscular once  influenza  Vaccine (HIGH DOSE) 0.7 milliLiter(s) IntraMuscular once  insulin lispro (ADMELOG) corrective regimen sliding scale   SubCutaneous three times a day before meals  insulin lispro (ADMELOG) corrective regimen sliding scale   SubCutaneous at bedtime  lactated ringers. 1000 milliLiter(s) (150 mL/Hr) IV Continuous <Continuous>  pantoprazole  Injectable 40 milliGRAM(s) IV Push every 12 hours  rifAXIMin 550 milliGRAM(s) Oral every 12 hours  sucralfate 1 Gram(s) Oral every 6 hours    MEDICATIONS  (PRN):  dextrose Oral Gel 15 Gram(s) Oral once PRN Blood Glucose LESS THAN 70 milliGRAM(s)/deciliter      ALLERGIES:  Allergies    aspirin (Pruritus)    Intolerances        LABS:                        7.5    1.99  )-----------( 79       ( 05 Mar 2024 05:30 )             24.9     03-05    142  |  106  |  9   ----------------------------<  119<H>  3.6   |  25  |  0.90    Ca    8.5      05 Mar 2024 05:30  Phos  4.1     03-05  Mg     1.6     03-05    TPro  6.2  /  Alb  3.3  /  TBili  2.1<H>  /  DBili  0.6<H>  /  AST  39  /  ALT  18  /  AlkPhos  125<H>  03-05    PT/INR - ( 04 Mar 2024 11:44 )   PT: 16.1 sec;   INR: 1.43          PTT - ( 04 Mar 2024 11:44 )  PTT:31.6 sec    RADIOLOGY & ADDITIONAL TESTS: Reviewed.

## 2024-03-05 NOTE — PROGRESS NOTE ADULT - PROBLEM SELECTOR PLAN 5
WBC 2.77 on admission (baseline ~4), H&H 8.4/27.5 (baseline Hgb 8-9.5), MCV 77.9, plt 93 (baseline 80s-100s). Likely i/s/o liver cirrhosis.  - f/u iron studies, retic count, LDH, haptoglobin, fractionated bilirubin  - trend CBC w/ diff Resolved; no signs of sepsis at this time    Presenting w/ generalized malaise, decreased PO intake, dizziness, headaches, abd pain, nausea x 2 days. 2/4 SIRS criteria on admission (WBC 2.77, HR 93). Cr and LFTs at baseline. CTAP w/ PO & IV contrast w/o obvious signs of infection - no diverticulitis; redundant sigmoid colon, LLQ grossly unremarkable; prominent rectal wall w/ circumferential thickening, possibly 2/2 incomplete distention vs pathologic etiology; resolution of L pleural effusion; near complete resolution of previously noted ascites; mild nodularity of hepatic contour, compatible w/ cirrhotic change; splenomegaly; nonspecific mild stranding in fat surrounding proximal inferior mesenteric artery (ddx focal mild vasculitis), no occlusion, possible moderate ostial stenosis. No other signs or symptoms of infection.  - monitor off abx Hx of alcoholic liver cirrhosis, portal hypertension c/b esophageal varices (and UGIB, last GIB in 11/2023, EGD 11/13 duodenitis, nonbleeding varices, healing PUD), L pleural effusion s/p thoracentesis in 10/2023. Home meds: rifaximin 550mg BID, lasix 40mg qd, spironolactone 25mg (everyday except MWF), coreg 3.125mg BID  Coags worsening, PT of 16.1 and INR of 14.3  - c/w rifaximin 550mg PO BID  - holding coreg, lasix, spironolactone i/s/o orthostatic hypotension -- restart as indicated  - Daily CBC, CMP, Coags

## 2024-03-05 NOTE — PROGRESS NOTE ADULT - PROBLEM SELECTOR PLAN 8
F: None   E: Replete as necessary K>4 Mg>2  N: consistent carb/DASH diet  DVT Prophylaxis: Lovenox 40mg SQ qd  GI prophylaxis: None   CODE STATUS: FULL CODE  Dispo: [facility] S/p CABG in 09/2023 by Dr. Hinton. Was on plavix after surgery, was held after the GIB in 11/2023. Per wife pt has hx of allergy to ASA many years ago had itching and rash, no anaphylaxis, she is unsure if it was a true allergy as it was so long ago. Pt is not on a statin at this time.  - f/u lipid panel  - collateral from cardiologist in AM regarding ASA, plavix, statin  - consistent carb/DASH diet Home meds: metformin 500mg BID. A1C was 7.0 in 09/2023, A1c normal at 5.9 at admission  5.9 A1C  Plan:  - mISS  - monitor FS  - consistent carb/DASH diet

## 2024-03-05 NOTE — PROGRESS NOTE ADULT - PROBLEM SELECTOR PLAN 7
Home meds: metformin 500mg BID. A1C was 7.0 in 09/2023.  - f/u A1C  - mISS  - monitor FS  - consistent carb/DASH diet Home meds: sucralfate 1g QID and protonix 40mg BID. Of note pt was discharged 11/2023 w/ prescription for protonix 40mg PO BID w/ plan to switch to qd after 2 weeks, however per wife is still taking it BID. No signs or symptoms of GIB at this time.  - c/w sucralfate 1g PO q6h  - protonix 40mg IV BID  - monitor for signs/symptoms of GIB Home meds: sucralfate 1g QID and protonix 40mg BID. Of note pt was discharged 11/2023 w/ prescription for protonix 40mg PO BID w/ plan to switch to qd after 2 weeks, however per wife is still taking it BID. No signs or symptoms of GIB at this time.  Hgb drop to 7.5 from 8.1 at admit is concerning  - c/w sucralfate 1g PO q6h  - protonix 40mg IV BID  - monitor for signs/symptoms of GIB  - Consult GI S/p CABG in 09/2023 by Dr. Hinton. Was on plavix after surgery, was held after the GIB in 11/2023. Per wife pt has hx of allergy to ASA many years ago had itching and rash, no anaphylaxis, she is unsure if it was a true allergy as it was so long ago. Pt is not on a statin at this time.  - f/u lipid panel  - collateral from cardiologist in AM regarding ASA, plavix, statin  - consistent carb/DASH diet

## 2024-03-05 NOTE — DISCHARGE NOTE PROVIDER - PROVIDER TOKENS
PROVIDER:[TOKEN:[[id number]],FOLLOWUP:[1 month]] PROVIDER:[TOKEN:[[id number]],FOLLOWUP:[1 month]],PROVIDER:[TOKEN:[[id number]],FOLLOWUP:[1 month]] PROVIDER:[TOKEN:[[id number]],FOLLOWUP:[2 weeks],ESTABLISHEDPATIENT:[T]]

## 2024-03-05 NOTE — PROGRESS NOTE ADULT - PROBLEM SELECTOR PLAN 10
F: None   E: Replete as necessary K>4 Mg>2  N: consistent carb/DASH diet  DVT Prophylaxis: Lovenox 40mg SQ qd  GI prophylaxis: None   CODE STATUS: FULL CODE  Dispo: [facility] F: None   E: Replete as necessary K>4 Mg>2  N: consistent carb/DASH diet  DVT Prophylaxis: NONE  GI prophylaxis: None   CODE STATUS: FULL CODE  Dispo: [facility] F: None   E: Replete as necessary K>4 Mg>2  N: consistent carb/DASH diet  DVT Prophylaxis: NONE  GI prophylaxis: None   CODE STATUS: FULL CODE   Dispo: [facility]

## 2024-03-05 NOTE — DISCHARGE NOTE PROVIDER - NSDCCPCAREPLAN_GEN_ALL_CORE_FT
PRINCIPAL DISCHARGE DIAGNOSIS  Diagnosis: Orthostatic hypotension  Assessment and Plan of Treatment:       SECONDARY DISCHARGE DIAGNOSES  Diagnosis: Liver cirrhosis  Assessment and Plan of Treatment:     Diagnosis: Anemia  Assessment and Plan of Treatment:      PRINCIPAL DISCHARGE DIAGNOSIS  Diagnosis: Orthostatic hypotension  Assessment and Plan of Treatment: You were found to have orthostatic hypotension, this means that your blood pressure falls dependent on the position of your body. When you stand up, your body takes a few seconds to adjust in order to keep your blood pressure at a normal level. Your blood pressure did not adjust quickly, causing you to have low blood pressure which in turn causes you to "pass out." Medications can also contribute to this problem. In order to prevent this, you should maintain good hydration. We stopped a few of your medications (carvedilol, spironolactone, and lasix) due to your symptoms. CTH was done which was negative. It is important to stand up slowly and give your body time to adjust before you start walking. Please follow up with Dr Lawson and your PCP outpatient. Please also make new appointments to follow up with cardiology and neurology for further evaluation of your symptoms.      SECONDARY DISCHARGE DIAGNOSES  Diagnosis: Anemia  Assessment and Plan of Treatment: Anemia is the medical term for when a person has too few red blood cells. Red blood cells are the cells in your blood that carry oxygen. If you have too few red blood cells, your body does not get all the oxygen it needs. Most people with anemia have no symptoms. They find out they have it after their doctor does blood tests for another reason. People who do have symptoms might feel tired or weak, especially if they try to exercise or have headaches. If you experience these symptoms you should see your primary care provider for further evaluation.  While in the hospital, sigmoidoscopy was done due to your symptoms. You were found to have internal hemorrhoids. Please follow up with Dr Lawson outpatient, you may also follow up with hematology outpatient for further management of your anemia.      Diagnosis: Liver cirrhosis  Assessment and Plan of Treatment: You have a history of cirrhosis. While in the hospital, your spironolactone and lasix was discontinued. You were also taking rifaximin, which is a home medication. You will be discharged with lactulose 30mg three times a day, rifaximin will be discontinued.     PRINCIPAL DISCHARGE DIAGNOSIS  Diagnosis: Orthostatic hypotension  Assessment and Plan of Treatment: You were found to have orthostatic hypotension, this means that your blood pressure falls dependent on the position of your body. When you stand up, your body takes a few seconds to adjust in order to keep your blood pressure at a normal level. Your blood pressure did not adjust quickly, causing you to have low blood pressure which in turn causes you to "pass out." Medications can also contribute to this problem. In order to prevent this, you should maintain good hydration. We stopped a few of your medications (carvedilol, spironolactone, and lasix) due to your symptoms. CTH was done which was negative. It is important to stand up slowly and give your body time to adjust before you start walking. Please follow up with Dr Lawson and your PCP outpatient. Please also make new appointments to follow up with cardiology and neurology for further evaluation of your symptoms.      SECONDARY DISCHARGE DIAGNOSES  Diagnosis: CAD (coronary artery disease)  Assessment and Plan of Treatment: You have a history of cardiac surgery. During your admission, we discontinued a medication called carvedilol due to your blood pressure. We also consulted cardiology for further evaluation.  - follow up with your cardiologist outpatient  - continue Toprol XL 25 daily  - continue lipitor 40mg daily    Diagnosis: Anemia  Assessment and Plan of Treatment: Anemia is the medical term for when a person has too few red blood cells. Red blood cells are the cells in your blood that carry oxygen. If you have too few red blood cells, your body does not get all the oxygen it needs. Most people with anemia have no symptoms. They find out they have it after their doctor does blood tests for another reason. People who do have symptoms might feel tired or weak, especially if they try to exercise or have headaches. If you experience these symptoms you should see your primary care provider for further evaluation.  While in the hospital, sigmoidoscopy was done due to your symptoms. You were found to have internal hemorrhoids. Please follow up with Dr Lawson outpatient, you may also follow up with hematology outpatient for further management of your anemia.      Diagnosis: Liver cirrhosis  Assessment and Plan of Treatment: You have a history of cirrhosis. While in the hospital, your spironolactone and lasix was discontinued. You were also taking rifaximin, which is a home medication. You will be discharged with lactulose 30mg three times a day, rifaximin will be discontinued. Please follow up with Dr Lawson

## 2024-03-05 NOTE — PROGRESS NOTE ADULT - PROBLEM SELECTOR PLAN 6
S/p CABG in 09/2023 by Dr. Hinton. Was on plavix after surgery, was held after the GIB in 11/2023. Per wife pt has hx of allergy to ASA many years ago had itching and rash, no anaphylaxis, she is unsure if it was a true allergy as it was so long ago. Pt is not on a statin at this time.  - f/u lipid panel  - collateral from cardiologist in AM regarding ASA, plavix, statin  - consistent carb/DASH diet Hx of alcoholic liver cirrhosis, portal hypertension c/b esophageal varices (and UGIB, last GIB in 11/2023, EGD 11/13 duodenitis, nonbleeding varices, healing PUD), L pleural effusion s/p thoracentesis in 10/2023. Home meds: rifaximin 550mg BID, lasix 40mg qd, spironolactone 25mg (everyday except MWF), coreg 3.125mg BID  - c/w rifaximin 550mg PO BID  - holding coreg, lasix, spironolactone i/s/o orthostatic hypotension -- restart as indicated  - f/u fractionated bilirubin  - trend CMP, coags Hx of alcoholic liver cirrhosis, portal hypertension c/b esophageal varices (and UGIB, last GIB in 11/2023, EGD 11/13 duodenitis, nonbleeding varices, healing PUD), L pleural effusion s/p thoracentesis in 10/2023. Home meds: rifaximin 550mg BID, lasix 40mg qd, spironolactone 25mg (everyday except MWF), coreg 3.125mg BID  Coags worsening, PT of 16.1 and INR of 14.3  - c/w rifaximin 550mg PO BID  - holding coreg, lasix, spironolactone i/s/o orthostatic hypotension -- restart as indicated  - trend CMP and coags  - Consult GI Hx of alcoholic liver cirrhosis, portal hypertension c/b esophageal varices (and UGIB, last GIB in 11/2023, EGD 11/13 duodenitis, nonbleeding varices, healing PUD), L pleural effusion s/p thoracentesis in 10/2023. Home meds: rifaximin 550mg BID, lasix 40mg qd, spironolactone 25mg (everyday except MWF), coreg 3.125mg BID  Coags worsening, PT of 16.1 and INR of 14.3  - c/w rifaximin 550mg PO BID  - holding coreg, lasix, spironolactone i/s/o orthostatic hypotension -- restart as indicated  - trend CMP and coags  - Consulted GI Home meds: sucralfate 1g QID and protonix 40mg BID. Of note pt was discharged 11/2023 w/ prescription for protonix 40mg PO BID w/ plan to switch to qd after 2 weeks, however per wife is still taking it BID. No signs or symptoms of GIB at this time.  Hgb drop to 7.5 from 8.1 at admit is concerning  - c/w sucralfate 1g PO q6h  - protonix 40mg IV BID  - monitor for signs/symptoms of GIB  - f/u GI recs

## 2024-03-05 NOTE — PROGRESS NOTE ADULT - PROBLEM SELECTOR PLAN 4
Home meds: sucralfate 1g QID and protonix 40mg BID. Of note pt was discharged 11/2023 w/ prescription for protonix 40mg PO BID w/ plan to switch to qd after 2 weeks, however per wife is still taking it BID. No signs or symptoms of GIB at this time.  - c/w sucralfate 1g PO q6h  - protonix 40mg IV BID  - monitor for signs/symptoms of GIB MR abdomen from 2/14/2024 showed 3.  5 mm arterially enhancing focus in the superior aspect of the right   hepatic lobe, as described above; not visualized on the previous CT.   LR-3.. Recommend one-year interval follow-up with MRI.  - Follow-up with GI MR abdomen from 2/14/2024 showed 3.  5 mm arterially enhancing focus in the superior aspect of the right   hepatic lobe, as described above; not visualized on the previous CT.   LR-3.. Recommend one-year interval follow-up with MRI.  - Pending GI recs MR abdomen from 2/14/2024 showed 3.  5 mm arterially enhancing focus in the superior aspect of the right   hepatic lobe, as described above; not visualized on the previous CT.   LR-3.. Recommend one-year interval follow-up with MRI.  - Outpatient f/u

## 2024-03-05 NOTE — PROGRESS NOTE ADULT - ATTENDING COMMENTS
63 yo M with PMHx TIA, HTN, HLD, CAD (s/p CABG), pre-DM, esophageal varices (s/p EGD x2 12/2023, 1/2024), gout, etOH cirrhosis, portal HTN, L-sided pleural effusion px from home with 1d hx of dizziness with ambulation found to have +orthostatic hypotension admitted for further management.      #Orthostatic hypotension  #symptomatic anemia  #symptomatic anemia – 1d hx of dizziness.   DC’d from ED earlier but returned due to persistent sx and inability ambulating.    Orthostatics positive in ED.  Troponin T negative x1.  CBC with downtrending Hb since discharge (baseline 8-10).   No signs/sx of acute or active bleed despite known hx of esophageal varices, last EGD in 1/2024.   s/p 2L NS IVF. PPI IV Q12.   fu repeat orthostatics.   GI consulted as per Dr. Lawson's recs   CBC Q12 for now   will hold off on home diuretics for now   fu GI recs     #Pancytopenia - WBC 3.19. Hb 8.1 with MCV 77.4. Plt 99. --on admission    noted to have downtrend in all cell lines after IVF   Pancytopenia likely i/s/o known etOH cirrhosis.   Maintain active T/S.   No chemical DVT ppx or AC.     Agree with remainder of resident plan as above.

## 2024-03-05 NOTE — PROGRESS NOTE ADULT - PROBLEM SELECTOR PLAN 3
Hx of alcoholic liver cirrhosis, portal hypertension c/b esophageal varices (and UGIB, last GIB in 11/2023, EGD 11/13 duodenitis, nonbleeding varices, healing PUD), L pleural effusion s/p thoracentesis in 10/2023. Home meds: rifaximin 550mg BID, lasix 40mg qd, spironolactone 25mg (everyday except MWF), coreg 3.125mg BID  - c/w rifaximin 550mg PO BID  - holding coreg, lasix, spironolactone i/s/o orthostatic hypotension -- restart as indicated  - f/u fractionated bilirubin  - trend CMP, coags WBC 2.77 on admission (baseline ~4), H&H 8.4/27.5 (baseline Hgb 8-9.5), MCV 77.9, plt 93 (baseline 80s-100s). Likely i/s/o liver cirrhosis.  WBC dropped to 1.99, Hgb to 7.5, and platelets to 79.  - Continue to trend CBC WBC 2.77 on admission (baseline ~4), H&H 8.4/27.5 (baseline Hgb 8-9.5), MCV 77.9, plt 93 (baseline 80s-100s). Likely i/s/o liver cirrhosis.  WBC dropped to 1.99, Hgb to 7.5, and platelets to 79.  Iron studies low total iron and ferritin, haptoglobin of 32, normal LDH  - Continue to trend CBC Likely multifactorial i/s/o Liver disease vs Malignancy vs less likely Bone marrow involvement  WBC 2.77 on admission (baseline ~4), H&H 8.4/27.5 (baseline Hgb 8-9.5), MCV 77.9, plt 93 (baseline 80s-100s).   WBC dropped to 1.99, Hgb to 7.5, and platelets to 79.   Iron studies low total iron and ferritin, haptoglobin of 32, normal LDH.  - Continue to trend CBC Likely multifactorial i/s/o Liver disease vs Malignancy vs less likely Bone marrow involvement  WBC 2.77 on admission (baseline ~4), H&H 8.4/27.5 (baseline Hgb 8-9.5), MCV 77.9, plt 93 (baseline 80s-100s).   WBC dropped to 1.99, Hgb to 7.5, and platelets to 79. No signs of infection at this time.   Iron studies low total iron and ferritin, haptoglobin of 32, normal LDH.  - Daily CBC

## 2024-03-05 NOTE — DISCHARGE NOTE PROVIDER - NSDCCPTREATMENT_GEN_ALL_CORE_FT
PRINCIPAL PROCEDURE  Procedure: CT head  Findings and Treatment: IMPRESSION: No evidence of acute hemorrhage mass or mass effect seen.      SECONDARY PROCEDURE  Procedure: CT abdomen & pelvis  Findings and Treatment: 1.  No diverticulitis. Aside from a redundant sigmoid colon, left lower  quadrant is grossly unremarkable.  2.  Prominent rectal wall with circumferential thickening; possibly due   to incomplete distention, however, a pathologic etiology cannot be   completely excluded; clinical correlation.  3.  Resolution of left pleural effusion.  4.  Near complete resolution of previously noted ascites.  5.  Mild nodularity of hepatic contour, compatible with cirrhotic change  6.  Splenomegaly.  7.  Nonspecific mild stranding noted in the fat surrounding the proximal   inferiormesenteric artery; cannot exclude focal mild vasculitis. No   occlusion. Possible moderate ostial stenosis.     PRINCIPAL PROCEDURE  Procedure: Flexible sigmoidoscopy  Findings and Treatment: Internal hemorrhoids      SECONDARY PROCEDURE  Procedure: CT head  Findings and Treatment: IMPRESSION: No evidence of acute hemorrhage mass or mass effect seen.    Procedure: CT abdomen & pelvis  Findings and Treatment: 1.  No diverticulitis. Aside from a redundant sigmoid colon, left lower  quadrant is grossly unremarkable.  2.  Prominent rectal wall with circumferential thickening; possibly due   to incomplete distention, however, a pathologic etiology cannot be   completely excluded; clinical correlation.  3.  Resolution of left pleural effusion.  4.  Near complete resolution of previously noted ascites.  5.  Mild nodularity of hepatic contour, compatible with cirrhotic change  6.  Splenomegaly.  7.  Nonspecific mild stranding noted in the fat surrounding the proximal   inferiormesenteric artery; cannot exclude focal mild vasculitis. No   occlusion. Possible moderate ostial stenosis.     PRINCIPAL PROCEDURE  Procedure: Flexible sigmoidoscopy  Findings and Treatment: Internal hemorrhoids      SECONDARY PROCEDURE  Procedure: CT abdomen & pelvis  Findings and Treatment: 1.  No diverticulitis. Aside from a redundant sigmoid colon, left lower  quadrant is grossly unremarkable.  2.  Prominent rectal wall with circumferential thickening; possibly due   to incomplete distention, however, a pathologic etiology cannot be   completely excluded; clinical correlation.  3.  Resolution of left pleural effusion.  4.  Near complete resolution of previously noted ascites.  5.  Mild nodularity of hepatic contour, compatible with cirrhotic change  6.  Splenomegaly.  7.  Nonspecific mild stranding noted in the fat surrounding the proximal   inferiormesenteric artery; cannot exclude focal mild vasculitis. No   occlusion. Possible moderate ostial stenosis.    Procedure: CT head  Findings and Treatment: IMPRESSION: No evidence of acute hemorrhage mass or mass effect seen.    Procedure: Transthoracic echocardiography (TTE)  Findings and Treatment: 1. Normal left and right ventricular size and systolic function.   2. Moderately dilated left atrium.   3. Aortic sclerosis without significant stenosis.   4. No evidence of pulmonary hypertension.   5. Trivial pericardial effusion.   6. Mildly dilated ascending aorta.

## 2024-03-05 NOTE — CONSULT NOTE ADULT - SUBJECTIVE AND OBJECTIVE BOX
GASTROENTEROLOGY CONSULT NOTE  HPI:  64M with PMH of former smoker with hx HTN, HLD, severe 3 vessel CAD s/p CAB x4 9/2023, pre-diabetes, gout, TIA (14 yrs ago), gastric ulcer, rectal bleed, LIZ and alcoholic liver disease, cirrhosis, portal hypertension c/b esophageal varices (and UGIB, last GIB in nov 2023, EGD 01/2024 showed normal duodenum, distal esophagealvarix, hyperplastic lesion, L pleural effusion s/p thoracentesis in 10/2023, presenting for 2 days of dizziness. Was seen by his hepatologist Dr. Lawson the day of admission and had an episode of profuse diaphoresis, abd pain, and pallor, EMS was called and pt was brought to ED from Dr. Lawson's office. Upon evaluation pt reports 2 days of dizziness, headaches, generalized malaise, decreased PO intake, and diffuse lower abdominal pain. No other complaints at this time. Denies fevers, chills, CP, SOB, cough, vomiting, diarrhea, hematochezia, melena.    ED course:  Vitals: afebrile, HR 60s-90s, BP 130s-160s/70s-90s, RR 18-20, SpO2 98-99% on RA.  Labs: WBC 2.77 --> 3.19, H&H 8.4/27.5 (baseline Hgb 8-9.5), MCV 77.9, plt 93 (baseline 80s-100s), BMP wnl, bilirubin 3.1, alk phos 140, AST 52, ALT 22, lipase wnl, UA negative.  EKG: NSR w/ no acute ischemic changes  Imaging:   *CTAP w/ PO & IV contrast: redundant sigmoid colon; prominent rectal wall w/ circumferential thickening, possibly 2/2 incomplete distention vs pathologic etiology; resolution of L pleural effusion (since 2/14 and 10/31); near complete resolution of ascites; mild nodularity of hepatic contour compatible w/ cirrhosis; splenomegaly; nonspecific mild stranding in fat surrounding proximal inferior mesenteric artery (ddx focal mild vasculitis), no occlusion, possible moderate ostial stenosis.  Interventions: toradol 15mg IV x1, MG 1g IV x1, reglan 10mg PO x1, NS 2L (04 Mar 2024 21:34)    GI was consulted for anemia. pt. seen and examined at bedside, He complains of watery diarrhea x 2, abd pain today. He also complains of lightheadedness with changing position. He denies any blood, black tarry stool, N,V at this time. Pt tested positive for orthostatic hypotension. Hb is stable at 7.5 (baseline ~8).       Allergies    aspirin (Pruritus)    Intolerances      Home Medications:  metFORMIN 500 mg oral tablet: 1 tab(s) orally 2 times a day (12 Nov 2023 16:27)  spironolactone 25 mg oral tablet: 1 tab(s) orally once a day everyday except MWF (04 Mar 2024 23:34)  sucralfate 1 g oral tablet: 1 tab(s) orally 4 times a day (04 Mar 2024 23:55)    MEDICATIONS:  MEDICATIONS  (STANDING):  dextrose 5%. 1000 milliLiter(s) (100 mL/Hr) IV Continuous <Continuous>  dextrose 5%. 1000 milliLiter(s) (50 mL/Hr) IV Continuous <Continuous>  dextrose 50% Injectable 12.5 Gram(s) IV Push once  dextrose 50% Injectable 25 Gram(s) IV Push once  dextrose 50% Injectable 25 Gram(s) IV Push once  glucagon  Injectable 1 milliGRAM(s) IntraMuscular once  influenza  Vaccine (HIGH DOSE) 0.7 milliLiter(s) IntraMuscular once  insulin lispro (ADMELOG) corrective regimen sliding scale   SubCutaneous three times a day before meals  insulin lispro (ADMELOG) corrective regimen sliding scale   SubCutaneous at bedtime  lactated ringers. 1000 milliLiter(s) (150 mL/Hr) IV Continuous <Continuous>  pantoprazole  Injectable 40 milliGRAM(s) IV Push every 12 hours  rifAXIMin 550 milliGRAM(s) Oral every 12 hours  sucralfate 1 Gram(s) Oral every 6 hours    MEDICATIONS  (PRN):  dextrose Oral Gel 15 Gram(s) Oral once PRN Blood Glucose LESS THAN 70 milliGRAM(s)/deciliter    PAST MEDICAL & SURGICAL HISTORY:  CAD (coronary artery disease)      HTN (hypertension)      Cirrhosis      Esophageal varices in cirrhosis      History of TIAs      H/O: GI bleed      Gout      HLD (hyperlipidemia)      History of portal hypertension      Diabetes mellitus      No significant past surgical history        FAMILY HISTORY:  No pertinent family history in first degree relatives      SOCIAL HISTORY:  Tobacco: [ ] Current, [ ] Former, [ ] Never; Pack Years:  Alcohol:  Illicit Drugs:    REVIEW OF SYSTEMS:  CONSTITUTIONAL: No weakness, fevers or chills, + lightheadedness  HEENT: No visual changes; No vertigo or throat pain   NECK: No pain or stiffness  RESPIRATORY: No cough, wheezing, hemoptysis; No shortness of breath  CARDIOVASCULAR: No chest pain or palpitations  GASTROINTESTINAL: As above.  GENITOURINARY: No dysuria, frequency or hematuria  NEUROLOGICAL: No numbness or weakness  SKIN: No itching, burning, rashes, or lesions   All other 10 review of systems is negative unless indicated above.    Vital Signs Last 24 Hrs  T(C): 36.6 (05 Mar 2024 07:13), Max: 36.7 (04 Mar 2024 15:29)  T(F): 97.9 (05 Mar 2024 07:13), Max: 98 (04 Mar 2024 15:29)  HR: 68 (05 Mar 2024 07:13) (68 - 93)  BP: 154/85 (05 Mar 2024 07:13) (127/80 - 166/78)  BP(mean): 108 (05 Mar 2024 07:13) (108 - 108)  RR: 18 (05 Mar 2024 07:13) (18 - 20)  SpO2: 99% (05 Mar 2024 07:13) (98% - 100%)    Parameters below as of 05 Mar 2024 07:13  Patient On (Oxygen Delivery Method): room air          PHYSICAL EXAM:    General: in no acute distress  Eyes: Anicteric sclerae, moist conjunctivae  HENT: Moist mucous membranes  Neck: Trachea midline, supple  Lungs: Normal respiratory effort, no intercostal retractions  Cardiovascular: RRR  Abdomen: Soft, mild tenderness at the LLQ, non-distended; No rebound or guarding  Extremities: Normal range of motion, No clubbing, cyanosis or edema  Neurological: Alert and oriented x3  Skin: Warm and dry. No obvious rash    LABS:                        7.5    1.99  )-----------( 79       ( 05 Mar 2024 05:30 )             24.9     03-05    142  |  106  |  9   ----------------------------<  119<H>  3.6   |  25  |  0.90    Ca    8.5      05 Mar 2024 05:30  Phos  4.1     03-05  Mg     1.6     03-05    TPro  6.2  /  Alb  3.3  /  TBili  2.1<H>  /  DBili  0.6<H>  /  AST  39  /  ALT  18  /  AlkPhos  125<H>  03-05        PT/INR - ( 04 Mar 2024 11:44 )   PT: 16.1 sec;   INR: 1.43          PTT - ( 04 Mar 2024 11:44 )  PTT:31.6 sec    Urinalysis with Rflx Culture (collected 04 Mar 2024 11:44)      RADIOLOGY & ADDITIONAL STUDIES:     Reviewed GASTROENTEROLOGY CONSULT NOTE  HPI:  64M with PMH of former smoker with hx HTN, HLD, severe 3 vessel CAD s/p CAB x4 9/2023, pre-diabetes, gout, TIA (14 yrs ago), gastric ulcer, rectal bleed, LIZ and alcoholic liver disease, cirrhosis, portal hypertension c/b esophageal varices (and UGIB, last GIB in nov 2023, EGD 01/2024 showed normal duodenum, distal esophagealvarix, hyperplastic lesion, L pleural effusion s/p thoracentesis in 10/2023, presenting for 2 days of dizziness. Was seen by his hepatologist Dr. Lawson the day of admission and had an episode of profuse diaphoresis, abd pain, and pallor, EMS was called and pt was brought to ED from Dr. Lawson's office. Upon evaluation pt reports 2 days of dizziness, headaches, generalized malaise, decreased PO intake, and diffuse lower abdominal pain. No other complaints at this time. Denies fevers, chills, CP, SOB, cough, vomiting, diarrhea, hematochezia, melena.    ED course:  Vitals: afebrile, HR 60s-90s, BP 130s-160s/70s-90s, RR 18-20, SpO2 98-99% on RA.  Labs: WBC 2.77 --> 3.19, H&H 8.4/27.5 (baseline Hgb 8-9.5), MCV 77.9, plt 93 (baseline 80s-100s), BMP wnl, bilirubin 3.1, alk phos 140, AST 52, ALT 22, lipase wnl, UA negative.  EKG: NSR w/ no acute ischemic changes  Imaging:   *CTAP w/ PO & IV contrast: redundant sigmoid colon; prominent rectal wall w/ circumferential thickening, possibly 2/2 incomplete distention vs pathologic etiology; resolution of L pleural effusion (since 2/14 and 10/31); near complete resolution of ascites; mild nodularity of hepatic contour compatible w/ cirrhosis; splenomegaly; nonspecific mild stranding in fat surrounding proximal inferior mesenteric artery (ddx focal mild vasculitis), no occlusion, possible moderate ostial stenosis.  Interventions: toradol 15mg IV x1, MG 1g IV x1, reglan 10mg PO x1, NS 2L (04 Mar 2024 21:34)    GI was consulted for symptomatic anemia. pt. seen and examined at bedside, He complains of watery diarrhea x 2, abd pain today. He also complains of lightheadedness with changing position. He denies any blood, black tarry stool, N,V at this time. Pt tested positive for orthostatic hypotension. Hb is stable at 7.5 (baseline ~8).       Allergies    aspirin (Pruritus)    Intolerances      Home Medications:  metFORMIN 500 mg oral tablet: 1 tab(s) orally 2 times a day (12 Nov 2023 16:27)  spironolactone 25 mg oral tablet: 1 tab(s) orally once a day everyday except MWF (04 Mar 2024 23:34)  sucralfate 1 g oral tablet: 1 tab(s) orally 4 times a day (04 Mar 2024 23:55)    MEDICATIONS:  MEDICATIONS  (STANDING):  dextrose 5%. 1000 milliLiter(s) (100 mL/Hr) IV Continuous <Continuous>  dextrose 5%. 1000 milliLiter(s) (50 mL/Hr) IV Continuous <Continuous>  dextrose 50% Injectable 12.5 Gram(s) IV Push once  dextrose 50% Injectable 25 Gram(s) IV Push once  dextrose 50% Injectable 25 Gram(s) IV Push once  glucagon  Injectable 1 milliGRAM(s) IntraMuscular once  influenza  Vaccine (HIGH DOSE) 0.7 milliLiter(s) IntraMuscular once  insulin lispro (ADMELOG) corrective regimen sliding scale   SubCutaneous three times a day before meals  insulin lispro (ADMELOG) corrective regimen sliding scale   SubCutaneous at bedtime  lactated ringers. 1000 milliLiter(s) (150 mL/Hr) IV Continuous <Continuous>  pantoprazole  Injectable 40 milliGRAM(s) IV Push every 12 hours  rifAXIMin 550 milliGRAM(s) Oral every 12 hours  sucralfate 1 Gram(s) Oral every 6 hours    MEDICATIONS  (PRN):  dextrose Oral Gel 15 Gram(s) Oral once PRN Blood Glucose LESS THAN 70 milliGRAM(s)/deciliter    PAST MEDICAL & SURGICAL HISTORY:  CAD (coronary artery disease)      HTN (hypertension)      Cirrhosis      Esophageal varices in cirrhosis      History of TIAs      H/O: GI bleed      Gout      HLD (hyperlipidemia)      History of portal hypertension      Diabetes mellitus      No significant past surgical history        FAMILY HISTORY:  No pertinent family history in first degree relatives      SOCIAL HISTORY:  Tobacco: [ ] Current, [ ] Former, [ ] Never; Pack Years:  Alcohol:  Illicit Drugs:    REVIEW OF SYSTEMS:  CONSTITUTIONAL: No weakness, fevers or chills, + lightheadedness  HEENT: No visual changes; No vertigo or throat pain   NECK: No pain or stiffness  RESPIRATORY: No cough, wheezing, hemoptysis; No shortness of breath  CARDIOVASCULAR: No chest pain or palpitations  GASTROINTESTINAL: As above.  GENITOURINARY: No dysuria, frequency or hematuria  NEUROLOGICAL: No numbness or weakness  SKIN: No itching, burning, rashes, or lesions   All other 10 review of systems is negative unless indicated above.    Vital Signs Last 24 Hrs  T(C): 36.6 (05 Mar 2024 07:13), Max: 36.7 (04 Mar 2024 15:29)  T(F): 97.9 (05 Mar 2024 07:13), Max: 98 (04 Mar 2024 15:29)  HR: 68 (05 Mar 2024 07:13) (68 - 93)  BP: 154/85 (05 Mar 2024 07:13) (127/80 - 166/78)  BP(mean): 108 (05 Mar 2024 07:13) (108 - 108)  RR: 18 (05 Mar 2024 07:13) (18 - 20)  SpO2: 99% (05 Mar 2024 07:13) (98% - 100%)    Parameters below as of 05 Mar 2024 07:13  Patient On (Oxygen Delivery Method): room air          PHYSICAL EXAM:    General: in no acute distress  Eyes: Anicteric sclerae, moist conjunctivae  HENT: Moist mucous membranes  Neck: Trachea midline, supple  Lungs: Normal respiratory effort, no intercostal retractions  Cardiovascular: RRR  Abdomen: Soft, mild tenderness at the LLQ, non-distended; No rebound or guarding  Extremities: Normal range of motion, No clubbing, cyanosis or edema  Neurological: Alert and oriented x3  Skin: Warm and dry. No obvious rash    LABS:                        7.5    1.99  )-----------( 79       ( 05 Mar 2024 05:30 )             24.9     03-05    142  |  106  |  9   ----------------------------<  119<H>  3.6   |  25  |  0.90    Ca    8.5      05 Mar 2024 05:30  Phos  4.1     03-05  Mg     1.6     03-05    TPro  6.2  /  Alb  3.3  /  TBili  2.1<H>  /  DBili  0.6<H>  /  AST  39  /  ALT  18  /  AlkPhos  125<H>  03-05        PT/INR - ( 04 Mar 2024 11:44 )   PT: 16.1 sec;   INR: 1.43          PTT - ( 04 Mar 2024 11:44 )  PTT:31.6 sec    Urinalysis with Rflx Culture (collected 04 Mar 2024 11:44)      RADIOLOGY & ADDITIONAL STUDIES:     Reviewed

## 2024-03-05 NOTE — DISCHARGE NOTE PROVIDER - CARE PROVIDER_API CALL
Sofie Singh  [address], [address]  [place] [address]  Phone: [phone]  Fax: [phone]  Follow Up Time: 1 month   Sofie Singh  Hematology  [address], [address]  [place] [address]  Phone: [phone]  Fax: [phone]  Follow Up Time: 1 month    Marty Kim  Neurology  [address] [place] [address], [address]  [place] [address]  Phone: [phone]  Fax: [phone]  Follow Up Time: 1 month   Drew Box  Cardiology  [address] [place] [address]  Phone: [phone]  Fax: [phone]  Established Patient  Follow Up Time: 2 weeks

## 2024-03-05 NOTE — DISCHARGE NOTE PROVIDER - NSDCFUSCHEDAPPT_GEN_ALL_CORE_FT
John Lawson  [facility]  HEPATOLOGY [address]  Scheduled Appointment: 03/14/2024

## 2024-03-06 LAB
ADD ON TEST-SPECIMEN IN LAB: SIGNIFICANT CHANGE UP
ALBUMIN SERPL ELPH-MCNC: 3.4 G/DL — SIGNIFICANT CHANGE UP (ref 3.3–5)
ALP SERPL-CCNC: 155 U/L — HIGH (ref 40–120)
ALT FLD-CCNC: 17 U/L — SIGNIFICANT CHANGE UP (ref 10–45)
ANION GAP SERPL CALC-SCNC: 12 MMOL/L — SIGNIFICANT CHANGE UP (ref 5–17)
APTT BLD: 33.7 SEC — SIGNIFICANT CHANGE UP (ref 24.5–35.6)
AST SERPL-CCNC: 37 U/L — SIGNIFICANT CHANGE UP (ref 10–40)
BASOPHILS # BLD AUTO: 0.01 K/UL — SIGNIFICANT CHANGE UP (ref 0–0.2)
BASOPHILS NFR BLD AUTO: 0.4 % — SIGNIFICANT CHANGE UP (ref 0–2)
BILIRUB SERPL-MCNC: 1.4 MG/DL — HIGH (ref 0.2–1.2)
BUN SERPL-MCNC: 10 MG/DL — SIGNIFICANT CHANGE UP (ref 7–23)
C DIFF GDH STL QL: NEGATIVE — SIGNIFICANT CHANGE UP
C DIFF GDH STL QL: SIGNIFICANT CHANGE UP
CALCIUM SERPL-MCNC: 8.4 MG/DL — SIGNIFICANT CHANGE UP (ref 8.4–10.5)
CHLORIDE SERPL-SCNC: 105 MMOL/L — SIGNIFICANT CHANGE UP (ref 96–108)
CO2 SERPL-SCNC: 22 MMOL/L — SIGNIFICANT CHANGE UP (ref 22–31)
CREAT SERPL-MCNC: 0.85 MG/DL — SIGNIFICANT CHANGE UP (ref 0.5–1.3)
CRP SERPL-MCNC: 3.7 MG/L — SIGNIFICANT CHANGE UP (ref 0–4)
EGFR: 96 ML/MIN/1.73M2 — SIGNIFICANT CHANGE UP
EOSINOPHIL # BLD AUTO: 0.07 K/UL — SIGNIFICANT CHANGE UP (ref 0–0.5)
EOSINOPHIL NFR BLD AUTO: 2.7 % — SIGNIFICANT CHANGE UP (ref 0–6)
GI PCR PANEL: SIGNIFICANT CHANGE UP
GLUCOSE BLDC GLUCOMTR-MCNC: 121 MG/DL — HIGH (ref 70–99)
GLUCOSE BLDC GLUCOMTR-MCNC: 130 MG/DL — HIGH (ref 70–99)
GLUCOSE BLDC GLUCOMTR-MCNC: 133 MG/DL — HIGH (ref 70–99)
GLUCOSE BLDC GLUCOMTR-MCNC: 174 MG/DL — HIGH (ref 70–99)
GLUCOSE SERPL-MCNC: 125 MG/DL — HIGH (ref 70–99)
HCT VFR BLD CALC: 25.5 % — LOW (ref 39–50)
HGB BLD-MCNC: 7.6 G/DL — LOW (ref 13–17)
IMM GRANULOCYTES NFR BLD AUTO: 0.4 % — SIGNIFICANT CHANGE UP (ref 0–0.9)
INR BLD: 1.5 — HIGH (ref 0.85–1.18)
LYMPHOCYTES # BLD AUTO: 0.56 K/UL — LOW (ref 1–3.3)
LYMPHOCYTES # BLD AUTO: 21.9 % — SIGNIFICANT CHANGE UP (ref 13–44)
MAGNESIUM SERPL-MCNC: 1.8 MG/DL — SIGNIFICANT CHANGE UP (ref 1.6–2.6)
MCHC RBC-ENTMCNC: 23.5 PG — LOW (ref 27–34)
MCHC RBC-ENTMCNC: 29.8 GM/DL — LOW (ref 32–36)
MCV RBC AUTO: 78.7 FL — LOW (ref 80–100)
MELD SCORE WITH DIALYSIS: 26 POINTS — SIGNIFICANT CHANGE UP
MELD SCORE WITHOUT DIALYSIS: 12 POINTS — SIGNIFICANT CHANGE UP
MONOCYTES # BLD AUTO: 0.45 K/UL — SIGNIFICANT CHANGE UP (ref 0–0.9)
MONOCYTES NFR BLD AUTO: 17.6 % — HIGH (ref 2–14)
NEUTROPHILS # BLD AUTO: 1.46 K/UL — LOW (ref 1.8–7.4)
NEUTROPHILS NFR BLD AUTO: 57 % — SIGNIFICANT CHANGE UP (ref 43–77)
NRBC # BLD: 0 /100 WBCS — SIGNIFICANT CHANGE UP (ref 0–0)
PHOSPHATE SERPL-MCNC: 2.9 MG/DL — SIGNIFICANT CHANGE UP (ref 2.5–4.5)
PLATELET # BLD AUTO: 77 K/UL — LOW (ref 150–400)
POTASSIUM SERPL-MCNC: 3.8 MMOL/L — SIGNIFICANT CHANGE UP (ref 3.5–5.3)
POTASSIUM SERPL-SCNC: 3.8 MMOL/L — SIGNIFICANT CHANGE UP (ref 3.5–5.3)
PROT SERPL-MCNC: 6.3 G/DL — SIGNIFICANT CHANGE UP (ref 6–8.3)
PROTHROM AB SERPL-ACNC: 16.9 SEC — HIGH (ref 9.5–13)
RBC # BLD: 3.24 M/UL — LOW (ref 4.2–5.8)
RBC # FLD: 16.4 % — HIGH (ref 10.3–14.5)
SODIUM SERPL-SCNC: 139 MMOL/L — SIGNIFICANT CHANGE UP (ref 135–145)
WBC # BLD: 2.56 K/UL — LOW (ref 3.8–10.5)
WBC # FLD AUTO: 2.56 K/UL — LOW (ref 3.8–10.5)

## 2024-03-06 PROCEDURE — 70450 CT HEAD/BRAIN W/O DYE: CPT | Mod: 26

## 2024-03-06 PROCEDURE — 99233 SBSQ HOSP IP/OBS HIGH 50: CPT | Mod: GC

## 2024-03-06 PROCEDURE — 99233 SBSQ HOSP IP/OBS HIGH 50: CPT

## 2024-03-06 RX ORDER — ALBUMIN HUMAN 25 %
50 VIAL (ML) INTRAVENOUS EVERY 8 HOURS
Refills: 0 | Status: COMPLETED | OUTPATIENT
Start: 2024-03-06 | End: 2024-03-07

## 2024-03-06 RX ORDER — LACTULOSE 10 G/15ML
15 SOLUTION ORAL EVERY 8 HOURS
Refills: 0 | Status: DISCONTINUED | OUTPATIENT
Start: 2024-03-06 | End: 2024-03-06

## 2024-03-06 RX ORDER — PANTOPRAZOLE SODIUM 20 MG/1
40 TABLET, DELAYED RELEASE ORAL EVERY 12 HOURS
Refills: 0 | Status: DISCONTINUED | OUTPATIENT
Start: 2024-03-06 | End: 2024-03-09

## 2024-03-06 RX ORDER — IRON SUCROSE 20 MG/ML
200 INJECTION, SOLUTION INTRAVENOUS EVERY 24 HOURS
Refills: 0 | Status: COMPLETED | OUTPATIENT
Start: 2024-03-06 | End: 2024-03-08

## 2024-03-06 RX ORDER — POLYETHYLENE GLYCOL 3350 17 G/17G
17 POWDER, FOR SOLUTION ORAL EVERY 24 HOURS
Refills: 0 | Status: DISCONTINUED | OUTPATIENT
Start: 2024-03-06 | End: 2024-03-06

## 2024-03-06 RX ORDER — POLYETHYLENE GLYCOL 3350 17 G/17G
17 POWDER, FOR SOLUTION ORAL
Refills: 0 | Status: COMPLETED | OUTPATIENT
Start: 2024-03-06 | End: 2024-03-06

## 2024-03-06 RX ORDER — SENNA PLUS 8.6 MG/1
2 TABLET ORAL AT BEDTIME
Refills: 0 | Status: DISCONTINUED | OUTPATIENT
Start: 2024-03-06 | End: 2024-03-09

## 2024-03-06 RX ADMIN — POLYETHYLENE GLYCOL 3350 17 GRAM(S): 17 POWDER, FOR SOLUTION ORAL at 20:10

## 2024-03-06 RX ADMIN — IRON SUCROSE 110 MILLIGRAM(S): 20 INJECTION, SOLUTION INTRAVENOUS at 16:44

## 2024-03-06 RX ADMIN — PANTOPRAZOLE SODIUM 40 MILLIGRAM(S): 20 TABLET, DELAYED RELEASE ORAL at 06:23

## 2024-03-06 RX ADMIN — SENNA PLUS 2 TABLET(S): 8.6 TABLET ORAL at 22:20

## 2024-03-06 RX ADMIN — Medication 2: at 13:25

## 2024-03-06 RX ADMIN — PANTOPRAZOLE SODIUM 40 MILLIGRAM(S): 20 TABLET, DELAYED RELEASE ORAL at 19:07

## 2024-03-06 RX ADMIN — Medication 50 MILLILITER(S): at 19:07

## 2024-03-06 RX ADMIN — POLYETHYLENE GLYCOL 3350 17 GRAM(S): 17 POWDER, FOR SOLUTION ORAL at 19:07

## 2024-03-06 RX ADMIN — Medication 1 GRAM(S): at 06:24

## 2024-03-06 RX ADMIN — Medication 50 MILLILITER(S): at 12:19

## 2024-03-06 RX ADMIN — Medication 1 GRAM(S): at 22:20

## 2024-03-06 RX ADMIN — Medication 1 GRAM(S): at 11:25

## 2024-03-06 RX ADMIN — Medication 1 GRAM(S): at 17:10

## 2024-03-06 RX ADMIN — LACTULOSE 15 GRAM(S): 10 SOLUTION ORAL at 12:20

## 2024-03-06 NOTE — PROGRESS NOTE ADULT - PROBLEM SELECTOR PLAN 8
Home meds: metformin 500mg BID. A1C was 7.0 in 09/2023, A1c normal at 5.9 at admission  5.9 A1C  Plan:  - mISS  - monitor FS  - consistent carb/DASH diet

## 2024-03-06 NOTE — PROGRESS NOTE ADULT - PROBLEM SELECTOR PLAN 6
Home meds: sucralfate 1g QID and protonix 40mg BID. Of note pt was discharged 11/2023 w/ prescription for protonix 40mg PO BID w/ plan to switch to qd after 2 weeks, however per wife is still taking it BID. No signs or symptoms of GIB at this time.  Hgb drop to 7.5 from 8.1 at admit is concerning  - c/w sucralfate 1g PO q6h  - protonix 40mg PO BID  - monitor for signs/symptoms of GIB  - f/u GI recs Home meds: sucralfate 1g QID and protonix 40mg BID. Of note pt was discharged 11/2023 w/ prescription for protonix 40mg PO BID w/ plan to switch to qd after 2 weeks, however per wife is still taking it BID. No signs or symptoms of GIB at this time.  Hgb drop to 7.5 from 8.1 at admit is concerning. Hgb stable today, pt had formed bowel movement with no blood.  - c/w sucralfate 1g PO q6h  - protonix 40mg PO BID  - monitor for signs/symptoms of GIB  - f/u GI recs Home meds: sucralfate 1g QID and protonix 40mg BID. Of note pt was discharged 11/2023 w/ prescription for protonix 40mg PO BID w/ plan to switch to qd after 2 weeks, however per wife is still taking it BID. No signs or symptoms of GIB at this time.  Hgb drop to 7.5 from 8.1 at admit is concerning. Hgb stable today, pt had formed bowel movement with no blood.  - c/w sucralfate 1g PO q6h  - protonix 40mg PO BID  - start lactulose for bowel regimen to reduce straining  - monitor for signs/symptoms of GIB  - f/u GI recs

## 2024-03-06 NOTE — PROGRESS NOTE ADULT - SUBJECTIVE AND OBJECTIVE BOX
GASTROENTEROLOGY PROGRESS NOTE  Patient seen and examined at bedside. He still complains of dizziness and lightheadedness with changing position, However, he denies diarrhea or abd pain at this time. He states that his diarrhea has resolved and he had a BM that is formed and brown. He  denies any blood or tarry stool. He is still orthostatic positive at this time.     PERTINENT REVIEW OF SYSTEMS:  CONSTITUTIONAL: No weakness, fevers or chills, + lightheadedness  HEENT: No visual changes; No vertigo or throat pain   GASTROINTESTINAL: As above.  NEUROLOGICAL: No numbness or weakness  SKIN: No itching, burning, rashes, or lesions     Allergies    aspirin (Pruritus)    Intolerances      MEDICATIONS:  MEDICATIONS  (STANDING):  dextrose 5%. 1000 milliLiter(s) (100 mL/Hr) IV Continuous <Continuous>  dextrose 5%. 1000 milliLiter(s) (50 mL/Hr) IV Continuous <Continuous>  dextrose 50% Injectable 12.5 Gram(s) IV Push once  dextrose 50% Injectable 25 Gram(s) IV Push once  dextrose 50% Injectable 25 Gram(s) IV Push once  glucagon  Injectable 1 milliGRAM(s) IntraMuscular once  influenza  Vaccine (HIGH DOSE) 0.7 milliLiter(s) IntraMuscular once  insulin lispro (ADMELOG) corrective regimen sliding scale   SubCutaneous three times a day before meals  insulin lispro (ADMELOG) corrective regimen sliding scale   SubCutaneous at bedtime  pantoprazole    Tablet 40 milliGRAM(s) Oral every 12 hours  rifAXIMin 550 milliGRAM(s) Oral every 12 hours  sucralfate 1 Gram(s) Oral every 6 hours    MEDICATIONS  (PRN):  dextrose Oral Gel 15 Gram(s) Oral once PRN Blood Glucose LESS THAN 70 milliGRAM(s)/deciliter    Vital Signs Last 24 Hrs  T(C): 36.4 (06 Mar 2024 05:57), Max: 36.9 (05 Mar 2024 20:27)  T(F): 97.6 (06 Mar 2024 05:57), Max: 98.4 (05 Mar 2024 20:27)  HR: 75 (06 Mar 2024 05:57) (73 - 76)  BP: 159/95 (06 Mar 2024 05:57) (147/89 - 159/95)  BP(mean): --  RR: 18 (06 Mar 2024 05:57) (17 - 18)  SpO2: 97% (06 Mar 2024 05:57) (97% - 98%)    Parameters below as of 06 Mar 2024 05:57  Patient On (Oxygen Delivery Method): room air        PHYSICAL EXAM:    General: in no acute distress  HEENT: MMM, conjunctiva and sclera clear  Gastrointestinal: Soft non-tender non-distended; No rebound or guarding  Skin: Warm and dry. No obvious rash    LABS:                        7.6    2.56  )-----------( 77       ( 06 Mar 2024 05:30 )             25.5     03-06    139  |  105  |  10  ----------------------------<  125<H>  3.8   |  22  |  0.85    Ca    8.4      06 Mar 2024 05:30  Phos  2.9     03-06  Mg     1.8     03-06    TPro  6.3  /  Alb  3.4  /  TBili  1.4<H>  /  DBili  x   /  AST  37  /  ALT  17  /  AlkPhos  155<H>  03-06    PT/INR - ( 06 Mar 2024 05:30 )   PT: 16.9 sec;   INR: 1.50          PTT - ( 06 Mar 2024 05:30 )  PTT:33.7 sec      Urinalysis Basic - ( 06 Mar 2024 05:30 )    Color: x / Appearance: x / SG: x / pH: x  Gluc: 125 mg/dL / Ketone: x  / Bili: x / Urobili: x   Blood: x / Protein: x / Nitrite: x   Leuk Esterase: x / RBC: x / WBC x   Sq Epi: x / Non Sq Epi: x / Bacteria: x                Urinalysis with Rflx Culture (collected 04 Mar 2024 11:44)      RADIOLOGY & ADDITIONAL STUDIES:  Reviewed

## 2024-03-06 NOTE — PROGRESS NOTE ADULT - ATTENDING COMMENTS
Seen at bedside   Feels better   Abdominal pain is subsiding. Minimal tenderness of LLQ  CT scan reviewed. For sigmoidoscopy in am to assess rectum and left colon  Orthostasis with no clear etiology. Could be due to Coreg.   Hold coreg for now. Please check with cardiology if beta blockers are indicated and in that case, consider metoprolol which he was tolerating well in the past  One unit blood transfusion if he continues to be orthostatic

## 2024-03-06 NOTE — PROGRESS NOTE ADULT - PROBLEM SELECTOR PLAN 5
Hx of alcoholic liver cirrhosis, portal hypertension c/b esophageal varices (and UGIB, last GIB in 11/2023, EGD 11/13 duodenitis, nonbleeding varices, healing PUD), L pleural effusion s/p thoracentesis in 10/2023. Home meds: rifaximin 550mg BID, lasix 40mg qd, spironolactone 25mg (everyday except MWF), coreg 3.125mg BID  Coags worsening, PT of 16.1 and INR of 14.3  - c/w rifaximin 550mg PO BID  - holding coreg, lasix, spironolactone i/s/o orthostatic hypotension -- restart as indicated  - Daily CBC, CMP, Coags

## 2024-03-06 NOTE — PROGRESS NOTE ADULT - PROBLEM SELECTOR PLAN 1
Likely 2/2 hypovolemia i/s/o poor PO intake for the past coupe of days, multiple medications affecting his BP and Liver disease  BP 160s/70s-80s while supine --> 100s-110s/70s standing, x2. S/p 2L NS in ED.  Patient continues to be orthostatic, /97 supine>138/67 standing.  Plan:  - s/p volume repletion with LR standing @150cc/hr for 6 hours, f/u orthostatics  - encourage PO intake after  - holding coreg, lasix, spironolactone as below  - encourage PO intake Likely 2/2 hypovolemia i/s/o poor PO intake for the past coupe of days, multiple medications affecting his BP and Liver disease. Could also be autonomic dysfunction from CLD.   BP 160s/70s-80s while supine --> 100s-110s/70s standing, x2. S/p 2L NS in ED.  Patient continues to be orthostatic today  Plan:  - Start with compression socks  - f/u orthostatics  - replete LR as needed; will hold off albumin 25% for now  - encourage PO intake   - holding coreg, lasix, spironolactone as below Likely 2/2 hypovolemia i/s/o poor PO intake for the past coupe of days, multiple medications affecting his BP and Liver disease. Could also be autonomic dysfunction from CLD.   BP 160s/70s-80s while supine --> 100s-110s/70s standing, x2. S/p 2L NS in ED.  Patient continues to be orthostatic today  Plan:  - Start Albumin 25% 50cc q8h x 24hrs per GI consult; appreciate recommendations  - Start compression socks  - f/u orthostatics  - encourage PO intake   - holding coreg, lasix, spironolactone as below Likely 2/2 hypovolemia i/s/o poor PO intake for the past coupe of days, multiple medications affecting his BP and Liver disease. Could also be autonomic dysfunction from CLD.   BP 160s/70s-80s while supine --> 100s-110s/70s standing, x2. S/p 2L NS in ED.  Patient continues to be orthostatic today  Plan:  - Start Albumin 25% 50cc q8h x 24hrs per GI consult; appreciate recommendations  - Start compression socks  - Due to complaints of dizziness and occipital headache, send for CT head   - f/u orthostatics  - encourage PO intake   - holding coreg, lasix, spironolactone as below

## 2024-03-06 NOTE — PROGRESS NOTE ADULT - PROBLEM SELECTOR PLAN 2
Likely i/s/o Liver disease   Noted with Hgb ~8-9 on admission which has been baseline for the past couple of months. Hgb drop to 7.5 (lowest in past year) from 8.1 at admission (Hypoproliferative). There was concern for possible GI bleed at Dr Lawson's office,. , however there is no signs of bleeding at this time. Pt with diarrhea at this time and not black or blood in stool. drop in all cell lines so may have dilutional component  Plan:  - f/u GI recs  - Maintain active T&S   - Transfuse if Hgb <7 Likely i/s/o Liver disease   Noted with Hgb ~8-9 on admission which has been baseline for the past couple of months. Hgb drop to 7.5 (lowest in past year) from 8.1 at admission (Hypoproliferative). There was concern for possible GI bleed at Dr Lawson's office, however there is no signs of bleeding at this time. Pt with diarrhea at this time and not black or blood in stool. drop in all cell lines so may have dilutional component. Hgb stable today at 7.6  Plan:  - f/u GI recs  - Maintain active T&S   - Transfuse if Hgb <7

## 2024-03-06 NOTE — PROGRESS NOTE ADULT - ASSESSMENT
64M with PMH of former smoker with hx HTN, HLD, severe 3 vessel CAD s/p CABG x4 9/2023 (not on ASA due to allergy and was on Plavix until UGIB in 11/23), DM, gout, TIA (14 yrs ago), gastric ulcer, rectal bleed, LIZ, alcoholic liver cirrhosis (Abstinent since Feb 2023), portal hypertension c/b esophageal varices (and UGIB, last GIB in 11/2023, EGD 11/13 duodenitis, nonbleeding varices, healing PUD), pericardial effusion, L pleural effusion s/p thoracentesis in 10/2023, presenting w/ 2 days of dizziness, headaches, generalized malaise, decreased PO intake, nausea, and lower abd pain, BIBEMS from Dr. Lawson's office for episode of diaphoresis/pallor, found to have orthostatic hypotension, pancytopenia admitted for further evaluation and management.

## 2024-03-06 NOTE — PROGRESS NOTE ADULT - ASSESSMENT
64M PMH of former smoker with hx HTN, HLD, severe 3 vessel CAD s/p CABG x4 9/2023, DM, gout, TIA (14 yrs ago), gastric ulcer, rectal bleed, LIZ, alcoholic liver cirrhosis, portal hypertension c/b esophageal varices (and UGIB, last GIB in 11/2023, last EGD 01/2024 showed normal duodenum, distal esophageal varix, hyperplastic lesion, L pleural effusion s/p thoracentesis in 10/2023, presenting w/ 2 days of dizziness, headaches, generalized malaise, admitted for orthostatic hypotension. GI was consulted for anemia.     #Symptomatic anemia  #Orthostatic hypotension  #Liver Cirrhosis  p/w Hb 8.1 dropped to 7.5 (likely due to hemoconcentration)  baseline hb ~8  EGD 01/2024 showed normal duodenum, distal esophageal varix, hyperplastic lesion  Orthostatic vitals positive  Orthostatic hypotension likely in the setting of symptomatic anemia and dehydration due to diarrhea  No signs of bleeding at this time  transfuse if hb <7  active type and screen  2 large IV lines  hold BP meds  c/w IVF  Albumin 25% 50cc q8h x 24H  No acute intervention at this time    #Diarrhea  p/w lower abdominal pain, diarrhea  CT AP shows: No diverticulitis, Prominent rectal wall with circumferential thickening  f/u gi pcr  c diff negative   hold off antibiotics for now, no signs of infection  replace electrolytes as needed  Resolved     Thank you for the opportunity to participate in the care of the patient  These are preliminary recommendations until further discussion with the attending. Final recommendations awaiting attending' s signature.  64M PMH of former smoker with hx HTN, HLD, severe 3 vessel CAD s/p CABG x4 9/2023, DM, gout, TIA (14 yrs ago), gastric ulcer, rectal bleed, LIZ, alcoholic liver cirrhosis, portal hypertension c/b esophageal varices (and UGIB, last GIB in 11/2023, last EGD 01/2024 showed normal duodenum, distal esophageal varix, hyperplastic lesion, L pleural effusion s/p thoracentesis in 10/2023, presenting w/ 2 days of dizziness, headaches, generalized malaise, admitted for orthostatic hypotension. GI was consulted for anemia.     #Symptomatic anemia  #Orthostatic hypotension  #Liver Cirrhosis  p/w Hb 8.1 dropped to 7.5 (likely due to hemoconcentration)  baseline hb ~8  EGD 01/2024 showed normal duodenum, distal esophageal varix, hyperplastic lesion  Orthostatic vitals positive  Orthostatic hypotension likely in the setting of symptomatic anemia and dehydration due to diarrhea  No signs of bleeding at this time  transfuse if hb <7  active type and screen  2 large IV lines  hold BP meds  consider cardiology consult for BP medication adjustment on discharge  c/w IVF  Albumin 25% 50cc q8h x 24H  Plan for flex sigmoidoscopy in AM   Please give tap water enema x1 at 7 AM  Keep pt NPO after midnight    #Diarrhea  p/w lower abdominal pain, diarrhea  CT AP shows: No diverticulitis, Prominent rectal wall with circumferential thickening  f/u gi pcr  c diff negative   hold off antibiotics for now, no signs of infection  replace electrolytes as needed  Resolved     Thank you for the opportunity to participate in the care of the patient  These are preliminary recommendations until further discussion with the attending. Final recommendations awaiting attending' s signature.  64M PMH of former smoker with hx HTN, HLD, severe 3 vessel CAD s/p CABG x4 9/2023, DM, gout, TIA (14 yrs ago), gastric ulcer, rectal bleed, LIZ, alcoholic liver cirrhosis, portal hypertension c/b esophageal varices (and UGIB, last GIB in 11/2023, last EGD 01/2024 showed normal duodenum, distal esophageal varix, hyperplastic lesion, L pleural effusion s/p thoracentesis in 10/2023, presenting w/ 2 days of dizziness, headaches, generalized malaise, admitted for orthostatic hypotension. GI was consulted for anemia.     #Symptomatic anemia  #Orthostatic hypotension  #Liver Cirrhosis  #Abnormal CT  p/w Hb 8.1 dropped to 7.5 (likely due to hemoconcentration)  baseline hb ~8  EGD 01/2024 showed normal duodenum, distal esophageal varix, hyperplastic lesion  Orthostatic vitals positive  Initially orthostatic hypotension suspected to be related to symptomatic anemia however Hgb remains stable and dehydration however diarrhea ceased. Suspect more so medication related.   No signs of bleeding at this time, low suspicion for active bleed  transfuse if hb <7  active type and screen  2 large IV lines  hold BP meds, including home meds, Coreg, Lasix and Spironolactone  Appreciate cardiology recs regarding need for Beta blocker in the setting of known cardiac history as well as choice of beta blocker (can include selective BB such as Metoprolol)  s/p 24 hrs Albumin 25%, no longer needs  Plan for flex sigmoidoscopy in AM based on circumferential rectal thickening noted on admission CT  Please give tap water enema x1 at 7 AM  Order for Miralax 17g x 2 tonight (no colonoscopy prep)  Keep pt NPO after midnight  Add on CRP    #Diarrhea  p/w lower abdominal pain, diarrhea  CT AP shows: No diverticulitis, Prominent rectal wall with circumferential thickening  f/u gi pcr  c diff negative   hold off antibiotics for now, no signs of infection  replace electrolytes as needed  Resolved     Thank you for the opportunity to participate in the care of the patient  These are preliminary recommendations until further discussion with the attending. Final recommendations awaiting attending' s signature.     GI Fellow Addendum: I have seen and examined the patient and agree with the subjective and objective information as above, as well as the assessment and plan which I have edited as needed.

## 2024-03-06 NOTE — PROGRESS NOTE ADULT - ATTENDING COMMENTS
Patient reports feeling dizzy when standing up, denies symptoms while in bed, denies weakness, no numbness. Denies abdominal pain, no N/V. No other complaints or events reported    General: AOX3, NAD, sitting in bed, speaking in full sentences, no labored breathing  HEENT: AT/NC, no facial asymmetry, no nystagmus  Lungs: poor inspiration, no crackles  Heart: RRR  Abdomen: soft, no tenderness, + BS  Extremities: warm, no edema,  no tenderness, no focal deficit     Labs, reviewed    Patient with dizziness with ambulation, no neuro deficit, no nystagmus on exam, denies symptoms when lying down. Has pancytopenia, no signs fo infection, no ascites. Will attempt Albumin, follow-up Hb, no signs of bleeding  Will get CT head  Will need hematology follow-up for pancytopenia

## 2024-03-06 NOTE — PROGRESS NOTE ADULT - PROBLEM SELECTOR PLAN 7
S/p CABG in 09/2023 by Dr. Hinton. Was on plavix after surgery, was held after the GIB in 11/2023. Per wife pt has hx of allergy to ASA many years ago had itching and rash, no anaphylaxis, she is unsure if it was a true allergy as it was so long ago. Pt is not on a statin at this time. , cholesterol 194, non HDL cholesterol 142.  - collateral from cardiologist regarding ASA, plavix, statin  - c/w DASH diet

## 2024-03-06 NOTE — PROGRESS NOTE ADULT - PROBLEM SELECTOR PLAN 3
Likely multifactorial i/s/o Liver disease vs Malignancy vs less likely Bone marrow involvement  WBC 2.77 on admission (baseline ~4), H&H 8.4/27.5 (baseline Hgb 8-9.5), MCV 77.9, plt 93 (baseline 80s-100s).   WBC dropped to 1.99, Hgb to 7.5, and platelets to 79. No signs of infection at this time.   Iron studies low total iron and ferritin, haptoglobin of 32, normal LDH.  - Daily CBC Likely multifactorial i/s/o Liver disease vs Malignancy vs less likely Bone marrow involvement  WBC 2.77 on admission (baseline ~4), H&H 8.4/27.5 (baseline Hgb 8-9.5), MCV 77.9, plt 93 (baseline 80s-100s).   WBC dropped to 1.99, Hgb to 7.5, and platelets to 79. No signs of infection at this time.   Iron studies low total iron and ferritin, haptoglobin of 32, normal LDH. CBC stable today.  - Daily CBC

## 2024-03-06 NOTE — PROGRESS NOTE ADULT - PROBLEM SELECTOR PLAN 9
F: None   E: Replete as necessary K>4 Mg>2  N: consistent carb/DASH diet  DVT Prophylaxis: NONE  GI prophylaxis: None   CODE STATUS: FULL CODE   Dispo: [facility] F: None   E: Replete as necessary K>4 Mg>2  N: consistent carb/DASH diet  DVT Prophylaxis: NONE  GI prophylaxis: lactulose; in addition to protonix and rifaximin noted above  CODE STATUS: FULL CODE   Dispo: [facility]

## 2024-03-06 NOTE — PROGRESS NOTE ADULT - SUBJECTIVE AND OBJECTIVE BOX
OVERNIGHT EVENTS:  Patient seen and examined. Slept well overnight with no acute complaints.    The pt had 1 bowel movement this morning which was formed and brown with no blood or melena. His diarrhea has resolved. He notes his abdominal pain has improved as well though he still continues to have some discomfort with palpation. He ate normally yesterday and denies any nausea or vomiting. His blurry vision has improved as well though he continues to complain of mild occipital region headache. He notes some dizziness when he gets up or walks. He says the last time he had a drink of alcohol was in September. He is concerned about his Hgb levels as he knows they are low.    Orthostatics this AM:  supine 154/92  sitting 133/86  standing 120/80    MEDICATIONS  (STANDING):  dextrose 5%. 1000 milliLiter(s) (100 mL/Hr) IV Continuous <Continuous>  dextrose 5%. 1000 milliLiter(s) (50 mL/Hr) IV Continuous <Continuous>  dextrose 50% Injectable 12.5 Gram(s) IV Push once  dextrose 50% Injectable 25 Gram(s) IV Push once  dextrose 50% Injectable 25 Gram(s) IV Push once  glucagon  Injectable 1 milliGRAM(s) IntraMuscular once  influenza  Vaccine (HIGH DOSE) 0.7 milliLiter(s) IntraMuscular once  insulin lispro (ADMELOG) corrective regimen sliding scale   SubCutaneous at bedtime  insulin lispro (ADMELOG) corrective regimen sliding scale   SubCutaneous three times a day before meals  pantoprazole    Tablet 40 milliGRAM(s) Oral every 12 hours  rifAXIMin 550 milliGRAM(s) Oral every 12 hours  sucralfate 1 Gram(s) Oral every 6 hours    MEDICATIONS  (PRN):  dextrose Oral Gel 15 Gram(s) Oral once PRN Blood Glucose LESS THAN 70 milliGRAM(s)/deciliter    Allergies    aspirin (Pruritus)    Intolerances      Vital Signs Last 24 Hrs  T(C): 36.4 (06 Mar 2024 05:57), Max: 36.9 (05 Mar 2024 20:27)  T(F): 97.6 (06 Mar 2024 05:57), Max: 98.4 (05 Mar 2024 20:27)  HR: 75 (06 Mar 2024 05:57) (73 - 76)  BP: 159/95 (06 Mar 2024 05:57) (147/89 - 159/95)  BP(mean): --  RR: 18 (06 Mar 2024 05:57) (17 - 18)  SpO2: 97% (06 Mar 2024 05:57) (97% - 98%)    Parameters below as of 06 Mar 2024 05:57  Patient On (Oxygen Delivery Method): room air        Physical exam:  General: No acute distress, awake and alert  EENT: Anicteric sclerae, moist conjunctivae, no lymphadenopathy  Cardiovascular: Normal S1S2, RRR, no MRG   Pulmonary: Lung sounds clear to auscultation, no accessory muscle use  GI: Abdomen soft, non-distended, non-tender  Extremities: Radial and DP pulses +2, no edema  Skin: Clean, no rashes or leasions  Neuro: No FND  Psych: A&O x3, appropriate affect    LABS:                        7.6    2.56  )-----------( 77       ( 06 Mar 2024 05:30 )             25.5     03-06    139  |  105  |  10  ----------------------------<  125<H>  3.8   |  22  |  0.85    Ca    8.4      06 Mar 2024 05:30  Phos  2.9     03-06  Mg     1.8     03-06    TPro  6.3  /  Alb  3.4  /  TBili  1.4<H>  /  DBili  x   /  AST  37  /  ALT  17  /  AlkPhos  155<H>  03-06    PT/INR - ( 06 Mar 2024 05:30 )   PT: 16.9 sec;   INR: 1.50          PTT - ( 06 Mar 2024 05:30 )  PTT:33.7 sec  Urinalysis Basic - ( 06 Mar 2024 05:30 )    Color: x / Appearance: x / SG: x / pH: x  Gluc: 125 mg/dL / Ketone: x  / Bili: x / Urobili: x   Blood: x / Protein: x / Nitrite: x   Leuk Esterase: x / RBC: x / WBC x   Sq Epi: x / Non Sq Epi: x / Bacteria: x        RADIOLOGY & ADDITIONAL TESTS:   OVERNIGHT EVENTS:  Patient seen and examined. Slept well overnight with no acute complaints.    The pt had 1 bowel movement this morning which was formed and brown with no blood or melena. His diarrhea has resolved. He notes his abdominal pain has improved as well though he still continues to have some discomfort with palpation. He ate normally yesterday and denies any nausea or vomiting. His blurry vision has improved as well though he continues to complain of mild occipital region headache. He notes some dizziness when he gets up or walks. He says the last time he had a drink of alcohol was in September. He is concerned about his Hgb levels as he knows they are low.    Orthostatics this AM:  supine 154/92  sitting 133/86  standing 120/80    MEDICATIONS  (STANDING):  dextrose 5%. 1000 milliLiter(s) (100 mL/Hr) IV Continuous <Continuous>  dextrose 5%. 1000 milliLiter(s) (50 mL/Hr) IV Continuous <Continuous>  dextrose 50% Injectable 12.5 Gram(s) IV Push once  dextrose 50% Injectable 25 Gram(s) IV Push once  dextrose 50% Injectable 25 Gram(s) IV Push once  glucagon  Injectable 1 milliGRAM(s) IntraMuscular once  influenza  Vaccine (HIGH DOSE) 0.7 milliLiter(s) IntraMuscular once  insulin lispro (ADMELOG) corrective regimen sliding scale   SubCutaneous at bedtime  insulin lispro (ADMELOG) corrective regimen sliding scale   SubCutaneous three times a day before meals  pantoprazole    Tablet 40 milliGRAM(s) Oral every 12 hours  rifAXIMin 550 milliGRAM(s) Oral every 12 hours  sucralfate 1 Gram(s) Oral every 6 hours    MEDICATIONS  (PRN):  dextrose Oral Gel 15 Gram(s) Oral once PRN Blood Glucose LESS THAN 70 milliGRAM(s)/deciliter    Allergies    aspirin (Pruritus)    Intolerances      Vital Signs Last 24 Hrs  T(C): 36.4 (06 Mar 2024 05:57), Max: 36.9 (05 Mar 2024 20:27)  T(F): 97.6 (06 Mar 2024 05:57), Max: 98.4 (05 Mar 2024 20:27)  HR: 75 (06 Mar 2024 05:57) (73 - 76)  BP: 159/95 (06 Mar 2024 05:57) (147/89 - 159/95)  BP(mean): --  RR: 18 (06 Mar 2024 05:57) (17 - 18)  SpO2: 97% (06 Mar 2024 05:57) (97% - 98%)    Parameters below as of 06 Mar 2024 05:57  Patient On (Oxygen Delivery Method): room air        Physical exam:  General: No acute distress, awake and alert  EENT: Anicteric sclerae, moist conjunctivae, no lymphadenopathy  Cardiovascular: Normal S1S2, RRR, no MRG   Pulmonary: Lung sounds clear to auscultation, no accessory muscle use  GI: Abdomen soft, non-distended, non-tender  Extremities: Radial and DP pulses +2, no edema  Skin: Clean, no rashes or leasions  Neuro: No FND  Psych: A&O x3, appropriate affect    LABS:                        7.6    2.56  )-----------( 77       ( 06 Mar 2024 05:30 )             25.5     03-06    139  |  105  |  10  ----------------------------<  125<H>  3.8   |  22  |  0.85    Ca    8.4      06 Mar 2024 05:30  Phos  2.9     03-06  Mg     1.8     03-06    TPro  6.3  /  Alb  3.4  /  TBili  1.4<H>  /  DBili  x   /  AST  37  /  ALT  17  /  AlkPhos  155<H>  03-06    PT/INR - ( 06 Mar 2024 05:30 )   PT: 16.9 sec;   INR: 1.50          PTT - ( 06 Mar 2024 05:30 )  PTT:33.7 sec  Urinalysis Basic - ( 06 Mar 2024 05:30 )    Color: x / Appearance: x / SG: x / pH: x  Gluc: 125 mg/dL / Ketone: x  / Bili: x / Urobili: x   Blood: x / Protein: x / Nitrite: x   Leuk Esterase: x / RBC: x / WBC x   Sq Epi: x / Non Sq Epi: x / Bacteria: x        RADIOLOGY & ADDITIONAL TESTS:  Stool PCR negative for C. diff OVERNIGHT EVENTS:  Patient seen and examined. Slept well overnight with no acute complaints.    The pt had 1 bowel movement this morning which was formed and brown with no blood or melena. His diarrhea has resolved. He notes his abdominal pain has improved as well though he still continues to have some discomfort with palpation. He ate normally yesterday and denies any nausea or vomiting. His blurry vision has improved as well though he continues to complain of mild occipital region headache. He notes some dizziness when he gets up or walks. He says the last time he had a drink of alcohol was in September. He is concerned about his Hgb levels as he knows they are low.    Orthostatics this AM:  supine 154/92  sitting 133/86  standing 120/80    MEDICATIONS  (STANDING):  dextrose 5%. 1000 milliLiter(s) (100 mL/Hr) IV Continuous <Continuous>  dextrose 5%. 1000 milliLiter(s) (50 mL/Hr) IV Continuous <Continuous>  dextrose 50% Injectable 12.5 Gram(s) IV Push once  dextrose 50% Injectable 25 Gram(s) IV Push once  dextrose 50% Injectable 25 Gram(s) IV Push once  glucagon  Injectable 1 milliGRAM(s) IntraMuscular once  influenza  Vaccine (HIGH DOSE) 0.7 milliLiter(s) IntraMuscular once  insulin lispro (ADMELOG) corrective regimen sliding scale   SubCutaneous at bedtime  insulin lispro (ADMELOG) corrective regimen sliding scale   SubCutaneous three times a day before meals  pantoprazole    Tablet 40 milliGRAM(s) Oral every 12 hours  rifAXIMin 550 milliGRAM(s) Oral every 12 hours  sucralfate 1 Gram(s) Oral every 6 hours    MEDICATIONS  (PRN):  dextrose Oral Gel 15 Gram(s) Oral once PRN Blood Glucose LESS THAN 70 milliGRAM(s)/deciliter    Allergies    aspirin (Pruritus)    Intolerances      Vital Signs Last 24 Hrs  T(C): 36.4 (06 Mar 2024 05:57), Max: 36.9 (05 Mar 2024 20:27)  T(F): 97.6 (06 Mar 2024 05:57), Max: 98.4 (05 Mar 2024 20:27)  HR: 75 (06 Mar 2024 05:57) (73 - 76)  BP: 159/95 (06 Mar 2024 05:57) (147/89 - 159/95)  BP(mean): --  RR: 18 (06 Mar 2024 05:57) (17 - 18)  SpO2: 97% (06 Mar 2024 05:57) (97% - 98%)    Parameters below as of 06 Mar 2024 05:57  Patient On (Oxygen Delivery Method): room air        Physical exam:  General: No acute distress, awake and alert  EENT: Anicteric sclerae, moist conjunctivae, no lymphadenopathy  Cardiovascular: Normal S1S2, RRR, no MRG   Pulmonary: Lung sounds clear to auscultation, no accessory muscle use  GI: Abdomen soft, non-distended, normoactive bowel sounds, mild tenderness to palpation of epigastric region, no guarding or rebound  Extremities: Radial and DP pulses +2, no edema  Skin: Clean, no rashes or leasions  Neuro: No FND  Psych: A&O x3, appropriate affect    LABS:                        7.6    2.56  )-----------( 77       ( 06 Mar 2024 05:30 )             25.5     03-06    139  |  105  |  10  ----------------------------<  125<H>  3.8   |  22  |  0.85    Ca    8.4      06 Mar 2024 05:30  Phos  2.9     03-06  Mg     1.8     03-06    TPro  6.3  /  Alb  3.4  /  TBili  1.4<H>  /  DBili  x   /  AST  37  /  ALT  17  /  AlkPhos  155<H>  03-06    PT/INR - ( 06 Mar 2024 05:30 )   PT: 16.9 sec;   INR: 1.50          PTT - ( 06 Mar 2024 05:30 )  PTT:33.7 sec  Urinalysis Basic - ( 06 Mar 2024 05:30 )    Color: x / Appearance: x / SG: x / pH: x  Gluc: 125 mg/dL / Ketone: x  / Bili: x / Urobili: x   Blood: x / Protein: x / Nitrite: x   Leuk Esterase: x / RBC: x / WBC x   Sq Epi: x / Non Sq Epi: x / Bacteria: x        RADIOLOGY & ADDITIONAL TESTS:  Stool PCR negative for C. diff

## 2024-03-06 NOTE — PROGRESS NOTE ADULT - PROBLEM SELECTOR PLAN 4
MR abdomen from 2/14/2024 showed 3.  5 mm arterially enhancing focus in the superior aspect of the right   hepatic lobe, as described above; not visualized on the previous CT.   LR-3.. Recommend one-year interval follow-up with MRI.  - Outpatient f/u

## 2024-03-07 ENCOUNTER — TRANSCRIPTION ENCOUNTER (OUTPATIENT)
Age: 64
End: 2024-03-07

## 2024-03-07 ENCOUNTER — RESULT REVIEW (OUTPATIENT)
Age: 64
End: 2024-03-07

## 2024-03-07 DIAGNOSIS — R10.32 LEFT LOWER QUADRANT PAIN: ICD-10-CM

## 2024-03-07 DIAGNOSIS — R53.1 WEAKNESS: ICD-10-CM

## 2024-03-07 DIAGNOSIS — Z87.11 PERSONAL HISTORY OF PEPTIC ULCER DISEASE: ICD-10-CM

## 2024-03-07 DIAGNOSIS — Z86.73 PERSONAL HISTORY OF TRANSIENT ISCHEMIC ATTACK (TIA), AND CEREBRAL INFARCTION WITHOUT RESIDUAL DEFICITS: ICD-10-CM

## 2024-03-07 DIAGNOSIS — Z88.6 ALLERGY STATUS TO ANALGESIC AGENT: ICD-10-CM

## 2024-03-07 DIAGNOSIS — E78.5 HYPERLIPIDEMIA, UNSPECIFIED: ICD-10-CM

## 2024-03-07 DIAGNOSIS — Z95.1 PRESENCE OF AORTOCORONARY BYPASS GRAFT: ICD-10-CM

## 2024-03-07 DIAGNOSIS — M10.9 GOUT, UNSPECIFIED: ICD-10-CM

## 2024-03-07 DIAGNOSIS — Z87.891 PERSONAL HISTORY OF NICOTINE DEPENDENCE: ICD-10-CM

## 2024-03-07 DIAGNOSIS — R73.03 PREDIABETES: ICD-10-CM

## 2024-03-07 DIAGNOSIS — I10 ESSENTIAL (PRIMARY) HYPERTENSION: ICD-10-CM

## 2024-03-07 DIAGNOSIS — I25.10 ATHEROSCLEROTIC HEART DISEASE OF NATIVE CORONARY ARTERY WITHOUT ANGINA PECTORIS: ICD-10-CM

## 2024-03-07 DIAGNOSIS — R10.9 UNSPECIFIED ABDOMINAL PAIN: ICD-10-CM

## 2024-03-07 DIAGNOSIS — K70.30 ALCOHOLIC CIRRHOSIS OF LIVER WITHOUT ASCITES: ICD-10-CM

## 2024-03-07 DIAGNOSIS — R42 DIZZINESS AND GIDDINESS: ICD-10-CM

## 2024-03-07 DIAGNOSIS — Z87.19 PERSONAL HISTORY OF OTHER DISEASES OF THE DIGESTIVE SYSTEM: ICD-10-CM

## 2024-03-07 LAB
ALBUMIN SERPL ELPH-MCNC: 3.3 G/DL — SIGNIFICANT CHANGE UP (ref 3.3–5)
ALP SERPL-CCNC: 138 U/L — HIGH (ref 40–120)
ALT FLD-CCNC: 18 U/L — SIGNIFICANT CHANGE UP (ref 10–45)
ANION GAP SERPL CALC-SCNC: 12 MMOL/L — SIGNIFICANT CHANGE UP (ref 5–17)
APTT BLD: 36 SEC — HIGH (ref 24.5–35.6)
AST SERPL-CCNC: 37 U/L — SIGNIFICANT CHANGE UP (ref 10–40)
BASOPHILS # BLD AUTO: 0.02 K/UL — SIGNIFICANT CHANGE UP (ref 0–0.2)
BASOPHILS NFR BLD AUTO: 0.7 % — SIGNIFICANT CHANGE UP (ref 0–2)
BILIRUB SERPL-MCNC: 1.5 MG/DL — HIGH (ref 0.2–1.2)
BUN SERPL-MCNC: 10 MG/DL — SIGNIFICANT CHANGE UP (ref 7–23)
CALCIUM SERPL-MCNC: 9.4 MG/DL — SIGNIFICANT CHANGE UP (ref 8.4–10.5)
CHLORIDE SERPL-SCNC: 108 MMOL/L — SIGNIFICANT CHANGE UP (ref 96–108)
CO2 SERPL-SCNC: 21 MMOL/L — LOW (ref 22–31)
CREAT SERPL-MCNC: 0.86 MG/DL — SIGNIFICANT CHANGE UP (ref 0.5–1.3)
EGFR: 96 ML/MIN/1.73M2 — SIGNIFICANT CHANGE UP
EOSINOPHIL # BLD AUTO: 0.06 K/UL — SIGNIFICANT CHANGE UP (ref 0–0.5)
EOSINOPHIL NFR BLD AUTO: 2.2 % — SIGNIFICANT CHANGE UP (ref 0–6)
GLUCOSE BLDC GLUCOMTR-MCNC: 120 MG/DL — HIGH (ref 70–99)
GLUCOSE BLDC GLUCOMTR-MCNC: 124 MG/DL — HIGH (ref 70–99)
GLUCOSE BLDC GLUCOMTR-MCNC: 133 MG/DL — HIGH (ref 70–99)
GLUCOSE BLDC GLUCOMTR-MCNC: 136 MG/DL — HIGH (ref 70–99)
GLUCOSE BLDC GLUCOMTR-MCNC: 165 MG/DL — HIGH (ref 70–99)
GLUCOSE SERPL-MCNC: 125 MG/DL — HIGH (ref 70–99)
HCT VFR BLD CALC: 24.9 % — LOW (ref 39–50)
HGB BLD-MCNC: 7.4 G/DL — LOW (ref 13–17)
IMM GRANULOCYTES NFR BLD AUTO: 0.4 % — SIGNIFICANT CHANGE UP (ref 0–0.9)
INR BLD: 1.47 — HIGH (ref 0.85–1.18)
LYMPHOCYTES # BLD AUTO: 0.61 K/UL — LOW (ref 1–3.3)
LYMPHOCYTES # BLD AUTO: 22.2 % — SIGNIFICANT CHANGE UP (ref 13–44)
MAGNESIUM SERPL-MCNC: 1.7 MG/DL — SIGNIFICANT CHANGE UP (ref 1.6–2.6)
MCHC RBC-ENTMCNC: 23.5 PG — LOW (ref 27–34)
MCHC RBC-ENTMCNC: 29.7 GM/DL — LOW (ref 32–36)
MCV RBC AUTO: 79 FL — LOW (ref 80–100)
MONOCYTES # BLD AUTO: 0.45 K/UL — SIGNIFICANT CHANGE UP (ref 0–0.9)
MONOCYTES NFR BLD AUTO: 16.4 % — HIGH (ref 2–14)
NEUTROPHILS # BLD AUTO: 1.6 K/UL — LOW (ref 1.8–7.4)
NEUTROPHILS NFR BLD AUTO: 58.1 % — SIGNIFICANT CHANGE UP (ref 43–77)
NRBC # BLD: 0 /100 WBCS — SIGNIFICANT CHANGE UP (ref 0–0)
PHOSPHATE SERPL-MCNC: 3.5 MG/DL — SIGNIFICANT CHANGE UP (ref 2.5–4.5)
PLATELET # BLD AUTO: 75 K/UL — LOW (ref 150–400)
POTASSIUM SERPL-MCNC: 4.1 MMOL/L — SIGNIFICANT CHANGE UP (ref 3.5–5.3)
POTASSIUM SERPL-SCNC: 4.1 MMOL/L — SIGNIFICANT CHANGE UP (ref 3.5–5.3)
PROT SERPL-MCNC: 6.3 G/DL — SIGNIFICANT CHANGE UP (ref 6–8.3)
PROTHROM AB SERPL-ACNC: 16.6 SEC — HIGH (ref 9.5–13)
RBC # BLD: 3.15 M/UL — LOW (ref 4.2–5.8)
RBC # FLD: 16.9 % — HIGH (ref 10.3–14.5)
SODIUM SERPL-SCNC: 141 MMOL/L — SIGNIFICANT CHANGE UP (ref 135–145)
WBC # BLD: 2.75 K/UL — LOW (ref 3.8–10.5)
WBC # FLD AUTO: 2.75 K/UL — LOW (ref 3.8–10.5)

## 2024-03-07 PROCEDURE — 45330 DIAGNOSTIC SIGMOIDOSCOPY: CPT

## 2024-03-07 PROCEDURE — 93306 TTE W/DOPPLER COMPLETE: CPT | Mod: 26

## 2024-03-07 PROCEDURE — 99233 SBSQ HOSP IP/OBS HIGH 50: CPT | Mod: GC

## 2024-03-07 RX ORDER — POLYETHYLENE GLYCOL 3350 17 G/17G
17 POWDER, FOR SOLUTION ORAL EVERY 24 HOURS
Refills: 0 | Status: DISCONTINUED | OUTPATIENT
Start: 2024-03-07 | End: 2024-03-09

## 2024-03-07 RX ADMIN — PANTOPRAZOLE SODIUM 40 MILLIGRAM(S): 20 TABLET, DELAYED RELEASE ORAL at 18:02

## 2024-03-07 RX ADMIN — Medication 1 GRAM(S): at 18:02

## 2024-03-07 RX ADMIN — Medication 50 MILLILITER(S): at 05:45

## 2024-03-07 RX ADMIN — PANTOPRAZOLE SODIUM 40 MILLIGRAM(S): 20 TABLET, DELAYED RELEASE ORAL at 05:46

## 2024-03-07 RX ADMIN — POLYETHYLENE GLYCOL 3350 17 GRAM(S): 17 POWDER, FOR SOLUTION ORAL at 16:04

## 2024-03-07 RX ADMIN — Medication 1 GRAM(S): at 11:32

## 2024-03-07 RX ADMIN — Medication 1 GRAM(S): at 05:46

## 2024-03-07 RX ADMIN — IRON SUCROSE 110 MILLIGRAM(S): 20 INJECTION, SOLUTION INTRAVENOUS at 16:03

## 2024-03-07 NOTE — PRE-ANESTHESIA EVALUATION ADULT - NSPROPOSEDPROCEDFT_GEN_ALL_CORE
flex sigmoidoscopy Non-Graft Cartilage Fenestration Text: The cartilage was fenestrated with a 2mm punch biopsy to help facilitate healing.

## 2024-03-07 NOTE — DIETITIAN INITIAL EVALUATION ADULT - PROBLEM SELECTOR PLAN 6
S/p CABG in 09/2023 by Dr. Hinton. Was on plavix after surgery, was held after the GIB in 11/2023. Per wife pt has hx of allergy to ASA many years ago had itching and rash, no anaphylaxis, she is unsure if it was a true allergy as it was so long ago. Pt is not on a statin at this time.  - f/u lipid panel  - collateral from cardiologist in AM regarding ASA, plavix, statin  - consistent carb/DASH diet

## 2024-03-07 NOTE — PROGRESS NOTE ADULT - ASSESSMENT
64M PMH of former smoker with hx HTN, HLD, severe 3 vessel CAD s/p CABG x4 9/2023, DM, gout, TIA (14 yrs ago), gastric ulcer, rectal bleed, LIZ, alcoholic liver cirrhosis, portal hypertension c/b esophageal varices (and UGIB, last GIB in 11/2023, last EGD 01/2024 showed normal duodenum, distal esophageal varix, hyperplastic lesion, L pleural effusion s/p thoracentesis in 10/2023, presenting w/ 2 days of dizziness, headaches, generalized malaise, admitted for orthostatic hypotension. GI was consulted for anemia.     #Symptomatic anemia  #Orthostatic hypotension  #Liver Cirrhosis  #Abnormal CT  p/w Hb 8.1 dropped to 7.5 (likely due to hemoconcentration)  baseline hb ~8  EGD 01/2024 showed normal duodenum, distal esophageal varix, hyperplastic lesion  Orthostatic vitals positive  Initially orthostatic hypotension suspected to be related to symptomatic anemia however Hgb remains stable and dehydration however diarrhea ceased. Suspect more so medication related.   No signs of bleeding at this time, low suspicion for active bleed  transfuse if hb <7  active type and screen  2 large IV lines  hold BP meds, including home meds, Coreg, Lasix and Spironolactone  Appreciate cardiology recs regarding need for Beta blocker in the setting of known cardiac history as well as choice of beta blocker (can include selective BB such as Metoprolol)  s/p 24 hrs Albumin 25%, no longer needs  s/p flex sigmoidoscopy on 3/7 showed internal hemorrhoids   Add on CRP    #Diarrhea  p/w lower abdominal pain, diarrhea  CT AP shows: No diverticulitis, Prominent rectal wall with circumferential thickening  c diff negative, gi pcr negative  hold off antibiotics for now, no signs of infection  replace electrolytes as needed  Resolved     Thank you for the opportunity to participate in the care of the patient  These are preliminary recommendations until further discussion with the attending. Final recommendations awaiting attending' s signature.

## 2024-03-07 NOTE — PROGRESS NOTE ADULT - PROBLEM SELECTOR PLAN 7
S/p CABG in 09/2023 by Dr. Hinton. Was on plavix after surgery, was held after the GIB in 11/2023. Per wife pt has hx of allergy to ASA many years ago had itching and rash, no anaphylaxis, she is unsure if it was a true allergy as it was so long ago. Pt is not on a statin at this time. , cholesterol 194, non HDL cholesterol 142.  - collateral from cardiologist regarding ASA, plavix, statin  - c/w DASH diet S/p CABG in 09/2023 by Dr. Hinton. Was on plavix after surgery, was held after the GIB in 11/2023. Per wife pt has hx of allergy to ASA many years ago had itching and rash, no anaphylaxis, she is unsure if it was a true allergy as it was so long ago. Pt is not on a statin at this time. , cholesterol 194, non HDL cholesterol 142.  - start metoprolol instead of coreg due to CAD hx  - collateral from cardiologist regarding ASA, plavix, statin  - c/w DASH diet

## 2024-03-07 NOTE — DIETITIAN INITIAL EVALUATION ADULT - OTHER INFO
64M with PMH of former smoker with hx HTN, HLD, severe 3 vessel CAD s/p CAB x4 9/2024, pre-diabetes, gout, TIA (14 yrs ago), gastric ulcer, rectal bleed, LIZ and alcoholic liver disease, cirrhosis, portal hypertension c/b esophageal varices (and UGIB, last GIB in nov 2023, EGD 11/13 duodenitis, nonbleeding varices, healing PUD), L pleural effusion s/p thoracentesis in 10/2023, presenting for 2 days of dizziness. Was seen by his hepatologist Dr. Lawson this morning and had an episode of profuse diaphoresis, abd pain, and pallor, wanted to have a BM however was advised to lie down given c/f GIB, EMS was called and pt was brought to ED from Dr. Lawson's office. Upon evaluation pt reports 2 days of dizziness, headaches, generalized malaise, decreased PO intake, and diffuse lower abdominal pain. No other complaints at this time. Denies fevers, chills, CP, SOB, cough, vomiting, diarrhea, hematochezia, melena.    Patient seen at bedside for nutrition assessment. Pt NPO at time of assessment, now ordered for DASH/TLC. Confirms NKFA. No difficulty chewing/swallowing reported. Pt reports improved appetite/PO intake, states appetite is "almost back to normal." Endorses poor PO intake x 2-3 days PTA related to abdominal pain/nausea. Prior to onset of symptoms, patient endorses normal appetite and intake. Food preferences reviewed: pt does not eat milk or yogurt- documented in [facility]. Dosing weight: 205#, BMI 31.3, reports UBW of 205#, however endorses weight loss over the summer related to surgery/hospitalizations. States he used to weight 270# and went down to 187#. Has since gained some of the weight back. Per María HERRERA, pt weight trending up x 4 months. Will continue to monitor weight trends. No pressure injuries or edema documented. Endorses episode of diarrhea last night and this AM- team aware. Labs: POCT 120-174 x 24 hours. Meds: protonix, bowel regimen, iron. Observed with no overt signs and symptoms of muscle or fat wasting. Based on ASPEN guidelines, pt does not meet criteria for malnutrition at this time. See nutrition recommendations below.

## 2024-03-07 NOTE — PROGRESS NOTE ADULT - PROBLEM SELECTOR PLAN 9
F: None   E: Replete as necessary K>4 Mg>2  N: consistent carb/DASH diet  DVT Prophylaxis: NONE  GI prophylaxis: lactulose; in addition to protonix and rifaximin noted above  CODE STATUS: FULL CODE   Dispo: [facility] F: None   E: Replete as necessary K>4 Mg>2  N: consistent carb/DASH diet  DVT Prophylaxis: NONE  GI prophylaxis: protonix  CODE STATUS: FULL CODE   Dispo: [facility]

## 2024-03-07 NOTE — PROGRESS NOTE ADULT - SUBJECTIVE AND OBJECTIVE BOX
GASTROENTEROLOGY PROGRESS NOTE  Patient seen and examined at bedside. Pt states that he feels dizzy and slight abd pain with palpation. He denies any more diarrhea, N,V at this time.    PERTINENT REVIEW OF SYSTEMS:  CONSTITUTIONAL: No weakness, fevers or chills  HEENT: No visual changes; No vertigo or throat pain   GASTROINTESTINAL: As above.  NEUROLOGICAL: No numbness or weakness  SKIN: No itching, burning, rashes, or lesions     Allergies    aspirin (Pruritus)    Intolerances      MEDICATIONS:  MEDICATIONS  (STANDING):  dextrose 5%. 1000 milliLiter(s) (50 mL/Hr) IV Continuous <Continuous>  dextrose 5%. 1000 milliLiter(s) (100 mL/Hr) IV Continuous <Continuous>  dextrose 50% Injectable 25 Gram(s) IV Push once  dextrose 50% Injectable 12.5 Gram(s) IV Push once  dextrose 50% Injectable 25 Gram(s) IV Push once  glucagon  Injectable 1 milliGRAM(s) IntraMuscular once  influenza  Vaccine (HIGH DOSE) 0.7 milliLiter(s) IntraMuscular once  insulin lispro (ADMELOG) corrective regimen sliding scale   SubCutaneous three times a day before meals  insulin lispro (ADMELOG) corrective regimen sliding scale   SubCutaneous at bedtime  iron sucrose IVPB 200 milliGRAM(s) IV Intermittent every 24 hours  pantoprazole    Tablet 40 milliGRAM(s) Oral every 12 hours  rifAXIMin 550 milliGRAM(s) Oral every 12 hours  senna 2 Tablet(s) Oral at bedtime  sucralfate 1 Gram(s) Oral every 6 hours    MEDICATIONS  (PRN):  dextrose Oral Gel 15 Gram(s) Oral once PRN Blood Glucose LESS THAN 70 milliGRAM(s)/deciliter    Vital Signs Last 24 Hrs  T(C): 36.5 (07 Mar 2024 07:58), Max: 36.8 (06 Mar 2024 20:31)  T(F): 97.7 (07 Mar 2024 06:03), Max: 98.2 (06 Mar 2024 20:31)  HR: 78 (07 Mar 2024 07:58) (78 - 99)  BP: 172/94 (07 Mar 2024 07:58) (148/87 - 172/94)  BP(mean): 108 (07 Mar 2024 07:58) (108 - 108)  RR: 17 (07 Mar 2024 07:58) (17 - 18)  SpO2: 97% (07 Mar 2024 07:58) (94% - 97%)    Parameters below as of 07 Mar 2024 06:03  Patient On (Oxygen Delivery Method): room air        PHYSICAL EXAM:    General: in no acute distress  HEENT: MMM, conjunctiva and sclera clear  Gastrointestinal: Soft non-tender non-distended; No rebound or guarding  Skin: Warm and dry. No obvious rash    LABS:                        7.4    2.75  )-----------( 75       ( 07 Mar 2024 05:30 )             24.9     03-07    141  |  108  |  10  ----------------------------<  125<H>  4.1   |  21<L>  |  0.86    Ca    9.4      07 Mar 2024 05:30  Phos  3.5     03-07  Mg     1.7     03-07    TPro  6.3  /  Alb  3.3  /  TBili  1.5<H>  /  DBili  x   /  AST  37  /  ALT  18  /  AlkPhos  138<H>  03-07    PT/INR - ( 07 Mar 2024 05:30 )   PT: 16.6 sec;   INR: 1.47          PTT - ( 07 Mar 2024 05:30 )  PTT:36.0 sec      Urinalysis Basic - ( 07 Mar 2024 05:30 )    Color: x / Appearance: x / SG: x / pH: x  Gluc: 125 mg/dL / Ketone: x  / Bili: x / Urobili: x   Blood: x / Protein: x / Nitrite: x   Leuk Esterase: x / RBC: x / WBC x   Sq Epi: x / Non Sq Epi: x / Bacteria: x                Urinalysis with Rflx Culture (collected 04 Mar 2024 11:44)      RADIOLOGY & ADDITIONAL STUDIES:  Reviewed GASTROENTEROLOGY PROGRESS NOTE  Patient seen and examined at bedside. Pt states that he feels dizzy and slight abd pain with palpation. He denies any more diarrhea, N,V at this time.    PERTINENT REVIEW OF SYSTEMS:  CONSTITUTIONAL: No weakness, fevers or chills  HEENT: No visual changes; No vertigo or throat pain   GASTROINTESTINAL: As above.  NEUROLOGICAL: No numbness or weakness  SKIN: No itching, burning, rashes, or lesions     Allergies    aspirin (Pruritus)    Intolerances      MEDICATIONS:  MEDICATIONS  (STANDING):  dextrose 5%. 1000 milliLiter(s) (50 mL/Hr) IV Continuous <Continuous>  dextrose 5%. 1000 milliLiter(s) (100 mL/Hr) IV Continuous <Continuous>  dextrose 50% Injectable 25 Gram(s) IV Push once  dextrose 50% Injectable 12.5 Gram(s) IV Push once  dextrose 50% Injectable 25 Gram(s) IV Push once  glucagon  Injectable 1 milliGRAM(s) IntraMuscular once  influenza  Vaccine (HIGH DOSE) 0.7 milliLiter(s) IntraMuscular once  insulin lispro (ADMELOG) corrective regimen sliding scale   SubCutaneous three times a day before meals  insulin lispro (ADMELOG) corrective regimen sliding scale   SubCutaneous at bedtime  iron sucrose IVPB 200 milliGRAM(s) IV Intermittent every 24 hours  pantoprazole    Tablet 40 milliGRAM(s) Oral every 12 hours  rifAXIMin 550 milliGRAM(s) Oral every 12 hours  senna 2 Tablet(s) Oral at bedtime  sucralfate 1 Gram(s) Oral every 6 hours    MEDICATIONS  (PRN):  dextrose Oral Gel 15 Gram(s) Oral once PRN Blood Glucose LESS THAN 70 milliGRAM(s)/deciliter    Vital Signs Last 24 Hrs  T(C): 36.5 (07 Mar 2024 07:58), Max: 36.8 (06 Mar 2024 20:31)  T(F): 97.7 (07 Mar 2024 06:03), Max: 98.2 (06 Mar 2024 20:31)  HR: 78 (07 Mar 2024 07:58) (78 - 99)  BP: 172/94 (07 Mar 2024 07:58) (148/87 - 172/94)  BP(mean): 108 (07 Mar 2024 07:58) (108 - 108)  RR: 17 (07 Mar 2024 07:58) (17 - 18)  SpO2: 97% (07 Mar 2024 07:58) (94% - 97%)    Parameters below as of 07 Mar 2024 06:03  Patient On (Oxygen Delivery Method): room air        PHYSICAL EXAM:    General: in no acute distress  HEENT: MMM, conjunctiva and sclera clear  Gastrointestinal: Soft  diffuse mild tenderness to palpation non-distended; No rebound or guarding  Skin: Warm and dry. No obvious rash    LABS:                        7.4    2.75  )-----------( 75       ( 07 Mar 2024 05:30 )             24.9     03-07    141  |  108  |  10  ----------------------------<  125<H>  4.1   |  21<L>  |  0.86    Ca    9.4      07 Mar 2024 05:30  Phos  3.5     03-07  Mg     1.7     03-07    TPro  6.3  /  Alb  3.3  /  TBili  1.5<H>  /  DBili  x   /  AST  37  /  ALT  18  /  AlkPhos  138<H>  03-07    PT/INR - ( 07 Mar 2024 05:30 )   PT: 16.6 sec;   INR: 1.47          PTT - ( 07 Mar 2024 05:30 )  PTT:36.0 sec      Urinalysis Basic - ( 07 Mar 2024 05:30 )    Color: x / Appearance: x / SG: x / pH: x  Gluc: 125 mg/dL / Ketone: x  / Bili: x / Urobili: x   Blood: x / Protein: x / Nitrite: x   Leuk Esterase: x / RBC: x / WBC x   Sq Epi: x / Non Sq Epi: x / Bacteria: x                Urinalysis with Rflx Culture (collected 04 Mar 2024 11:44)      RADIOLOGY & ADDITIONAL STUDIES:  Reviewed

## 2024-03-07 NOTE — PROGRESS NOTE ADULT - PROBLEM SELECTOR PLAN 2
Likely i/s/o Liver disease   Noted with Hgb ~8-9 on admission which has been baseline for the past couple of months. Hgb drop to 7.5 (lowest in past year) from 8.1 at admission (Hypoproliferative). There was concern for possible GI bleed at Dr Lawson's office, however there is no signs of bleeding at this time. Pt with diarrhea at this time and not black or blood in stool. drop in all cell lines so may have dilutional component. Hgb stable today at 7.6  Plan:  - f/u GI recs  - Maintain active T&S   - Transfuse if Hgb <7 Likely i/s/o Liver disease   Noted with Hgb ~8-9 on admission which has been baseline for the past couple of months. Hgb drop to 7.5 (lowest in past year) from 8.1 at admission (Hypoproliferative). There was concern for possible GI bleed at Dr Lawson's office, however there is no signs of bleeding at this time. Pt with diarrhea at this time and not black or blood in stool. drop in all cell lines so may have dilutional component.   Hgb stable today at 7.4.  Plan:  - f/u GI recs  - Maintain active T&S   - Transfuse if Hgb <7 Noted with Hgb ~8-9 on admission which has been baseline for the past couple of months. Hgb drop to 7.5 (lowest in past year) from 8.1 at admission (Hypoproliferative). There was concern for possible GI bleed at Dr Lawson's office, however there is no signs of bleeding at this time. Pt initially presenting with diarrhea, now resolved and infectious workup (GI PCR/C diff) negative. CTAP w/ rectal wall thickening, splenomegaly, resolution of ascites. s/p albumin 25% 50cc x3 doses. s/p flex sig w/ GI on 3/7, found to have internal hemorrhoids.   - f/u GI recs  - Maintain active T&S   - Transfuse if Hgb <7

## 2024-03-07 NOTE — PROGRESS NOTE ADULT - ATTENDING COMMENTS
S/p sigmoidoscopy this morning. reports dizziness improved but not resolved, denies change in vision. No other complaints or events reported  Exam unremarkable  Labs reviewed    Patient with persistent pancytopenia, no signs of infection. BP improved, will attempt to resume some of medications, will discuss with GI  PT evaluation  Will need hematology evaluation for pancytopenia.  DVT ppx

## 2024-03-07 NOTE — PROGRESS NOTE ADULT - PROBLEM SELECTOR PLAN 6
Home meds: sucralfate 1g QID and protonix 40mg BID. Of note pt was discharged 11/2023 w/ prescription for protonix 40mg PO BID w/ plan to switch to qd after 2 weeks, however per wife is still taking it BID. No signs or symptoms of GIB at this time.  Hgb drop to 7.5 from 8.1 at admit is concerning. Hgb stable today, pt had formed bowel movement with no blood.  - c/w sucralfate 1g PO q6h  - protonix 40mg PO BID  - start lactulose for bowel regimen to reduce straining  - monitor for signs/symptoms of GIB  - f/u GI recs

## 2024-03-07 NOTE — PROGRESS NOTE ADULT - PROBLEM SELECTOR PLAN 3
Likely multifactorial i/s/o Liver disease vs Malignancy vs less likely Bone marrow involvement  WBC 2.77 on admission (baseline ~4), H&H 8.4/27.5 (baseline Hgb 8-9.5), MCV 77.9, plt 93 (baseline 80s-100s).   WBC dropped to 1.99, Hgb to 7.5, and platelets to 79. No signs of infection at this time.   Iron studies low total iron and ferritin, haptoglobin of 32, normal LDH. CBC stable today.  - Daily CBC Likely multifactorial i/s/o Liver disease vs Malignancy vs less likely Bone marrow involvement  WBC 2.77 on admission (baseline ~4), H&H 8.4/27.5 (baseline Hgb 8-9.5), MCV 77.9, plt 93 (baseline 80s-100s).   WBC dropped to 1.99, Hgb to 7.5, and platelets to 79. No signs of infection at this time.   Iron studies low total iron and ferritin, haptoglobin of 32, normal LDH. CBC stable today.  - Daily CBC  - F/u with heme onc outpatient

## 2024-03-07 NOTE — DIETITIAN INITIAL EVALUATION ADULT - PERTINENT MEDS FT
MEDICATIONS  (STANDING):  dextrose 5%. 1000 milliLiter(s) (50 mL/Hr) IV Continuous <Continuous>  dextrose 5%. 1000 milliLiter(s) (100 mL/Hr) IV Continuous <Continuous>  dextrose 50% Injectable 25 Gram(s) IV Push once  dextrose 50% Injectable 12.5 Gram(s) IV Push once  dextrose 50% Injectable 25 Gram(s) IV Push once  glucagon  Injectable 1 milliGRAM(s) IntraMuscular once  influenza  Vaccine (HIGH DOSE) 0.7 milliLiter(s) IntraMuscular once  insulin lispro (ADMELOG) corrective regimen sliding scale   SubCutaneous three times a day before meals  insulin lispro (ADMELOG) corrective regimen sliding scale   SubCutaneous at bedtime  iron sucrose IVPB 200 milliGRAM(s) IV Intermittent every 24 hours  pantoprazole    Tablet 40 milliGRAM(s) Oral every 12 hours  polyethylene glycol 3350 17 Gram(s) Oral every 24 hours  rifAXIMin 550 milliGRAM(s) Oral every 12 hours  senna 2 Tablet(s) Oral at bedtime  sucralfate 1 Gram(s) Oral every 6 hours    MEDICATIONS  (PRN):  dextrose Oral Gel 15 Gram(s) Oral once PRN Blood Glucose LESS THAN 70 milliGRAM(s)/deciliter

## 2024-03-07 NOTE — DIETITIAN INITIAL EVALUATION ADULT - PERTINENT LABORATORY DATA
03-07    141  |  108  |  10  ----------------------------<  125<H>  4.1   |  21<L>  |  0.86    Ca    9.4      07 Mar 2024 05:30  Phos  3.5     03-07  Mg     1.7     03-07    TPro  6.3  /  Alb  3.3  /  TBili  1.5<H>  /  DBili  x   /  AST  37  /  ALT  18  /  AlkPhos  138<H>  03-07  POCT Blood Glucose.: 120 mg/dL (03-07-24 @ 12:52)  A1C with Estimated Average Glucose Result: 5.9 % (03-05-24 @ 05:30)  A1C with Estimated Average Glucose Result: 7.0 % (09-13-23 @ 05:30)

## 2024-03-07 NOTE — DIETITIAN INITIAL EVALUATION ADULT - OTHER CALCULATIONS
Based on Standards of Care pt above % IBW (133%) thus ideal body weight used for all calculations (154#). Needs adjusted for age, cirrhosis.

## 2024-03-07 NOTE — DIETITIAN INITIAL EVALUATION ADULT - PROBLEM SELECTOR PLAN 4
Home meds: sucralfate 1g QID and protonix 40mg BID. Of note pt was discharged 11/2023 w/ prescription for protonix 40mg PO BID w/ plan to switch to qd after 2 weeks, however per wife is still taking it BID. No signs or symptoms of GIB at this time.  - c/w sucralfate 1g PO q6h  - protonix 40mg IV BID  - monitor for signs/symptoms of GIB

## 2024-03-07 NOTE — PROGRESS NOTE ADULT - PROBLEM SELECTOR PLAN 1
Likely 2/2 hypovolemia i/s/o poor PO intake for the past coupe of days, multiple medications affecting his BP and Liver disease. Could also be autonomic dysfunction from CLD.   BP 160s/70s-80s while supine --> 100s-110s/70s standing, x2. S/p 2L NS in ED.  Patient continues to be orthostatic today  Plan:  - Start Albumin 25% 50cc q8h x 24hrs per GI consult; appreciate recommendations  - Start compression socks  - Due to complaints of dizziness and occipital headache, send for CT head   - f/u orthostatics  - encourage PO intake   - holding coreg, lasix, spironolactone as below Likely 2/2 hypovolemia i/s/o poor PO intake for the past couple of days, multiple medications affecting his BP and Liver disease. Could also be autonomic dysfunction from CLD.   BP 160s/70s-80s while supine --> 100s-110s/70s standing, x2. S/p 2L NS in ED.  Patient continues to be orthostatic today  Plan:  - Completed Albumin 25% 50cc q8h x 24hrs per GI consult; appreciate recommendations  - Started compression socks  - f/u orthostatics  - encourage PO intake   - holding coreg, lasix, spironolactone as below; will restart prior to discharge, see below Likely 2/2 hypovolemia i/s/o poor PO intake for the past couple of days, multiple medications affecting his BP and Liver disease. Could also be autonomic dysfunction from CLD.   BP 160s/70s-80s while supine --> 100s-110s/70s standing, x2. S/p 2L NS in ED.  Patient continues to be orthostatic today  Plan:  - Completed Albumin 25% 50cc q8h x 24hrs per GI consult; appreciate recommendations  - Started compression socks  - f/u orthostatics  - encourage PO intake   - holding coreg, lasix, spironolactone as below; will start metoprolol and restart some diuretics prior to discharge based on consult recs, see below Likely 2/2 hypovolemia i/s/o poor PO intake and diarrhea, multiple medications affecting his BP and Liver disease. Could also be autonomic dysfunction from CLD. Continues to be orthostatic despite holding home antihypertensives. S/p 2L NS in ED.  Plan:  - Completed Albumin 25% 50cc q8h x 24hrs per GI consult; appreciate recommendations  - Started compression socks  - f/u orthostatics  - encourage PO intake   - holding coreg, lasix, spironolactone as below; will start metoprolol and restart some diuretics prior to discharge based on consult recs, see below

## 2024-03-07 NOTE — DIETITIAN INITIAL EVALUATION ADULT - PROBLEM SELECTOR PLAN 3
Hx of alcoholic liver cirrhosis, portal hypertension c/b esophageal varices (and UGIB, last GIB in 11/2023, EGD 11/13 duodenitis, nonbleeding varices, healing PUD), L pleural effusion s/p thoracentesis in 10/2023. Home meds: rifaximin 550mg BID, lasix 40mg qd, spironolactone 25mg (everyday except MWF), coreg 3.125mg BID  - c/w rifaximin 550mg PO BID  - holding coreg, lasix, spironolactone i/s/o orthostatic hypotension -- restart as indicated  - f/u fractionated bilirubin  - trend CMP, coags

## 2024-03-07 NOTE — PRE-ANESTHESIA EVALUATION ADULT - NSRADCARDRESULTSFT_GEN_ALL_CORE
CONCLUSIONS:  echo 11/23   1. Normal left and right ventricular size and systolic function.   2. Aortic sclerosis without significant stenosis.   3. No evidence of pulmonary hypertension.   4. No pericardial effusion.   5. The proximal ascending aorta measures 4.00 cm (normal 2.6-3.4 cm for   men, 2.3-3.1 cm for women).

## 2024-03-07 NOTE — DIETITIAN INITIAL EVALUATION ADULT - PROBLEM SELECTOR PLAN 5
WBC 2.77 on admission (baseline ~4), H&H 8.4/27.5 (baseline Hgb 8-9.5), MCV 77.9, plt 93 (baseline 80s-100s). Likely i/s/o liver cirrhosis.  - f/u iron studies, retic count, LDH, haptoglobin, fractionated bilirubin  - trend CBC w/ diff

## 2024-03-07 NOTE — DIETITIAN INITIAL EVALUATION ADULT - PROBLEM SELECTOR PLAN 1
[facility]S Ambulance BP 160s/70s-80s while supine --> 100s-110s/70s standing, x2. S/p 2L NS in ED.  - LR @ 150cc/hr x 6 hrs  - holding coreg, lasix, spironolactone as below  - repeat orthostatics in AM  - encourage PO intake

## 2024-03-07 NOTE — PROGRESS NOTE ADULT - ATTENDING COMMENTS
Patient is feeling better   No new findings   SBP is now around 150 but he still has fluctuating BP and occasional dizziness. I am not sure if the orthostasis was related to Coreg but we can switch it to metoprolol to see if symptoms improve  At this point, liver issues are stable and patient's symptoms might be mostly related to cardiovascular system

## 2024-03-07 NOTE — DIETITIAN INITIAL EVALUATION ADULT - PROBLEM SELECTOR PLAN 2
Presenting w/ generalized malaise, decreased PO intake, dizziness, headaches, abd pain, nausea x 2 days. 2/4 SIRS criteria on admission (WBC 2.77, HR 93). Cr and LFTs at baseline. CTAP w/ PO & IV contrast w/o obvious signs of infection - no diverticulitis; redundant sigmoid colon, LLQ grossly unremarkable; prominent rectal wall w/ circumferential thickening, possibly 2/2 incomplete distention vs pathologic etiology; resolution of L pleural effusion; near complete resolution of previously noted ascites; mild nodularity of hepatic contour, compatible w/ cirrhotic change; splenomegaly; nonspecific mild stranding in fat surrounding proximal inferior mesenteric artery (ddx focal mild vasculitis), no occlusion, possible moderate ostial stenosis. No other signs or symptoms of infection.  - monitor off abx

## 2024-03-07 NOTE — PROGRESS NOTE ADULT - SUBJECTIVE AND OBJECTIVE BOX
O/N Events: Given miralax x2, did not have BM. Given enema @6:50am.    Subjective/ROS: Patient seen and examined at bedside.     VITALS  Vital Signs Last 24 Hrs  T(C): 36.5 (07 Mar 2024 06:03), Max: 36.8 (06 Mar 2024 20:31)  T(F): 97.7 (07 Mar 2024 06:03), Max: 98.2 (06 Mar 2024 20:31)  HR: 78 (07 Mar 2024 06:03) (78 - 99)  BP: 172/94 (07 Mar 2024 06:03) (148/87 - 172/94)  BP(mean): --  RR: 17 (07 Mar 2024 06:03) (17 - 18)  SpO2: 97% (07 Mar 2024 06:03) (94% - 97%)    Parameters below as of 07 Mar 2024 06:03  Patient On (Oxygen Delivery Method): room air        CAPILLARY BLOOD GLUCOSE      POCT Blood Glucose.: 133 mg/dL (07 Mar 2024 06:44)  POCT Blood Glucose.: 133 mg/dL (06 Mar 2024 21:53)  POCT Blood Glucose.: 121 mg/dL (06 Mar 2024 18:00)  POCT Blood Glucose.: 174 mg/dL (06 Mar 2024 13:03)  POCT Blood Glucose.: 130 mg/dL (06 Mar 2024 09:11)      PHYSICAL EXAM  General: NAD  Head: NC/AT; MMM; PERRL; EOMI;  Neck: Supple; no JVD  Respiratory: CTAB; no wheezes/rales/rhonchi  Cardiovascular: Regular rhythm/rate; S1/S2+, no murmurs, rubs gallops   Gastrointestinal: Soft; NTND; bowel sounds normal and present  Extremities: WWP; no edema/cyanosis  Neurological: A&Ox3, CNII-XII grossly intact; no obvious focal deficits    MEDICATIONS  (STANDING):  dextrose 5%. 1000 milliLiter(s) (50 mL/Hr) IV Continuous <Continuous>  dextrose 5%. 1000 milliLiter(s) (100 mL/Hr) IV Continuous <Continuous>  dextrose 50% Injectable 25 Gram(s) IV Push once  dextrose 50% Injectable 12.5 Gram(s) IV Push once  dextrose 50% Injectable 25 Gram(s) IV Push once  glucagon  Injectable 1 milliGRAM(s) IntraMuscular once  influenza  Vaccine (HIGH DOSE) 0.7 milliLiter(s) IntraMuscular once  insulin lispro (ADMELOG) corrective regimen sliding scale   SubCutaneous three times a day before meals  insulin lispro (ADMELOG) corrective regimen sliding scale   SubCutaneous at bedtime  iron sucrose IVPB 200 milliGRAM(s) IV Intermittent every 24 hours  pantoprazole    Tablet 40 milliGRAM(s) Oral every 12 hours  rifAXIMin 550 milliGRAM(s) Oral every 12 hours  senna 2 Tablet(s) Oral at bedtime  sucralfate 1 Gram(s) Oral every 6 hours    MEDICATIONS  (PRN):  dextrose Oral Gel 15 Gram(s) Oral once PRN Blood Glucose LESS THAN 70 milliGRAM(s)/deciliter      aspirin (Pruritus)      LABS                        7.6    2.56  )-----------( 77       ( 06 Mar 2024 05:30 )             25.5     03-06    139  |  105  |  10  ----------------------------<  125<H>  3.8   |  22  |  0.85    Ca    8.4      06 Mar 2024 05:30  Phos  2.9     03-06  Mg     1.8     03-06    TPro  6.3  /  Alb  3.4  /  TBili  1.4<H>  /  DBili  x   /  AST  37  /  ALT  17  /  AlkPhos  155<H>  03-06    PT/INR - ( 06 Mar 2024 05:30 )   PT: 16.9 sec;   INR: 1.50          PTT - ( 06 Mar 2024 05:30 )  PTT:33.7 sec  Urinalysis Basic - ( 06 Mar 2024 05:30 )    Color: x / Appearance: x / SG: x / pH: x  Gluc: 125 mg/dL / Ketone: x  / Bili: x / Urobili: x   Blood: x / Protein: x / Nitrite: x   Leuk Esterase: x / RBC: x / WBC x   Sq Epi: x / Non Sq Epi: x / Bacteria: x              IMAGING/EKG/ETC   O/N Events: Given miralax x2, did not have BM. Given enema @6:50am.    Subjective/ROS: Patient seen and examined at bedside. Went for sigmoidoscopy this AM.    VITALS  Vital Signs Last 24 Hrs  T(C): 36.5 (07 Mar 2024 06:03), Max: 36.8 (06 Mar 2024 20:31)  T(F): 97.7 (07 Mar 2024 06:03), Max: 98.2 (06 Mar 2024 20:31)  HR: 78 (07 Mar 2024 06:03) (78 - 99)  BP: 172/94 (07 Mar 2024 06:03) (148/87 - 172/94)  BP(mean): --  RR: 17 (07 Mar 2024 06:03) (17 - 18)  SpO2: 97% (07 Mar 2024 06:03) (94% - 97%)    Parameters below as of 07 Mar 2024 06:03  Patient On (Oxygen Delivery Method): room air        CAPILLARY BLOOD GLUCOSE      POCT Blood Glucose.: 133 mg/dL (07 Mar 2024 06:44)  POCT Blood Glucose.: 133 mg/dL (06 Mar 2024 21:53)  POCT Blood Glucose.: 121 mg/dL (06 Mar 2024 18:00)  POCT Blood Glucose.: 174 mg/dL (06 Mar 2024 13:03)  POCT Blood Glucose.: 130 mg/dL (06 Mar 2024 09:11)      PHYSICAL EXAM  General: NAD  Head: NC/AT; MMM; PERRL; EOMI;  Neck: Supple; no JVD  Respiratory: CTAB; no wheezes/rales/rhonchi  Cardiovascular: Regular rhythm/rate; S1/S2+, no murmurs, rubs gallops   Gastrointestinal: Soft; NTND; bowel sounds normal and present  Extremities: WWP; no edema/cyanosis  Neurological: A&Ox3, CNII-XII grossly intact; no obvious focal deficits    MEDICATIONS  (STANDING):  dextrose 5%. 1000 milliLiter(s) (50 mL/Hr) IV Continuous <Continuous>  dextrose 5%. 1000 milliLiter(s) (100 mL/Hr) IV Continuous <Continuous>  dextrose 50% Injectable 25 Gram(s) IV Push once  dextrose 50% Injectable 12.5 Gram(s) IV Push once  dextrose 50% Injectable 25 Gram(s) IV Push once  glucagon  Injectable 1 milliGRAM(s) IntraMuscular once  influenza  Vaccine (HIGH DOSE) 0.7 milliLiter(s) IntraMuscular once  insulin lispro (ADMELOG) corrective regimen sliding scale   SubCutaneous three times a day before meals  insulin lispro (ADMELOG) corrective regimen sliding scale   SubCutaneous at bedtime  iron sucrose IVPB 200 milliGRAM(s) IV Intermittent every 24 hours  pantoprazole    Tablet 40 milliGRAM(s) Oral every 12 hours  rifAXIMin 550 milliGRAM(s) Oral every 12 hours  senna 2 Tablet(s) Oral at bedtime  sucralfate 1 Gram(s) Oral every 6 hours    MEDICATIONS  (PRN):  dextrose Oral Gel 15 Gram(s) Oral once PRN Blood Glucose LESS THAN 70 milliGRAM(s)/deciliter      aspirin (Pruritus)      LABS                        7.6    2.56  )-----------( 77       ( 06 Mar 2024 05:30 )             25.5     03-06    139  |  105  |  10  ----------------------------<  125<H>  3.8   |  22  |  0.85    Ca    8.4      06 Mar 2024 05:30  Phos  2.9     03-06  Mg     1.8     03-06    TPro  6.3  /  Alb  3.4  /  TBili  1.4<H>  /  DBili  x   /  AST  37  /  ALT  17  /  AlkPhos  155<H>  03-06    PT/INR - ( 06 Mar 2024 05:30 )   PT: 16.9 sec;   INR: 1.50          PTT - ( 06 Mar 2024 05:30 )  PTT:33.7 sec  Urinalysis Basic - ( 06 Mar 2024 05:30 )    Color: x / Appearance: x / SG: x / pH: x  Gluc: 125 mg/dL / Ketone: x  / Bili: x / Urobili: x   Blood: x / Protein: x / Nitrite: x   Leuk Esterase: x / RBC: x / WBC x   Sq Epi: x / Non Sq Epi: x / Bacteria: x              IMAGING/EKG/ETC   O/N Events: Given miralax x2, did not have BM. Given enema @6:50am.    Subjective/ROS: Patient seen and examined at bedside. Went for sigmoidoscopy this AM. He had difficulty having a BM before sigmoidoscopy, but had one afterwards. He notes it was loose but no blood present. He has been urinating with no complaints. At this time he notes he is extremely tired, and is dizzy/lightheaded when standing up. He also notes his stomach "singing" more but denies worsening of abdominal pain. He would like to be discharged tomorrow morning as he would like to arrange transportation and [place] with his current level of dizziness.     Orthostatics  Supine: 138/87 HR 88  Sittin/82   Standin/84 112    VITALS  Vital Signs Last 24 Hrs  T(C): 36.5 (07 Mar 2024 06:03), Max: 36.8 (06 Mar 2024 20:31)  T(F): 97.7 (07 Mar 2024 06:03), Max: 98.2 (06 Mar 2024 20:31)  HR: 78 (07 Mar 2024 06:03) (78 - 99)  BP: 172/94 (07 Mar 2024 06:03) (148/87 - 172/94)  BP(mean): --  RR: 17 (07 Mar 2024 06:03) (17 - 18)  SpO2: 97% (07 Mar 2024 06:03) (94% - 97%)    Parameters below as of 07 Mar 2024 06:03  Patient On (Oxygen Delivery Method): room air        CAPILLARY BLOOD GLUCOSE      POCT Blood Glucose.: 133 mg/dL (07 Mar 2024 06:44)  POCT Blood Glucose.: 133 mg/dL (06 Mar 2024 21:53)  POCT Blood Glucose.: 121 mg/dL (06 Mar 2024 18:00)  POCT Blood Glucose.: 174 mg/dL (06 Mar 2024 13:03)  POCT Blood Glucose.: 130 mg/dL (06 Mar 2024 09:11)      PHYSICAL EXAM  General: NAD, tired appearing  Head: NC/AT; MMM; PERRL; EOMI;  Neck: Supple; no JVD  Respiratory: CTAB; no wheezes/rales/rhonchi  Cardiovascular: Regular rhythm/rate; S1/S2+, no murmurs, rubs gallops   Gastrointestinal: Soft; NTND; bowel sounds normal and present  Extremities: WWP; no edema/cyanosis  Neurological: A&Ox3, CNII-XII grossly intact; no obvious focal deficits    MEDICATIONS  (STANDING):  dextrose 5%. 1000 milliLiter(s) (50 mL/Hr) IV Continuous <Continuous>  dextrose 5%. 1000 milliLiter(s) (100 mL/Hr) IV Continuous <Continuous>  dextrose 50% Injectable 25 Gram(s) IV Push once  dextrose 50% Injectable 12.5 Gram(s) IV Push once  dextrose 50% Injectable 25 Gram(s) IV Push once  glucagon  Injectable 1 milliGRAM(s) IntraMuscular once  influenza  Vaccine (HIGH DOSE) 0.7 milliLiter(s) IntraMuscular once  insulin lispro (ADMELOG) corrective regimen sliding scale   SubCutaneous three times a day before meals  insulin lispro (ADMELOG) corrective regimen sliding scale   SubCutaneous at bedtime  iron sucrose IVPB 200 milliGRAM(s) IV Intermittent every 24 hours  pantoprazole    Tablet 40 milliGRAM(s) Oral every 12 hours  rifAXIMin 550 milliGRAM(s) Oral every 12 hours  senna 2 Tablet(s) Oral at bedtime  sucralfate 1 Gram(s) Oral every 6 hours    MEDICATIONS  (PRN):  dextrose Oral Gel 15 Gram(s) Oral once PRN Blood Glucose LESS THAN 70 milliGRAM(s)/deciliter      aspirin (Pruritus)      LABS                        7.6    2.56  )-----------( 77       ( 06 Mar 2024 05:30 )             25.5     03-06    139  |  105  |  10  ----------------------------<  125<H>  3.8   |  22  |  0.85    Ca    8.4      06 Mar 2024 05:30  Phos  2.9     03-06  Mg     1.8     03-06    TPro  6.3  /  Alb  3.4  /  TBili  1.4<H>  /  DBili  x   /  AST  37  /  ALT  17  /  AlkPhos  155<H>  03-06    PT/INR - ( 06 Mar 2024 05:30 )   PT: 16.9 sec;   INR: 1.50          PTT - ( 06 Mar 2024 05:30 )  PTT:33.7 sec  Urinalysis Basic - ( 06 Mar 2024 05:30 )    Color: x / Appearance: x / SG: x / pH: x  Gluc: 125 mg/dL / Ketone: x  / Bili: x / Urobili: x   Blood: x / Protein: x / Nitrite: x   Leuk Esterase: x / RBC: x / WBC x   Sq Epi: x / Non Sq Epi: x / Bacteria: x      IMAGING/EKG/ETC  Sigmoidoscopy 3/7: no notable abnormalities

## 2024-03-08 LAB
ALBUMIN SERPL ELPH-MCNC: 3.4 G/DL — SIGNIFICANT CHANGE UP (ref 3.3–5)
ALP SERPL-CCNC: 151 U/L — HIGH (ref 40–120)
ALT FLD-CCNC: 19 U/L — SIGNIFICANT CHANGE UP (ref 10–45)
ANION GAP SERPL CALC-SCNC: 10 MMOL/L — SIGNIFICANT CHANGE UP (ref 5–17)
ANISOCYTOSIS BLD QL: SLIGHT — SIGNIFICANT CHANGE UP
AST SERPL-CCNC: 36 U/L — SIGNIFICANT CHANGE UP (ref 10–40)
BASOPHILS # BLD AUTO: 0 K/UL — SIGNIFICANT CHANGE UP (ref 0–0.2)
BASOPHILS NFR BLD AUTO: 0 % — SIGNIFICANT CHANGE UP (ref 0–2)
BILIRUB SERPL-MCNC: 1.3 MG/DL — HIGH (ref 0.2–1.2)
BUN SERPL-MCNC: 8 MG/DL — SIGNIFICANT CHANGE UP (ref 7–23)
CALCIUM SERPL-MCNC: 9.1 MG/DL — SIGNIFICANT CHANGE UP (ref 8.4–10.5)
CHLORIDE SERPL-SCNC: 108 MMOL/L — SIGNIFICANT CHANGE UP (ref 96–108)
CO2 SERPL-SCNC: 22 MMOL/L — SIGNIFICANT CHANGE UP (ref 22–31)
CREAT SERPL-MCNC: 0.94 MG/DL — SIGNIFICANT CHANGE UP (ref 0.5–1.3)
EGFR: 90 ML/MIN/1.73M2 — SIGNIFICANT CHANGE UP
EOSINOPHIL # BLD AUTO: 0.03 K/UL — SIGNIFICANT CHANGE UP (ref 0–0.5)
EOSINOPHIL NFR BLD AUTO: 0.9 % — SIGNIFICANT CHANGE UP (ref 0–6)
GIANT PLATELETS BLD QL SMEAR: PRESENT — SIGNIFICANT CHANGE UP
GLUCOSE BLDC GLUCOMTR-MCNC: 116 MG/DL — HIGH (ref 70–99)
GLUCOSE BLDC GLUCOMTR-MCNC: 121 MG/DL — HIGH (ref 70–99)
GLUCOSE BLDC GLUCOMTR-MCNC: 146 MG/DL — HIGH (ref 70–99)
GLUCOSE BLDC GLUCOMTR-MCNC: 98 MG/DL — SIGNIFICANT CHANGE UP (ref 70–99)
GLUCOSE SERPL-MCNC: 130 MG/DL — HIGH (ref 70–99)
HCT VFR BLD CALC: 25.1 % — LOW (ref 39–50)
HGB BLD-MCNC: 7.6 G/DL — LOW (ref 13–17)
HYPOCHROMIA BLD QL: SIGNIFICANT CHANGE UP
LYMPHOCYTES # BLD AUTO: 0.54 K/UL — LOW (ref 1–3.3)
LYMPHOCYTES # BLD AUTO: 17.5 % — SIGNIFICANT CHANGE UP (ref 13–44)
MAGNESIUM SERPL-MCNC: 1.7 MG/DL — SIGNIFICANT CHANGE UP (ref 1.6–2.6)
MANUAL SMEAR VERIFICATION: SIGNIFICANT CHANGE UP
MCHC RBC-ENTMCNC: 24 PG — LOW (ref 27–34)
MCHC RBC-ENTMCNC: 30.3 GM/DL — LOW (ref 32–36)
MCV RBC AUTO: 79.2 FL — LOW (ref 80–100)
MICROCYTES BLD QL: SLIGHT — SIGNIFICANT CHANGE UP
MONOCYTES # BLD AUTO: 0.43 K/UL — SIGNIFICANT CHANGE UP (ref 0–0.9)
MONOCYTES NFR BLD AUTO: 14 % — SIGNIFICANT CHANGE UP (ref 2–14)
NEUTROPHILS # BLD AUTO: 2.05 K/UL — SIGNIFICANT CHANGE UP (ref 1.8–7.4)
NEUTROPHILS NFR BLD AUTO: 66.7 % — SIGNIFICANT CHANGE UP (ref 43–77)
OVALOCYTES BLD QL SMEAR: SLIGHT — SIGNIFICANT CHANGE UP
PHOSPHATE SERPL-MCNC: 3.7 MG/DL — SIGNIFICANT CHANGE UP (ref 2.5–4.5)
PLAT MORPH BLD: ABNORMAL
PLATELET # BLD AUTO: 73 K/UL — LOW (ref 150–400)
POIKILOCYTOSIS BLD QL AUTO: SLIGHT — SIGNIFICANT CHANGE UP
POLYCHROMASIA BLD QL SMEAR: SLIGHT — SIGNIFICANT CHANGE UP
POTASSIUM SERPL-MCNC: 3.8 MMOL/L — SIGNIFICANT CHANGE UP (ref 3.5–5.3)
POTASSIUM SERPL-SCNC: 3.8 MMOL/L — SIGNIFICANT CHANGE UP (ref 3.5–5.3)
PROT SERPL-MCNC: 6.4 G/DL — SIGNIFICANT CHANGE UP (ref 6–8.3)
RBC # BLD: 3.17 M/UL — LOW (ref 4.2–5.8)
RBC # FLD: 17.2 % — HIGH (ref 10.3–14.5)
RBC BLD AUTO: ABNORMAL
SODIUM SERPL-SCNC: 140 MMOL/L — SIGNIFICANT CHANGE UP (ref 135–145)
VARIANT LYMPHS # BLD: 0.9 % — SIGNIFICANT CHANGE UP (ref 0–6)
WBC # BLD: 3.07 K/UL — LOW (ref 3.8–10.5)
WBC # FLD AUTO: 3.07 K/UL — LOW (ref 3.8–10.5)

## 2024-03-08 PROCEDURE — 99232 SBSQ HOSP IP/OBS MODERATE 35: CPT | Mod: GC

## 2024-03-08 PROCEDURE — 99222 1ST HOSP IP/OBS MODERATE 55: CPT

## 2024-03-08 PROCEDURE — 99233 SBSQ HOSP IP/OBS HIGH 50: CPT | Mod: GC

## 2024-03-08 RX ORDER — LACTULOSE 10 G/15ML
30 SOLUTION ORAL
Qty: 1260 | Refills: 2
Start: 2024-03-08 | End: 2024-04-18

## 2024-03-08 RX ORDER — ATORVASTATIN CALCIUM 80 MG/1
40 TABLET, FILM COATED ORAL AT BEDTIME
Refills: 0 | Status: DISCONTINUED | OUTPATIENT
Start: 2024-03-08 | End: 2024-03-09

## 2024-03-08 RX ORDER — METOPROLOL TARTRATE 50 MG
25 TABLET ORAL EVERY 24 HOURS
Refills: 0 | Status: DISCONTINUED | OUTPATIENT
Start: 2024-03-08 | End: 2024-03-09

## 2024-03-08 RX ORDER — SUCRALFATE 1 G
1 TABLET ORAL
Refills: 0 | DISCHARGE

## 2024-03-08 RX ORDER — POLYETHYLENE GLYCOL 3350 17 G/17G
17 POWDER, FOR SOLUTION ORAL
Qty: 0 | Refills: 0 | DISCHARGE
Start: 2024-03-08

## 2024-03-08 RX ORDER — SENNA PLUS 8.6 MG/1
2 TABLET ORAL
Qty: 0 | Refills: 0 | DISCHARGE
Start: 2024-03-08

## 2024-03-08 RX ADMIN — POLYETHYLENE GLYCOL 3350 17 GRAM(S): 17 POWDER, FOR SOLUTION ORAL at 16:46

## 2024-03-08 RX ADMIN — SENNA PLUS 2 TABLET(S): 8.6 TABLET ORAL at 22:11

## 2024-03-08 RX ADMIN — Medication 1 GRAM(S): at 12:38

## 2024-03-08 RX ADMIN — IRON SUCROSE 110 MILLIGRAM(S): 20 INJECTION, SOLUTION INTRAVENOUS at 15:52

## 2024-03-08 RX ADMIN — Medication 25 MILLIGRAM(S): at 18:58

## 2024-03-08 RX ADMIN — Medication 1 GRAM(S): at 06:57

## 2024-03-08 RX ADMIN — PANTOPRAZOLE SODIUM 40 MILLIGRAM(S): 20 TABLET, DELAYED RELEASE ORAL at 06:57

## 2024-03-08 RX ADMIN — Medication 1 GRAM(S): at 00:21

## 2024-03-08 RX ADMIN — ATORVASTATIN CALCIUM 40 MILLIGRAM(S): 80 TABLET, FILM COATED ORAL at 22:11

## 2024-03-08 RX ADMIN — PANTOPRAZOLE SODIUM 40 MILLIGRAM(S): 20 TABLET, DELAYED RELEASE ORAL at 17:56

## 2024-03-08 RX ADMIN — Medication 1 GRAM(S): at 17:57

## 2024-03-08 NOTE — PROGRESS NOTE ADULT - PROBLEM SELECTOR PLAN 7
S/p CABG in 09/2023 by Dr. Hinton. Was on plavix after surgery, was held after the GIB in 11/2023. Per wife pt has hx of allergy to ASA many years ago had itching and rash, no anaphylaxis, she is unsure if it was a true allergy as it was so long ago. Pt is not on a statin at this time. , cholesterol 194, non HDL cholesterol 142.  - start metoprolol instead of coreg due to CAD hx  - collateral from cardiologist regarding ASA, plavix, statin  - c/w DASH diet S/p CABG in 09/2023 by Dr. Hinton. Was on plavix after surgery, was held after the GIB in 11/2023. Per wife pt has hx of allergy to ASA many years ago had itching and rash, no anaphylaxis, she is unsure if it was a true allergy as it was so long ago. Pt is not on a statin at this time. , cholesterol 194, non HDL cholesterol 142.  - start metoprolol 12.5mg BID instead of coreg due to CAD hx  - collateral from cardiologist regarding ASA, plavix, statin  - c/w DASH diet MR abdomen from 2/14/2024 showed 3.  5 mm arterially enhancing focus in the superior aspect of the right   hepatic lobe, as described above; not visualized on the previous CT.   LR-3.. Recommend one-year interval follow-up with MRI.  - Outpatient f/u next week with Dr. Lawson

## 2024-03-08 NOTE — CONSULT NOTE ADULT - ASSESSMENT
64M with PMH of former smoker with hx HTN, HLD CAD s/p CABG (LIMA-LAD, SVG-PDA, SVG-OM1-RI) on 9/14/23, pre-diabetes, gout, TIA (14 yrs ago), gastric ulcer, rectal bleed, LIZ and alcoholic liver disease, cirrhosis, portal hypertension c/b esophageal varices (and UGIB, last GIB in nov 2023, EGD 11/13 duodenitis, nonbleeding varices, healing PUD), L pleural effusion s/p thoracentesis in 10/2023, presenting for 2 days of dizziness found to have orthostasis with ambulation. Cardiology consulted for medication management.      Review of studies:    ECG: 3/4/24: Sinus rhythm, poor R wave progression, otherwise normal    TTE 3/7/24: LVIDD 4.8cm LVEF 59% dilated LA, mild ascending aorta dilation.     #Orthostasis  #CAD s/p CABG (LIMA-LAD, SVG-RPDA, [id number] intermedius on 9/14/23 with Dr Hinton)  - Agree with transition from coreg to Toprol to aid with orthostasis   - Antiplatelets held in setting of recurrent GI bleeds in setting of varicies  - Moderate to high intensity statin - Lipitor 40mg QD  - Follow up with outpatient cardiologist - if no follow up can follow with Dr Karina Ignacio      Case discussed with cardiology consult attending.       64M with PMH of former smoker with hx HTN, HLD CAD s/p CABG (LIMA-LAD, SVG-PDA, SVG-OM1-RI) on 9/14/23, pre-diabetes, gout, TIA (14 yrs ago), gastric ulcer, rectal bleed, LIZ and alcoholic liver disease, cirrhosis, portal hypertension c/b esophageal varices (and UGIB, last GIB in nov 2023, EGD 11/13 duodenitis, nonbleeding varices, healing PUD), L pleural effusion s/p thoracentesis in 10/2023, presenting for 2 days of dizziness found to have orthostasis with ambulation. Cardiology consulted for medication management.      Review of studies:    ECG: 3/4/24: Sinus rhythm, poor R wave progression, otherwise normal    TTE 3/7/24: LVIDD 4.8cm LVEF 59% dilated LA, mild ascending aorta dilation.     #Orthostasis  #CAD s/p CABG (LIMA-LAD, SVG-RPDA, [id number] intermedius on 9/14/23 with Dr Hinton)  - Agree with transition from coreg to Toprol to aid with orthostasis   - Antiplatelets held in setting of recurrent GI bleeds in setting of varicies  - Moderate to high intensity statin - Lipitor 40mg QD  - Follow up with outpatient cardiologist - if no follow up can follow with Dr Karina Ignacio      Case discussed with cardiology consult attending - cardiology to sign off, please re-consult with any further questions

## 2024-03-08 NOTE — CONSULT NOTE ADULT - SUBJECTIVE AND OBJECTIVE BOX
Neurology Consult    64M with PMH of former smoker with hx HTN, HLD, severe 3 vessel CAD s/p CAB x4 9/2024, pre-diabetes, gout, TIA (14 yrs ago), gastric ulcer, rectal bleed, LIZ and alcoholic liver disease, cirrhosis, portal hypertension c/b esophageal varices (and UGIB, last GIB in nov 2023, EGD 11/13 duodenitis, nonbleeding varices, healing PUD), L pleural effusion s/p thoracentesis in 10/2023, presenting for 2 days of dizziness.       HPI:  64M with PMH of former smoker with hx HTN, HLD, severe 3 vessel CAD s/p CAB x4 9/2024, pre-diabetes, gout, TIA (14 yrs ago), gastric ulcer, rectal bleed, LIZ and alcoholic liver disease, cirrhosis, portal hypertension c/b esophageal varices (and UGIB, last GIB in nov 2023, EGD 11/13 duodenitis, nonbleeding varices, healing PUD), L pleural effusion s/p thoracentesis in 10/2023, presenting for 2 days of dizziness. Was seen by his hepatologist Dr. Lawson this morning and had an episode of profuse diaphoresis, abd pain, and pallor, wanted to have a BM however was advised to lie down given c/f GIB, EMS was called and pt was brought to ED from Dr. Lawson's office. Upon evaluation pt reports 2 days of dizziness, headaches, generalized malaise, decreased PO intake, and diffuse lower abdominal pain. No other complaints at this time. Denies fevers, chills, CP, SOB, cough, vomiting, diarrhea, hematochezia, melena.    ED course:  Vitals: afebrile, HR 60s-90s, BP 130s-160s/70s-90s, RR 18-20, SpO2 98-99% on RA.  Labs: WBC 2.77 --> 3.19, H&H 8.4/27.5 (baseline Hgb 8-9.5), MCV 77.9, plt 93 (baseline 80s-100s), BMP wnl, bilirubin 3.1, alk phos 140, AST 52, ALT 22, lipase wnl, UA negative.  EKG: NSR w/ no acute ischemic changes  Imaging:   *CTAP w/ PO & IV contrast: redundant sigmoid colon; prominent rectal wall w/ circumferential thickening, possibly 2/2 incomplete distention vs pathologic etiology; resolution of L pleural effusion (since 2/14 and 10/31); near complete resolution of ascites; mild nodularity of hepatic contour compatible w/ cirrhosis; splenomegaly; nonspecific mild stranding in fat surrounding proximal inferior mesenteric artery (ddx focal mild vasculitis), no occlusion, possible moderate ostial stenosis.  Interventions: toradol 15mg IV x1, MG 1g IV x1, reglan 10mg PO x1, NS 2L (04 Mar 2024 21:34)      PAST MEDICAL & SURGICAL HISTORY:  CAD (coronary artery disease)      HTN (hypertension)      Cirrhosis      Esophageal varices in cirrhosis      History of TIAs      H/O: GI bleed      Gout      HLD (hyperlipidemia)      History of portal hypertension      Diabetes mellitus      No significant past surgical history          FAMILY HISTORY:  No pertinent family history in first degree relatives        Social History: (-) x 3    Allergies    aspirin (Pruritus)    Intolerances        MEDICATIONS  (STANDING):  dextrose 5%. 1000 milliLiter(s) (50 mL/Hr) IV Continuous <Continuous>  dextrose 5%. 1000 milliLiter(s) (100 mL/Hr) IV Continuous <Continuous>  dextrose 50% Injectable 25 Gram(s) IV Push once  dextrose 50% Injectable 12.5 Gram(s) IV Push once  dextrose 50% Injectable 25 Gram(s) IV Push once  glucagon  Injectable 1 milliGRAM(s) IntraMuscular once  influenza  Vaccine (HIGH DOSE) 0.7 milliLiter(s) IntraMuscular once  insulin lispro (ADMELOG) corrective regimen sliding scale   SubCutaneous three times a day before meals  insulin lispro (ADMELOG) corrective regimen sliding scale   SubCutaneous at bedtime  iron sucrose IVPB 200 milliGRAM(s) IV Intermittent every 24 hours  pantoprazole    Tablet 40 milliGRAM(s) Oral every 12 hours  polyethylene glycol 3350 17 Gram(s) Oral every 24 hours  rifAXIMin 550 milliGRAM(s) Oral every 12 hours  senna 2 Tablet(s) Oral at bedtime  sucralfate 1 Gram(s) Oral every 6 hours    MEDICATIONS  (PRN):  dextrose Oral Gel 15 Gram(s) Oral once PRN Blood Glucose LESS THAN 70 milliGRAM(s)/deciliter      Review of systems:    Constitutional: as per HPI  Eyes: No eye pain or discharge  ENMT:  No difficulty hearing; No sinus or throat pain  Neck: No pain or stiffness  Respiratory: No cough, wheezing, chills or hemoptysis  Cardiovascular: No chest pain, palpitations, shortness of breath, dyspnea on exertion  Gastrointestinal: No abdominal pain, nausea, vomiting or hematemesis; No diarrhea or constipation.   Genitourinary: No dysuria, frequency, hematuria or incontinence  Neurological: As per HPI  Skin: No rashes or lesions   Endocrine: No heat or cold intolerance; No hair loss  Musculoskeletal: No joint pain or swelling  Psychiatric: No depression, anxiety, mood swings  Heme/Lymph: No easy bruising or bleeding gums    Vital Signs Last 24 Hrs  T(C): 36.7 (08 Mar 2024 12:07), Max: 36.7 (07 Mar 2024 20:59)  T(F): 98 (08 Mar 2024 12:07), Max: 98.1 (07 Mar 2024 20:59)  HR: 79 (08 Mar 2024 12:07) (77 - 80)  BP: 160/95 (08 Mar 2024 12:07) (152/83 - 160/95)  BP(mean): --  RR: 18 (08 Mar 2024 12:07) (17 - 18)  SpO2: 98% (08 Mar 2024 12:07) (98% - 98%)    Parameters below as of 08 Mar 2024 12:07  Patient On (Oxygen Delivery Method): room air        Examination:  General:  Appearance is consistent with chronologic age.  No abnormal facies.  Gross skin survey within normal limits.    Cognitive/Language:  The patient is oriented to person, place, time and date.  Recent and remote memory intact.  Fund of knowledge is intact and normal.  Language with normal repetition, comprehension and naming.  Nondysarthric.    Eyes: intact VA, VFF.  EOMI w/o nystagmus, skew or reported double vision.  PERRL.  No ptosis/weakness of eyelid closure.    Face:  Facial sensation normal V1 - 3, no facial asymmetry.    Ears/Nose/Throat:  Hearing grossly intact b/l.  Palate elevates midline.  Tongue and uvula midline.   Motor examination:   Normal tone, bulk and range of motion.  No tenderness, twitching, tremors or involuntary movements.  Formal Muscle Strength Testing: (MRC grade R/L) 5/5 UE; 5/5 LE.  No observable drift.  Reflexes:   2+ b/l pectoralis, biceps, triceps, brachioradialis, patella and Achilles.  Plantar response downgoing b/l.  Jaw jerk, Chay, clonus absent.  Sensory examination:   Intact to light touch and pinprick, pain, temperature and proprioception and vibration in all extremities.  Cerebellum:   FTN/HKS intact with normal ESVIN in all limbs.  No dysmetria or dysdiadokinesia.  Gait narrow based and normal.    Respiratory:  no audible wheezing or inspiratory stridor.  no use of accessory muscles.   Cardiac: pulse palpable, no audible bruits  Abdomen: supple, no guarding, no TTP    Labs:   CBC Full  -  ( 08 Mar 2024 05:30 )  WBC Count : 3.07 K/uL  RBC Count : 3.17 M/uL  Hemoglobin : 7.6 g/dL  Hematocrit : 25.1 %  Platelet Count - Automated : 73 K/uL  Mean Cell Volume : 79.2 fl  Mean Cell Hemoglobin : 24.0 pg  Mean Cell Hemoglobin Concentration : 30.3 gm/dL  Auto Neutrophil # : 2.05 K/uL  Auto Lymphocyte # : 0.54 K/uL  Auto Monocyte # : 0.43 K/uL  Auto Eosinophil # : 0.03 K/uL  Auto Basophil # : 0.00 K/uL  Auto Neutrophil % : 66.7 %  Auto Lymphocyte % : 17.5 %  Auto Monocyte % : 14.0 %  Auto Eosinophil % : 0.9 %  Auto Basophil % : 0.0 %    03-08    140  |  108  |  8   ----------------------------<  130<H>  3.8   |  22  |  0.94    Ca    9.1      08 Mar 2024 05:30  Phos  3.7     03-08  Mg     1.7     03-08    TPro  6.4  /  Alb  3.4  /  TBili  1.3<H>  /  DBili  x   /  AST  36  /  ALT  19  /  AlkPhos  151<H>  03-08    LIVER FUNCTIONS - ( 08 Mar 2024 05:30 )  Alb: 3.4 g/dL / Pro: 6.4 g/dL / ALK PHOS: 151 U/L / ALT: 19 U/L / AST: 36 U/L / GGT: x           PT/INR - ( 07 Mar 2024 05:30 )   PT: 16.6 sec;   INR: 1.47          PTT - ( 07 Mar 2024 05:30 )  PTT:36.0 sec  Urinalysis Basic - ( 08 Mar 2024 05:30 )    Color: x / Appearance: x / SG: x / pH: x  Gluc: 130 mg/dL / Ketone: x  / Bili: x / Urobili: x   Blood: x / Protein: x / Nitrite: x   Leuk Esterase: x / RBC: x / WBC x   Sq Epi: x / Non Sq Epi: x / Bacteria: x          Neuroimaging:  [facility]:     03-08-24 @ 15:12       Neurology Consult      64M with PMH of former smoker with hx HTN, HLD, severe 3 vessel CAD s/p CAB x4 9/2024, pre-diabetes, gout, TIA (14 yrs ago), gastric ulcer, rectal bleed, LIZ and alcoholic liver disease, cirrhosis, portal hypertension c/b esophageal varices (and UGIB, last GIB in nov 2023, EGD 11/13 duodenitis, nonbleeding varices, healing PUD), L pleural effusion s/p thoracentesis in 10/2023, presenting for 2 days of dizziness. Was seen by his hepatologist Dr. Lawson this morning and had an episode of profuse diaphoresis, abd pain, and pallor, wanted to have a BM however was advised to lie down given c/f GIB, EMS was called and pt was brought to ED from Dr. Lawson's office. Upon evaluation pt reports 2 days of dizziness, headaches, generalized malaise, decreased PO intake, and diffuse lower abdominal pain. No other complaints at this time. Denies fevers, chills, CP, SOB, cough, vomiting, diarrhea, hematochezia, melena.    ED course:  Vitals: afebrile, HR 60s-90s, BP 130s-160s/70s-90s, RR 18-20, SpO2 98-99% on RA.  Labs: WBC 2.77 --> 3.19, H&H 8.4/27.5 (baseline Hgb 8-9.5), MCV 77.9, plt 93 (baseline 80s-100s), BMP wnl, bilirubin 3.1, alk phos 140, AST 52, ALT 22, lipase wnl, UA negative.  EKG: NSR w/ no acute ischemic changes  Imaging:   *CTAP w/ PO & IV contrast: redundant sigmoid colon; prominent rectal wall w/ circumferential thickening, possibly 2/2 incomplete distention vs pathologic etiology; resolution of L pleural effusion (since 2/14 and 10/31); near complete resolution of ascites; mild nodularity of hepatic contour compatible w/ cirrhosis; splenomegaly; nonspecific mild stranding in fat surrounding proximal inferior mesenteric artery (ddx focal mild vasculitis), no occlusion, possible moderate ostial stenosis.  Interventions: toradol 15mg IV x1, MG 1g IV x1, reglan 10mg PO x1, NS 2L (04 Mar 2024 21:34)      PAST MEDICAL & SURGICAL HISTORY:  CAD (coronary artery disease)      HTN (hypertension)      Cirrhosis      Esophageal varices in cirrhosis      History of TIAs      H/O: GI bleed      Gout      HLD (hyperlipidemia)      History of portal hypertension      Diabetes mellitus      No significant past surgical history          FAMILY HISTORY:  No pertinent family history in first degree relatives        Social History: (-) x 3    Allergies    aspirin (Pruritus)    Intolerances        MEDICATIONS  (STANDING):  dextrose 5%. 1000 milliLiter(s) (50 mL/Hr) IV Continuous <Continuous>  dextrose 5%. 1000 milliLiter(s) (100 mL/Hr) IV Continuous <Continuous>  dextrose 50% Injectable 25 Gram(s) IV Push once  dextrose 50% Injectable 12.5 Gram(s) IV Push once  dextrose 50% Injectable 25 Gram(s) IV Push once  glucagon  Injectable 1 milliGRAM(s) IntraMuscular once  influenza  Vaccine (HIGH DOSE) 0.7 milliLiter(s) IntraMuscular once  insulin lispro (ADMELOG) corrective regimen sliding scale   SubCutaneous three times a day before meals  insulin lispro (ADMELOG) corrective regimen sliding scale   SubCutaneous at bedtime  iron sucrose IVPB 200 milliGRAM(s) IV Intermittent every 24 hours  pantoprazole    Tablet 40 milliGRAM(s) Oral every 12 hours  polyethylene glycol 3350 17 Gram(s) Oral every 24 hours  rifAXIMin 550 milliGRAM(s) Oral every 12 hours  senna 2 Tablet(s) Oral at bedtime  sucralfate 1 Gram(s) Oral every 6 hours    MEDICATIONS  (PRN):  dextrose Oral Gel 15 Gram(s) Oral once PRN Blood Glucose LESS THAN 70 milliGRAM(s)/deciliter      Review of systems:    Constitutional: as per HPI  Eyes: No eye pain or discharge  ENMT:  No difficulty hearing; No sinus or throat pain  Neck: No pain or stiffness  Respiratory: No cough, wheezing, chills or hemoptysis  Cardiovascular: No chest pain, palpitations, shortness of breath, dyspnea on exertion  Gastrointestinal: No abdominal pain, nausea, vomiting or hematemesis; No diarrhea or constipation.   Genitourinary: No dysuria, frequency, hematuria or incontinence  Neurological: As per HPI  Skin: No rashes or lesions   Endocrine: No heat or cold intolerance; No hair loss  Musculoskeletal: No joint pain or swelling  Psychiatric: No depression, anxiety, mood swings  Heme/Lymph: No easy bruising or bleeding gums    Vital Signs Last 24 Hrs  T(C): 36.7 (08 Mar 2024 12:07), Max: 36.7 (07 Mar 2024 20:59)  T(F): 98 (08 Mar 2024 12:07), Max: 98.1 (07 Mar 2024 20:59)  HR: 79 (08 Mar 2024 12:07) (77 - 80)  BP: 160/95 (08 Mar 2024 12:07) (152/83 - 160/95)  BP(mean): --  RR: 18 (08 Mar 2024 12:07) (17 - 18)  SpO2: 98% (08 Mar 2024 12:07) (98% - 98%)    Parameters below as of 08 Mar 2024 12:07  Patient On (Oxygen Delivery Method): room air        Examination:  General:  Appearance is consistent with chronologic age.  No abnormal facies.  Gross skin survey within normal limits.    Cognitive/Language:  The patient is oriented to person, place, time and date.  Recent and remote memory intact.  Fund of knowledge is intact and normal.  Language with normal repetition, comprehension and naming.  Nondysarthric.    Eyes: intact VA, VFF.  EOMI w/o nystagmus, skew or reported double vision.  PERRL.  No ptosis/weakness of eyelid closure.    Face:  Facial sensation normal V1 - 3, no facial asymmetry.    Ears/Nose/Throat:  Hearing grossly intact b/l.  Palate elevates midline.  Tongue and uvula midline.   Motor examination:   Normal tone, bulk and range of motion.  No tenderness, twitching, tremors or involuntary movements.  Formal Muscle Strength Testing: (MRC grade R/L) 5/5 UE; 5/5 LE.  No observable drift.  Reflexes:   2+ b/l  biceps, triceps, brachioradialis, patella and Achilles.    Sensory examination:   Intact to light touch and pinprick, pain in all extremities.  Cerebellum:   FTN intact   Gait deferred    Respiratory:  no audible wheezing or inspiratory stridor.  no use of accessory muscles.   Cardiac: pulse palpable, no audible bruits  Abdomen: supple, no guarding, no TTP    Labs:   CBC Full  -  ( 08 Mar 2024 05:30 )  WBC Count : 3.07 K/uL  RBC Count : 3.17 M/uL  Hemoglobin : 7.6 g/dL  Hematocrit : 25.1 %  Platelet Count - Automated : 73 K/uL  Mean Cell Volume : 79.2 fl  Mean Cell Hemoglobin : 24.0 pg  Mean Cell Hemoglobin Concentration : 30.3 gm/dL  Auto Neutrophil # : 2.05 K/uL  Auto Lymphocyte # : 0.54 K/uL  Auto Monocyte # : 0.43 K/uL  Auto Eosinophil # : 0.03 K/uL  Auto Basophil # : 0.00 K/uL  Auto Neutrophil % : 66.7 %  Auto Lymphocyte % : 17.5 %  Auto Monocyte % : 14.0 %  Auto Eosinophil % : 0.9 %  Auto Basophil % : 0.0 %    03-08    140  |  108  |  8   ----------------------------<  130<H>  3.8   |  22  |  0.94    Ca    9.1      08 Mar 2024 05:30  Phos  3.7     03-08  Mg     1.7     03-08    TPro  6.4  /  Alb  3.4  /  TBili  1.3<H>  /  DBili  x   /  AST  36  /  ALT  19  /  AlkPhos  151<H>  03-08    LIVER FUNCTIONS - ( 08 Mar 2024 05:30 )  Alb: 3.4 g/dL / Pro: 6.4 g/dL / ALK PHOS: 151 U/L / ALT: 19 U/L / AST: 36 U/L / GGT: x           PT/INR - ( 07 Mar 2024 05:30 )   PT: 16.6 sec;   INR: 1.47          PTT - ( 07 Mar 2024 05:30 )  PTT:36.0 sec  Urinalysis Basic - ( 08 Mar 2024 05:30 )    Color: x / Appearance: x / SG: x / pH: x  Gluc: 130 mg/dL / Ketone: x  / Bili: x / Urobili: x   Blood: x / Protein: x / Nitrite: x   Leuk Esterase: x / RBC: x / WBC x   Sq Epi: x / Non Sq Epi: x / Bacteria: x          Neuroimaging:  [facility]:     03-08-24 @ 15:12

## 2024-03-08 NOTE — PROGRESS NOTE ADULT - PROBLEM SELECTOR PLAN 4
MR abdomen from 2/14/2024 showed 3.  5 mm arterially enhancing focus in the superior aspect of the right   hepatic lobe, as described above; not visualized on the previous CT.   LR-3.. Recommend one-year interval follow-up with MRI.  - Outpatient f/u MR abdomen from 2/14/2024 showed 3.  5 mm arterially enhancing focus in the superior aspect of the right   hepatic lobe, as described above; not visualized on the previous CT.   LR-3.. Recommend one-year interval follow-up with MRI.  - Outpatient f/u next week with Dr. Lawson Hx of alcoholic liver cirrhosis, portal hypertension c/b esophageal varices (and UGIB, last GIB in 11/2023, EGD 11/13 duodenitis, nonbleeding varices, healing PUD), L pleural effusion s/p thoracentesis in 10/2023. Home meds: rifaximin 550mg BID, lasix 40mg qd, spironolactone 25mg (everyday except MWF), coreg 3.125mg BID  Coags worsening, PT of 16.1 and INR of 14.3  - c/w rifaximin 550mg PO BID  - holding coreg, lasix, spironolactone i/s/o orthostatic hypotension -- will not restart at this time  - Daily CBC, CMP, Coags  - F/u with Dr. Lawson outpatient

## 2024-03-08 NOTE — PROGRESS NOTE ADULT - PROBLEM SELECTOR PLAN 5
Hx of alcoholic liver cirrhosis, portal hypertension c/b esophageal varices (and UGIB, last GIB in 11/2023, EGD 11/13 duodenitis, nonbleeding varices, healing PUD), L pleural effusion s/p thoracentesis in 10/2023. Home meds: rifaximin 550mg BID, lasix 40mg qd, spironolactone 25mg (everyday except MWF), coreg 3.125mg BID  Coags worsening, PT of 16.1 and INR of 14.3  - c/w rifaximin 550mg PO BID  - holding coreg, lasix, spironolactone i/s/o orthostatic hypotension -- restart as appropriate  - Daily CBC, CMP, Coags Hx of alcoholic liver cirrhosis, portal hypertension c/b esophageal varices (and UGIB, last GIB in 11/2023, EGD 11/13 duodenitis, nonbleeding varices, healing PUD), L pleural effusion s/p thoracentesis in 10/2023. Home meds: rifaximin 550mg BID, lasix 40mg qd, spironolactone 25mg (everyday except MWF), coreg 3.125mg BID  Coags worsening, PT of 16.1 and INR of 14.3  - c/w rifaximin 550mg PO BID  - holding coreg, lasix, spironolactone i/s/o orthostatic hypotension -- will not restart at this time  - Daily CBC, CMP, Coags  - F/u with Dr. Lawson outpatient Home meds: sucralfate 1g QID and protonix 40mg BID. Of note pt was discharged 11/2023 w/ prescription for protonix 40mg PO BID w/ plan to switch to qd after 2 weeks, however per wife is still taking it BID. No signs or symptoms of GIB at this time.  Hgb drop to 7.5 from 8.1 at admit is concerning. Hgb continues to be stable with no signs of GIB.  - c/w sucralfate 1g PO q6h  - protonix 40mg PO BID  - c/w senna and miralax, lactulose PRN   - monitor for signs/symptoms of GIB  - f/u GI recs

## 2024-03-08 NOTE — PROGRESS NOTE ADULT - SUBJECTIVE AND OBJECTIVE BOX
O/N Events:    Subjective/ROS: Patient seen and examined at bedside.     Denies Fever/Chills, HA, CP, SOB, n/v, changes in bowel/urinary habits.  12pt ROS otherwise negative.    VITALS  Vital Signs Last 24 Hrs  T(C): 36.6 (08 Mar 2024 06:11), Max: 36.7 (07 Mar 2024 11:46)  T(F): 97.9 (08 Mar 2024 06:11), Max: 98.1 (07 Mar 2024 20:59)  HR: 77 (08 Mar 2024 06:11) (77 - 88)  BP: 152/83 (08 Mar 2024 06:11) (152/83 - 172/94)  BP(mean): 108 (07 Mar 2024 07:58) (108 - 108)  RR: 17 (08 Mar 2024 06:11) (16 - 17)  SpO2: 98% (08 Mar 2024 06:11) (97% - 98%)    Parameters below as of 08 Mar 2024 06:11  Patient On (Oxygen Delivery Method): room air        CAPILLARY BLOOD GLUCOSE      POCT Blood Glucose.: 165 mg/dL (07 Mar 2024 21:49)  POCT Blood Glucose.: 124 mg/dL (07 Mar 2024 17:32)  POCT Blood Glucose.: 120 mg/dL (07 Mar 2024 12:52)  POCT Blood Glucose.: 136 mg/dL (07 Mar 2024 08:58)      PHYSICAL EXAM  General: NAD  Head: NC/AT; MMM; PERRL; EOMI;  Neck: Supple; no JVD  Respiratory: CTAB; no wheezes/rales/rhonchi  Cardiovascular: Regular rhythm/rate; S1/S2+, no murmurs, rubs gallops   Gastrointestinal: Soft; NTND; bowel sounds normal and present  Extremities: WWP; no edema/cyanosis  Neurological: A&Ox3, CNII-XII grossly intact; no obvious focal deficits    MEDICATIONS  (STANDING):  dextrose 5%. 1000 milliLiter(s) (50 mL/Hr) IV Continuous <Continuous>  dextrose 5%. 1000 milliLiter(s) (100 mL/Hr) IV Continuous <Continuous>  dextrose 50% Injectable 25 Gram(s) IV Push once  dextrose 50% Injectable 12.5 Gram(s) IV Push once  dextrose 50% Injectable 25 Gram(s) IV Push once  glucagon  Injectable 1 milliGRAM(s) IntraMuscular once  influenza  Vaccine (HIGH DOSE) 0.7 milliLiter(s) IntraMuscular once  insulin lispro (ADMELOG) corrective regimen sliding scale   SubCutaneous three times a day before meals  insulin lispro (ADMELOG) corrective regimen sliding scale   SubCutaneous at bedtime  iron sucrose IVPB 200 milliGRAM(s) IV Intermittent every 24 hours  pantoprazole    Tablet 40 milliGRAM(s) Oral every 12 hours  polyethylene glycol 3350 17 Gram(s) Oral every 24 hours  rifAXIMin 550 milliGRAM(s) Oral every 12 hours  senna 2 Tablet(s) Oral at bedtime  sucralfate 1 Gram(s) Oral every 6 hours    MEDICATIONS  (PRN):  dextrose Oral Gel 15 Gram(s) Oral once PRN Blood Glucose LESS THAN 70 milliGRAM(s)/deciliter      aspirin (Pruritus)      LABS                        7.6    3.07  )-----------( 73       ( 08 Mar 2024 05:30 )             25.1     03-07    141  |  108  |  10  ----------------------------<  125<H>  4.1   |  21<L>  |  0.86    Ca    9.4      07 Mar 2024 05:30  Phos  3.5     03-07  Mg     1.7     03-07    TPro  6.3  /  Alb  3.3  /  TBili  1.5<H>  /  DBili  x   /  AST  37  /  ALT  18  /  AlkPhos  138<H>  03-07    PT/INR - ( 07 Mar 2024 05:30 )   PT: 16.6 sec;   INR: 1.47          PTT - ( 07 Mar 2024 05:30 )  PTT:36.0 sec  Urinalysis Basic - ( 07 Mar 2024 05:30 )    Color: x / Appearance: x / SG: x / pH: x  Gluc: 125 mg/dL / Ketone: x  / Bili: x / Urobili: x   Blood: x / Protein: x / Nitrite: x   Leuk Esterase: x / RBC: x / WBC x   Sq Epi: x / Non Sq Epi: x / Bacteria: x              IMAGING/EKG/ETC   O/N Events: did not sleep well as roommate was disruptive overnight    Subjective/ROS: Patient seen and examined at bedside. He reports he is doing well, and feels his dizziness has improved slightly. His headache has improved as well. Right now he is concerned about his high blood pressure. He has not had a BM since yesterday after his scope. He notes mild abdominal pain, but nothing different from baseline.    Denies Fever/Chills, HA, CP, SOB, n/v, changes in bowel/urinary habits.  12pt ROS otherwise negative.    VITALS  Vital Signs Last 24 Hrs  T(C): 36.6 (08 Mar 2024 06:11), Max: 36.7 (07 Mar 2024 11:46)  T(F): 97.9 (08 Mar 2024 06:11), Max: 98.1 (07 Mar 2024 20:59)  HR: 77 (08 Mar 2024 06:11) (77 - 88)  BP: 152/83 (08 Mar 2024 06:11) (152/83 - 172/94)  BP(mean): 108 (07 Mar 2024 07:58) (108 - 108)  RR: 17 (08 Mar 2024 06:11) (16 - 17)  SpO2: 98% (08 Mar 2024 06:11) (97% - 98%)    Parameters below as of 08 Mar 2024 06:11  Patient On (Oxygen Delivery Method): room air    CAPILLARY BLOOD GLUCOSE      POCT Blood Glucose.: 165 mg/dL (07 Mar 2024 21:49)  POCT Blood Glucose.: 124 mg/dL (07 Mar 2024 17:32)  POCT Blood Glucose.: 120 mg/dL (07 Mar 2024 12:52)  POCT Blood Glucose.: 136 mg/dL (07 Mar 2024 08:58)      PHYSICAL EXAM  General: NAD  Head: NC/AT; MMM; PERRL; EOMI;  Neck: Supple; no JVD  Respiratory: CTAB; no wheezes/rales/rhonchi  Cardiovascular: Regular rhythm/rate; S1/S2+, no murmurs, rubs gallops   Gastrointestinal: Soft, bowel sounds normal and present; mild guarding of LUQ; suprapubic tenderness to palpation  Extremities: WWP; no edema/cyanosis  Neurological: A&Ox3, no obvious focal deficits    MEDICATIONS  (STANDING):  dextrose 5%. 1000 milliLiter(s) (50 mL/Hr) IV Continuous <Continuous>  dextrose 5%. 1000 milliLiter(s) (100 mL/Hr) IV Continuous <Continuous>  dextrose 50% Injectable 25 Gram(s) IV Push once  dextrose 50% Injectable 12.5 Gram(s) IV Push once  dextrose 50% Injectable 25 Gram(s) IV Push once  glucagon  Injectable 1 milliGRAM(s) IntraMuscular once  influenza  Vaccine (HIGH DOSE) 0.7 milliLiter(s) IntraMuscular once  insulin lispro (ADMELOG) corrective regimen sliding scale   SubCutaneous three times a day before meals  insulin lispro (ADMELOG) corrective regimen sliding scale   SubCutaneous at bedtime  iron sucrose IVPB 200 milliGRAM(s) IV Intermittent every 24 hours  pantoprazole    Tablet 40 milliGRAM(s) Oral every 12 hours  polyethylene glycol 3350 17 Gram(s) Oral every 24 hours  rifAXIMin 550 milliGRAM(s) Oral every 12 hours  senna 2 Tablet(s) Oral at bedtime  sucralfate 1 Gram(s) Oral every 6 hours    MEDICATIONS  (PRN):  dextrose Oral Gel 15 Gram(s) Oral once PRN Blood Glucose LESS THAN 70 milliGRAM(s)/deciliter      aspirin (Pruritus)      LABS                        7.6    3.07  )-----------( 73       ( 08 Mar 2024 05:30 )             25.1     03-07    141  |  108  |  10  ----------------------------<  125<H>  4.1   |  21<L>  |  0.86    Ca    9.4      07 Mar 2024 05:30  Phos  3.5     03-07  Mg     1.7     03-07    TPro  6.3  /  Alb  3.3  /  TBili  1.5<H>  /  DBili  x   /  AST  37  /  ALT  18  /  AlkPhos  138<H>  03-07    PT/INR - ( 07 Mar 2024 05:30 )   PT: 16.6 sec;   INR: 1.47          PTT - ( 07 Mar 2024 05:30 )  PTT:36.0 sec  Urinalysis Basic - ( 07 Mar 2024 05:30 )    Color: x / Appearance: x / SG: x / pH: x  Gluc: 125 mg/dL / Ketone: x  / Bili: x / Urobili: x   Blood: x / Protein: x / Nitrite: x   Leuk Esterase: x / RBC: x / WBC x   Sq Epi: x / Non Sq Epi: x / Bacteria: x      IMAGING/EKG/ETC  TTE 59% EF   O/N Events: did not sleep well as roommate was disruptive overnight    Subjective/ROS: Patient seen and examined at bedside. He reports he is doing well, and feels his dizziness has improved slightly. His headache has improved as well. Right now he is concerned about his high blood pressure. He has not had a BM since yesterday after his scope. He notes mild abdominal pain, but nothing different from baseline.    Supine 161/82 HR 78  Sitting 124/78 HR 96  Standing 145/89 HR 97    Denies Fever/Chills, HA, CP, SOB, n/v, changes in bowel/urinary habits.  12pt ROS otherwise negative.    VITALS  Vital Signs Last 24 Hrs  T(C): 36.6 (08 Mar 2024 06:11), Max: 36.7 (07 Mar 2024 11:46)  T(F): 97.9 (08 Mar 2024 06:11), Max: 98.1 (07 Mar 2024 20:59)  HR: 77 (08 Mar 2024 06:11) (77 - 88)  BP: 152/83 (08 Mar 2024 06:11) (152/83 - 172/94)  BP(mean): 108 (07 Mar 2024 07:58) (108 - 108)  RR: 17 (08 Mar 2024 06:11) (16 - 17)  SpO2: 98% (08 Mar 2024 06:11) (97% - 98%)    Parameters below as of 08 Mar 2024 06:11  Patient On (Oxygen Delivery Method): room air    CAPILLARY BLOOD GLUCOSE      POCT Blood Glucose.: 165 mg/dL (07 Mar 2024 21:49)  POCT Blood Glucose.: 124 mg/dL (07 Mar 2024 17:32)  POCT Blood Glucose.: 120 mg/dL (07 Mar 2024 12:52)  POCT Blood Glucose.: 136 mg/dL (07 Mar 2024 08:58)      PHYSICAL EXAM  General: NAD  Head: NC/AT; MMM; PERRL; EOMI;  Neck: Supple; no JVD  Respiratory: CTAB; no wheezes/rales/rhonchi  Cardiovascular: Regular rhythm/rate; S1/S2+, no murmurs, rubs gallops   Gastrointestinal: Soft, bowel sounds normal and present; mild guarding of LUQ; suprapubic tenderness to palpation  Extremities: WWP; no edema/cyanosis  Neurological: A&Ox3, no obvious focal deficits    MEDICATIONS  (STANDING):  dextrose 5%. 1000 milliLiter(s) (50 mL/Hr) IV Continuous <Continuous>  dextrose 5%. 1000 milliLiter(s) (100 mL/Hr) IV Continuous <Continuous>  dextrose 50% Injectable 25 Gram(s) IV Push once  dextrose 50% Injectable 12.5 Gram(s) IV Push once  dextrose 50% Injectable 25 Gram(s) IV Push once  glucagon  Injectable 1 milliGRAM(s) IntraMuscular once  influenza  Vaccine (HIGH DOSE) 0.7 milliLiter(s) IntraMuscular once  insulin lispro (ADMELOG) corrective regimen sliding scale   SubCutaneous three times a day before meals  insulin lispro (ADMELOG) corrective regimen sliding scale   SubCutaneous at bedtime  iron sucrose IVPB 200 milliGRAM(s) IV Intermittent every 24 hours  pantoprazole    Tablet 40 milliGRAM(s) Oral every 12 hours  polyethylene glycol 3350 17 Gram(s) Oral every 24 hours  rifAXIMin 550 milliGRAM(s) Oral every 12 hours  senna 2 Tablet(s) Oral at bedtime  sucralfate 1 Gram(s) Oral every 6 hours    MEDICATIONS  (PRN):  dextrose Oral Gel 15 Gram(s) Oral once PRN Blood Glucose LESS THAN 70 milliGRAM(s)/deciliter      aspirin (Pruritus)      LABS                        7.6    3.07  )-----------( 73       ( 08 Mar 2024 05:30 )             25.1     03-07    141  |  108  |  10  ----------------------------<  125<H>  4.1   |  21<L>  |  0.86    Ca    9.4      07 Mar 2024 05:30  Phos  3.5     03-07  Mg     1.7     03-07    TPro  6.3  /  Alb  3.3  /  TBili  1.5<H>  /  DBili  x   /  AST  37  /  ALT  18  /  AlkPhos  138<H>  03-07    PT/INR - ( 07 Mar 2024 05:30 )   PT: 16.6 sec;   INR: 1.47          PTT - ( 07 Mar 2024 05:30 )  PTT:36.0 sec  Urinalysis Basic - ( 07 Mar 2024 05:30 )    Color: x / Appearance: x / SG: x / pH: x  Gluc: 125 mg/dL / Ketone: x  / Bili: x / Urobili: x   Blood: x / Protein: x / Nitrite: x   Leuk Esterase: x / RBC: x / WBC x   Sq Epi: x / Non Sq Epi: x / Bacteria: x      IMAGING/EKG/ETC  TTE 59% EF

## 2024-03-08 NOTE — PROGRESS NOTE ADULT - ASSESSMENT
64M, former smoker, PMH HTN, HLD, severe 3 vessel CAD s/p CABG x4 9/2023, DM, gout, TIA (14 yrs ago), gastric ulcer, rectal bleed, LIZ, decompensated alcoholic liver cirrhosis, portal hypertension c/b esophageal varices (and UGIB, last GIB in 11/2023, last EGD 01/2024 showed normal duodenum, distal esophageal varix, hyperplastic lesion, L pleural effusion s/p thoracentesis in 10/2023, presenting w/ 2 days of dizziness, headaches, generalized malaise, admitted for orthostatic hypotension. GI was consulted for anemia.     Hgb on admission near baseline, ~8. No reported overt bleeding, had reported 2 loose stools preceding admission, low suspicion for active bleeding. CT A/P on admission with e/o circumferential rectal thickening. Flex sig (3/7/24) only notable for non-bleeding hemorrhoids.    With known history of esophageal varices as well as UGIB. Last EGD (Jan 2024) notable for distal esophageal varix s/p banding, hyperplastic pre-pyloric lesion and normal duodenum. Has been on Coreg for known history of CAD and esophageal varices.     Orthostatic vitals positive  Initially orthostatic hypotension suspected to be related to symptomatic anemia however Hgb remains stable and dehydration however diarrhea ceased. Suspect more so medication related.   No signs of bleeding at this time, low suspicion for active bleed      #Symptomatic anemia  #Orthostatic hypotension  #Decompensated EtOH Cirrhosis  #Abnormal CT  transfuse if hb <7  active type and screen  2 large IV lines  hold BP meds, including home meds, Coreg, Lasix and Spironolactone  Appreciate cardiology recs regarding need for Beta blocker in the setting of known cardiac history as well as choice of beta blocker (can include selective BB such as Metoprolol)  s/p 24 hrs Albumin 25%, no longer needs  s/p flex sigmoidoscopy on 3/7 showed internal hemorrhoids   Add on CRP    #Diarrhea  p/w lower abdominal pain, diarrhea  CT AP shows: No diverticulitis, Prominent rectal wall with circumferential thickening  c diff negative, gi pcr negative  hold off antibiotics for now, no signs of infection  replace electrolytes as needed  Resolved     Thank you for the opportunity to participate in the care of the patient  These are preliminary recommendations until further discussion with the attending. Final recommendations awaiting attending' s signature.    64M, former smoker, PMH HTN, HLD, severe 3 vessel CAD s/p CABG x4 9/2023, DM, gout, TIA (14 yrs ago), gastric ulcer, rectal bleed, LIZ, decompensated alcoholic liver cirrhosis, portal hypertension c/b esophageal varices (and UGIB, last GIB in 11/2023, last EGD 01/2024 showed normal duodenum, distal esophageal varix, hyperplastic lesion, L pleural effusion s/p thoracentesis in 10/2023, presenting w/ 2 days of dizziness, headaches, generalized malaise, admitted for orthostatic hypotension. GI was consulted for anemia.     Hgb on admission near baseline, ~8. No reported overt bleeding, had reported 2 loose stools preceding admission, low suspicion for active bleeding. CT A/P on admission with e/o circumferential rectal thickening. Flex sig (3/7/24) only notable for non-bleeding hemorrhoids.    With known history of esophageal varices as well as UGIB. Last EGD (Jan 2024) notable for distal esophageal varix s/p banding, hyperplastic pre-pyloric lesion and normal duodenum. Has been on Coreg for known history of CAD and esophageal varices. However on admission, has remained persistently orthostatic during this admission. Holding home medications of Coreg, Lasix and Spironolactone. Reassuringly, patient euvolemic so okay to hold Lasix and Spironolactone for now. Warrants lifelong BB therapy for secondary ppx in light of history of UGIB however currently unable to tolerate.     Low suspicion for orthostasis to be related to bleeding as with no evidence of overt bleeding. ddx includes POTS, vasovagal response.    #Symptomatic anemia  #Orthostatic hypotension  #Decompensated EtOH Cirrhosis  #Abnormal CT  -transfuse if hb <7  -active type and screen  -2 large IV lines  -Continue to hold BP meds, including home meds, Coreg, Lasix and Spironolactone  -Appreciate cardiology recs regarding need for Beta blocker in the setting of known cardiac history as well as choice of beta blocker (can include selective BB such as Metoprolol)  -s/p flex sigmoidoscopy on 3/7 revealing internal hemorrhoids   -Compression stockings  -PT evaluation    #Diarrhea  p/w lower abdominal pain, diarrhea, since resolved.  CT AP shows: No diverticulitis, Prominent rectal wall with circumferential thickening  c diff negative, gi pcr negative  hold off antibiotics for now, no signs of infection  replace electrolytes as needed    Case discussed with hepatologist and primary team.     Ana Kennedy DO  Gastroenterology Fellow  Pager: [phone]

## 2024-03-08 NOTE — CONSULT NOTE ADULT - ASSESSMENT
64M with PMH of former smoker with hx HTN, HLD, severe 3 vessel CAD s/p CAB x4 9/2024, pre-diabetes, gout, TIA (14 yrs ago), gastric ulcer, rectal bleed, LIZ and alcoholic liver disease, cirrhosis, portal hypertension c/b esophageal varices (and UGIB, last GIB in nov 2023, EGD 11/13 duodenitis, nonbleeding varices, healing PUD), L pleural effusion s/p thoracentesis in 10/2023, presenting for 2 days of dizziness.  64M with PMH of former smoker with hx HTN, HLD, severe 3 vessel CAD s/p CAB x4 9/2024, pre-diabetes, gout, TIA (14 yrs ago), gastric ulcer, rectal bleed, LIZ and alcoholic liver disease, cirrhosis, portal hypertension c/b esophageal varices (and UGIB, last GIB in nov 2023, EGD 11/13 duodenitis, nonbleeding varices, healing PUD), L pleural effusion s/p thoracentesis in 10/2023, presenting for 2 days of dizziness. He was recently admitted to hospital for variceal bleeding. GI is following to r/o any GI bleed, sigmoidoscopy was done which was showing hemorrhoids. Currently off BP meds due to orthostatics. Neuro consulted for possible autonomic dysfunction. Orthostatics are not severe, borderline positive today when lying to sitting today but normal when sitting to standing. Non focal neuro exam.    Impression: orthostatics unlikely due to autonomic dysfunction as his HR is responding well (increasing) when BP is dropping. Non focal neuro exam. Likely related to volume depletion.    Recommendations:  - wear abdominal binders and thigh high compression stockings  - cardiology consult, will defer fluid repletion to cardiology as pt has extension CAD hx  - fall precautions (Arising slowly (from a supine or seated position) to standing, Raising the head of the bed)  - no further neuro workup needed    Case discussed with attending Dr. Carlitos Contreras MD  PGY2, Neurology 64M with PMH of former smoker with hx HTN, HLD, severe 3 vessel CAD s/p CAB x4 9/2024, pre-diabetes, gout, TIA (14 yrs ago), gastric ulcer, rectal bleed, LIZ and alcoholic liver disease, cirrhosis, portal hypertension c/b esophageal varices (and UGIB, last GIB in nov 2023, EGD 11/13 duodenitis, nonbleeding varices, healing PUD), L pleural effusion s/p thoracentesis in 10/2023, presenting for 2 days of dizziness. He was recently admitted to hospital for variceal bleeding. GI is following to r/o any GI bleed, sigmoidoscopy was done which was showing hemorrhoids. Currently off BP meds due to orthostatics. Neuro consulted for possible autonomic dysfunction. Orthostatics are not severe, borderline positive today when lying to sitting today but normal when sitting to standing. Non focal neuro exam.    Impression: orthostatics unlikely due to autonomic dysfunction as his HR is responding well (increasing) when BP is dropping. Non focal neuro exam. Likely related to volume depletion.    Recommendations:  - wear abdominal binders and thigh high compression stockings  - cardiology consult, will defer fluid repletion to cardiology as pt has extension CAD hx  - fall precautions (Arising slowly (from a supine or seated position) to standing, Raising the head of the bed)  - no further neuro workup needed    Case WILL BE  discussed with attending    Soy Contreras MD  PGY2, Neurology 64M with PMH of former smoker with hx HTN, HLD, severe 3 vessel CAD s/p CAB x4 9/2024, pre-diabetes, gout, TIA (14 yrs ago), gastric ulcer, rectal bleed, LIZ and alcoholic liver disease, cirrhosis, portal hypertension c/b esophageal varices (and UGIB, last GIB in nov 2023, EGD 11/13 duodenitis, nonbleeding varices, healing PUD), L pleural effusion s/p thoracentesis in 10/2023, presenting for 2 days of dizziness. He was admitted to hospital few months back for variceal bleeding 2/2 liver cirrhosis. During this admission, GI is following to r/o any GI bleed, sigmoidoscopy was done which was showing hemorrhoids. Currently off BP meds due to orthostatics. Neuro consulted for possible autonomic dysfunction. Orthostatics are not severe, borderline positive today when lying to sitting today but normal when sitting to standing. Non focal neuro exam.    Impression: orthostatics unlikely due to autonomic dysfunction as his HR is responding well (increasing) when BP is dropping. Non focal neuro exam. Likely related to volume depletion.    Recommendations:  - wear abdominal binders and thigh high compression stockings  - cardiology consult, will defer fluid repletion to cardiology as pt has extension CAD hx  - fall precautions   - no further neuro workup needed    Case WILL BE discussed with attending    Soy Contreras MD  PGY2, Neurology

## 2024-03-08 NOTE — PROGRESS NOTE ADULT - SUBJECTIVE AND OBJECTIVE BOX
GASTROENTEROLOGY PROGRESS NOTE  Patient seen and examined at bedside.  -Flexible sigmoidoscopy (3/7/24): hemorrhoids, otherwise unrevealing  -Patient remains orthostatic on 3/7 and 3/8 VS  -Remains off lasix, spironolactone and coreg    ROS: Patient notes being concerned that when he ambulates, he's unsteady on his feet (feeling like he is "drunk"). Interested in talking to cardiology regarding his BP medications. Otherwise ROS negative for fevers, chills, NS. States he has some vague periumbilical discomfort. No BM since procedure yesterday.     PERTINENT REVIEW OF SYSTEMS:  CONSTITUTIONAL: No weakness, fevers or chills  HEENT: No visual changes; No vertigo or throat pain   GASTROINTESTINAL: As above.  NEUROLOGICAL: No numbness or weakness  SKIN: No itching, burning, rashes, or lesions     Allergies    aspirin (Pruritus)    Intolerances      MEDICATIONS:  MEDICATIONS  (STANDING):  dextrose 5%. 1000 milliLiter(s) (50 mL/Hr) IV Continuous <Continuous>  dextrose 5%. 1000 milliLiter(s) (100 mL/Hr) IV Continuous <Continuous>  dextrose 50% Injectable 25 Gram(s) IV Push once  dextrose 50% Injectable 12.5 Gram(s) IV Push once  dextrose 50% Injectable 25 Gram(s) IV Push once  glucagon  Injectable 1 milliGRAM(s) IntraMuscular once  influenza  Vaccine (HIGH DOSE) 0.7 milliLiter(s) IntraMuscular once  insulin lispro (ADMELOG) corrective regimen sliding scale   SubCutaneous three times a day before meals  insulin lispro (ADMELOG) corrective regimen sliding scale   SubCutaneous at bedtime  pantoprazole    Tablet 40 milliGRAM(s) Oral every 12 hours  polyethylene glycol 3350 17 Gram(s) Oral every 24 hours  rifAXIMin 550 milliGRAM(s) Oral every 12 hours  senna 2 Tablet(s) Oral at bedtime  sucralfate 1 Gram(s) Oral every 6 hours    MEDICATIONS  (PRN):  dextrose Oral Gel 15 Gram(s) Oral once PRN Blood Glucose LESS THAN 70 milliGRAM(s)/deciliter    Vital Signs Last 24 Hrs  T(C): 36.7 (08 Mar 2024 12:07), Max: 36.7 (07 Mar 2024 20:59)  T(F): 98 (08 Mar 2024 12:07), Max: 98.1 (07 Mar 2024 20:59)  HR: 79 (08 Mar 2024 12:07) (77 - 80)  BP: 160/95 (08 Mar 2024 12:07) (152/83 - 160/95)  BP(mean): --  RR: 18 (08 Mar 2024 12:07) (17 - 18)  SpO2: 98% (08 Mar 2024 12:07) (98% - 98%)    Parameters below as of 08 Mar 2024 12:07  Patient On (Oxygen Delivery Method): room air        PHYSICAL EXAM:    General: in no acute distress  HEENT: MMM, conjunctiva and sclera clear  Gastrointestinal: Soft non-tender non-distended; No rebound or guarding  Skin: Warm and dry. No obvious rash  Neuro: AA0x3, no asterixis    LABS:                        7.6    3.07  )-----------( 73       ( 08 Mar 2024 05:30 )             25.1     03-08    140  |  108  |  8   ----------------------------<  130<H>  3.8   |  22  |  0.94    Ca    9.1      08 Mar 2024 05:30  Phos  3.7     03-08  Mg     1.7     03-08    TPro  6.4  /  Alb  3.4  /  TBili  1.3<H>  /  DBili  x   /  AST  36  /  ALT  19  /  AlkPhos  151<H>  03-08    PT/INR - ( 07 Mar 2024 05:30 )   PT: 16.6 sec;   INR: 1.47          PTT - ( 07 Mar 2024 05:30 )  PTT:36.0 sec      Urinalysis Basic - ( 08 Mar 2024 05:30 )    Color: x / Appearance: x / SG: x / pH: x  Gluc: 130 mg/dL / Ketone: x  / Bili: x / Urobili: x   Blood: x / Protein: x / Nitrite: x   Leuk Esterase: x / RBC: x / WBC x   Sq Epi: x / Non Sq Epi: x / Bacteria: x                RADIOLOGY & ADDITIONAL STUDIES:  Reviewed

## 2024-03-08 NOTE — PROGRESS NOTE ADULT - ATTENDING COMMENTS
S/p flex sigmoidoscopy on 3/7 showed internal hemorrhoids on this admission. Pt reports dizziness improve today- orthostatic negative today      Liver Cirrhosis  Symptomatic anemia  orthostatic hypotension: could be in the setting of diuretics /diarrhea leading to volume depletion vs autonomic dysfunction   TTE negative CT AP shows: No diverticulitis,  rectal wall with circumferential thickening  c diff negative, gi pcr negative   pt received albumin and iv iron x 3 doses   Patient with persistent pancytopenia, no signs of infection.   holding home meds (meds, Coreg, Lasix and Spironolactone) as orthostatics  bp just improved - pt will see Dr. Lawson outpt   GI recommends cardio consult for recommendation   Will need hematology outpt evaluation for pancytopenia.  DVT ppx

## 2024-03-08 NOTE — PROGRESS NOTE ADULT - PROBLEM SELECTOR PLAN 1
Likely 2/2 hypovolemia i/s/o poor PO intake and diarrhea, multiple medications affecting his BP and Liver disease. Could also be autonomic dysfunction from CLD. Continues to be orthostatic despite holding home antihypertensives. S/p 2L NS in ED.  Plan:  - Completed Albumin 25% 50cc q8h x 24hrs per GI recommendations  - c/w compression socks  - continue to monitor orthostatics  - encourage PO intake   - holding coreg, lasix, spironolactone as below; will start metoprolol and restart some diuretics prior to discharge based on consult recs, see below Multiple medications affecting his BP and Liver disease. Could also be autonomic dysfunction from CLD. Continues to be orthostatic despite holding home antihypertensives. S/p 2L NS in ED. Received albumin 25% per GI recommendations. TTE showed LVEF 59% and no other concerning abnormalities, less likely cardiac in origin.  Plan:  - c/w compression socks  - consider abdominal binder  - continue to monitor orthostatics  - encourage PO intake   - holding coreg, lasix, spironolactone as below  - will be starting metoprolol due to CAD, see below Multiple medications affecting his BP and Liver disease. Could also be autonomic dysfunction from CLD. Continues to be orthostatic despite holding home antihypertensives. S/p 2L NS in ED. Received albumin 25% per GI recommendations. TTE showed LVEF 59% and no other concerning abnormalities, less likely cardiac in origin.  Plan:  - Consult neurology regarding persistent orthostasis  - c/w compression socks  - consider abdominal binder  - continue to monitor orthostatics  - encourage PO intake   - holding coreg, lasix, spironolactone as below  - will be starting metoprolol due to CAD, see below Multiple medications affecting his BP and Liver disease. Could also be autonomic dysfunction from CLD. Continues to be orthostatic despite holding home antihypertensives. S/p 2L NS in ED. Received albumin 25% per GI recommendations. TTE showed LVEF 59% and no other concerning abnormalities, less likely cardiac in origin.  Plan:  - Consult neurology regarding persistent orthostasis  - c/w compression socks  - consider abdominal binder  - continue to monitor orthostatics  - encourage PO intake   - holding coreg, lasix, spironolactone as below  - will restart BB and BP meds outpatient at this time Multiple medications affecting his BP and Liver disease. Could also be autonomic dysfunction from CLD. Continues to be orthostatic despite holding home antihypertensives. S/p 2L NS in ED. Received albumin 25% per GI recommendations. TTE showed LVEF 59% and no other concerning abnormalities, less likely cardiac in origin. Orthostatics likely complication of cirrhosis, however per hepatology less likely GI cause.  Plan:  - Consult neurology regarding persistent orthostasis  - c/w compression socks, abdominal binder  - continue to monitor orthostatics  - encourage PO intake   - holding coreg, lasix, spironolactone as below  - will restart BB and BP meds outpatient at this time

## 2024-03-08 NOTE — CONSULT NOTE ADULT - ATTENDING COMMENTS
65 yo man PMHx of HTN, HLD, CAD s/p CABG Sept/2024, TIA, decompensated cirrhosis w/ varices 2/2 alcohol use disorder who was admitted for symptomatic anemia and dizziness in setting of orthostatic hypotension.    Assessment  1. Dizziness likely 2/2 orthostatic hypotension  TTE 3/7/24: normal BiV function; mildly dilated ascending aorta  2. CAD s/p CABG Sept/2024  3. Decompensated cirrhosis c/b varices 2/2 alcohol use disorder  4. HLD  5. Dilated ascending aorta (4.1 cm)  6. HTN  7. Hx of TIA    Plan  1. Not on ASA 81mg PO QD for CAD 2/2 esophageal varices w/ prior GIB and anemia  2. High intensity statin for CAD, HLD, and hx of TIA  3. Agree with switching carvedilol to metoprolol succinate 25mg PO QD which would have less effect on systolic BP and also would be beneficial to slow progression of dilated ascending aorta  4. He reports extremely labile BP at home, with SBP ranging from 90-160s. I encouraged continued abstinence from alcohol use, minimal salt intake, and frequent hydration. SBP goal <140/90.   5. If orthostatic hypotension resolves off carvedilol and BP control is needed, recommend amlodipine 5mg PO QD which would help maintain steady BP control.  6. Patient can f/u with me in [facility] cardiology clinic for surveillance of dilated ascending aorta. I will arrange f/u.    During non face-to-face time, I reviewed relevant portions of the patient’s medical record. During face-to-face time, I took a relevant history and examined the patient. I also explained differential diagnoses, relevant cardiac diagnoses, workup, and management plan, which required a moderate level of medical decision making. I answered all questions related to the patient's medical conditions.     Yanelis Delaney M.D.  Attending Cardiologist  [facility]

## 2024-03-08 NOTE — PROGRESS NOTE ADULT - PROBLEM SELECTOR PLAN 3
Likely multifactorial i/s/o Liver disease vs Malignancy vs less likely Bone marrow involvement  WBC 2.77 on admission (baseline ~4), H&H 8.4/27.5 (baseline Hgb 8-9.5), MCV 77.9, plt 93 (baseline 80s-100s).   WBC dropped to 1.99, Hgb to 7.5, and platelets to 79. No signs of infection at this time.   Iron studies low total iron and ferritin, haptoglobin of 32, normal LDH. CBC stable today.  - Daily CBC  - F/u with heme onc outpatient S/p CABG in 09/2023 by Dr. Hinton, Follows with Dr Box outpatient, last seen 1/2024. Was on plavix after surgery, was held after the GIB in 11/2023. Per wife pt has hx of allergy to ASA many years ago had itching and rash, no anaphylaxis, she is unsure if it was a true allergy as it was so long ago. Pt is not on a statin at this time. , cholesterol 194, non HDL cholesterol 142. TTE w/ normal systolic function, moderately dilated LA, aortic sclerosis, mildly dilated AA.  - plan to start metoprolol 12.5mg BID instead of coreg   - c/w DASH diet  - cardiology consulted

## 2024-03-08 NOTE — PROGRESS NOTE ADULT - PROBLEM SELECTOR PLAN 2
Noted with Hgb ~8-9 on admission which has been baseline for the past couple of months. Hgb drop to 7.5 (lowest in past year) from 8.1 at admission (Hypoproliferative). There was concern for possible GI bleed at Dr Lawson's office, however there is no signs of bleeding at this time. Pt initially presenting with diarrhea, now resolved and infectious workup (GI PCR/C diff) negative. CTAP w/ rectal wall thickening, splenomegaly, resolution of ascites. s/p albumin 25% 50cc x3 doses. s/p flex sig w/ GI on 3/7, found to have internal hemorrhoids.   - f/u GI recs  - Maintain active T&S   - Transfuse if Hgb <7 Noted with Hgb ~8-9 on admission which has been baseline for the past couple of months. Hgb drop to 7.5 (lowest in past year) from 8.1 at admission (Hypoproliferative). There was concern for possible GI bleed at Dr Lawson's office, however there is no signs of bleeding at this time. Pt initially presenting with diarrhea, now resolved and infectious workup (GI PCR/C diff) negative. CTAP w/ rectal wall thickening, splenomegaly, resolution of ascites. s/p albumin 25% 50cc x3 doses. s/p flex sig w/ GI on 3/7, found to have internal hemorrhoids. Received IV iron sucrose. Hgb continues to be stable, now at 7.6.   - f/u GI recs  - Maintain active T&S   - Transfuse if Hgb <7

## 2024-03-08 NOTE — PROGRESS NOTE ADULT - ATTENDING COMMENTS
63yo male with PMH ALD cirrhosis (+EV bleed 2/2023 s/p EVL, +ascites/HHT, +HE), HTN, HLD, DM2, 3vCAD s/p CABG 9/2023, hx PUD, who was sent in from hepatology clinic on 3/4/24 with orthostatic hypotension, dizziness.    Pt reports that he had some fatigue on the day of his hepatology appt but otherwise was in USOH. During the appt, he had acute onset of lower abd pain that rapidly worsened and was followed by lightheadedness and diaphoresis. He received 2L NS in the ED and his diuretics and BB have been held since admission.     Pt was seen this afternoon where he was seated in bed, eating lunch. Endorses mild epigastric pain. Denies f/c, cp, sob, cough, n/v/d, hematochezia/melena, dysuria. He reports he has not had a BM in a few days. Pt continues to have orthostasis and reports that he feels unsteady on his feet when he tries to walk (states he feels like he is walking like he is drunk) - he has lightheadedness with standing, accompanied by increased tearing of his eyes and eye pain.    CT A/P on admission with e/o circumferential rectal thickening. Flex sig (3/7/24) only notable for non-bleeding hemorrhoids.    On exam: Pt is in NAD, seated in bed. RRR. Lungs CTAB. Abd soft, mildly TTP in epigastrium, nondistended, no r/g. No LE edema. AOx3, no asterixis.     Recommendations:  - Orthostasis: Of unclear etiology. Has had prior admission with similar sx that improved with pRBC transfusion. Could consider ?POTS as potential dx. Cardiology consulted.  - Ascites: Diuretics held since admission due to orthostatic hypotension. LVP PRN.  - HE: Continue rifaximin. On Miralax for constipation.  - Hx EV bleed: EGD 1/2024 with EV s/p EVL x1. Holding BB given orthostasis. Will need repeat EGD for EV reassessment as outpatient.  - Unsteady gait: Likely related to orthostasis as above. Would recommend PT evaluation given these mobility concerns (which are new) prior to discharge.    Hepatology will continue to follow.    Sasha Willams MD  Transplant Hepatology 63yo male with PMH ALD cirrhosis (+EV bleed 2/2023 s/p EVL, +ascites/HHT, +HE), HTN, HLD, DM2, 3vCAD s/p CABG 9/2023, hx PUD, who was sent in from hepatology clinic on 3/4/24 with orthostatic hypotension, dizziness.    Pt reports that he had some fatigue on the day of his hepatology appt but otherwise was in USOH. During the appt, he had acute onset of lower abd pain that rapidly worsened and was followed by lightheadedness and diaphoresis. He received 2L NS in the ED and his diuretics and BB have been held since admission.     Pt was seen this afternoon where he was seated in bed, eating lunch. Endorses mild epigastric pain. Denies f/c, cp, sob, cough, n/v/d, hematochezia/melena, dysuria. He reports he has not had a BM in a few days. Pt continues to have orthostasis and reports that he feels unsteady on his feet when he tries to walk (states he feels like he is walking like he is drunk) - he has lightheadedness with standing, accompanied by increased tearing of his eyes and eye pain.    CT A/P on admission with e/o circumferential rectal thickening. Flex sig (3/7/24) only notable for non-bleeding hemorrhoids.    On exam: Pt is in NAD, seated in bed. RRR. Lungs CTAB. Abd soft, mildly TTP in epigastrium, nondistended, no r/g. No LE edema. AOx3, no asterixis.     Recommendations:  - Orthostasis: Of unclear etiology. Has had prior admission with similar sx that improved with pRBC transfusion. Could consider ?POTS as potential dx. Cardiology consulted.  - Ascites: Diuretics held since admission due to orthostatic hypotension. LVP PRN.  - HE: Continue rifaximin. On Miralax for constipation.  - Hx EV bleed: EGD 1/2024 with EV s/p EVL x1. Holding BB given orthostasis. Will need repeat EGD for EV reassessment as outpatient.  - Unsteady gait: Likely related to orthostasis as above. Would recommend PT evaluation given these mobility concerns (which are new) prior to discharge.  - Please check MELD labs (CBC, CMP, INR) daily.    Hepatology will continue to follow.    Sasha Willams MD  Transplant Hepatology

## 2024-03-08 NOTE — PROGRESS NOTE ADULT - PROBLEM SELECTOR PLAN 9
F: None   E: Replete as necessary K>4 Mg>2  N: consistent carb/DASH diet  DVT Prophylaxis: NONE  GI prophylaxis: protonix  CODE STATUS: FULL CODE   Dispo: [facility]

## 2024-03-08 NOTE — PROGRESS NOTE ADULT - PROBLEM SELECTOR PLAN 6
Home meds: sucralfate 1g QID and protonix 40mg BID. Of note pt was discharged 11/2023 w/ prescription for protonix 40mg PO BID w/ plan to switch to qd after 2 weeks, however per wife is still taking it BID. No signs or symptoms of GIB at this time.  Hgb drop to 7.5 from 8.1 at admit is concerning. Hgb stable today, pt had formed bowel movement with no blood.  - c/w sucralfate 1g PO q6h  - protonix 40mg PO BID  - c/w senna and miralax, lactulose PRN   - monitor for signs/symptoms of GIB  - f/u GI recs Home meds: sucralfate 1g QID and protonix 40mg BID. Of note pt was discharged 11/2023 w/ prescription for protonix 40mg PO BID w/ plan to switch to qd after 2 weeks, however per wife is still taking it BID. No signs or symptoms of GIB at this time.  Hgb drop to 7.5 from 8.1 at admit is concerning. Hgb continues to be stable with no signs of GIB.  - c/w sucralfate 1g PO q6h  - protonix 40mg PO BID  - c/w senna and miralax, lactulose PRN   - monitor for signs/symptoms of GIB  - f/u GI recs Likely multifactorial i/s/o Liver disease vs Malignancy vs less likely Bone marrow involvement  WBC 2.77 on admission (baseline ~4), H&H 8.4/27.5 (baseline Hgb 8-9.5), MCV 77.9, plt 93 (baseline 80s-100s).   WBC dropped to 1.99, Hgb to 7.5, and platelets to 79. No signs of infection at this time.   Iron studies low total iron and ferritin, haptoglobin of 32, normal LDH. CBC stable today.  - Daily CBC  - F/u with heme onc outpatient

## 2024-03-09 ENCOUNTER — TRANSCRIPTION ENCOUNTER (OUTPATIENT)
Age: 64
End: 2024-03-09

## 2024-03-09 VITALS
TEMPERATURE: 97 F | RESPIRATION RATE: 18 BRPM | SYSTOLIC BLOOD PRESSURE: 162 MMHG | DIASTOLIC BLOOD PRESSURE: 80 MMHG | OXYGEN SATURATION: 100 % | HEART RATE: 69 BPM

## 2024-03-09 LAB
ALBUMIN SERPL ELPH-MCNC: 3.4 G/DL — SIGNIFICANT CHANGE UP (ref 3.3–5)
ALP SERPL-CCNC: 138 U/L — HIGH (ref 40–120)
ALT FLD-CCNC: 18 U/L — SIGNIFICANT CHANGE UP (ref 10–45)
ANION GAP SERPL CALC-SCNC: 10 MMOL/L — SIGNIFICANT CHANGE UP (ref 5–17)
AST SERPL-CCNC: 36 U/L — SIGNIFICANT CHANGE UP (ref 10–40)
BASOPHILS # BLD AUTO: 0.02 K/UL — SIGNIFICANT CHANGE UP (ref 0–0.2)
BASOPHILS NFR BLD AUTO: 0.6 % — SIGNIFICANT CHANGE UP (ref 0–2)
BILIRUB SERPL-MCNC: 1.4 MG/DL — HIGH (ref 0.2–1.2)
BLD GP AB SCN SERPL QL: NEGATIVE — SIGNIFICANT CHANGE UP
BUN SERPL-MCNC: 8 MG/DL — SIGNIFICANT CHANGE UP (ref 7–23)
CALCIUM SERPL-MCNC: 8.9 MG/DL — SIGNIFICANT CHANGE UP (ref 8.4–10.5)
CHLORIDE SERPL-SCNC: 109 MMOL/L — HIGH (ref 96–108)
CO2 SERPL-SCNC: 21 MMOL/L — LOW (ref 22–31)
CREAT SERPL-MCNC: 0.87 MG/DL — SIGNIFICANT CHANGE UP (ref 0.5–1.3)
EGFR: 96 ML/MIN/1.73M2 — SIGNIFICANT CHANGE UP
EOSINOPHIL # BLD AUTO: 0.11 K/UL — SIGNIFICANT CHANGE UP (ref 0–0.5)
EOSINOPHIL NFR BLD AUTO: 3 % — SIGNIFICANT CHANGE UP (ref 0–6)
GLUCOSE BLDC GLUCOMTR-MCNC: 116 MG/DL — HIGH (ref 70–99)
GLUCOSE BLDC GLUCOMTR-MCNC: 126 MG/DL — HIGH (ref 70–99)
GLUCOSE BLDC GLUCOMTR-MCNC: 132 MG/DL — HIGH (ref 70–99)
GLUCOSE SERPL-MCNC: 124 MG/DL — HIGH (ref 70–99)
HCT VFR BLD CALC: 27.7 % — LOW (ref 39–50)
HGB BLD-MCNC: 8 G/DL — LOW (ref 13–17)
IMM GRANULOCYTES NFR BLD AUTO: 0.3 % — SIGNIFICANT CHANGE UP (ref 0–0.9)
LYMPHOCYTES # BLD AUTO: 0.64 K/UL — LOW (ref 1–3.3)
LYMPHOCYTES # BLD AUTO: 17.6 % — SIGNIFICANT CHANGE UP (ref 13–44)
MAGNESIUM SERPL-MCNC: 1.7 MG/DL — SIGNIFICANT CHANGE UP (ref 1.6–2.6)
MCHC RBC-ENTMCNC: 23.6 PG — LOW (ref 27–34)
MCHC RBC-ENTMCNC: 28.9 GM/DL — LOW (ref 32–36)
MCV RBC AUTO: 81.7 FL — SIGNIFICANT CHANGE UP (ref 80–100)
MONOCYTES # BLD AUTO: 0.6 K/UL — SIGNIFICANT CHANGE UP (ref 0–0.9)
MONOCYTES NFR BLD AUTO: 16.5 % — HIGH (ref 2–14)
NEUTROPHILS # BLD AUTO: 2.25 K/UL — SIGNIFICANT CHANGE UP (ref 1.8–7.4)
NEUTROPHILS NFR BLD AUTO: 62 % — SIGNIFICANT CHANGE UP (ref 43–77)
NRBC # BLD: 0 /100 WBCS — SIGNIFICANT CHANGE UP (ref 0–0)
PHOSPHATE SERPL-MCNC: 3.5 MG/DL — SIGNIFICANT CHANGE UP (ref 2.5–4.5)
PLATELET # BLD AUTO: 76 K/UL — LOW (ref 150–400)
POTASSIUM SERPL-MCNC: 4.1 MMOL/L — SIGNIFICANT CHANGE UP (ref 3.5–5.3)
POTASSIUM SERPL-SCNC: 4.1 MMOL/L — SIGNIFICANT CHANGE UP (ref 3.5–5.3)
PROT SERPL-MCNC: 6.4 G/DL — SIGNIFICANT CHANGE UP (ref 6–8.3)
RBC # BLD: 3.39 M/UL — LOW (ref 4.2–5.8)
RBC # FLD: 17.8 % — HIGH (ref 10.3–14.5)
RH IG SCN BLD-IMP: POSITIVE — SIGNIFICANT CHANGE UP
SODIUM SERPL-SCNC: 140 MMOL/L — SIGNIFICANT CHANGE UP (ref 135–145)
WBC # BLD: 3.63 K/UL — LOW (ref 3.8–10.5)
WBC # FLD AUTO: 3.63 K/UL — LOW (ref 3.8–10.5)

## 2024-03-09 PROCEDURE — 86850 RBC ANTIBODY SCREEN: CPT

## 2024-03-09 PROCEDURE — 84484 ASSAY OF TROPONIN QUANT: CPT

## 2024-03-09 PROCEDURE — 82607 VITAMIN B-12: CPT

## 2024-03-09 PROCEDURE — 82248 BILIRUBIN DIRECT: CPT

## 2024-03-09 PROCEDURE — 82247 BILIRUBIN TOTAL: CPT

## 2024-03-09 PROCEDURE — 96375 TX/PRO/DX INJ NEW DRUG ADDON: CPT

## 2024-03-09 PROCEDURE — 87507 IADNA-DNA/RNA PROBE TQ 12-25: CPT

## 2024-03-09 PROCEDURE — 83540 ASSAY OF IRON: CPT

## 2024-03-09 PROCEDURE — 85045 AUTOMATED RETICULOCYTE COUNT: CPT

## 2024-03-09 PROCEDURE — 85730 THROMBOPLASTIN TIME PARTIAL: CPT

## 2024-03-09 PROCEDURE — 86901 BLOOD TYPING SEROLOGIC RH(D): CPT

## 2024-03-09 PROCEDURE — 36415 COLL VENOUS BLD VENIPUNCTURE: CPT

## 2024-03-09 PROCEDURE — 84100 ASSAY OF PHOSPHORUS: CPT

## 2024-03-09 PROCEDURE — 82728 ASSAY OF FERRITIN: CPT

## 2024-03-09 PROCEDURE — 80048 BASIC METABOLIC PNL TOTAL CA: CPT

## 2024-03-09 PROCEDURE — 83550 IRON BINDING TEST: CPT

## 2024-03-09 PROCEDURE — 87324 CLOSTRIDIUM AG IA: CPT

## 2024-03-09 PROCEDURE — 80053 COMPREHEN METABOLIC PANEL: CPT

## 2024-03-09 PROCEDURE — 85610 PROTHROMBIN TIME: CPT

## 2024-03-09 PROCEDURE — 99239 HOSP IP/OBS DSCHRG MGMT >30: CPT

## 2024-03-09 PROCEDURE — 86140 C-REACTIVE PROTEIN: CPT

## 2024-03-09 PROCEDURE — 93306 TTE W/DOPPLER COMPLETE: CPT

## 2024-03-09 PROCEDURE — 87449 NOS EACH ORGANISM AG IA: CPT

## 2024-03-09 PROCEDURE — 80061 LIPID PANEL: CPT

## 2024-03-09 PROCEDURE — 85025 COMPLETE CBC W/AUTO DIFF WBC: CPT

## 2024-03-09 PROCEDURE — 85027 COMPLETE CBC AUTOMATED: CPT

## 2024-03-09 PROCEDURE — 93005 ELECTROCARDIOGRAM TRACING: CPT

## 2024-03-09 PROCEDURE — 97161 PT EVAL LOW COMPLEX 20 MIN: CPT

## 2024-03-09 PROCEDURE — 82962 GLUCOSE BLOOD TEST: CPT

## 2024-03-09 PROCEDURE — 86900 BLOOD TYPING SEROLOGIC ABO: CPT

## 2024-03-09 PROCEDURE — 99233 SBSQ HOSP IP/OBS HIGH 50: CPT

## 2024-03-09 PROCEDURE — 74177 CT ABD & PELVIS W/CONTRAST: CPT | Mod: MC

## 2024-03-09 PROCEDURE — 81003 URINALYSIS AUTO W/O SCOPE: CPT

## 2024-03-09 PROCEDURE — 83036 HEMOGLOBIN GLYCOSYLATED A1C: CPT

## 2024-03-09 PROCEDURE — 96374 THER/PROPH/DIAG INJ IV PUSH: CPT

## 2024-03-09 PROCEDURE — 70450 CT HEAD/BRAIN W/O DYE: CPT | Mod: MC

## 2024-03-09 PROCEDURE — 83735 ASSAY OF MAGNESIUM: CPT

## 2024-03-09 PROCEDURE — 84466 ASSAY OF TRANSFERRIN: CPT

## 2024-03-09 PROCEDURE — P9047: CPT

## 2024-03-09 PROCEDURE — 83690 ASSAY OF LIPASE: CPT

## 2024-03-09 PROCEDURE — 83615 LACTATE (LD) (LDH) ENZYME: CPT

## 2024-03-09 PROCEDURE — 99285 EMERGENCY DEPT VISIT HI MDM: CPT

## 2024-03-09 PROCEDURE — 83010 ASSAY OF HAPTOGLOBIN QUANT: CPT

## 2024-03-09 RX ORDER — LACTULOSE 10 G/15ML
30 SOLUTION ORAL
Qty: 1260 | Refills: 2
Start: 2024-03-09 | End: 2024-04-19

## 2024-03-09 RX ORDER — LOPERAMIDE HCL 2 MG
2 TABLET ORAL ONCE
Refills: 0 | Status: COMPLETED | OUTPATIENT
Start: 2024-03-09 | End: 2024-03-09

## 2024-03-09 RX ORDER — ATORVASTATIN CALCIUM 80 MG/1
1 TABLET, FILM COATED ORAL
Qty: 30 | Refills: 2
Start: 2024-03-09 | End: 2024-06-06

## 2024-03-09 RX ORDER — LACTULOSE 10 G/15ML
30 SOLUTION ORAL ONCE
Refills: 0 | Status: COMPLETED | OUTPATIENT
Start: 2024-03-09 | End: 2024-03-09

## 2024-03-09 RX ORDER — METOPROLOL TARTRATE 50 MG
1 TABLET ORAL
Qty: 30 | Refills: 2
Start: 2024-03-09 | End: 2024-06-06

## 2024-03-09 RX ADMIN — Medication 1 GRAM(S): at 06:21

## 2024-03-09 RX ADMIN — Medication 1 GRAM(S): at 00:38

## 2024-03-09 RX ADMIN — Medication 25 MILLIGRAM(S): at 17:18

## 2024-03-09 RX ADMIN — LACTULOSE 30 GRAM(S): 10 SOLUTION ORAL at 10:08

## 2024-03-09 RX ADMIN — PANTOPRAZOLE SODIUM 40 MILLIGRAM(S): 20 TABLET, DELAYED RELEASE ORAL at 06:21

## 2024-03-09 RX ADMIN — Medication 2 MILLIGRAM(S): at 16:10

## 2024-03-09 RX ADMIN — Medication 1 GRAM(S): at 17:18

## 2024-03-09 RX ADMIN — PANTOPRAZOLE SODIUM 40 MILLIGRAM(S): 20 TABLET, DELAYED RELEASE ORAL at 17:18

## 2024-03-09 RX ADMIN — Medication 1 GRAM(S): at 12:24

## 2024-03-09 NOTE — DISCHARGE NOTE NURSING/CASE MANAGEMENT/SOCIAL WORK - NSDCPEFALRISK_GEN_ALL_CORE
For information on Fall & Injury Prevention, visit: [email or web address] OR  [email or web address] OR  https://www.cdc.gov/steadi/patient.html

## 2024-03-09 NOTE — PHYSICAL THERAPY INITIAL EVALUATION ADULT - ADDITIONAL COMMENTS
Pt. lives with his wife and daughter in an elevator access apartment. At baseline, ambulates independently with no DME.

## 2024-03-09 NOTE — PROGRESS NOTE ADULT - PROBLEM SELECTOR PLAN 3
S/p CABG in 09/2023 by Dr. Hinton, Follows with Dr Box outpatient, last seen 1/2024. Was on plavix after surgery, was held after the GIB in 11/2023. Per wife pt has hx of allergy to ASA many years ago had itching and rash, no anaphylaxis, she is unsure if it was a true allergy as it was so long ago. Pt is not on a statin at this time. , cholesterol 194, non HDL cholesterol 142. TTE w/ normal systolic function, moderately dilated LA, aortic sclerosis, mildly dilated AA.  - c/w toprol 25 XL instead of coreg   - lipitor 40  - c/w DASH diet  - cardiology consulted

## 2024-03-09 NOTE — PROGRESS NOTE ADULT - PROBLEM SELECTOR PLAN 2
Noted with Hgb ~8-9 on admission which has been baseline for the past couple of months. Hgb drop to 7.5 (lowest in past year) from 8.1 at admission (Hypoproliferative). There was concern for possible GI bleed at Dr Lawson's office, however there is no signs of bleeding at this time. Pt initially presenting with diarrhea, now resolved and infectious workup (GI PCR/C diff) negative. CTAP w/ rectal wall thickening, splenomegaly, resolution of ascites. s/p albumin 25% 50cc x3 doses. s/p flex sig w/ GI on 3/7, found to have internal hemorrhoids. Received IV iron sucrose. Hgb continues to be stable, now at 7.6.   - f/u GI recs  - Maintain active T&S   - Transfuse if Hgb <7

## 2024-03-09 NOTE — PROGRESS NOTE ADULT - ASSESSMENT
64M, former smoker, PMH HTN, HLD, severe 3 vessel CAD s/p CABG x4 9/2023, DM, gout, TIA (14 yrs ago), gastric ulcer, rectal bleed, LIZ, decompensated alcoholic liver cirrhosis, portal hypertension c/b esophageal varices (and UGIB, last GIB in 11/2023, last EGD 01/2024 showed normal duodenum, distal esophageal varix, hyperplastic lesion, L pleural effusion s/p thoracentesis in 10/2023, presenting w/ 2 days of dizziness, headaches, generalized malaise, admitted for orthostatic hypotension. GI was consulted for anemia.     Hgb on admission near baseline, ~8 and has remained stable. No e/o clinical bleeding  CT A/P on admission with e/o circumferential rectal thickening. Flex sig (3/7/24) only notable for non-bleeding hemorrhoids.    With known history of esophageal varices as well as UGIB. Last EGD (Jan 2024) notable for distal esophageal varix s/p banding, hyperplastic pre-pyloric lesion and normal duodenum. Has been on Coreg for known history of CAD and esophageal varices. However on admission, has maria victoria orthostatic during this admission.     Home medications of Coreg, Lasix and Spironolactone, held but now has been transitioned to toprol XL  Reassuringly, patient euvolemic so okay to continue to hold Lasix and Spironolactone for now.  Does warrant lifelong BB therapy for secondary ppx in light of history of UGIB however currently unable to tolerate.     Low suspicion for orthostasis to be related to bleeding as with no evidence of overt bleeding. Perhaps medication induced.      Curiously, orthostatic BP measurements this AM more pronounced SBP downtrend (21 mm Hg) from lying to sitting, though improved when standing which is not expected with postural hypotension as we would expect the decrease to be more pronounced when standing from seated position.  DBP similarly improved when standing, but only with 9 mm Hg decrease from lying to sitting.      No e/o POTS based on HR.  No e/o autonomic dysfunction per neuro.    Nonetheless degree of postural hypotension has clearly improved from prior reading since med adjustments    #Symptomatic anemia  #Orthostatic hypotension  #Decompensated EtOH Cirrhosis  #Abnormal CT    Recommendations:  -Monitor Hgb and evidence of clinical bleeding  -Maintain PIV access x 2  -Continue to hold Lasix and Spironolactone  -Appreciate cardiology recs regarding need for Beta blocker in the setting of known cardiac history as well as choice of beta blocker (can include selective BB such as Metoprolol)  Please note, selective beta-1 antagonists such as metoprolol have been shown to be less effective in portal hypertension and are not recommended for the prophylaxis of variceal hemorrhage.  For now, further med mgmt can be addressed with hepatologist at f/u  -Compression stockings  -PT evaluation    #Diarrhea  p/w lower abdominal pain, diarrhea, since resolved.  CT AP shows: No diverticulitis, Prominent rectal wall with circumferential thickening  c diff negative, gi pcr negative  hold off antibiotics for now, no signs of infection  replace electrolytes as needed    Marty Cuellar, DO  Gastroenterology Fellow  Pager: [phone]  After 5PM or on weekends, please contact Jovanni Hill  for fellow on call

## 2024-03-09 NOTE — PROGRESS NOTE ADULT - PROBLEM SELECTOR PLAN 9
F: None   E: Replete as necessary K>4 Mg>2  N: consistent carb/DASH diet  DVT Prophylaxis: NONE  GI prophylaxis: protonix  CODE STATUS: FULL CODE   Dispo: [facility] [email or web address]]

## 2024-03-09 NOTE — PROGRESS NOTE ADULT - PROBLEM SELECTOR PLAN 5
Home meds: sucralfate 1g QID and protonix 40mg BID. Of note pt was discharged 11/2023 w/ prescription for protonix 40mg PO BID w/ plan to switch to qd after 2 weeks, however per wife is still taking it BID. No signs or symptoms of GIB at this time.  Hgb drop to 7.5 from 8.1 at admit is concerning. Hgb continues to be stable with no signs of GIB.  - c/w sucralfate 1g PO q6h  - protonix 40mg PO BID  - c/w senna and miralax, lactulose PRN   - monitor for signs/symptoms of GIB  - f/u GI recs

## 2024-03-09 NOTE — PROGRESS NOTE ADULT - PROBLEM SELECTOR PLAN 1
Multiple medications affecting his BP and Liver disease. Could also be autonomic dysfunction from CLD. Continues to be orthostatic despite holding home antihypertensives. S/p 2L NS in ED. Received albumin 25% per GI recommendations. TTE showed LVEF 59% and no other concerning abnormalities, less likely cardiac in origin. Orthostatics likely complication of cirrhosis, however per hepatology less likely GI cause.  Plan:  - Consult neurology regarding persistent orthostasis  - c/w compression socks, abdominal binder  - continue to monitor orthostatics  - encourage PO intake   - holding coreg, lasix, spironolactone as below  - will restart BB and BP meds outpatient at this time

## 2024-03-09 NOTE — PROGRESS NOTE ADULT - PROBLEM SELECTOR PLAN 4
Hx of alcoholic liver cirrhosis, portal hypertension c/b esophageal varices (and UGIB, last GIB in 11/2023, EGD 11/13 duodenitis, nonbleeding varices, healing PUD), L pleural effusion s/p thoracentesis in 10/2023. Home meds: rifaximin 550mg BID, lasix 40mg qd, spironolactone 25mg (everyday except MWF), coreg 3.125mg BID  Coags worsening, PT of 16.1 and INR of 14.3  - c/w rifaximin 550mg PO BID  - lactulose prn  - holding coreg, lasix, spironolactone i/s/o orthostatic hypotension -- will not restart at this time  - Daily CBC, CMP, Coags  - F/u with Dr. Lulu nails

## 2024-03-09 NOTE — PROGRESS NOTE ADULT - PROBLEM SELECTOR PLAN 6
Likely multifactorial i/s/o Liver disease vs Malignancy vs less likely Bone marrow involvement  WBC 2.77 on admission (baseline ~4), H&H 8.4/27.5 (baseline Hgb 8-9.5), MCV 77.9, plt 93 (baseline 80s-100s).   WBC dropped to 1.99, Hgb to 7.5, and platelets to 79. No signs of infection at this time.   Iron studies low total iron and ferritin, haptoglobin of 32, normal LDH. CBC stable today.  - Daily CBC  - F/u with heme onc outpatient

## 2024-03-09 NOTE — PROGRESS NOTE ADULT - SUBJECTIVE AND OBJECTIVE BOX
O/N Events: RAZIA    Subjective/ROS: Patient seen and examined at bedside. Resting comfortably, reports improved symptoms.    Denies Fever/Chills, HA, CP, SOB, n/v, changes in bowel/urinary habits.  12pt ROS otherwise negative.    VITALS  Vital Signs Last 24 Hrs  T(C): 36.6 (09 Mar 2024 06:07), Max: 36.8 (08 Mar 2024 21:02)  T(F): 97.8 (09 Mar 2024 06:07), Max: 98.2 (08 Mar 2024 21:02)  HR: 68 (09 Mar 2024 06:07) (68 - 80)  BP: 146/87 (09 Mar 2024 06:07) (146/87 - 160/95)  BP(mean): --  RR: 18 (09 Mar 2024 06:07) (18 - 18)  SpO2: 97% (09 Mar 2024 06:07) (97% - 98%)    Parameters below as of 09 Mar 2024 06:07  Patient On (Oxygen Delivery Method): room air        CAPILLARY BLOOD GLUCOSE      POCT Blood Glucose.: 126 mg/dL (09 Mar 2024 09:06)  POCT Blood Glucose.: 116 mg/dL (08 Mar 2024 22:58)  POCT Blood Glucose.: 146 mg/dL (08 Mar 2024 17:58)  POCT Blood Glucose.: 98 mg/dL (08 Mar 2024 13:11)      PHYSICAL EXAM  General: NAD  Head: NC/AT; MMM; PERRL; EOMI;  Neck: Supple; no JVD  Respiratory: CTAB; no wheezes/rales/rhonchi  Cardiovascular: Regular rhythm/rate; S1/S2+, no murmurs, rubs gallops   Gastrointestinal: abd soft, mildly distended  Extremities: WWP; no edema/cyanosis  Neurological: A&Ox3    MEDICATIONS  (STANDING):  atorvastatin 40 milliGRAM(s) Oral at bedtime  dextrose 5%. 1000 milliLiter(s) (100 mL/Hr) IV Continuous <Continuous>  dextrose 5%. 1000 milliLiter(s) (50 mL/Hr) IV Continuous <Continuous>  dextrose 50% Injectable 25 Gram(s) IV Push once  dextrose 50% Injectable 12.5 Gram(s) IV Push once  dextrose 50% Injectable 25 Gram(s) IV Push once  glucagon  Injectable 1 milliGRAM(s) IntraMuscular once  influenza  Vaccine (HIGH DOSE) 0.7 milliLiter(s) IntraMuscular once  insulin lispro (ADMELOG) corrective regimen sliding scale   SubCutaneous three times a day before meals  insulin lispro (ADMELOG) corrective regimen sliding scale   SubCutaneous at bedtime  metoprolol succinate ER 25 milliGRAM(s) Oral every 24 hours  pantoprazole    Tablet 40 milliGRAM(s) Oral every 12 hours  polyethylene glycol 3350 17 Gram(s) Oral every 24 hours  rifAXIMin 550 milliGRAM(s) Oral every 12 hours  senna 2 Tablet(s) Oral at bedtime  sucralfate 1 Gram(s) Oral every 6 hours    MEDICATIONS  (PRN):  dextrose Oral Gel 15 Gram(s) Oral once PRN Blood Glucose LESS THAN 70 milliGRAM(s)/deciliter      aspirin (Pruritus)      LABS                        8.0    3.63  )-----------( 76       ( 09 Mar 2024 05:30 )             27.7     03-09    140  |  109<H>  |  8   ----------------------------<  124<H>  4.1   |  21<L>  |  0.87    Ca    8.9      09 Mar 2024 05:30  Phos  3.5     03-09  Mg     1.7     03-09    TPro  6.4  /  Alb  3.4  /  TBili  1.4<H>  /  DBili  x   /  AST  36  /  ALT  18  /  AlkPhos  138<H>  03-09      Urinalysis Basic - ( 09 Mar 2024 05:30 )    Color: x / Appearance: x / SG: x / pH: x  Gluc: 124 mg/dL / Ketone: x  / Bili: x / Urobili: x   Blood: x / Protein: x / Nitrite: x   Leuk Esterase: x / RBC: x / WBC x   Sq Epi: x / Non Sq Epi: x / Bacteria: x              IMAGING/EKG/ETC

## 2024-03-09 NOTE — PROGRESS NOTE ADULT - PROVIDER SPECIALTY LIST ADULT
Gastroenterology
Gastroenterology
Internal Medicine
Gastroenterology
Hepatology
Neurology
Internal Medicine

## 2024-03-09 NOTE — PROGRESS NOTE ADULT - SUBJECTIVE AND OBJECTIVE BOX
Pt seen and examined at bedside.  NAEO. Overall pt feels hes feeling somewhat better  Orthostatics being performed at bedside during eval    Allergies    aspirin (Pruritus)    Intolerances        MEDICATIONS:  MEDICATIONS  (STANDING):  atorvastatin 40 milliGRAM(s) Oral at bedtime  dextrose 5%. 1000 milliLiter(s) (100 mL/Hr) IV Continuous <Continuous>  dextrose 5%. 1000 milliLiter(s) (50 mL/Hr) IV Continuous <Continuous>  dextrose 50% Injectable 25 Gram(s) IV Push once  dextrose 50% Injectable 12.5 Gram(s) IV Push once  dextrose 50% Injectable 25 Gram(s) IV Push once  glucagon  Injectable 1 milliGRAM(s) IntraMuscular once  influenza  Vaccine (HIGH DOSE) 0.7 milliLiter(s) IntraMuscular once  insulin lispro (ADMELOG) corrective regimen sliding scale   SubCutaneous three times a day before meals  insulin lispro (ADMELOG) corrective regimen sliding scale   SubCutaneous at bedtime  metoprolol succinate ER 25 milliGRAM(s) Oral every 24 hours  pantoprazole    Tablet 40 milliGRAM(s) Oral every 12 hours  polyethylene glycol 3350 17 Gram(s) Oral every 24 hours  rifAXIMin 550 milliGRAM(s) Oral every 12 hours  senna 2 Tablet(s) Oral at bedtime  sucralfate 1 Gram(s) Oral every 6 hours    MEDICATIONS  (PRN):  dextrose Oral Gel 15 Gram(s) Oral once PRN Blood Glucose LESS THAN 70 milliGRAM(s)/deciliter      Vital Signs Last 24 Hrs  T(C): 36.6 (09 Mar 2024 06:07), Max: 36.8 (08 Mar 2024 21:02)  T(F): 97.8 (09 Mar 2024 06:07), Max: 98.2 (08 Mar 2024 21:02)  HR: 68 (09 Mar 2024 06:07) (68 - 80)  BP: 146/87 (09 Mar 2024 06:07) (146/87 - 160/95)  BP(mean): --  RR: 18 (09 Mar 2024 06:07) (18 - 18)  SpO2: 97% (09 Mar 2024 06:07) (97% - 98%)    Parameters below as of 09 Mar 2024 06:07  Patient On (Oxygen Delivery Method): room air          PHYSICAL EXAM:    General: No acute distress  Pulm: Normal resp effort  Skin: Warm and dry. No obvious rash    LABS:  CBC Full  -  ( 09 Mar 2024 05:30 )  WBC Count : 3.63 K/uL  RBC Count : 3.39 M/uL  Hemoglobin : 8.0 g/dL  Hematocrit : 27.7 %  Platelet Count - Automated : 76 K/uL  Mean Cell Volume : 81.7 fl  Mean Cell Hemoglobin : 23.6 pg  Mean Cell Hemoglobin Concentration : 28.9 gm/dL  Auto Neutrophil # : 2.25 K/uL  Auto Lymphocyte # : 0.64 K/uL  Auto Monocyte # : 0.60 K/uL  Auto Eosinophil # : 0.11 K/uL  Auto Basophil # : 0.02 K/uL  Auto Neutrophil % : 62.0 %  Auto Lymphocyte % : 17.6 %  Auto Monocyte % : 16.5 %  Auto Eosinophil % : 3.0 %  Auto Basophil % : 0.6 %    03-09    140  |  109<H>  |  8   ----------------------------<  124<H>  4.1   |  21<L>  |  0.87    Ca    8.9      09 Mar 2024 05:30  Phos  3.5     03-09  Mg     1.7     03-09    TPro  6.4  /  Alb  3.4  /  TBili  1.4<H>  /  DBili  x   /  AST  36  /  ALT  18  /  AlkPhos  138<H>  03-09          Urinalysis Basic - ( 09 Mar 2024 05:30 )    Color: x / Appearance: x / SG: x / pH: x  Gluc: 124 mg/dL / Ketone: x  / Bili: x / Urobili: x   Blood: x / Protein: x / Nitrite: x   Leuk Esterase: x / RBC: x / WBC x   Sq Epi: x / Non Sq Epi: x / Bacteria: x                RADIOLOGY & ADDITIONAL STUDIES:

## 2024-03-09 NOTE — DISCHARGE NOTE NURSING/CASE MANAGEMENT/SOCIAL WORK - PATIENT PORTAL LINK FT
You can access the FollowMyHealth Patient Portal offered by [facility] by registering at the following website: [email or web address]. By joining [facility]’s FollowMyHealth portal, you will also be able to view your health information using other applications (apps) compatible with our system.

## 2024-03-09 NOTE — PROGRESS NOTE ADULT - SUBJECTIVE AND OBJECTIVE BOX
Neurology Consult      64M with PMH of former smoker with hx HTN, HLD, severe 3 vessel CAD s/p CAB x4 9/2024, pre-diabetes, gout, TIA (14 yrs ago), gastric ulcer, rectal bleed, LIZ and alcoholic liver disease, cirrhosis, portal hypertension c/b esophageal varices (and UGIB, last GIB in nov 2023, EGD 11/13 duodenitis, nonbleeding varices, healing PUD), L pleural effusion s/p thoracentesis in 10/2023, presenting for 2 days of dizziness. Was seen by his hepatologist Dr. Lawson this morning and had an episode of profuse diaphoresis, abd pain, and pallor, wanted to have a BM however was advised to lie down given c/f GIB, EMS was called and pt was brought to ED from Dr. Lawson's office. Upon evaluation pt reports 2 days of dizziness, headaches, generalized malaise, decreased PO intake, and diffuse lower abdominal pain. No other complaints at this time. Denies fevers, chills, CP, SOB, cough, vomiting, diarrhea, hematochezia, melena.    ED course:  Vitals: afebrile, HR 60s-90s, BP 130s-160s/70s-90s, RR 18-20, SpO2 98-99% on RA.  Labs: WBC 2.77 --> 3.19, H&H 8.4/27.5 (baseline Hgb 8-9.5), MCV 77.9, plt 93 (baseline 80s-100s), BMP wnl, bilirubin 3.1, alk phos 140, AST 52, ALT 22, lipase wnl, UA negative.  EKG: NSR w/ no acute ischemic changes  Imaging:   *CTAP w/ PO & IV contrast: redundant sigmoid colon; prominent rectal wall w/ circumferential thickening, possibly 2/2 incomplete distention vs pathologic etiology; resolution of L pleural effusion (since 2/14 and 10/31); near complete resolution of ascites; mild nodularity of hepatic contour compatible w/ cirrhosis; splenomegaly; nonspecific mild stranding in fat surrounding proximal inferior mesenteric artery (ddx focal mild vasculitis), no occlusion, possible moderate ostial stenosis.  Interventions: toradol 15mg IV x1, MG 1g IV x1, reglan 10mg PO x1, NS 2L (04 Mar 2024 21:34)      PAST MEDICAL & SURGICAL HISTORY:  CAD (coronary artery disease)      HTN (hypertension)      Cirrhosis      Esophageal varices in cirrhosis      History of TIAs      H/O: GI bleed      Gout      HLD (hyperlipidemia)      History of portal hypertension      Diabetes mellitus      No significant past surgical history          FAMILY HISTORY:  No pertinent family history in first degree relatives        Social History: (-) x 3    Allergies    aspirin (Pruritus)    Intolerances        MEDICATIONS  (STANDING):  atorvastatin 40 milliGRAM(s) Oral at bedtime  dextrose 5%. 1000 milliLiter(s) (50 mL/Hr) IV Continuous <Continuous>  dextrose 5%. 1000 milliLiter(s) (100 mL/Hr) IV Continuous <Continuous>  dextrose 50% Injectable 25 Gram(s) IV Push once  dextrose 50% Injectable 12.5 Gram(s) IV Push once  dextrose 50% Injectable 25 Gram(s) IV Push once  glucagon  Injectable 1 milliGRAM(s) IntraMuscular once  influenza  Vaccine (HIGH DOSE) 0.7 milliLiter(s) IntraMuscular once  insulin lispro (ADMELOG) corrective regimen sliding scale   SubCutaneous three times a day before meals  insulin lispro (ADMELOG) corrective regimen sliding scale   SubCutaneous at bedtime  metoprolol succinate ER 25 milliGRAM(s) Oral every 24 hours  pantoprazole    Tablet 40 milliGRAM(s) Oral every 12 hours  polyethylene glycol 3350 17 Gram(s) Oral every 24 hours  rifAXIMin 550 milliGRAM(s) Oral every 12 hours  senna 2 Tablet(s) Oral at bedtime  sucralfate 1 Gram(s) Oral every 6 hours    MEDICATIONS  (PRN):  dextrose Oral Gel 15 Gram(s) Oral once PRN Blood Glucose LESS THAN 70 milliGRAM(s)/deciliter        Vital Signs Last 24 Hrs  T(C): 36.3 (09 Mar 2024 12:06), Max: 36.8 (08 Mar 2024 21:02)  T(F): 97.4 (09 Mar 2024 12:06), Max: 98.2 (08 Mar 2024 21:02)  HR: 69 (09 Mar 2024 12:06) (68 - 80)  BP: 162/80 (09 Mar 2024 12:06) (146/87 - 162/80)  BP(mean): --  RR: 18 (09 Mar 2024 12:06) (18 - 18)  SpO2: 100% (09 Mar 2024 12:06) (97% - 100%)    Parameters below as of 09 Mar 2024 12:06  Patient On (Oxygen Delivery Method): room air        Examination:  General:  Appearance is consistent with chronologic age.  No abnormal facies.  Gross skin survey within normal limits.    Cognitive/Language:  The patient is oriented to person, place, time and date.  Recent and remote memory intact.  Fund of knowledge is intact and normal.  Language with normal repetition, comprehension and naming.  Nondysarthric.    Eyes: intact VA, VFF.  EOMI w/o nystagmus, skew or reported double vision.  PERRL.  No ptosis/weakness of eyelid closure.    Face:  Facial sensation normal V1 - 3, no facial asymmetry.    Ears/Nose/Throat:  Hearing grossly intact b/l.  Palate elevates midline.  Tongue and uvula midline.   Motor examination:   Normal tone, bulk and range of motion.  No tenderness, twitching, tremors or involuntary movements.  Formal Muscle Strength Testing: (MRC grade R/L) 5/5 UE; 5/5 LE.  No observable drift.  Reflexes:   2+ b/l  biceps, triceps, brachioradialis, patella and Achilles.    Sensory examination:   Intact to light touch and pinprick, pain in all extremities.  Cerebellum:   FTN intact   Gait deferred    Respiratory:  no audible wheezing or inspiratory stridor.  no use of accessory muscles.   Cardiac: pulse palpable, no audible bruits  Abdomen: supple, no guarding, no TTP    Labs:   CBC Full  -  ( 08 Mar 2024 05:30 )  WBC Count : 3.07 K/uL  RBC Count : 3.17 M/uL  Hemoglobin : 7.6 g/dL  Hematocrit : 25.1 %  Platelet Count - Automated : 73 K/uL  Mean Cell Volume : 79.2 fl  Mean Cell Hemoglobin : 24.0 pg  Mean Cell Hemoglobin Concentration : 30.3 gm/dL  Auto Neutrophil # : 2.05 K/uL  Auto Lymphocyte # : 0.54 K/uL  Auto Monocyte # : 0.43 K/uL  Auto Eosinophil # : 0.03 K/uL  Auto Basophil # : 0.00 K/uL  Auto Neutrophil % : 66.7 %  Auto Lymphocyte % : 17.5 %  Auto Monocyte % : 14.0 %  Auto Eosinophil % : 0.9 %  Auto Basophil % : 0.0 %    03-08    140  |  108  |  8   ----------------------------<  130<H>  3.8   |  22  |  0.94    Ca    9.1      08 Mar 2024 05:30  Phos  3.7     03-08  Mg     1.7     03-08    TPro  6.4  /  Alb  3.4  /  TBili  1.3<H>  /  DBili  x   /  AST  36  /  ALT  19  /  AlkPhos  151<H>  03-08    LIVER FUNCTIONS - ( 08 Mar 2024 05:30 )  Alb: 3.4 g/dL / Pro: 6.4 g/dL / ALK PHOS: 151 U/L / ALT: 19 U/L / AST: 36 U/L / GGT: x           PT/INR - ( 07 Mar 2024 05:30 )   PT: 16.6 sec;   INR: 1.47          PTT - ( 07 Mar 2024 05:30 )  PTT:36.0 sec  Urinalysis Basic - ( 08 Mar 2024 05:30 )    Color: x / Appearance: x / SG: x / pH: x  Gluc: 130 mg/dL / Ketone: x  / Bili: x / Urobili: x   Blood: x / Protein: x / Nitrite: x   Leuk Esterase: x / RBC: x / WBC x   Sq Epi: x / Non Sq Epi: x / Bacteria: x          [facility]  [facility]     03-08-24 @ 15:12

## 2024-03-09 NOTE — PROGRESS NOTE ADULT - ASSESSMENT
64M with PMH of former smoker with hx HTN, HLD, severe 3 vessel CAD s/p CAB x4 9/2024, pre-diabetes, gout, TIA (14 yrs ago), gastric ulcer, rectal bleed, LIZ and alcoholic liver disease, cirrhosis, portal hypertension c/b esophageal varices (and UGIB, last GIB in nov 2023, EGD 11/13 duodenitis, nonbleeding varices, healing PUD), L pleural effusion s/p thoracentesis in 10/2023, presenting for 2 days of dizziness. He was admitted to hospital few months back for variceal bleeding 2/2 liver cirrhosis. During this admission, GI is following to r/o any GI bleed, sigmoidoscopy was done which was showing hemorrhoids. Currently off BP meds due to orthostatics. Neuro consulted for possible autonomic dysfunction. Orthostatics borderline positive today when lying to sitting today but normalized today. Patient reports resolution of his symptoms during interview today. Non focal neuro exam.    Impression: Orthostatics unlikely due to autonomic dysfunction. Patient has several systemic risk factors for OH, including cirrhosis, anemia and extensive cardiac history. Symptoms resolved with medication adjustment.     Recommendations:  - wear abdominal binders and thigh high compression stockings if orthostasis recurs.   - Fluid optimization with the assistance of cardiology, given extensive cardiac history.   - fall precautions   - no further neuro workup needed. Neurology signing off.     Case discussed with attending neurologist Dr. Durham.

## 2024-03-09 NOTE — PROGRESS NOTE ADULT - PROBLEM SELECTOR PLAN 7
MR abdomen from 2/14/2024 showed 3.  5 mm arterially enhancing focus in the superior aspect of the right   hepatic lobe, as described above; not visualized on the previous CT.   LR-3.. Recommend one-year interval follow-up with MRI.  - Outpatient f/u next week with Dr. Lawson

## 2024-03-14 ENCOUNTER — APPOINTMENT (OUTPATIENT)
Dept: HEPATOLOGY | Facility: CLINIC | Age: 64
End: 2024-03-14
Payer: MEDICARE

## 2024-03-14 VITALS
OXYGEN SATURATION: 100 % | DIASTOLIC BLOOD PRESSURE: 85 MMHG | TEMPERATURE: 98.2 F | HEART RATE: 62 BPM | RESPIRATION RATE: 16 BRPM | SYSTOLIC BLOOD PRESSURE: 168 MMHG | WEIGHT: 214 LBS | BODY MASS INDEX: 32.43 KG/M2 | HEIGHT: 68 IN

## 2024-03-14 PROCEDURE — 99214 OFFICE O/P EST MOD 30 MIN: CPT

## 2024-03-14 RX ORDER — SPIRONOLACTONE 25 MG/1
25 TABLET ORAL DAILY
Qty: 90 | Refills: 3 | Status: DISCONTINUED | COMMUNITY
Start: 2023-11-27 | End: 2024-03-14

## 2024-03-14 RX ORDER — CARVEDILOL 3.12 MG/1
3.12 TABLET, FILM COATED ORAL TWICE DAILY
Qty: 180 | Refills: 3 | Status: DISCONTINUED | COMMUNITY
Start: 1900-01-01 | End: 2024-03-14

## 2024-03-15 DIAGNOSIS — D61.818 OTHER PANCYTOPENIA: ICD-10-CM

## 2024-03-15 DIAGNOSIS — I95.1 ORTHOSTATIC HYPOTENSION: ICD-10-CM

## 2024-03-15 DIAGNOSIS — K64.8 OTHER HEMORRHOIDS: ICD-10-CM

## 2024-03-15 DIAGNOSIS — D50.9 IRON DEFICIENCY ANEMIA, UNSPECIFIED: ICD-10-CM

## 2024-03-15 DIAGNOSIS — K76.9 LIVER DISEASE, UNSPECIFIED: ICD-10-CM

## 2024-03-15 DIAGNOSIS — Z79.84 LONG TERM (CURRENT) USE OF ORAL HYPOGLYCEMIC DRUGS: ICD-10-CM

## 2024-03-15 DIAGNOSIS — Z87.891 PERSONAL HISTORY OF NICOTINE DEPENDENCE: ICD-10-CM

## 2024-03-15 DIAGNOSIS — K70.30 ALCOHOLIC CIRRHOSIS OF LIVER WITHOUT ASCITES: ICD-10-CM

## 2024-03-15 DIAGNOSIS — E86.1 HYPOVOLEMIA: ICD-10-CM

## 2024-03-15 DIAGNOSIS — E11.9 TYPE 2 DIABETES MELLITUS WITHOUT COMPLICATIONS: ICD-10-CM

## 2024-03-15 DIAGNOSIS — I10 ESSENTIAL (PRIMARY) HYPERTENSION: ICD-10-CM

## 2024-03-15 DIAGNOSIS — I25.10 ATHEROSCLEROTIC HEART DISEASE OF NATIVE CORONARY ARTERY WITHOUT ANGINA PECTORIS: ICD-10-CM

## 2024-03-15 DIAGNOSIS — R65.10 SYSTEMIC INFLAMMATORY RESPONSE SYNDROME (SIRS) OF NON-INFECTIOUS ORIGIN WITHOUT ACUTE ORGAN DYSFUNCTION: ICD-10-CM

## 2024-03-15 DIAGNOSIS — K76.6 PORTAL HYPERTENSION: ICD-10-CM

## 2024-03-15 DIAGNOSIS — I85.10 SECONDARY ESOPHAGEAL VARICES WITHOUT BLEEDING: ICD-10-CM

## 2024-03-15 DIAGNOSIS — Z95.1 PRESENCE OF AORTOCORONARY BYPASS GRAFT: ICD-10-CM

## 2024-03-15 DIAGNOSIS — E78.5 HYPERLIPIDEMIA, UNSPECIFIED: ICD-10-CM

## 2024-03-18 NOTE — HISTORY OF PRESENT ILLNESS
[de-identified] : 65 yo [place] ([facility]) M former smoker w hx HTN, HLD, 3V CAD, preDM, gout, TIA (15 yrs ago), gastric ulcer, rectal bleed, LIZ and alcoholic liver disease c/b cirrhosis and portal htn m/b EV (s/p UGIB), splenomegaly, recurrent pleural effusions requiring LVP (last 10/23), pericardial eff requiring thoracentesis (10/23 2L)and recently discovered R lobe (11mm) LR 3 lesion and L lobe (12mm) LR 3 lesion, being surveilled. Pt declined liver txp evaluation.   Recent 1 week admission discharge 3/9 for symptomatic anemia, orthostatic hypotension  (diuretics held) Hgb on admission near baseline, ~8 and has remained stable. No e/o clinical bleeding CT A/P done with e/o circumferential rectal thickening. Flex sig (3/7/24)notable for non-bleeding hemorrhoids. held coreg, and changed to Metoprolol   EV, on coreg (currently held, started metoprolol 3/7) Pleural/Pericardial effusion, last LVP and Thoracentesis 10/26, Lasix 80mg AM and 40mg @ 2pm and spironolactone 50mg daily.  (diuretics currently held)  Abstinent since 2023  accompanied by wife  BACKGROUND 2023 - Sent to [facility] ED from PCP office with c/o intermittent CP x 2 weeks. EKG noted to have ST depressions and troponin 0.27. Cardiac cath on 23 revealed distal LM stenosis and 3 vessel CAD Given hx s/f black stools and anemia (Hg7.7) further w/u revealed Gr 3 EV s/p incomplete banding and elevated LFTs (TB 2.2, ).. He was discharged home on 3/3.  Initially referred by cardiologist Dr. Box for GI/hep f/u given EVB and GIB and need for repeat EGD (performed  cw EV s/p 3 bands placed).  Subsequently referred for liver transplant evaluation by Dr. Lawson, though pt declined undergoing liver transplant evaluation.  May, 2023 - [facility] hosp r/t staph sepsis, treatment with prednisolone. Since then, he has been on metformin and insulin for hyperglycemia. Underwent EGD while in the hospital (EV with hx of severe anemia) - small columns of esophageal varix was seen and PHG.  Pt underwent OP- CAB x 4 on 23. Readmitted from office on  for large L pleural effusion s/p pigtail drainage (~4L), discharged . Pt with recurrent large left pleural effusion so furosemide 40mg @ 2pm added on 10/11/23.  Admission [facility] in 2023 for hematemesis x2 11 AM. S/p EGD on  showed duodenitis, nonbleeding varices, healing PUD, and PHG. Protonix was switched to PO. 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  STUDIES  CTAP  3/4/24 1.  No diverticulitis. Aside from a redundant sigmoid colon, left lower quadrant is grossly unremarkable. 2.  Prominent rectal wall with circumferential thickening; possibly due to incomplete distention, however, a pathologic etiology cannot be completely excluded; clinical correlation. 3.  Resolution of left pleural effusion. 4.  Near complete resolution of previously noted ascites. 5.  Mild nodularity of hepatic contour, compatible with cirrhotic change 6.  Splenomegaly. 7.  Nonspecific mild stranding noted in the fat surrounding the proximal inferior mesenteric artery; cannot exclude focal mild vasculitis. No occlusion. Possible moderate ostial stenosis.  MRI 24: 1. Mild hepatic nodularity, compatible with cirrhotic change. 2. The previously noted hypervascular lesions on CT 10/31/2023, are not visualized as discrete lesions on this exam. They may be focal vascular malformations/flash hemangiomas. They do not appear to be foci of neoplasm. 3. 5 mm arterially enhancing focus in the superior aspect of the right hepatic lobe, as described above; not visualized on the previous CT. LR-3.. Recommend one-year interval follow-up with MRI.  EGD 24 Normal duodenum. -One column of esophageal varix mostly in distal esophagus. One band was placed. -The lesion seen in previous EGD in pre-pyloric area is still visible with features more suggestive of hyperplastic tissue. Four biopsies were taken. This measures approximately 15 mm. (Biopsy). -Biopsy of stomach antrum and body were taken.. PATH Neg for intestinal metaplasia and H. pylori, with + ulceration and granulation tissue in the peripyloric area Addendum Block 1A; deeper levels were reviewed and in addition shows ulceration and granulation tissue formation.  COLON 23 (good prep) polyps removed - 1 10mm in transverse, 1 5 mm in descending, 2 rectal IH multiple nonbleeding colonic Angio ectasias  Cardiac Cath 23 [facility]: -50% distal LM -60% mid LAD, 80% distal LAD -100%  CFX after OM1 -95% mid RCA -100%  RPDA -grade 3 collaterals supplying distal CFX -severe inferolateral hypokinesis -basal inferolateral hypokinesis  MEDICATIONS carvedilol 3.125mg bid (recently lowered s/t hematem) lasix 40 mg daily spironaldactone 25mg daily rifaximin 550m bid pantoprazole 40mg daily metormin 500mg bid gabapentin 100mg q 8 hrs  INTERIM HX feels 'fine' abd feels ok at rest, when presses feels mid upr abdomen not related to food denies NVD, CP, SOB, f/c appetite good no longer has diarrhea (had diar in hosp) BM 2x/day no rectal blood

## 2024-03-18 NOTE — HISTORY OF PRESENT ILLNESS
[de-identified] : 63 yo Citizen of [place] ([facility]) M former smoker w hx HTN, HLD, 3V CAD, preDM, gout, TIA (15 yrs ago), gastric ulcer, rectal bleed, LIZ and alcoholic liver disease c/b cirrhosis and portal htn m/b EV (s/p UGIB), splenomegaly, recurrent pleural effusions requiring LVP (last 10/23), pericardial eff requiring thoracentesis (10/23 2L)and recently discovered R lobe (11mm) LR 3 lesion and L lobe (12mm) LR 3 lesion, being surveilled. Pt declined liver txp evaluation.   Recent 1 week admission discharge 3/9 for symptomatic anemia, orthostatic hypotension  (diuretics held) Hgb on admission near baseline, ~8 and has remained stable. No e/o clinical bleeding CT A/P done with e/o circumferential rectal thickening. Flex sig (3/7/24)notable for non-bleeding hemorrhoids. held coreg, and changed to Metoprolol   EV, on coreg (currently held, started metoprolol 3/7) Pleural/Pericardial effusion, last LVP and Thoracentesis 10/26, Lasix 80mg AM and 40mg @ 2pm and spironolactone 50mg daily.  (diuretics currently held)  Abstinent since 2023  accompanied by wife  BACKGROUND 2023 - Sent to [facility] ED from PCP office with c/o intermittent CP x 2 weeks. EKG noted to have ST depressions and troponin 0.27. Cardiac cath on 23 revealed distal LM stenosis and 3 vessel CAD Given hx s/f black stools and anemia (Hg7.7) further w/u revealed Gr 3 EV s/p incomplete banding and elevated LFTs (TB 2.2, ).. He was discharged home on 3/3.  Initially referred by cardiologist Dr. Box for GI/hep f/u given EVB and GIB and need for repeat EGD (performed  cw EV s/p 3 bands placed).  Subsequently referred for liver transplant evaluation by Dr. Lawson, though pt declined undergoing liver transplant evaluation.  May, 2023 - [facility] hosp r/t staph sepsis, treatment with prednisolone. Since then, he has been on metformin and insulin for hyperglycemia. Underwent EGD while in the hospital (EV with hx of severe anemia) - small columns of esophageal varix was seen and PHG.  Pt underwent OP- CAB x 4 on 23. Readmitted from office on  for large L pleural effusion s/p pigtail drainage (~4L), discharged . Pt with recurrent large left pleural effusion so furosemide 40mg @ 2pm added on 10/11/23.  Admission [facility] in 2023 for hematemesis x2 11 AM. S/p EGD on  showed duodenitis, nonbleeding varices, healing PUD, and PHG. Protonix was switched to PO. 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  STUDIES  CTAP  3/4/24 1.  No diverticulitis. Aside from a redundant sigmoid colon, left lower quadrant is grossly unremarkable. 2.  Prominent rectal wall with circumferential thickening; possibly due to incomplete distention, however, a pathologic etiology cannot be completely excluded; clinical correlation. 3.  Resolution of left pleural effusion. 4.  Near complete resolution of previously noted ascites. 5.  Mild nodularity of hepatic contour, compatible with cirrhotic change 6.  Splenomegaly. 7.  Nonspecific mild stranding noted in the fat surrounding the proximal inferior mesenteric artery; cannot exclude focal mild vasculitis. No occlusion. Possible moderate ostial stenosis.  MRI 24: 1. Mild hepatic nodularity, compatible with cirrhotic change. 2. The previously noted hypervascular lesions on CT 10/31/2023, are not visualized as discrete lesions on this exam. They may be focal vascular malformations/flash hemangiomas. They do not appear to be foci of neoplasm. 3. 5 mm arterially enhancing focus in the superior aspect of the right hepatic lobe, as described above; not visualized on the previous CT. LR-3.. Recommend one-year interval follow-up with MRI.  EGD 24 Normal duodenum. -One column of esophageal varix mostly in distal esophagus. One band was placed. -The lesion seen in previous EGD in pre-pyloric area is still visible with features more suggestive of hyperplastic tissue. Four biopsies were taken. This measures approximately 15 mm. (Biopsy). -Biopsy of stomach antrum and body were taken.. PATH Neg for intestinal metaplasia and H. pylori, with + ulceration and granulation tissue in the peripyloric area Addendum Block 1A; deeper levels were reviewed and in addition shows ulceration and granulation tissue formation.  COLON 23 (good prep) polyps removed - 1 10mm in transverse, 1 5 mm in descending, 2 rectal IH multiple nonbleeding colonic Angio ectasias  Cardiac Cath 23 [facility]: -50% distal LM -60% mid LAD, 80% distal LAD -100%  CFX after OM1 -95% mid RCA -100%  RPDA -grade 3 collaterals supplying distal CFX -severe inferolateral hypokinesis -basal inferolateral hypokinesis  MEDICATIONS carvedilol 3.125mg bid (recently lowered s/t hematem) lasix 40 mg daily spironaldactone 25mg daily rifaximin 550m bid pantoprazole 40mg daily metormin 500mg bid gabapentin 100mg q 8 hrs  INTERIM HX feels 'fine' abd feels ok at rest, when presses feels mid upr abdomen not related to food denies NVD, CP, SOB, f/c appetite good no longer has diarrhea (had diar in hosp) BM 2x/day no rectal blood

## 2024-03-18 NOTE — END OF VISIT
[FreeTextEntry3] : I have seen and examined patient at bedside. I agree with hx, ROS, physical examination, imp/suggestions as written by Ms. Kayce Hong NP. Please see my note. [Time Spent: ___ minutes] : I have spent [unfilled] minutes of time on the encounter.

## 2024-03-18 NOTE — ASSESSMENT
[FreeTextEntry1] : recent admission for symptomatic anemia, not requiring transfusion  # HCC screening: ABDL CT 3/3/24  - cirr, change in intestine.  MRI 2/204 2 lesions - R lobe 11mm and L lobe 12 mm, both LR3. Lesion surveillance - MRI 3 mos DUE end of JAN  # Portal HTN -resolution of pleural effusion on recent imaging 3/2024 s/p LVP 10./2023 (1.5L) s/p Thoracentesis 10/23 (2L)  Diuretics held during hosp since 3/4 abdl pain, incr abdl girth, some pain on palpation restart diuretics - Lasix 40mg daily and inspra 25 mg daily (gynecomastia from aldactone) - EV 6/22/23, G1 EV-  continue metoprolol (coreg r/t bp fluctuations) - HE, 1 BM daily, cont Rifax  # ETOH denies drinking, encouraged continue not to drink  # CAD S/p CAPG (OP-CAB x 4) 9/14/23 frequent anemia, has been off ac therapy  cont f/u with cardiologist Dr. Box and surgeon, Dr. Hinton.  labs next week visit next week

## 2024-03-21 NOTE — HISTORY OF PRESENT ILLNESS
[de-identified] : 65 yo Stateless ([facility]) M former smoker w hx HTN, HLD, 3V CAD, preDM, gout, TIA (15 yrs ago), gastric ulcer, rectal bleed, LIZ and alcoholic liver disease c/b cirrhosis and portal htn m/b EV (s/p UGIB), splenomegaly, recurrent pleural effusions requiring LVP (last 10/23), pericardial eff requiring thoracentesis (10/23 2L)and recently discovered R lobe (11mm) LR 3 lesion and L lobe (12mm) LR 3 lesion, being surveilled. Pt declined liver txp evaluation. Here for f/u, accompanied by.....  EV, on coreg Pleural/Pericardial effusion, last LVP and Thoracentesis 10/26, Lasix 80mg AM and 40mg @ 2pm and spironolactone 50mg daily.  Abstinent since 2023  BACKGROUND 2023 - Sent to [facility] ED from PCP office with c/o intermittent CP x 2 weeks. EKG noted to have ST depressions and troponin 0.27. Cardiac cath on 23 revealed distal LM stenosis and 3 vessel CAD Given hx s/f black stools and anemia (Hg7.7) further w/u revealed Gr 3 EV s/p incomplete banding and elevated LFTs (TB 2.2, ).. He was discharged home on 3/3.  Initially referred by cardiologist Dr. Box for GI/hep f/u given EVB and GIB and need for repeat EGD (performed  cw EV s/p 3 bands placed).  Subsequently referred for liver transplant evaluation by Dr. Lawson, though pt declined undergoing liver transplant evaluation.  May, 2023 - [facility] hosp r/t staph sepsis, treatment with prednisolone. Since then, he has been on metformin and insulin for hyperglycemia. Underwent EGD while in the hospital (EV with hx of severe anemia) - small columns of esophageal varix was seen and PHG.  Pt underwent OP- CAB x 4 on 23. Readmitted from office on  for large L pleural effusion s/p pigtail drainage (~4L), discharged . Pt with recurrent large left pleural effusion so furosemide 40mg @ 2pm added on 10/11/23.  Admission [facility] in 2023 for hematemesis x2  AM. S/p EGD on  showed duodenitis, nonbleeding varices, healing PUD, and PHG. Protonix was switched to PO. 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  STUDIES  MRI 24: 1. Mild hepatic nodularity, compatible with cirrhotic change. 2. The previously noted hypervascular lesions on CT 10/31/2023, are not visualized as discrete lesions on this exam. They may be focal vascular malformations/flash hemangiomas. They do not appear to be foci of neoplasm. 3. 5 mm arterially enhancing focus in the superior aspect of the right hepatic lobe, as described above; not visualized on the previous CT. LR-3.. Recommend one-year interval follow-up with MRI.  CT ABD waw IC 10/31/23 1. Hepatic lesions: Lesion 1: Right hepatic lobe, 11 mm, arterially enhancing, LR-3 Lesion 2: Left hepatic lobe, 12 mm, arterially enhancing. LR-3 MRI may be indicated for f/u of these lesions. 2. cirrhotic changes in liver 3. Moderate to severe left pleural effusion with near complete atelectasis of the left lower lobe. 4. Small to moderate pericardial effusion. 5. Splenomegaly 6. Mild ascites 7. Cholelithiasis.  EGD 24 Normal duodenum. -One column of esophageal varix mostly in distal esophagus. One band was placed. -The lesion seen in previous EGD in pre-pyloric area is still visible with features more suggestive of hyperplastic tissue. Four biopsies were taken. This measures approximately 15 mm. (Biopsy). -Biopsy of stomach antrum and body were taken.. PATH Neg for intestinal metaplasia and H. pylori, with + ulceration and granulation tissue in the peripyloric area Addendum Block 1A; deeper levels were reviewed and in addition shows ulceration and granulation tissue formation.  COLON 23 (good prep) polyps removed - 1 10mm in transverse, 1 5 mm in descending, 2 rectal IH multiple nonbleeding colonic Angio ectasias  Cardiac Cath 23 [facility]: -50% distal LM -60% mid LAD, 80% distal LAD -100%  CFX after OM1 -95% mid RCA -100%  RPDA -grade 3 collaterals supplying distal CFX -severe inferolateral hypokinesis -basal inferolateral hypokinesis  MEDICATIONS carvedilol 3.125mg bid (recently lowered s/t hematem) lasix 40 mg daily spironaldactone 25mg daily rifaximin 550m bid pantoprazole 40mg daily metormin 500mg bid gabapentin 100mg q 8 hrs  INTERIM HX

## 2024-03-21 NOTE — HISTORY OF PRESENT ILLNESS
[de-identified] : 65 yo [place] ([facility]) M former smoker w hx HTN, HLD, 3V CAD, preDM, gout, TIA (15 yrs ago), gastric ulcer, rectal bleed, LIZ and alcoholic liver disease c/b cirrhosis and portal htn m/b EV (s/p UGIB), splenomegaly, recurrent pleural effusions requiring LVP (last 10/23), pericardial eff requiring thoracentesis (10/23 2L)and recently discovered R lobe (11mm) LR 3 lesion and L lobe (12mm) LR 3 lesion, being surveilled. Pt declined liver txp evaluation. Here for f/u, accompanied by.....  EV, on coreg Pleural/Pericardial effusion, last LVP and Thoracentesis 10/26, Lasix 80mg AM and 40mg @ 2pm and spironolactone 50mg daily.  Abstinent since 2023  BACKGROUND 2023 - [facility] with c/o intermittent CP x 2 weeks. EKG noted to have ST depressions and troponin 0.27. Cardiac cath on 23 revealed distal LM stenosis and 3 vessel CAD Given hx s/f black stools and anemia (Hg7.7) further w/u revealed Gr 3 EV s/p incomplete banding and elevated LFTs (TB 2.2, ).. He was discharged home on 3/3.  Initially referred by cardiologist Dr. Box for GI/hep f/u given EVB and GIB and need for repeat EGD (performed  cw EV s/p 3 bands placed).  Subsequently referred for liver transplant evaluation by Dr. Lawson, though pt declined undergoing liver transplant evaluation.  [facility] r/t staph sepsis, treatment with prednisolone. Since then, he has been on metformin and insulin for hyperglycemia. Underwent EGD while in the hospital (EV with hx of severe anemia) - small columns of esophageal varix was seen and PHG.  Pt underwent OP- CAB x 4 on 23. Readmitted from office on  for large L pleural effusion s/p pigtail drainage (~4L), discharged . Pt with recurrent large left pleural effusion so furosemide 40mg @ 2pm added on 10/11/23.  Admission [facility] in 2023 for hematemesis x2  AM. S/p EGD on  showed duodenitis, nonbleeding varices, healing PUD, and PHG. Protonix was switched to PO. 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  STUDIES  MRI 24: 1. Mild hepatic nodularity, compatible with cirrhotic change. 2. The previously noted hypervascular lesions on CT 10/31/2023, are not visualized as discrete lesions on this exam. They may be focal vascular malformations/flash hemangiomas. They do not appear to be foci of neoplasm. 3. 5 mm arterially enhancing focus in the superior aspect of the right hepatic lobe, as described above; not visualized on the previous CT. LR-3.. Recommend one-year interval follow-up with MRI.  CT ABD waw IC 10/31/23 1. Hepatic lesions: Lesion 1: Right hepatic lobe, 11 mm, arterially enhancing, LR-3 Lesion 2: Left hepatic lobe, 12 mm, arterially enhancing. LR-3 MRI may be indicated for f/u of these lesions. 2. cirrhotic changes in liver 3. Moderate to severe left pleural effusion with near complete atelectasis of the left lower lobe. 4. Small to moderate pericardial effusion. 5. Splenomegaly 6. Mild ascites 7. Cholelithiasis.  EGD 24 Normal duodenum. -One column of esophageal varix mostly in distal esophagus. One band was placed. -The lesion seen in previous EGD in pre-pyloric area is still visible with features more suggestive of hyperplastic tissue. Four biopsies were taken. This measures approximately 15 mm. (Biopsy). -Biopsy of stomach antrum and body were taken.. PATH Neg for intestinal metaplasia and H. pylori, with + ulceration and granulation tissue in the peripyloric area Addendum Block 1A; deeper levels were reviewed and in addition shows ulceration and granulation tissue formation.  COLON 23 (good prep) polyps removed - 1 10mm in transverse, 1 5 mm in descending, 2 rectal IH multiple nonbleeding colonic Angio ectasias  Cardiac Cath 23 [facility]: -50% distal LM -60% mid LAD, 80% distal LAD -100%  CFX after OM1 -95% mid RCA -100%  RPDA -grade 3 collaterals supplying distal CFX -severe inferolateral hypokinesis -basal inferolateral hypokinesis  MEDICATIONS carvedilol 3.125mg bid (recently lowered s/t hematem) lasix 40 mg daily spironaldactone 25mg daily rifaximin 550m bid pantoprazole 40mg daily metormin 500mg bid gabapentin 100mg q 8 hrs  INTERIM HX

## 2024-04-01 ENCOUNTER — LABORATORY RESULT (OUTPATIENT)
Age: 64
End: 2024-04-01

## 2024-04-03 ENCOUNTER — APPOINTMENT (OUTPATIENT)
Dept: HEPATOLOGY | Facility: CLINIC | Age: 64
End: 2024-04-03
Payer: MEDICARE

## 2024-04-03 VITALS
WEIGHT: 214 LBS | DIASTOLIC BLOOD PRESSURE: 75 MMHG | HEART RATE: 88 BPM | RESPIRATION RATE: 16 BRPM | TEMPERATURE: 98.2 F | SYSTOLIC BLOOD PRESSURE: 167 MMHG | OXYGEN SATURATION: 100 % | HEIGHT: 68 IN | BODY MASS INDEX: 32.43 KG/M2

## 2024-04-03 LAB
ALBUMIN SERPL ELPH-MCNC: 3.9 G/DL
ALP BLD-CCNC: 250 U/L
ALT SERPL-CCNC: 37 U/L
ANION GAP SERPL CALC-SCNC: 14 MMOL/L
AST SERPL-CCNC: 77 U/L
BASOPHILS # BLD AUTO: 0.03 K/UL
BASOPHILS NFR BLD AUTO: 0.9 %
BILIRUB SERPL-MCNC: 1.8 MG/DL
BUN SERPL-MCNC: 8 MG/DL
CALCIUM SERPL-MCNC: 9 MG/DL
CHLORIDE SERPL-SCNC: 102 MMOL/L
CO2 SERPL-SCNC: 23 MMOL/L
CREAT SERPL-MCNC: 0.87 MG/DL
EGFR: 96 ML/MIN/1.73M2
EOSINOPHIL # BLD AUTO: 0.1 K/UL
EOSINOPHIL NFR BLD AUTO: 3 %
GLUCOSE SERPL-MCNC: 135 MG/DL
HCT VFR BLD CALC: 30.8 %
HGB BLD-MCNC: 9.8 G/DL
IMM GRANULOCYTES NFR BLD AUTO: 0 %
INR PPP: 1.48 RATIO
LYMPHOCYTES # BLD AUTO: 0.84 K/UL
LYMPHOCYTES NFR BLD AUTO: 24.9 %
MAN DIFF?: NORMAL
MCHC RBC-ENTMCNC: 27.3 PG
MCHC RBC-ENTMCNC: 31.8 GM/DL
MCV RBC AUTO: 85.8 FL
MONOCYTES # BLD AUTO: 0.55 K/UL
MONOCYTES NFR BLD AUTO: 16.3 %
NEUTROPHILS # BLD AUTO: 1.86 K/UL
NEUTROPHILS NFR BLD AUTO: 54.9 %
PLATELET # BLD AUTO: NORMAL K/UL
POTASSIUM SERPL-SCNC: 3.9 MMOL/L
PROT SERPL-MCNC: 7.6 G/DL
PT BLD: 16.5 SEC
RBC # BLD: 3.59 M/UL
RBC # FLD: 19.5 %
SODIUM SERPL-SCNC: 140 MMOL/L
WBC # FLD AUTO: 3.38 K/UL

## 2024-04-03 PROCEDURE — 99214 OFFICE O/P EST MOD 30 MIN: CPT

## 2024-04-12 NOTE — DIETITIAN INITIAL EVALUATION ADULT - BODY MASS INDEX
Chief Complaint   Patient presents with    Port Draw     Labs drawn via port by RN in lab. VS taken.      Port accessed and labs drawn by RN. Pt tolerated well.  VS taken.  Pt checked in for next appt.    Lurdes Leroy RN     27.3

## 2024-04-17 ENCOUNTER — NON-APPOINTMENT (OUTPATIENT)
Age: 64
End: 2024-04-17

## 2024-04-17 LAB
ALBUMIN SERPL ELPH-MCNC: 4 G/DL
ALP BLD-CCNC: 235 U/L
ALT SERPL-CCNC: 40 U/L
ANION GAP SERPL CALC-SCNC: 16 MMOL/L
AST SERPL-CCNC: 90 U/L
BASOPHILS # BLD AUTO: 0.04 K/UL
BASOPHILS NFR BLD AUTO: 1.3 %
BILIRUB SERPL-MCNC: 2.1 MG/DL
BUN SERPL-MCNC: 10 MG/DL
CALCIUM SERPL-MCNC: 8.8 MG/DL
CHLORIDE SERPL-SCNC: 99 MMOL/L
CO2 SERPL-SCNC: 25 MMOL/L
CREAT SERPL-MCNC: 0.83 MG/DL
EGFR: 97 ML/MIN/1.73M2
EOSINOPHIL # BLD AUTO: 0.04 K/UL
EOSINOPHIL NFR BLD AUTO: 1.3 %
GLUCOSE SERPL-MCNC: 129 MG/DL
HCT VFR BLD CALC: 32.1 %
HGB BLD-MCNC: 10 G/DL
IMM GRANULOCYTES NFR BLD AUTO: 0 %
INR PPP: 1.33 RATIO
LYMPHOCYTES # BLD AUTO: 0.63 K/UL
LYMPHOCYTES NFR BLD AUTO: 20.2 %
MAN DIFF?: NORMAL
MCHC RBC-ENTMCNC: 25.7 PG
MCHC RBC-ENTMCNC: 31.2 GM/DL
MCV RBC AUTO: 82.5 FL
MONOCYTES # BLD AUTO: 0.53 K/UL
MONOCYTES NFR BLD AUTO: 17 %
NEUTROPHILS # BLD AUTO: 1.88 K/UL
NEUTROPHILS NFR BLD AUTO: 60.2 %
PLATELET # BLD AUTO: NORMAL K/UL
POTASSIUM SERPL-SCNC: 3.6 MMOL/L
PROT SERPL-MCNC: 7.5 G/DL
PT BLD: 14.9 SEC
RBC # BLD: 3.89 M/UL
RBC # FLD: 19.1 %
SODIUM SERPL-SCNC: 141 MMOL/L
WBC # FLD AUTO: 3.12 K/UL

## 2024-04-29 NOTE — ASSESSMENT
[FreeTextEntry1] : recent admission for symptomatic anemia, not requiring transfusion  # HCC screening: MRI 2/14/24 -  cirr, 2 lesions - cR lobe 11mm and L lobe 12 mm, both LR3. Lesion surveillance - MRI 3 mos DUE  MAY  # Portal HTN resolution of pleural effusion on imaging 3/2024 s/p LVP 10./2023 (1.5L) s/p Thoracentesis 10/23 (2L) Diuretics held during hosp 3/4 and per wife only started eplerone 3/14, per wife, pt not taking lasix - recommeded restart lasix resolve gynecomastia off aldactone - EV 6/22/23, G1 EV- cont metoprolol (coreg r/t bp fluctuations) - HE, 2-3 BM daily, cont Rifax  # Labs reviewed rising LFTs (TB 1.8 (1.4)  AST 77 (36)  ALT 37 (18) encouraged etoh abstinence  # ETOH denies drinking, encouraged continue not to drink, had a vodka at a birthday celebration, one drink rising LFTs, reinforced importance of abstinence   # CAD S/p CAPG (OP-CAB x 4) 9/14/23 frequent anemia, hg 9.8,  has been off ac therapy, per wife has been taking plavix cont f/u with cardiologist Dr. Box and surgeon, Dr. Hinton. htn, needs better control, defer to Dr. Box for med adjustment  LABS RTC 4 weeks.

## 2024-04-29 NOTE — PHYSICAL EXAM
[General Appearance - In No Acute Distress] : in no acute distress [General Appearance - Alert] : alert [] : no respiratory distress [Auscultation Breath Sounds / Voice Sounds] : lungs were clear to auscultation bilaterally [Scleral Icterus] : No Scleral Icterus [Spider Angioma] : No spider angioma(s) were observed [Asterixis] : no asterixis observed [Jaundice] : No jaundice

## 2024-04-29 NOTE — END OF VISIT
[FreeTextEntry3] : I have seen and examined patient at bedside. I agree with hx, ROS, physical examination, imp/suggestions as written by Ms. Kayce Hong NP. Please see my note.

## 2024-04-29 NOTE — HISTORY OF PRESENT ILLNESS
[de-identified] : 63 yo [place] ([facility]) M former smoker w hx HTN, HLD, 3V CAD, preDM, gout, TIA (15 yrs ago), gastric ulcer, rectal bleed, LIZ and alcoholic liver disease c/b cirrhosis and portal htn m/b EV (s/p UGIB), splenomegaly, recurrent pleural effusions requiring LVP (last 10/23), pericardial eff requiring thoracentesis (10/23 2L)and recently discovered R lobe (11mm) LR 3 lesion and L lobe (12mm) LR 3 lesion, being surveilled. Pt declined liver txp evaluation.  Recent 1 week admission discharge 3/9 for symptomatic anemia, orthostatic hypotension (diuretics held) Hgb on admission near baseline, ~8 and has remained stable. No e/o clinical bleeding CT A/P done with e/o circumferential rectal thickening. Flex sig (3/7/24)notable for non-bleeding hemorrhoids. held coreg, and changed to Metoprolol  EV, on coreg (currently held, started metoprolol 3/7) Pleural/Pericardial effusion, last LVP and Thoracentesis 10/26, Lasix 80mg AM and 40mg @ 2pm and spironolactone 50mg daily. (diuretics currently held)  Abstinent since 2023  accompanied by wife  BACKGROUND 2023 - Sent to [facility] ED from PCP office with c/o intermittent CP x 2 weeks. EKG noted to have ST depressions and troponin 0.27. Cardiac cath on 23 revealed distal LM stenosis and 3 vessel CAD Given hx s/f black stools and anemia (Hg7.7) further w/u revealed Gr 3 EV s/p incomplete banding and elevated LFTs (TB 2.2, ).. He was discharged home on 3/3.  Initially referred by cardiologist Dr. Box for GI/hep f/u given EVB and GIB and need for repeat EGD (performed  cw EV s/p 3 bands placed).  Subsequently referred for liver transplant evaluation by Dr. Lawson, though pt declined undergoing liver transplant evaluation.  May, 2023 - [facility] hosp r/t staph sepsis, treatment with prednisolone. Since then, he has been on metformin and insulin for hyperglycemia. Underwent EGD while in the hospital (EV with hx of severe anemia) - small columns of esophageal varix was seen and PHG.  Pt underwent OP- CAB x 4 on 23. Readmitted from office on  for large L pleural effusion s/p pigtail drainage (~4L), discharged . Pt with recurrent large left pleural effusion so furosemide 40mg @ 2pm added on 10/11/23.  Admission [facility] in 2023 for hematemesis x2  AM. S/p EGD on  showed duodenitis, nonbleeding varices, healing PUD, and PHG. Protonix was switched to PO. 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  STUDIES  CTAP 3/4/24 1. No diverticulitis. Aside from a redundant sigmoid colon, left lower quadrant is grossly unremarkable. 2. Prominent rectal wall with circumferential thickening; possibly due to incomplete distention, however, a pathologic etiology cannot be completely excluded; clinical correlation. 3. Resolution of left pleural effusion. 4. Near complete resolution of previously noted ascites. 5. Mild nodularity of hepatic contour, compatible with cirrhotic change 6. Splenomegaly. 7. Nonspecific mild stranding noted in the fat surrounding the proximal inferior mesenteric artery; cannot exclude focal mild vasculitis. No occlusion. Possible moderate ostial stenosis.  MRI 24: 1. Mild hepatic nodularity, compatible with cirrhotic change. 2. The previously noted hypervascular lesions on CT 10/31/2023, are not visualized as discrete lesions on this exam. They may be focal vascular malformations/flash hemangiomas. They do not appear to be foci of neoplasm. 3. 5 mm arterially enhancing focus in the superior aspect of the right hepatic lobe, as described above; not visualized on the previous CT. LR-3.. Recommend one-year interval follow-up with MRI.  EGD 24 Normal duodenum. -One column of esophageal varix mostly in distal esophagus. One band was placed. -The lesion seen in previous EGD in pre-pyloric area is still visible with features more suggestive of hyperplastic tissue. Four biopsies were taken. This measures approximately 15 mm. (Biopsy). -Biopsy of stomach antrum and body were taken.. PATH Neg for intestinal metaplasia and H. pylori, with + ulceration and granulation tissue in the peripyloric area Addendum Block 1A; deeper levels were reviewed and in addition shows ulceration and granulation tissue formation.  COLON 23 (good prep) polyps removed - 1 10mm in transverse, 1 5 mm in descending, 2 rectal IH multiple nonbleeding colonic Angio ectasias  Cardiac Cath 23 [facility]: -50% distal LM -60% mid LAD, 80% distal LAD -100%  CFX after OM1 -95% mid RCA -100%  RPDA -grade 3 collaterals supplying distal CFX -severe inferolateral hypokinesis -basal inferolateral hypokinesis  MEDICATIONS carvedilol 3.125mg bid (recently lowered s/t hematem) lasix 40 mg daily spironaldactone 25mg daily rifaximin 550m bid pantoprazole 40mg daily metormin 500mg bid gabapentin 100mg q 8 hrs  INTERIM HX denies abdl pain, NVD, CP, SOB, f/c appetite good BM 2x- 3x/day, no rectal blood fluid ok, denies TERE or worsening ascites (once, 10/2023) - has not been on diuretics since 3/4

## 2024-05-06 ENCOUNTER — OUTPATIENT (OUTPATIENT)
Dept: OUTPATIENT SERVICES | Facility: HOSPITAL | Age: 64
LOS: 1 days | End: 2024-05-06
Payer: MEDICARE

## 2024-05-06 ENCOUNTER — APPOINTMENT (OUTPATIENT)
Age: 64
End: 2024-05-06

## 2024-05-06 PROCEDURE — 74183 MRI ABD W/O CNTR FLWD CNTR: CPT | Mod: 26,MH

## 2024-05-06 PROCEDURE — A9585: CPT

## 2024-05-06 PROCEDURE — 74183 MRI ABD W/O CNTR FLWD CNTR: CPT

## 2024-05-09 ENCOUNTER — APPOINTMENT (OUTPATIENT)
Dept: HEPATOLOGY | Facility: CLINIC | Age: 64
End: 2024-05-09
Payer: MEDICARE

## 2024-05-09 VITALS
HEART RATE: 83 BPM | WEIGHT: 211 LBS | RESPIRATION RATE: 16 BRPM | HEIGHT: 68 IN | DIASTOLIC BLOOD PRESSURE: 92 MMHG | SYSTOLIC BLOOD PRESSURE: 153 MMHG | OXYGEN SATURATION: 97 % | TEMPERATURE: 97.9 F | BODY MASS INDEX: 31.98 KG/M2

## 2024-05-09 DIAGNOSIS — K76.9 LIVER DISEASE, UNSPECIFIED: ICD-10-CM

## 2024-05-09 DIAGNOSIS — K25.4 CHRONIC OR UNSPECIFIED GASTRIC ULCER WITH HEMORRHAGE: ICD-10-CM

## 2024-05-09 LAB
ALBUMIN SERPL ELPH-MCNC: 3.7 G/DL
ALP BLD-CCNC: 251 U/L
ALT SERPL-CCNC: 34 U/L
ANION GAP SERPL CALC-SCNC: 14 MMOL/L
AST SERPL-CCNC: 61 U/L
BASOPHILS # BLD AUTO: 0.06 K/UL
BASOPHILS NFR BLD AUTO: 1.3 %
BILIRUB SERPL-MCNC: 1.3 MG/DL
BUN SERPL-MCNC: 14 MG/DL
CALCIUM SERPL-MCNC: 9 MG/DL
CHLORIDE SERPL-SCNC: 101 MMOL/L
CO2 SERPL-SCNC: 26 MMOL/L
CREAT SERPL-MCNC: 0.96 MG/DL
EGFR: 88 ML/MIN/1.73M2
EOSINOPHIL # BLD AUTO: 0.09 K/UL
EOSINOPHIL NFR BLD AUTO: 1.9 %
GLUCOSE SERPL-MCNC: 106 MG/DL
HCT VFR BLD CALC: 33.3 %
HGB BLD-MCNC: 9.9 G/DL
IMM GRANULOCYTES NFR BLD AUTO: 0.2 %
INR PPP: 1.24 RATIO
LYMPHOCYTES # BLD AUTO: 0.96 K/UL
LYMPHOCYTES NFR BLD AUTO: 20 %
MAN DIFF?: NORMAL
MCHC RBC-ENTMCNC: 25.1 PG
MCHC RBC-ENTMCNC: 29.7 GM/DL
MCV RBC AUTO: 84.5 FL
MONOCYTES # BLD AUTO: 0.58 K/UL
MONOCYTES NFR BLD AUTO: 12.1 %
NEUTROPHILS # BLD AUTO: 3.1 K/UL
NEUTROPHILS NFR BLD AUTO: 64.5 %
PLATELET # BLD AUTO: 103 K/UL
POTASSIUM SERPL-SCNC: 4 MMOL/L
PROT SERPL-MCNC: 7.3 G/DL
PT BLD: 13.9 SEC
RBC # BLD: 3.94 M/UL
RBC # FLD: 19.9 %
SODIUM SERPL-SCNC: 141 MMOL/L
WBC # FLD AUTO: 4.8 K/UL

## 2024-05-09 PROCEDURE — 99214 OFFICE O/P EST MOD 30 MIN: CPT

## 2024-05-09 NOTE — HISTORY OF PRESENT ILLNESS
[de-identified] : Problem list Cirrhosis related to alcohol use disorder Portal hypertension esophageal varices status post bleeding which required banding Coronary artery disease status post CABG Antral ulcer with negative biopsy for malignancy Right-sided pleural effusion and ascites responded to diuretics History of GI bleeding possibly from ulcer in the context of Plavix Allergy to aspirin Type 2 diabetes but now off the medicine after prednisone was stopped Staph septicemia which required hospitalization in 2023  Patient is here for follow-up No new complaint No melena No nausea vomiting or diarrhea Breathing is better he is taking his medicine Gynecomastia has resolved after the change the spironolactone to Inspra He is still off Plavix Review of systems otherwise unremarkable  Vital signs are stable No scleral icterus Clear lungs with no crackle or rhonchi Soft abdomen with no ascites Minimal edema of extremities No asterixis or gross evidence of encephalopathy

## 2024-05-09 NOTE — ASSESSMENT
[FreeTextEntry1] : Cirrhosis due to alcohol use disorder He drinks once in a while and I emphasized on the importance of avoiding alcohol altogether Ascites and pleural effusion have improved significantly I encouraged him to follow-up with cardiology to decide on the antiplatelet therapy No contraindication from hepatology point of view to start Plavix, however, he will be at risk of oozing due to the gastric lesion He is referred for EGD EUS by Dr. Jha  MRI of liver to follow up on liver mass

## 2024-05-10 ENCOUNTER — NON-APPOINTMENT (OUTPATIENT)
Age: 64
End: 2024-05-10

## 2024-05-12 ENCOUNTER — NON-APPOINTMENT (OUTPATIENT)
Age: 64
End: 2024-05-12

## 2024-06-11 ENCOUNTER — APPOINTMENT (OUTPATIENT)
Dept: HEART AND VASCULAR | Facility: CLINIC | Age: 64
End: 2024-06-11
Payer: MEDICARE

## 2024-06-11 VITALS
HEART RATE: 61 BPM | HEIGHT: 68 IN | DIASTOLIC BLOOD PRESSURE: 95 MMHG | WEIGHT: 206 LBS | BODY MASS INDEX: 31.22 KG/M2 | SYSTOLIC BLOOD PRESSURE: 160 MMHG

## 2024-06-11 DIAGNOSIS — Z95.1 PRESENCE OF AORTOCORONARY BYPASS GRAFT: ICD-10-CM

## 2024-06-11 DIAGNOSIS — R01.1 CARDIAC MURMUR, UNSPECIFIED: ICD-10-CM

## 2024-06-11 DIAGNOSIS — E11.9 TYPE 2 DIABETES MELLITUS W/OUT COMPLICATIONS: ICD-10-CM

## 2024-06-11 DIAGNOSIS — R09.89 OTHER SPECIFIED SYMPTOMS AND SIGNS INVOLVING THE CIRCULATORY AND RESPIRATORY SYSTEMS: ICD-10-CM

## 2024-06-11 PROCEDURE — G2211 COMPLEX E/M VISIT ADD ON: CPT

## 2024-06-11 PROCEDURE — 93880 EXTRACRANIAL BILAT STUDY: CPT

## 2024-06-11 PROCEDURE — 93000 ELECTROCARDIOGRAM COMPLETE: CPT

## 2024-06-11 PROCEDURE — 99214 OFFICE O/P EST MOD 30 MIN: CPT

## 2024-06-11 PROCEDURE — 93306 TTE W/DOPPLER COMPLETE: CPT

## 2024-06-11 NOTE — DISCUSSION/SUMMARY
[Coronary Artery Disease] : coronary artery disease [Stable] : stable [None] : There are no changes in medication management [Dietary Modification] : dietary modification [Patient] : discussed with the patient [de-identified] : S/P CABG x 4. [FreeTextEntry1] : Carotid artery disease-Stable; no further intervention at this time. Type 2 diabetes - Blood work reviewed with patient. Maintain a low caloric, low sodium, low cholesterol diet. Dietary counseling given, diet and exercise discussed in detail.

## 2024-06-11 NOTE — PHYSICAL EXAM
[Well Developed] : well developed [Well Nourished] : well nourished [No Acute Distress] : no acute distress [Normal Conjunctiva] : normal conjunctiva [Normal Venous Pressure] : normal venous pressure [Normal S1, S2] : normal S1, S2 [No Rub] : no rub [No Gallop] : no gallop [Murmur] : murmur [Clear Lung Fields] : clear lung fields [Good Air Entry] : good air entry [No Respiratory Distress] : no respiratory distress  [Soft] : abdomen soft [Non Tender] : non-tender [No Masses/organomegaly] : no masses/organomegaly [Normal Bowel Sounds] : normal bowel sounds [Normal Gait] : normal gait [No Edema] : no edema [No Cyanosis] : no cyanosis [No Clubbing] : no clubbing [No Varicosities] : no varicosities [No Rash] : no rash [No Skin Lesions] : no skin lesions [Moves all extremities] : moves all extremities [No Focal Deficits] : no focal deficits [Normal Speech] : normal speech [Alert and Oriented] : alert and oriented [Normal memory] : normal memory [Carotid Bruit] : carotid bruit [de-identified] : R [de-identified] : 2/6 JT --> Axilla

## 2024-06-11 NOTE — END OF VISIT
[FreeTextEntry3] : All medical record entries made by the Scribe were at my Dr. Drew Box, direction and personally dictated by me on -Jun 11, 2024, 11:00AM- I have reviewed the chart and agree that the record accurately reflects my personal performance of the history, physical exam, assessment and plan. I have also personally directed, reviewed and agreed with the chart. [Time Spent: ___ minutes] : I have spent [unfilled] minutes of time on the encounter.

## 2024-06-11 NOTE — HISTORY OF PRESENT ILLNESS
[FreeTextEntry1] : Denies Chest Pain, SOB, Dizziness, Leg edema, Orthopnea, PND, Palpitations, Syncope, WARD, Diaphoresis. Patient denies change in appetite, fatigue, heat or cold intolerance, myalgias, polydipsia, polyphagia, polyuria, tingling, numbness.

## 2024-06-17 RX ORDER — RIFAXIMIN 550 MG/1
550 TABLET ORAL
Qty: 60 | Refills: 11 | Status: ACTIVE | COMMUNITY
Start: 2024-05-09

## 2024-06-24 ENCOUNTER — APPOINTMENT (OUTPATIENT)
Dept: HEPATOLOGY | Facility: CLINIC | Age: 64
End: 2024-06-24
Payer: MEDICARE

## 2024-06-24 VITALS
RESPIRATION RATE: 16 BRPM | TEMPERATURE: 97.3 F | OXYGEN SATURATION: 99 % | SYSTOLIC BLOOD PRESSURE: 174 MMHG | WEIGHT: 210 LBS | DIASTOLIC BLOOD PRESSURE: 98 MMHG | HEIGHT: 68 IN | HEART RATE: 66 BPM | BODY MASS INDEX: 31.83 KG/M2

## 2024-06-24 DIAGNOSIS — R18.8 OTHER ASCITES: ICD-10-CM

## 2024-06-24 DIAGNOSIS — G93.40 ENCEPHALOPATHY, UNSPECIFIED: ICD-10-CM

## 2024-06-24 DIAGNOSIS — I25.10 ATHEROSCLEROTIC HEART DISEASE OF NATIVE CORONARY ARTERY W/OUT ANGINA PECTORIS: ICD-10-CM

## 2024-06-24 DIAGNOSIS — K27.9 PEPTIC ULCER, SITE UNSPECIFIED, UNSPECIFIED AS ACUTE OR CHRONIC, W/OUT HEMORRHAGE OR PERFORATION: ICD-10-CM

## 2024-06-24 DIAGNOSIS — I85.00 ESOPHAGEAL VARICES W/OUT BLEEDING: ICD-10-CM

## 2024-06-24 DIAGNOSIS — K74.60 UNSPECIFIED CIRRHOSIS OF LIVER: ICD-10-CM

## 2024-06-24 PROCEDURE — 99214 OFFICE O/P EST MOD 30 MIN: CPT

## 2024-06-24 RX ORDER — EPLERENONE 25 MG/1
25 TABLET, COATED ORAL DAILY
Qty: 30 | Refills: 5 | Status: ACTIVE | COMMUNITY
Start: 2024-03-14 | End: 1900-01-01

## 2024-06-24 RX ORDER — PANTOPRAZOLE 40 MG/1
40 TABLET, DELAYED RELEASE ORAL DAILY
Qty: 90 | Refills: 3 | Status: ACTIVE | COMMUNITY
Start: 2023-12-13 | End: 1900-01-01

## 2024-06-24 RX ORDER — FUROSEMIDE 40 MG/1
40 TABLET ORAL DAILY
Qty: 90 | Refills: 3 | Status: ACTIVE | COMMUNITY
Start: 1900-01-01 | End: 1900-01-01

## 2024-07-18 ENCOUNTER — RESULT REVIEW (OUTPATIENT)
Age: 64
End: 2024-07-18

## 2024-07-18 ENCOUNTER — APPOINTMENT (OUTPATIENT)
Dept: GASTROENTEROLOGY | Facility: CLINIC | Age: 64
End: 2024-07-18
Payer: MEDICARE

## 2024-07-18 PROCEDURE — 43239 EGD BIOPSY SINGLE/MULTIPLE: CPT | Mod: 59

## 2024-07-18 PROCEDURE — 43237 ENDOSCOPIC US EXAM ESOPH: CPT

## 2024-07-23 ENCOUNTER — APPOINTMENT (OUTPATIENT)
Dept: HEART AND VASCULAR | Facility: CLINIC | Age: 64
End: 2024-07-23

## 2024-07-29 ENCOUNTER — APPOINTMENT (OUTPATIENT)
Dept: HEART AND VASCULAR | Facility: CLINIC | Age: 64
End: 2024-07-29

## 2024-07-29 RX ORDER — METOPROLOL SUCCINATE 50 MG/1
50 TABLET, EXTENDED RELEASE ORAL
Qty: 90 | Refills: 3 | Status: ACTIVE | COMMUNITY
Start: 2024-07-29 | End: 1900-01-01

## 2024-10-10 ENCOUNTER — APPOINTMENT (OUTPATIENT)
Dept: HEPATOLOGY | Facility: CLINIC | Age: 64
End: 2024-10-10
Payer: MEDICARE

## 2024-10-10 VITALS
TEMPERATURE: 98.5 F | OXYGEN SATURATION: 100 % | RESPIRATION RATE: 16 BRPM | SYSTOLIC BLOOD PRESSURE: 163 MMHG | HEIGHT: 68 IN | BODY MASS INDEX: 31.83 KG/M2 | HEART RATE: 58 BPM | WEIGHT: 210 LBS | DIASTOLIC BLOOD PRESSURE: 99 MMHG

## 2024-10-10 DIAGNOSIS — K76.6 PORTAL HYPERTENSION: ICD-10-CM

## 2024-10-10 DIAGNOSIS — K74.60 UNSPECIFIED CIRRHOSIS OF LIVER: ICD-10-CM

## 2024-10-10 DIAGNOSIS — R18.8 OTHER ASCITES: ICD-10-CM

## 2024-10-10 DIAGNOSIS — K25.4 CHRONIC OR UNSPECIFIED GASTRIC ULCER WITH HEMORRHAGE: ICD-10-CM

## 2024-10-10 DIAGNOSIS — I85.00 ESOPHAGEAL VARICES W/OUT BLEEDING: ICD-10-CM

## 2024-10-10 DIAGNOSIS — G93.40 ENCEPHALOPATHY, UNSPECIFIED: ICD-10-CM

## 2024-10-10 PROCEDURE — 99214 OFFICE O/P EST MOD 30 MIN: CPT

## 2024-10-17 ENCOUNTER — NON-APPOINTMENT (OUTPATIENT)
Age: 64
End: 2024-10-17

## 2024-10-17 LAB
ACARBOXYPROTHROMBIN SERPL-MCNC: 5.8 NG/ML
ALBUMIN SERPL ELPH-MCNC: 3.6 G/DL
ALP BLD-CCNC: 269 U/L
ALPHA-1-FETOPROTEIN-L3: 10.9 %
ALPHA-1-FETOPROTEIN: 2.8 NG/ML
ALT SERPL-CCNC: 60 U/L
ANION GAP SERPL CALC-SCNC: 13 MMOL/L
APTT BLD: 40.6 SEC
AST SERPL-CCNC: 115 U/L
BILIRUB SERPL-MCNC: 3.6 MG/DL
BUN SERPL-MCNC: 11 MG/DL
CALCIUM SERPL-MCNC: 9.2 MG/DL
CHLORIDE SERPL-SCNC: 102 MMOL/L
CO2 SERPL-SCNC: 23 MMOL/L
CREAT SERPL-MCNC: 0.99 MG/DL
EGFR: 85 ML/MIN/1.73M2
ESTIMATED AVERAGE GLUCOSE: 117 MG/DL
GLUCOSE SERPL-MCNC: 115 MG/DL
HBA1C MFR BLD HPLC: 5.7 %
HCT VFR BLD CALC: 44.1 %
HGB BLD-MCNC: 14.3 G/DL
INR PPP: 1.44 RATIO
IRON SATN MFR SERPL: 19 %
IRON SERPL-MCNC: 59 UG/DL
MCHC RBC-ENTMCNC: 30.5 PG
MCHC RBC-ENTMCNC: 32.4 GM/DL
MCV RBC AUTO: 94 FL
PLATELET # BLD AUTO: 81 K/UL
POTASSIUM SERPL-SCNC: 3.9 MMOL/L
PROT SERPL-MCNC: 7.6 G/DL
PT BLD: 16.9 SEC
RBC # BLD: 4.69 M/UL
RBC # FLD: 19 %
SODIUM SERPL-SCNC: 139 MMOL/L
TIBC SERPL-MCNC: 309 UG/DL
UIBC SERPL-MCNC: 250 UG/DL
WBC # FLD AUTO: 4.54 K/UL

## 2024-11-03 NOTE — ED ADULT TRIAGE NOTE - BSA (M2)
Patient with Hypercapnic Respiratory failure which is Acute on chronic. Unclear if on O2 at the NH. Supplemental oxygen was provided and noted-     Vent Mode: A/C  Oxygen Concentration (%):  [21] 21  Resp Rate Total:  [9.8 br/min-23 br/min] 15 br/min  Vt Set:  [350 mL] 350 mL  PEEP/CPAP:  [5 cmH20] 5 cmH20  Mean Airway Pressure:  [7.1 cmH20-9.8 cmH20] 9.4 cmH20    Harrisonejosé miguel prn  Sedation: Precedex, Fentanyl- Weaning    10/30 S/p trach.      - Not much progress thus far with ventilator weaning  - Unclear if he will be permanently ventilator dependent  - Significant bowel issues persist  - Wound care  - Supportive care  - LTAC pending   1.95

## 2024-11-27 NOTE — ED ADULT NURSE NOTE - NSFALLRISKASMTTYPE_ED_ALL_ED
Subjective   Patient ID: Armando Blake is a 9 y.o. male otherwise healthy who presents for Rash (Rash on left leg ).  He is accompanied today by his mother.    HPI:  Armando presents with a rash on his left inner thigh which has been present the past several weeks.  He tried OTC hydrocortisone which decreased the raised outer rim of the lesion.  He denies itching.    He played football this past fall and the area may have been irritated by the pads.              Review of Systems   Constitutional:  Negative for appetite change, fatigue and fever.   HENT:  Negative for congestion, ear pain, rhinorrhea and sore throat.    Eyes:  Negative for discharge and redness.   Respiratory:  Negative for cough, chest tightness and shortness of breath.    Gastrointestinal:  Negative for abdominal pain, diarrhea, nausea and vomiting.   Skin:  Positive for rash.   Neurological:  Negative for headaches.       Objective   Temp 36.9 °C (98.5 °F)   Wt (!) 55.9 kg   BSA: There is no height or weight on file to calculate BSA.  Growth percentiles: No height on file for this encounter. 98 %ile (Z= 2.04) based on CDC (Boys, 2-20 Years) weight-for-age data using data from 11/27/2024.     Physical Exam  Constitutional:       Appearance: Normal appearance.   Skin:     Comments: Left medial proximal thigh with scaly round macular lesion.    Neurological:      Mental Status: He is alert.         Assessment/Plan   Diagnoses and all orders for this visit:  Tinea corporis  -     clotrimazole (Lotrimin) 1 % cream; Apply topically 2 times a day for 28 days. Apply to affected area for 3 weeks.  Follow up if not improving.   
Initial (On Arrival)

## 2025-01-06 NOTE — PATIENT PROFILE ADULT - LIVING ENVIRONMENT
Care Transitions Program Week 4 Follow-up Call    Current Issues/Problems reviewed on call:     PC to [facility], he had left a VM stating he needed help getting some refills on his scripts. In the meantime, he called his PCP office and they filled them for him. He states the a fib is still \"kicking my butt.\" He is able to feel when he is in a fib. He feels shakey.  when in afib. He states his BP's are \"normal.\" He currently has a cold and is taking mucinex and a generic cold medicine. He checked with the pharmacist to make sure that it is not interfering with his warfarin. No s/s of bleeding. He has a cardiology apt on 1/8. He is off work this week.     Details of Interventions/Education completed on call:     Con't POC    Review of Systems:   Care Transition System Evaluation:      Fever: is not present   General Symptoms: Fatigue  Neurologic/Neuromuscular Symptoms: WDL (absence of symptoms)  ENT Symptoms: WDL (absence of symptoms)  Respiratory Symptoms: Shortness of breath; Cough  Shortness of Breath: SOB with moderate exercise  Cardiovascular Symptoms: Fatigue  GI Symptoms: WDL (absence of symptoms)         Symptoms: WDL (absence of symptoms)  Skin Symptoms: WDL (absence of symptoms)       The patient is taking all medications as prescribed. The patient did not have questions or concerns regarding the medications prescribed.        Plan for next follow-up call: close case 1/15/2025   no

## 2025-01-09 ENCOUNTER — APPOINTMENT (OUTPATIENT)
Dept: HEPATOLOGY | Facility: CLINIC | Age: 65
End: 2025-01-09

## 2025-01-14 NOTE — PATIENT PROFILE ADULT - NSFALLSECTIONLABEL_GEN_A_CORE
Cardiac Cath Lab Procedure Area Arrival Note:    Osito Ruff arrived to Cardiac Cath Lab, Procedure Area. Patient identifiers verified with NAME and DATE OF BIRTH. Procedure verified with patient. Consent forms verified. Allergies verified. Patient informed of procedure and plan of care. Questions answered with review. Patient voiced understanding of procedure and plan of care.    Patient on cardiac monitor, non-invasive blood pressure, SPO2 monitor. On room air.  IV of normal saline on pump at KVO. Patient status doing well without problems, however short of breath with activity and when lying flat (\"a little bit but I'll be alright.\"). Patient is A&Ox 4. Patient reports no pain.     Patient medicated during procedure with orders obtained and verified by Dr. Olmos.    Refer to patients Cardiac Cath Lab PROCEDURE REPORT for vital signs, assessment, status, and response during procedure, printed at end of case. Printed report on chart or scanned into chart.     .

## 2025-01-28 ENCOUNTER — APPOINTMENT (OUTPATIENT)
Dept: HEART AND VASCULAR | Facility: CLINIC | Age: 65
End: 2025-01-28

## 2025-02-03 ENCOUNTER — LABORATORY RESULT (OUTPATIENT)
Age: 65
End: 2025-02-03

## 2025-02-03 ENCOUNTER — NON-APPOINTMENT (OUTPATIENT)
Age: 65
End: 2025-02-03

## 2025-02-03 ENCOUNTER — APPOINTMENT (OUTPATIENT)
Dept: HEART AND VASCULAR | Facility: CLINIC | Age: 65
End: 2025-02-03
Payer: MEDICARE

## 2025-02-03 VITALS
WEIGHT: 213 LBS | DIASTOLIC BLOOD PRESSURE: 100 MMHG | SYSTOLIC BLOOD PRESSURE: 150 MMHG | RESPIRATION RATE: 14 BRPM | HEIGHT: 68 IN | HEART RATE: 66 BPM | BODY MASS INDEX: 32.28 KG/M2

## 2025-02-03 DIAGNOSIS — I25.10 ATHEROSCLEROTIC HEART DISEASE OF NATIVE CORONARY ARTERY W/OUT ANGINA PECTORIS: ICD-10-CM

## 2025-02-03 DIAGNOSIS — Z95.1 PRESENCE OF AORTOCORONARY BYPASS GRAFT: ICD-10-CM

## 2025-02-03 DIAGNOSIS — E11.9 TYPE 2 DIABETES MELLITUS W/OUT COMPLICATIONS: ICD-10-CM

## 2025-02-03 DIAGNOSIS — R01.1 CARDIAC MURMUR, UNSPECIFIED: ICD-10-CM

## 2025-02-03 PROCEDURE — G2211 COMPLEX E/M VISIT ADD ON: CPT

## 2025-02-03 PROCEDURE — 36415 COLL VENOUS BLD VENIPUNCTURE: CPT

## 2025-02-03 PROCEDURE — 99214 OFFICE O/P EST MOD 30 MIN: CPT

## 2025-02-03 PROCEDURE — 93000 ELECTROCARDIOGRAM COMPLETE: CPT

## 2025-02-03 RX ORDER — VALSARTAN 80 MG/1
80 TABLET, COATED ORAL DAILY
Qty: 90 | Refills: 3 | Status: ACTIVE | COMMUNITY
Start: 2025-02-03 | End: 1900-01-01

## 2025-02-04 LAB
25(OH)D3 SERPL-MCNC: 26.3 NG/ML
ALBUMIN SERPL ELPH-MCNC: 3.7 G/DL
ALP BLD-CCNC: 257 U/L
ALT SERPL-CCNC: 37 U/L
ANION GAP SERPL CALC-SCNC: 12 MMOL/L
AST SERPL-CCNC: 99 U/L
BASOPHILS # BLD AUTO: 0.03 K/UL
BASOPHILS NFR BLD AUTO: 0.6 %
BILIRUB SERPL-MCNC: 2.4 MG/DL
BUN SERPL-MCNC: 9 MG/DL
CALCIUM SERPL-MCNC: 9.1 MG/DL
CHLORIDE SERPL-SCNC: 98 MMOL/L
CHOLEST SERPL-MCNC: 284 MG/DL
CO2 SERPL-SCNC: 26 MMOL/L
CREAT SERPL-MCNC: 0.78 MG/DL
EGFR: 98 ML/MIN/1.73M2
EOSINOPHIL # BLD AUTO: 0.09 K/UL
EOSINOPHIL NFR BLD AUTO: 1.8 %
ESTIMATED AVERAGE GLUCOSE: 137 MG/DL
FOLATE SERPL-MCNC: 4.2 NG/ML
GLUCOSE SERPL-MCNC: 154 MG/DL
HBA1C MFR BLD HPLC: 6.4 %
HCT VFR BLD CALC: 38.2 %
HDLC SERPL-MCNC: 53 MG/DL
HGB BLD-MCNC: 13.7 G/DL
IMM GRANULOCYTES NFR BLD AUTO: 0.2 %
LDLC SERPL CALC-MCNC: 208 MG/DL
LYMPHOCYTES # BLD AUTO: 0.69 K/UL
LYMPHOCYTES NFR BLD AUTO: 14 %
MAN DIFF?: NORMAL
MCHC RBC-ENTMCNC: 34.6 PG
MCHC RBC-ENTMCNC: 35.9 G/DL
MCV RBC AUTO: 96.5 FL
MONOCYTES # BLD AUTO: 0.54 K/UL
MONOCYTES NFR BLD AUTO: 11 %
NEUTROPHILS # BLD AUTO: 3.57 K/UL
NEUTROPHILS NFR BLD AUTO: 72.4 %
NONHDLC SERPL-MCNC: 231 MG/DL
PLATELET # BLD AUTO: NORMAL K/UL
POTASSIUM SERPL-SCNC: 4.1 MMOL/L
PROT SERPL-MCNC: 7.4 G/DL
PSA FREE FLD-MCNC: 20 %
PSA FREE SERPL-MCNC: 0.15 NG/ML
PSA SERPL-MCNC: 0.71 NG/ML
RBC # BLD: 3.96 M/UL
RBC # FLD: 15.9 %
SODIUM SERPL-SCNC: 136 MMOL/L
T3 SERPL-MCNC: 83 NG/DL
T3FREE SERPL-MCNC: 2.14 PG/ML
T3RU NFR SERPL: 0.8 TBI
T4 FREE SERPL-MCNC: 1.5 NG/DL
T4 SERPL-MCNC: 8.5 UG/DL
TRIGL SERPL-MCNC: 123 MG/DL
TSH SERPL-ACNC: 1.81 UIU/ML
VIT B12 SERPL-MCNC: 930 PG/ML
WBC # FLD AUTO: 4.93 K/UL

## 2025-02-10 ENCOUNTER — APPOINTMENT (OUTPATIENT)
Dept: HEPATOLOGY | Facility: CLINIC | Age: 65
End: 2025-02-10

## 2025-02-10 ENCOUNTER — RESULT REVIEW (OUTPATIENT)
Age: 65
End: 2025-02-10

## 2025-02-10 VITALS
DIASTOLIC BLOOD PRESSURE: 112 MMHG | SYSTOLIC BLOOD PRESSURE: 166 MMHG | TEMPERATURE: 97.5 F | BODY MASS INDEX: 31.83 KG/M2 | WEIGHT: 210 LBS | OXYGEN SATURATION: 97 % | HEIGHT: 68 IN | HEART RATE: 80 BPM | RESPIRATION RATE: 14 BRPM

## 2025-02-10 DIAGNOSIS — K74.60 UNSPECIFIED CIRRHOSIS OF LIVER: ICD-10-CM

## 2025-02-10 DIAGNOSIS — R18.8 OTHER ASCITES: ICD-10-CM

## 2025-02-10 DIAGNOSIS — G93.40 ENCEPHALOPATHY, UNSPECIFIED: ICD-10-CM

## 2025-02-10 DIAGNOSIS — I85.00 ESOPHAGEAL VARICES W/OUT BLEEDING: ICD-10-CM

## 2025-02-10 PROCEDURE — G2211 COMPLEX E/M VISIT ADD ON: CPT

## 2025-02-10 PROCEDURE — 99214 OFFICE O/P EST MOD 30 MIN: CPT

## 2025-02-10 RX ORDER — LACTULOSE 10 G/15ML
10 SOLUTION ORAL 3 TIMES DAILY
Qty: 5 | Refills: 0 | Status: ACTIVE | COMMUNITY
Start: 2025-02-10 | End: 1900-01-01

## 2025-02-10 RX ORDER — FOLIC ACID 1 MG/1
1 TABLET ORAL
Qty: 90 | Refills: 3 | Status: ACTIVE | COMMUNITY
Start: 2025-02-10 | End: 1900-01-01

## 2025-02-12 ENCOUNTER — APPOINTMENT (OUTPATIENT)
Dept: MRI IMAGING | Facility: CLINIC | Age: 65
End: 2025-02-12
Payer: MEDICARE

## 2025-02-12 ENCOUNTER — OUTPATIENT (OUTPATIENT)
Dept: OUTPATIENT SERVICES | Facility: HOSPITAL | Age: 65
LOS: 1 days | End: 2025-02-12

## 2025-02-12 PROCEDURE — 72197 MRI PELVIS W/O & W/DYE: CPT | Mod: 26

## 2025-02-12 PROCEDURE — 74183 MRI ABD W/O CNTR FLWD CNTR: CPT | Mod: 26

## 2025-02-13 LAB
ALBUMIN SERPL ELPH-MCNC: 3.9 G/DL
ALP BLD-CCNC: 233 U/L
ALT SERPL-CCNC: 42 U/L
AMMONIA PLAS-MCNC: 51 UMOL/L
AST SERPL-CCNC: 102 U/L
BILIRUB DIRECT SERPL-MCNC: 1 MG/DL
BILIRUB INDIRECT SERPL-MCNC: 1.3 MG/DL
BILIRUB SERPL-MCNC: 2.4 MG/DL
HCT VFR BLD CALC: 46.5 %
HGB BLD-MCNC: 14.3 G/DL
INR PPP: 1.34 RATIO
MCHC RBC-ENTMCNC: 29.9 PG
MCHC RBC-ENTMCNC: 30.8 G/DL
MCV RBC AUTO: 97.3 FL
PLATELET # BLD AUTO: 88 K/UL
PROT SERPL-MCNC: 8.1 G/DL
PT BLD: 15.8 SEC
RBC # BLD: 4.78 M/UL
RBC # FLD: 16.9 %
WBC # FLD AUTO: 4.55 K/UL

## 2025-02-18 LAB
PETH 16:0/18:1: >400 NG/ML
PETH 16:0/18:2: >400 NG/ML
PETH COMMENTS: NORMAL

## 2025-02-19 LAB — ACARBOXYPROTHROMBIN SERPL-MCNC: 10.4 NG/ML

## 2025-02-20 LAB
ALPHA-1-FETOPROTEIN-L3: 8.2 %
ALPHA-1-FETOPROTEIN: 4.5 NG/ML

## 2025-03-07 RX ORDER — ROSUVASTATIN CALCIUM 5 MG/1
5 TABLET, FILM COATED ORAL
Qty: 90 | Refills: 1 | Status: ACTIVE | COMMUNITY
Start: 2025-03-07 | End: 1900-01-01

## 2025-03-28 ENCOUNTER — RX RENEWAL (OUTPATIENT)
Age: 65
End: 2025-03-28

## 2025-05-12 ENCOUNTER — APPOINTMENT (OUTPATIENT)
Dept: HEPATOLOGY | Facility: CLINIC | Age: 65
End: 2025-05-12
Payer: MEDICARE

## 2025-05-12 VITALS
RESPIRATION RATE: 16 BRPM | WEIGHT: 216 LBS | OXYGEN SATURATION: 99 % | DIASTOLIC BLOOD PRESSURE: 131 MMHG | TEMPERATURE: 96.4 F | HEIGHT: 68 IN | HEART RATE: 69 BPM | SYSTOLIC BLOOD PRESSURE: 184 MMHG | BODY MASS INDEX: 32.74 KG/M2

## 2025-05-12 VITALS
RESPIRATION RATE: 16 BRPM | HEIGHT: 68 IN | SYSTOLIC BLOOD PRESSURE: 184 MMHG | HEART RATE: 69 BPM | TEMPERATURE: 98.6 F | DIASTOLIC BLOOD PRESSURE: 131 MMHG | OXYGEN SATURATION: 99 %

## 2025-05-12 DIAGNOSIS — K70.30 ALCOHOLIC CIRRHOSIS OF LIVER W/OUT ASCITES: ICD-10-CM

## 2025-05-12 DIAGNOSIS — K74.60 UNSPECIFIED CIRRHOSIS OF LIVER: ICD-10-CM

## 2025-05-12 DIAGNOSIS — I85.00 ESOPHAGEAL VARICES W/OUT BLEEDING: ICD-10-CM

## 2025-05-12 DIAGNOSIS — K25.4 CHRONIC OR UNSPECIFIED GASTRIC ULCER WITH HEMORRHAGE: ICD-10-CM

## 2025-05-12 PROCEDURE — 99214 OFFICE O/P EST MOD 30 MIN: CPT

## 2025-05-22 RX ORDER — NALTREXONE HYDROCHLORIDE 50 MG/1
50 TABLET, FILM COATED ORAL TWICE DAILY
Qty: 30 | Refills: 5 | Status: ACTIVE | COMMUNITY
Start: 2025-05-12 | End: 1900-01-01

## 2025-05-23 ENCOUNTER — NON-APPOINTMENT (OUTPATIENT)
Age: 65
End: 2025-05-23

## 2025-05-23 LAB
AFP-TM SERPL-MCNC: 3.5 NG/ML
ALBUMIN SERPL ELPH-MCNC: 3.5 G/DL
ALP BLD-CCNC: 246 U/L
ALT SERPL-CCNC: 49 U/L
ANION GAP SERPL CALC-SCNC: 15 MMOL/L
AST SERPL-CCNC: 82 U/L
BILIRUB SERPL-MCNC: 1.8 MG/DL
BUN SERPL-MCNC: 9 MG/DL
CALCIUM SERPL-MCNC: 9 MG/DL
CHLORIDE SERPL-SCNC: 103 MMOL/L
CO2 SERPL-SCNC: 25 MMOL/L
CREAT SERPL-MCNC: 0.83 MG/DL
EGFRCR SERPLBLD CKD-EPI 2021: 97 ML/MIN/1.73M2
GLUCOSE SERPL-MCNC: 110 MG/DL
INR PPP: 1.39 RATIO
MAGNESIUM SERPL-MCNC: 1.6 MG/DL
POTASSIUM SERPL-SCNC: 3.9 MMOL/L
PROT SERPL-MCNC: 7.9 G/DL
PT BLD: 16.4 SEC
SODIUM SERPL-SCNC: 142 MMOL/L

## 2025-05-29 ENCOUNTER — APPOINTMENT (OUTPATIENT)
Dept: HEPATOLOGY | Facility: CLINIC | Age: 65
End: 2025-05-29
Payer: MEDICARE

## 2025-05-29 VITALS
HEIGHT: 68 IN | TEMPERATURE: 98.1 F | SYSTOLIC BLOOD PRESSURE: 141 MMHG | BODY MASS INDEX: 31.37 KG/M2 | DIASTOLIC BLOOD PRESSURE: 80 MMHG | RESPIRATION RATE: 17 BRPM | HEART RATE: 62 BPM | OXYGEN SATURATION: 99 % | WEIGHT: 207 LBS

## 2025-05-29 DIAGNOSIS — K76.6 PORTAL HYPERTENSION: ICD-10-CM

## 2025-05-29 DIAGNOSIS — K70.30 ALCOHOLIC CIRRHOSIS OF LIVER W/OUT ASCITES: ICD-10-CM

## 2025-05-29 DIAGNOSIS — I85.00 ESOPHAGEAL VARICES W/OUT BLEEDING: ICD-10-CM

## 2025-05-29 DIAGNOSIS — R18.8 OTHER ASCITES: ICD-10-CM

## 2025-05-29 PROCEDURE — 99214 OFFICE O/P EST MOD 30 MIN: CPT

## 2025-05-29 PROCEDURE — G2211 COMPLEX E/M VISIT ADD ON: CPT

## 2025-06-05 ENCOUNTER — OUTPATIENT (OUTPATIENT)
Dept: OUTPATIENT SERVICES | Facility: HOSPITAL | Age: 65
LOS: 1 days | Discharge: HOME | End: 2025-06-05
Payer: MEDICARE

## 2025-06-05 ENCOUNTER — APPOINTMENT (OUTPATIENT)
Dept: HEPATOLOGY | Facility: HOSPITAL | Age: 65
End: 2025-06-05

## 2025-06-05 ENCOUNTER — RESULT REVIEW (OUTPATIENT)
Age: 65
End: 2025-06-05

## 2025-06-05 VITALS
TEMPERATURE: 98 F | SYSTOLIC BLOOD PRESSURE: 190 MMHG | RESPIRATION RATE: 17 BRPM | OXYGEN SATURATION: 98 % | HEART RATE: 66 BPM | DIASTOLIC BLOOD PRESSURE: 101 MMHG | WEIGHT: 210.1 LBS | HEIGHT: 68 IN

## 2025-06-05 LAB
BASOPHILS # BLD AUTO: 0.07 K/UL
BASOPHILS NFR BLD AUTO: 1.2 %
EOSINOPHIL # BLD AUTO: 0.44 K/UL
EOSINOPHIL NFR BLD AUTO: 7.5 %
HCT VFR BLD CALC: 35.9 %
HGB BLD-MCNC: 12.2 G/DL
IMM GRANULOCYTES NFR BLD AUTO: 0.2 %
LYMPHOCYTES # BLD AUTO: 0.98 K/UL
LYMPHOCYTES NFR BLD AUTO: 16.6 %
MAN DIFF?: NORMAL
MCHC RBC-ENTMCNC: 33.3 PG
MCHC RBC-ENTMCNC: 34 G/DL
MCV RBC AUTO: 98.1 FL
MONOCYTES # BLD AUTO: 0.77 K/UL
MONOCYTES NFR BLD AUTO: 13.1 %
NEUTROPHILS # BLD AUTO: 3.63 K/UL
NEUTROPHILS NFR BLD AUTO: 61.4 %
PLATELET # BLD AUTO: 86 K/UL
RBC # BLD: 3.66 M/UL
RBC # FLD: 16.6 %
WBC # FLD AUTO: 5.9 K/UL

## 2025-06-05 PROCEDURE — 82962 GLUCOSE BLOOD TEST: CPT

## 2025-06-05 PROCEDURE — 43239 EGD BIOPSY SINGLE/MULTIPLE: CPT

## 2025-06-05 PROCEDURE — 88305 TISSUE EXAM BY PATHOLOGIST: CPT

## 2025-06-05 PROCEDURE — 88305 TISSUE EXAM BY PATHOLOGIST: CPT | Mod: 26

## 2025-09-02 ENCOUNTER — APPOINTMENT (OUTPATIENT)
Dept: HEART AND VASCULAR | Facility: CLINIC | Age: 65
End: 2025-09-02

## 2025-09-08 ENCOUNTER — APPOINTMENT (OUTPATIENT)
Dept: HEART AND VASCULAR | Facility: CLINIC | Age: 65
End: 2025-09-08
Payer: MEDICARE

## 2025-09-08 ENCOUNTER — LABORATORY RESULT (OUTPATIENT)
Age: 65
End: 2025-09-08

## 2025-09-08 ENCOUNTER — NON-APPOINTMENT (OUTPATIENT)
Age: 65
End: 2025-09-08

## 2025-09-08 VITALS
SYSTOLIC BLOOD PRESSURE: 159 MMHG | DIASTOLIC BLOOD PRESSURE: 109 MMHG | BODY MASS INDEX: 33.34 KG/M2 | HEIGHT: 68 IN | RESPIRATION RATE: 17 BRPM | HEART RATE: 64 BPM | WEIGHT: 220 LBS

## 2025-09-08 DIAGNOSIS — R01.1 CARDIAC MURMUR, UNSPECIFIED: ICD-10-CM

## 2025-09-08 DIAGNOSIS — I25.10 ATHEROSCLEROTIC HEART DISEASE OF NATIVE CORONARY ARTERY W/OUT ANGINA PECTORIS: ICD-10-CM

## 2025-09-08 DIAGNOSIS — I65.21 OCCLUSION AND STENOSIS OF RIGHT CAROTID ARTERY: ICD-10-CM

## 2025-09-08 DIAGNOSIS — E11.9 TYPE 2 DIABETES MELLITUS W/OUT COMPLICATIONS: ICD-10-CM

## 2025-09-08 DIAGNOSIS — Z95.1 PRESENCE OF AORTOCORONARY BYPASS GRAFT: ICD-10-CM

## 2025-09-08 PROCEDURE — 99214 OFFICE O/P EST MOD 30 MIN: CPT

## 2025-09-08 PROCEDURE — 93306 TTE W/DOPPLER COMPLETE: CPT

## 2025-09-08 PROCEDURE — 36415 COLL VENOUS BLD VENIPUNCTURE: CPT

## 2025-09-08 PROCEDURE — G2211 COMPLEX E/M VISIT ADD ON: CPT

## 2025-09-08 PROCEDURE — 93000 ELECTROCARDIOGRAM COMPLETE: CPT

## 2025-09-08 PROCEDURE — 93880 EXTRACRANIAL BILAT STUDY: CPT

## 2025-09-09 ENCOUNTER — RX RENEWAL (OUTPATIENT)
Age: 65
End: 2025-09-09

## 2025-09-10 LAB
25(OH)D3 SERPL-MCNC: 33.5 NG/ML
ALBUMIN SERPL ELPH-MCNC: 3.5 G/DL
ALP BLD-CCNC: 160 U/L
ALT SERPL-CCNC: 52 U/L
ANION GAP SERPL CALC-SCNC: 12 MMOL/L
AST SERPL-CCNC: 128 U/L
BASOPHILS # BLD AUTO: 0.02 K/UL
BASOPHILS NFR BLD AUTO: 0.5 %
BILIRUB SERPL-MCNC: 2.6 MG/DL
BUN SERPL-MCNC: 8 MG/DL
CALCIUM SERPL-MCNC: 9.3 MG/DL
CHLORIDE SERPL-SCNC: 105 MMOL/L
CHOLEST SERPL-MCNC: 301 MG/DL
CO2 SERPL-SCNC: 23 MMOL/L
CREAT SERPL-MCNC: 0.85 MG/DL
EGFRCR SERPLBLD CKD-EPI 2021: 96 ML/MIN/1.73M2
EOSINOPHIL # BLD AUTO: 0.06 K/UL
EOSINOPHIL NFR BLD AUTO: 1.5 %
ESTIMATED AVERAGE GLUCOSE: 134 MG/DL
FOLATE SERPL-MCNC: 2.9 NG/ML
GLUCOSE SERPL-MCNC: 136 MG/DL
HBA1C MFR BLD HPLC: 6.3 %
HCT VFR BLD CALC: 36.3 %
HDLC SERPL-MCNC: 50 MG/DL
HGB BLD-MCNC: 11.8 G/DL
IMM GRANULOCYTES NFR BLD AUTO: 0.2 %
LDLC SERPL-MCNC: 226 MG/DL
LYMPHOCYTES # BLD AUTO: 0.75 K/UL
LYMPHOCYTES NFR BLD AUTO: 18.4 %
MAN DIFF?: NORMAL
MCHC RBC-ENTMCNC: 32 PG
MCHC RBC-ENTMCNC: 32.5 G/DL
MCV RBC AUTO: 98.4 FL
MONOCYTES # BLD AUTO: 0.68 K/UL
MONOCYTES NFR BLD AUTO: 16.7 %
NEUTROPHILS # BLD AUTO: 2.55 K/UL
NEUTROPHILS NFR BLD AUTO: 62.7 %
NONHDLC SERPL-MCNC: 252 MG/DL
PLATELET # BLD AUTO: NORMAL K/UL
POTASSIUM SERPL-SCNC: 4.9 MMOL/L
PROT SERPL-MCNC: 7.7 G/DL
PSA FREE FLD-MCNC: 40 %
PSA FREE SERPL-MCNC: 0.18 NG/ML
PSA SERPL-MCNC: 0.46 NG/ML
RBC # BLD: 3.69 M/UL
RBC # FLD: 17 %
SODIUM SERPL-SCNC: 141 MMOL/L
T3 SERPL-MCNC: 71 NG/DL
T3FREE SERPL-MCNC: 1.93 PG/ML
T3RU NFR SERPL: 0.7 TBI
T4 FREE SERPL-MCNC: 1.5 NG/DL
T4 SERPL-MCNC: 8 UG/DL
TRIGL SERPL-MCNC: 142 MG/DL
TSH SERPL-ACNC: 1.53 UIU/ML
VIT B12 SERPL-MCNC: 935 PG/ML
WBC # FLD AUTO: 4.07 K/UL

## (undated) DEVICE — PACK PROCEDURE HARVEST SMARTPREP APC-60

## (undated) DEVICE — SUT VICRYL 2-0 27" CT-1

## (undated) DEVICE — KIT APPLICATOR SPRAY FOR HARVEST SYSTEM

## (undated) DEVICE — FORCEP RADIAL JAW 4 W NDL 2.2MM 2.8MM 240CM ORANGE DISP

## (undated) DEVICE — LAP PAD 18 X 18"

## (undated) DEVICE — ELCTR STRYKER NEPTUNE SMOKE EVACUATION PENCIL (GREEN)

## (undated) DEVICE — PUNCH VASC STD 7.75" HANDLE 4.8MM DISP

## (undated) DEVICE — SUMP INTRACARDIAC/PERICARDIAL 20FR 1/4" ADULT

## (undated) DEVICE — GAUZE SPONGE 12PLY DMT MT 8X4

## (undated) DEVICE — SYNOVIS VASCULAR PROBE 1.5MM 15CM

## (undated) DEVICE — CATH NG SALEM SUMP 16FR

## (undated) DEVICE — POSITIONER FOAM EGG CRATE ULNAR 2PCS (PINK)

## (undated) DEVICE — PACK OPEN HEART LNX

## (undated) DEVICE — DRSG DERMABOND 0.7ML

## (undated) DEVICE — DRAPE IOBAN 33" X 23"

## (undated) DEVICE — PACK PROC CV DRAPE

## (undated) DEVICE — ACROBAT I-POSITIONER

## (undated) DEVICE — SUT VICRYL PLUS 0 27" CT

## (undated) DEVICE — DRAPE PROBE COVER LATEX FREE 3X96"

## (undated) DEVICE — BLADE SCALPEL SAFETY #10 WITH PLASTIC GREEN HANDLE

## (undated) DEVICE — SUCTION CATH ARGYLE WHISTLE TIP 14FR STRAIGHT PACKED

## (undated) DEVICE — CHEST DRAIN PLEUR-EVAC DRY/WET ADULT-PEDS SINGLE (QUICK)

## (undated) DEVICE — Device

## (undated) DEVICE — GLV 7.5 PROTEXIS (WHITE)

## (undated) DEVICE — SUT ETHIBOND 4-0 12-18"

## (undated) DEVICE — DRSG TRACH DRAINAGE 4X4

## (undated) DEVICE — CATH IV SAFE BC 24G X 0.75" (YELLOW)

## (undated) DEVICE — DRAIN RESERVOIR FOR JACKSON PRATT 100CC CARDINAL

## (undated) DEVICE — GETINGE VASOVIEW 7 ENDOSCOPIC VESSEL HARVESTING SYSTEM

## (undated) DEVICE — SUT VICRYL 1 36" CTX UNDYED

## (undated) DEVICE — GLV 7 PROTEXIS (WHITE)

## (undated) DEVICE — ACROBAT I-STABILIZER

## (undated) DEVICE — DRSG ACE BANDAGE 6" LF STERILE

## (undated) DEVICE — VASOVIEW HEMOPRO ENDO SYSTEM

## (undated) DEVICE — ELCTR REM POLYHESIVE ADULT PT RETURN 15FT

## (undated) DEVICE — GLV 6 PROTEXIS (WHITE)

## (undated) DEVICE — PACING CABLE (BLUE) ATRIAL TEMP SCREW DOWN 12FT

## (undated) DEVICE — SUT STAINLESS STEEL 7 4-18" CCS

## (undated) DEVICE — DRAPE FLUID WARMER 44 X 66"

## (undated) DEVICE — GOWN TRIMAX XXL

## (undated) DEVICE — DRAPE TOWEL WHITE 17" X 24"

## (undated) DEVICE — TUBING SMOKE EVAC 3/8" X 10FT FOR NEPTUNE

## (undated) DEVICE — DRSG BIOPATCH DISK W CHG 1" W 4.0MM HOLE

## (undated) DEVICE — KNIFE ALCON STANDARD FULL HANDLE 15 DEG (PINK)

## (undated) DEVICE — AROS VESSEL CLAMP SINGLE LARGE (YELLOW) 120G

## (undated) DEVICE — SUT NUROLON 1 18" OS-8 (POP-OFF)

## (undated) DEVICE — SUT PROLENE 8-0 24" BV175-6

## (undated) DEVICE — SUT PROLENE 3-0 36" SH-1

## (undated) DEVICE — BLADE ETHICON HARMONIC SYNERGY HOOK TIP 3MM 4CM-9CM

## (undated) DEVICE — NDL BLUNT 18G LOOP VESSEL MAXI WHITE

## (undated) DEVICE — SOL ANTI FOG

## (undated) DEVICE — ELCTR BOVIE TIP BLADE INSULATED 3" EDGE

## (undated) DEVICE — SUT PROLENE 6-0 30" RB-2

## (undated) DEVICE — TUBING STRYKER PNEUMOCLEAR HIGH FLOW

## (undated) DEVICE — SUT PROLENE 7-0 24" BV175-6

## (undated) DEVICE — SUT ETHIBOND 0 18" TIES

## (undated) DEVICE — PREP SCRUB BRUSH W CHG 4%

## (undated) DEVICE — SUT MONOCRYL 4-0 27" PS-2 UNDYED

## (undated) DEVICE — BLADE SCALPEL SAFETY #11 WITH PLASTIC GREEN HANDLE

## (undated) DEVICE — FOLEY TRAY 16FR 5CC LF LUBRISIL ADVANCE TEMP CLOSED

## (undated) DEVICE — PACING CABLE (BROWN) A/V TEMP SCREW DOWN 12FT

## (undated) DEVICE — DRAPE TOWEL BLUE 17" X 24"

## (undated) DEVICE — CATH CV TRAY INSR ST UNIV

## (undated) DEVICE — DRAPE PROBE COVER 5" X 96"

## (undated) DEVICE — ELCTR STRYKER EXTENSION SUCTION TIP 125MM

## (undated) DEVICE — SUT SILK 2-0 18" SH (POP-OFF)

## (undated) DEVICE — SUT STAINLESS STEEL 6 4-18" CCS

## (undated) DEVICE — TUBING BRAT 2 SUCTION ASSEMBLY TWIST LOCK

## (undated) DEVICE — DRAPE U POLY BLUE 60X72"

## (undated) DEVICE — DRSG ALLEVYN LIFE 6 X 6 (PINK)

## (undated) DEVICE — DRSG ACE BANDAGE 6"

## (undated) DEVICE — SUT PROLENE BV 130-5 8-0

## (undated) DEVICE — ELCTR BOVIE TIP BLADE INSULATED 4" EDGE

## (undated) DEVICE — SUT VICRYL 0 27" CT

## (undated) DEVICE — SUT SILK 0 18" CT-1 (POP-OFF)

## (undated) DEVICE — BLOWER MISTER AXIUS WITH IV SET

## (undated) DEVICE — MEDTRONIC URCHIN EVO HEART POSITIONER & CANISTER TUBING SET

## (undated) DEVICE — WARMING BLANKET FULL ADULT

## (undated) DEVICE — SUT SILK 2-0 30" SH

## (undated) DEVICE — PUNCH VASC STD 7.75" HANDLE 4MM DISP

## (undated) DEVICE — SUT PROLENE 7-0 24" BV-1

## (undated) DEVICE — SUT DOUBLE 6 WIRE STERNAL

## (undated) DEVICE — GLV 6.5 PROTEXIS (WHITE)